# Patient Record
Sex: MALE | Race: WHITE
[De-identification: names, ages, dates, MRNs, and addresses within clinical notes are randomized per-mention and may not be internally consistent; named-entity substitution may affect disease eponyms.]

---

## 2018-09-12 ENCOUNTER — HOSPITAL ENCOUNTER (INPATIENT)
Dept: HOSPITAL 75 - ER | Age: 63
LOS: 5 days | Discharge: HOME | DRG: 392 | End: 2018-09-17
Attending: SURGERY | Admitting: SURGERY
Payer: COMMERCIAL

## 2018-09-12 VITALS — WEIGHT: 191.63 LBS | HEIGHT: 69 IN | BODY MASS INDEX: 28.38 KG/M2

## 2018-09-12 VITALS — SYSTOLIC BLOOD PRESSURE: 108 MMHG | DIASTOLIC BLOOD PRESSURE: 65 MMHG

## 2018-09-12 VITALS — DIASTOLIC BLOOD PRESSURE: 74 MMHG | SYSTOLIC BLOOD PRESSURE: 124 MMHG

## 2018-09-12 DIAGNOSIS — K57.20: Primary | ICD-10-CM

## 2018-09-12 DIAGNOSIS — K21.9: ICD-10-CM

## 2018-09-12 DIAGNOSIS — Z79.899: ICD-10-CM

## 2018-09-12 DIAGNOSIS — F32.9: ICD-10-CM

## 2018-09-12 LAB
ALBUMIN SERPL-MCNC: 4.5 GM/DL (ref 3.2–4.5)
ALP SERPL-CCNC: 85 U/L (ref 40–136)
ALT SERPL-CCNC: 24 U/L (ref 0–55)
APTT PPP: YELLOW S
BACTERIA #/AREA URNS HPF: (no result) /HPF
BASOPHILS # BLD AUTO: 0 10^3/UL (ref 0–0.1)
BASOPHILS NFR BLD AUTO: 0 % (ref 0–10)
BILIRUB SERPL-MCNC: 1.3 MG/DL (ref 0.1–1)
BILIRUB UR QL STRIP: NEGATIVE
BUN/CREAT SERPL: 10
CALCIUM SERPL-MCNC: 9.5 MG/DL (ref 8.5–10.1)
CHLORIDE SERPL-SCNC: 105 MMOL/L (ref 98–107)
CO2 SERPL-SCNC: 22 MMOL/L (ref 21–32)
CREAT SERPL-MCNC: 0.93 MG/DL (ref 0.6–1.3)
EOSINOPHIL # BLD AUTO: 0.1 10^3/UL (ref 0–0.3)
EOSINOPHIL NFR BLD AUTO: 1 % (ref 0–10)
ERYTHROCYTE [DISTWIDTH] IN BLOOD BY AUTOMATED COUNT: 13.1 % (ref 10–14.5)
FIBRINOGEN PPP-MCNC: CLEAR MG/DL
GFR SERPLBLD BASED ON 1.73 SQ M-ARVRAT: > 60 ML/MIN
GLUCOSE SERPL-MCNC: 107 MG/DL (ref 70–105)
GLUCOSE UR STRIP-MCNC: NEGATIVE MG/DL
HCT VFR BLD CALC: 48 % (ref 40–54)
HGB BLD-MCNC: 16.8 G/DL (ref 13.3–17.7)
KETONES UR QL STRIP: (no result)
LEUKOCYTE ESTERASE UR QL STRIP: (no result)
LYMPHOCYTES # BLD AUTO: 1.1 X 10^3 (ref 1–4)
LYMPHOCYTES NFR BLD AUTO: 8 % (ref 12–44)
MAGNESIUM SERPL-MCNC: 2.2 MG/DL (ref 1.8–2.4)
MANUAL DIFFERENTIAL PERFORMED BLD QL: NO
MCH RBC QN AUTO: 32 PG (ref 25–34)
MCHC RBC AUTO-ENTMCNC: 35 G/DL (ref 32–36)
MCV RBC AUTO: 92 FL (ref 80–99)
MONOCYTES # BLD AUTO: 1 X 10^3 (ref 0–1)
MONOCYTES NFR BLD AUTO: 8 % (ref 0–12)
NEUTROPHILS # BLD AUTO: 10.5 X 10^3 (ref 1.8–7.8)
NEUTROPHILS NFR BLD AUTO: 83 % (ref 42–75)
NITRITE UR QL STRIP: NEGATIVE
PH UR STRIP: 5 [PH] (ref 5–9)
PLATELET # BLD: 220 10^3/UL (ref 130–400)
PMV BLD AUTO: 9.4 FL (ref 7.4–10.4)
POTASSIUM SERPL-SCNC: 3.9 MMOL/L (ref 3.6–5)
PROT SERPL-MCNC: 7.8 GM/DL (ref 6.4–8.2)
PROT UR QL STRIP: (no result)
RBC # BLD AUTO: 5.21 10^6/UL (ref 4.35–5.85)
RBC #/AREA URNS HPF: (no result) /HPF
SODIUM SERPL-SCNC: 138 MMOL/L (ref 135–145)
SP GR UR STRIP: 1 (ref 1.02–1.02)
UROBILINOGEN UR-MCNC: NORMAL MG/DL
WBC # BLD AUTO: 12.7 10^3/UL (ref 4.3–11)
WBC #/AREA URNS HPF: (no result) /HPF

## 2018-09-12 PROCEDURE — 96374 THER/PROPH/DIAG INJ IV PUSH: CPT

## 2018-09-12 PROCEDURE — 85025 COMPLETE CBC W/AUTO DIFF WBC: CPT

## 2018-09-12 PROCEDURE — 85027 COMPLETE CBC AUTOMATED: CPT

## 2018-09-12 PROCEDURE — 87088 URINE BACTERIA CULTURE: CPT

## 2018-09-12 PROCEDURE — 80053 COMPREHEN METABOLIC PANEL: CPT

## 2018-09-12 PROCEDURE — 80048 BASIC METABOLIC PNL TOTAL CA: CPT

## 2018-09-12 PROCEDURE — 83735 ASSAY OF MAGNESIUM: CPT

## 2018-09-12 PROCEDURE — 81000 URINALYSIS NONAUTO W/SCOPE: CPT

## 2018-09-12 PROCEDURE — 36415 COLL VENOUS BLD VENIPUNCTURE: CPT

## 2018-09-12 PROCEDURE — 83605 ASSAY OF LACTIC ACID: CPT

## 2018-09-12 PROCEDURE — 74177 CT ABD & PELVIS W/CONTRAST: CPT

## 2018-09-12 RX ADMIN — MORPHINE SULFATE PRN MG: 4 INJECTION, SOLUTION INTRAMUSCULAR; INTRAVENOUS at 20:30

## 2018-09-12 RX ADMIN — METRONIDAZOLE SCH MLS/HR: 5 INJECTION, SOLUTION INTRAVENOUS at 22:14

## 2018-09-12 RX ADMIN — SODIUM CHLORIDE, SODIUM LACTATE, POTASSIUM CHLORIDE, AND CALCIUM CHLORIDE SCH MLS/HR: 600; 310; 30; 20 INJECTION, SOLUTION INTRAVENOUS at 20:25

## 2018-09-12 RX ADMIN — MORPHINE SULFATE PRN MG: 4 INJECTION, SOLUTION INTRAMUSCULAR; INTRAVENOUS at 22:46

## 2018-09-12 NOTE — XMS REPORT
Continuity of Care Document

 Created on: 2018



ROGERIO RODRIGUEZ

External Reference #: [id number]

: 1955

Sex: Male



Demographics







 Address  [address] [place] [address]

 

 Home Phone  [phone] x

 

 Preferred Language  Unknown

 

 Marital Status  Unknown

 

 [employer] Affiliation  Unknown

 

 Race  Unknown

 

 Ethnic Group  Unknown





Author







 Author  [facility]

 

 Address  Unknown

 

 Phone  Unavailable



              



Allergies

      



There is no data.                  



Medications

      



There is no data.                  



Problems

      





 Date Dx Coded            Attending            Type            Code            
Diagnosis            Diagnosed By        

 

 10/06/2011            DARIAN FARRAR PHD                         728.71    
        Plantar Fascial Fibromatosis                     

 

 10/06/2011            RASHI DIAMOND DO                         728.71      
      Plantar Fascial Fibromatosis                     

 

 10/06/2011            DARIAN FARRAR PHD                         728.71    
        Plantar Fascial Fibromatosis                     

 

 10/06/2011            ESSIE MCMANUS DO                         728.71         
   Plantar Fascial Fibromatosis                     

 

 10/06/2011                                      728.71            Plantar 
Fascial Fibromatosis                     

 

 10/06/2011                                      728.71            Plantar 
Fascial Fibromatosis                     

 

 10/06/2011                                      728.71            Plantar 
Fascial Fibromatosis                     

 

 10/06/2011                                      728.71            Plantar 
Fascial Fibromatosis                     

 

 10/06/2011                                      728.71            Plantar 
Fascial Fibromatosis                     

 

 10/06/2011            KYLAH MELGAR PA-C                         728.71    
        Plantar Fascial Fibromatosis                     

 

 10/06/2011            DARIAN FARRAR PHD                         728.71    
        Plantar Fascial Fibromatosis                     

 

 10/06/2011            ESSIE MCMANUS DO                         728.71         
   Plantar Fascial Fibromatosis                     

 

 10/06/2011            DARIAN FARRAR PHD                         728.71    
        Plantar Fascial Fibromatosis                     

 

 10/06/2011            EDWARD REYNAGA                         728.71 
           Plantar Fascial Fibromatosis                     

 

 10/06/2011            DARIAN FARRAR PHD                         728.71    
        Plantar Fascial Fibromatosis                     

 

 10/06/2011            DARIAN FARRAR PHD                         728.71    
        Plantar Fascial Fibromatosis                     

 

 10/06/2011            DARIAN FARRAR PHD                         728.71    
        Plantar Fascial Fibromatosis                     

 

 10/06/2011            EDWARD REYNAGA                         728.71 
           Plantar Fascial Fibromatosis                     

 

 10/06/2011            DARIAN FARRAR PHD                         728.71    
        Plantar Fascial Fibromatosis                     

 

 10/06/2011            ESSIE MCMANUS DO                         728.71         
   Plantar Fascial Fibromatosis                     

 

 10/06/2011            VERONICA APRN, JEFFREY                         728.71      
      Plantar Fascial Fibromatosis                     

 

 10/06/2011            DARIAN FARRAR PHD                         728.71    
        Plantar Fascial Fibromatosis                     

 

 10/06/2011            VERONICA APRN, JEFFREY                         728.71      
      Plantar Fascial Fibromatosis                     

 

 10/06/2011            DARIAN FARRAR PHD                         728.71    
        Plantar Fascial Fibromatosis                     

 

 10/21/2011            DARIAN FARRAR PHD                         311       
     DEPRESSIVE DISORDER NOT ELSEWHERE CLASSIFIED                     

 

 10/21/2011            DARIAN FARRAR PHD                         729.5     
       PAIN IN LIMB                     

 

 10/21/2011            RASHI IDAMOND DO F                         311         
   DEPRESSIVE DISORDER NOT ELSEWHERE CLASSIFIED                     

 

 10/21/2011            RASHI DIAMOND DO F                         729.5       
     PAIN IN LIMB                     

 

 10/21/2011            DARIAN FARRAR PHD                         311       
     DEPRESSIVE DISORDER NOT ELSEWHERE CLASSIFIED                     

 

 10/21/2011            DARIAN FARRAR PHD                         729.5     
       PAIN IN LIMB                     

 

 10/21/2011            ESSIE MCMANUS DO                         311            
DEPRESSIVE DISORDER NOT ELSEWHERE CLASSIFIED                     

 

 10/21/2011            ESSIE MCMANUS DO                         729.5          
  Pain In Limb                     

 

 10/21/2011                                      311            DEPRESSIVE 
DISORDER NOT ELSEWHERE CLASSIFIED                     

 

 10/21/2011                                      729.5            Pain In Limb 
                    

 

 10/21/2011                                      311            DEPRESSIVE 
DISORDER NOT ELSEWHERE CLASSIFIED                     

 

 10/21/2011                                      729.5            Pain In Limb 
                    

 

 10/21/2011                                      311            DEPRESSIVE 
DISORDER NOT ELSEWHERE CLASSIFIED                     

 

 10/21/2011                                      729.5            Pain In Limb 
                    

 

 10/21/2011                                      311            DEPRESSIVE 
DISORDER NOT ELSEWHERE CLASSIFIED                     

 

 10/21/2011                                      729.5            Pain In Limb 
                    

 

 10/21/2011                                      311            DEPRESSIVE 
DISORDER NOT ELSEWHERE CLASSIFIED                     

 

 10/21/2011                                      729.5            Pain In Limb 
                    

 

 10/21/2011            KYLAH MELGAR PA-C                         311       
     DEPRESSIVE DISORDER NOT ELSEWHERE CLASSIFIED                     

 

 10/21/2011            KYLAH MELGAR PA-C                         729.5     
       Pain In Limb                     

 

 10/21/2011            DARIAN FARRAR PHD                         311       
     DEPRESSIVE DISORDER NOT ELSEWHERE CLASSIFIED                     

 

 10/21/2011            DARIAN FARRAR PHD                         729.5     
       Pain In Limb                     

 

 10/21/2011            ESSIE MCMANUS DO                         311            
DEPRESSIVE DISORDER NOT ELSEWHERE CLASSIFIED                     

 

 10/21/2011            ESSIE MCMANUS DO                         729.5          
  Pain In Limb                     

 

 10/21/2011            DARIAN FARRAR PHD                         311       
     DEPRESSIVE DISORDER NOT ELSEWHERE CLASSIFIED                     

 

 10/21/2011            DARIAN FARRAR PHD                         729.5     
       Pain In Limb                     

 

 10/21/2011            VENKATA APREDWARD MAE                         311    
        DEPRESSIVE DISORDER NOT ELSEWHERE CLASSIFIED                     

 

 10/21/2011            VENKATA APREDWARD MAE                         729.5  
          Pain In Limb                     

 

 10/21/2011            DARIAN FARRAR PHD                         311       
     DEPRESSIVE DISORDER NOT ELSEWHERE CLASSIFIED                     

 

 10/21/2011            DARIAN FARRAR PHD                         729.5     
       Pain In Limb                     

 

 10/21/2011            DARIAN FARRAR PHD                         311       
     DEPRESSIVE DISORDER NOT ELSEWHERE CLASSIFIED                     

 

 10/21/2011            DARIAN FARRAR PHD                         729.5     
       Pain In Limb                     

 

 10/21/2011            DARIAN FARRAR PHD                         311       
     DEPRESSIVE DISORDER NOT ELSEWHERE CLASSIFIED                     

 

 10/21/2011            DARIAN FARRAR PHD                         729.5     
       Pain In Limb                     

 

 10/21/2011            EDWARD REYNAGA                         311    
        DEPRESSIVE DISORDER NOT ELSEWHERE CLASSIFIED                     

 

 10/21/2011            HASTINGS APREDWARD MAE                         729.5  
          Pain In Limb                     

 

 10/21/2011            DARIAN FARRAR PHD                         311       
     DEPRESSIVE DISORDER NOT ELSEWHERE CLASSIFIED                     

 

 10/21/2011            DARIAN FARRAR PHD                         729.5     
       Pain In Limb                     

 

 10/21/2011            MCMANUS DO, ESSIE K                         311            
DEPRESSIVE DISORDER NOT ELSEWHERE CLASSIFIED                     

 

 10/21/2011            MCMANUS DO, ESSIE K                         729.5          
  Pain In Limb                     

 

 10/21/2011            VERONICA APRN, JEFFREY                         311         
   DEPRESSIVE DISORDER NOT ELSEWHERE CLASSIFIED                     

 

 10/21/2011            VERONICA APRN, JEFFREY                         729.5       
     Pain In Limb                     

 

 10/21/2011            DARIAN FARRAR PHD                         311       
     DEPRESSIVE DISORDER NOT ELSEWHERE CLASSIFIED                     

 

 10/21/2011            DARIAN FARRAR PHD                         729.5     
       Pain In Limb                     

 

 10/21/2011            VERONICA APRN, JEFFREY                         311         
   DEPRESSIVE DISORDER NOT ELSEWHERE CLASSIFIED                     

 

 10/21/2011            VERONICA APRN, JEFFREY                         729.5       
     Pain In Limb                     

 

 10/21/2011            DARIAN FARRAR PHD                         311       
     DEPRESSIVE DISORDER NOT ELSEWHERE CLASSIFIED                     

 

 10/21/2011            DARIAN FARRAR PHD                         729.5     
       Pain In Limb                     

 

 10/22/2011            DARIAN FARRAR PHD                         296.30    
        MO DEPRESSIVE RECURRENT UNSPECIFIED                     

 

 10/22/2011            DARIAN FARRAR PHD                         304.80    
        SA POLYSUB DEP                     

 

 10/22/2011            DARIAN FARRAR PHD                         307.47    
        SI DYSSOMNIA NOS                     

 

 10/22/2011            WERDER DO, RASHI F                         296.30      
      MO DEPRESSIVE RECURRENT UNSPECIFIED                     

 

 10/22/2011            WERDER DO, RASHI F                         304.80      
      SA POLYSUB DEP                     

 

 10/22/2011            WERDER DO, RASHI F                         307.47      
      SI DYSSOMNIA NOS                     

 

 10/22/2011            DARIAN FARRAR PHD                         296.30    
        MO DEPRESSIVE RECURRENT UNSPECIFIED                     

 

 10/22/2011            DARIAN FARRAR PHD                         304.80    
        SA POLYSUB DEP                     

 

 10/22/2011            DARIAN FARRAR PHD                         307.47    
        SI DYSSOMNIA NOS                     

 

 10/22/2011            MCMANUS DO, ESSIE K                         296.30         
   MO DEPRESSIVE RECURRENT UNSPECIFIED                     

 

 10/22/2011            MCMANUS DO, ESSIE K                         304.80         
   SA POLYSUB DEP                     

 

 10/22/2011            MCMANUS DO, ESSIE K                         307.47         
   SI DYSSOMNIA NOS                     

 

 10/22/2011                                      296.30            MO 
DEPRESSIVE RECURRENT UNSPECIFIED                     

 

 10/22/2011                                      304.80            SA POLYSUB 
DEP                     

 

 10/22/2011                                      307.47            SI DYSSOMNIA 
NOS                     

 

 10/22/2011                                      296.30            MO 
DEPRESSIVE RECURRENT UNSPECIFIED                     

 

 10/22/2011                                      304.80            SA POLYSUB 
DEP                     

 

 10/22/2011                                      307.47            SI DYSSOMNIA 
NOS                     

 

 10/22/2011                                      296.30            MO 
DEPRESSIVE RECURRENT UNSPECIFIED                     

 

 10/22/2011                                      304.80            SA POLYSUB 
DEP                     

 

 10/22/2011                                      307.47            SI DYSSOMNIA 
NOS                     

 

 10/22/2011                                      296.30            MO 
DEPRESSIVE RECURRENT UNSPECIFIED                     

 

 10/22/2011                                      304.80            SA POLYSUB 
DEP                     

 

 10/22/2011                                      307.47            SI DYSSOMNIA 
NOS                     

 

 10/22/2011                                      296.30            MO 
DEPRESSIVE RECURRENT UNSPECIFIED                     

 

 10/22/2011                                      304.80            SA POLYSUB 
DEP                     

 

 10/22/2011                                      307.47            SI DYSSOMNIA 
NOS                     

 

 10/22/2011            KYLAH MELGAR PA-C                         296.30    
        MO DEPRESSIVE RECURRENT UNSPECIFIED                     

 

 10/22/2011            KYLAH MELGAR PA-C                         304.80    
        SA POLYSUB DEP                     

 

 10/22/2011            KYLAH MELGAR PA-C                         307.47    
        SI DYSSOMNIA NOS                     

 

 10/22/2011            DARIAN FARRAR PHD                         296.30    
        MO DEPRESSIVE RECURRENT UNSPECIFIED                     

 

 10/22/2011            DARIAN FARRAR PHD                         304.80    
        SA POLYSUB DEP                     

 

 10/22/2011            DARIAN FARRAR PHD                         307.47    
        SI DYSSOMNIA NOS                     

 

 10/22/2011            MCMANUS DO, ESSIE K                         296.30         
   MO DEPRESSIVE RECURRENT UNSPECIFIED                     

 

 10/22/2011            MCMANUS DO, ESSIE K                         304.80         
   SA POLYSUB DEP                     

 

 10/22/2011            MCMANUS DO, ESSIE K                         307.47         
   SI DYSSOMNIA NOS                     

 

 10/22/2011            DARIAN FARRAR PHD                         296.30    
        MO DEPRESSIVE RECURRENT UNSPECIFIED                     

 

 10/22/2011            DARIAN FARRAR PHD                         304.80    
        SA POLYSUB DEP                     

 

 10/22/2011            DARIAN FARRAR PHD                         307.47    
        SI DYSSOMNIA NOS                     

 

 10/22/2011            HASTINGS APRN, EDWARD VAUGHN                         296.30 
           MO DEPRESSIVE RECURRENT UNSPECIFIED                     

 

 10/22/2011            HASTINGS APRN, EDWARD RIVERAH                         304.80 
           SA POLYSUB DEP                     

 

 10/22/2011            HASTINGS APRN, EDWARD VAUGHN                         307.47 
           SI DYSSOMNIA NOS                     

 

 10/22/2011            DARIAN FARRAR PHD                         296.30    
        MO DEPRESSIVE RECURRENT UNSPECIFIED                     

 

 10/22/2011            DARIAN FARRAR PHD                         304.80    
        SA POLYSUB DEP                     

 

 10/22/2011            DARIAN FARRAR PHD                         307.47    
        SI DYSSOMNIA NOS                     

 

 10/22/2011            DARIAN FARRAR PHD                         296.30    
        MO DEPRESSIVE RECURRENT UNSPECIFIED                     

 

 10/22/2011            DARIAN FARRAR PHD                         304.80    
        SA POLYSUB DEP                     

 

 10/22/2011            DARIAN FARRAR PHD                         307.47    
        SI DYSSOMNIA NOS                     

 

 10/22/2011            DARIAN FARRAR PHD                         296.30    
        MO DEPRESSIVE RECURRENT UNSPECIFIED                     

 

 10/22/2011            DARIAN FARRAR PHD                         304.80    
        SA POLYSUB DEP                     

 

 10/22/2011            DARIAN FARRAR PHD                         307.47    
        SI DYSSOMNIA NOS                     

 

 10/22/2011            HASTINGS APRN, EDWARD VAUGHN                         296.30 
           MO DEPRESSIVE RECURRENT UNSPECIFIED                     

 

 10/22/2011            HASTINGS APRN, EDWARD RIVERAH                         304.80 
           SA POLYSUB DEP                     

 

 10/22/2011            HASTINGS APRN, EDWARD VAUGHN                         307.47 
           SI DYSSOMNIA NOS                     

 

 10/22/2011            DARIAN FARRAR PHD                         296.30    
        MO DEPRESSIVE RECURRENT UNSPECIFIED                     

 

 10/22/2011            DARIAN FARRAR PHD                         304.80    
        SA POLYSUB DEP                     

 

 10/22/2011            DARIAN FARRAR PHD                         307.47    
        SI DYSSOMNIA NOS                     

 

 10/22/2011            ESSIE MCMANUS DO K                         296.30         
   MO DEPRESSIVE RECURRENT UNSPECIFIED                     

 

 10/22/2011            ESSIE MCMANUS DO K                         304.80         
   SA POLYSUB DEP                     

 

 10/22/2011            MCMANUS DOESSIE K                         307.47         
   SI DYSSOMNIA NOS                     

 

 10/22/2011            VERONICA APRN, JEFFREY                         296.30      
      MO DEPRESSIVE RECURRENT UNSPECIFIED                     

 

 10/22/2011            VERONICA APRN, JEFFREY                         304.80      
      SA POLYSUB DEP                     

 

 10/22/2011            VERONICA APRN, JEFFREY                         307.47      
      SI DYSSOMNIA NOS                     

 

 10/22/2011            DARIAN FARRAR PHD                         296.30    
        MO DEPRESSIVE RECURRENT UNSPECIFIED                     

 

 10/22/2011            DARIAN FARRAR PHD                         304.80    
        SA POLYSUB DEP                     

 

 10/22/2011            DARIAN FARRAR PHD                         307.47    
        SI DYSSOMNIA NOS                     

 

 10/22/2011            VERONICA APRN, JEFFREY                         296.30      
      MO DEPRESSIVE RECURRENT UNSPECIFIED                     

 

 10/22/2011            VERONICA APRN, JEFFREY                         304.80      
      SA POLYSUB DEP                     

 

 10/22/2011            VERONICA APRN, JEFFREY                         307.47      
      SI DYSSOMNIA NOS                     

 

 10/22/2011            DARIAN FARRAR PHD                         296.30    
        MO DEPRESSIVE RECURRENT UNSPECIFIED                     

 

 10/22/2011            DARIAN FARRAR PHD                         304.80    
        SA POLYSUB DEP                     

 

 10/22/2011            DARIAN FARRAR PHD                         307.47    
        SI DYSSOMNIA NOS                     

 

 2011            DARIAN FARRAR PHD                         296.32    
        MO DEPRESSIVE RECURRENT MODERATE                     

 

 2011            RASHI DIAMOND DO                         296.32      
      MO DEPRESSIVE RECURRENT MODERATE                     

 

 2011            DARIAN FARRAR PHD                         296.32    
        MO DEPRESSIVE RECURRENT MODERATE                     

 

 2011            ESSIE MCMANUS DO                         296.32         
   MO DEPRESSIVE RECURRENT MODERATE                     

 

 2011                                      296.32            MO 
DEPRESSIVE RECURRENT MODERATE                     

 

 2011                                      296.32            MO 
DEPRESSIVE RECURRENT MODERATE                     

 

 2011                                      296.32            MO 
DEPRESSIVE RECURRENT MODERATE                     

 

 2011                                      296.32            MO 
DEPRESSIVE RECURRENT MODERATE                     

 

 2011                                      296.32            MO 
DEPRESSIVE RECURRENT MODERATE                     

 

 2011            KYLAH MELGAR PA-C                         296.32    
        MO DEPRESSIVE RECURRENT MODERATE                     

 

 2011            DARIAN FARRAR PHD                         296.32    
        MO DEPRESSIVE RECURRENT MODERATE                     

 

 2011            ESSIE MCMANUS DO                         296.32         
   MO DEPRESSIVE RECURRENT MODERATE                     

 

 2011            DARIAN FARRAR PHD                         296.32    
        MO DEPRESSIVE RECURRENT MODERATE                     

 

 2011            HASTINGS APRTU, EDWARD VAUGHN                         296.32 
           MO DEPRESSIVE RECURRENT MODERATE                     

 

 2011            LENI YAN, DARIAN JARA                         296.32    
        MO DEPRESSIVE RECURRENT MODERATE                     

 

 2011            DARIAN FARRAR PHD                         296.32    
        MO DEPRESSIVE RECURRENT MODERATE                     

 

 2011            LENI YAN, DARIAN JARA                         296.32    
        MO DEPRESSIVE RECURRENT MODERATE                     

 

 2011            HASTINGS APRTU, EDWARD VAUGHN                         296.32 
           MO DEPRESSIVE RECURRENT MODERATE                     

 

 2011            LENI YAN, DARIAN JARA                         296.32    
        MO DEPRESSIVE RECURRENT MODERATE                     

 

 2011            ESSIE MCMANUS DO                         296.32         
   MO DEPRESSIVE RECURRENT MODERATE                     

 

 2011            VERONICA APRN, JEFFREY                         296.32      
      MO DEPRESSIVE RECURRENT MODERATE                     

 

 2011            DARIAN FARRAR PHD                         296.32    
        MO DEPRESSIVE RECURRENT MODERATE                     

 

 2011            VERONICA APRN, JEFFREY                         296.32      
      MO DEPRESSIVE RECURRENT MODERATE                     

 

 2011            DARIAN FARRAR PHD                         296.32    
        MO DEPRESSIVE RECURRENT MODERATE                     

 

 2012            DARIAN FARRAR PHD                         111.0     
       PITYRIASIS VERSICOLOR                     

 

 2012            DARIAN FARRAR PHD                         272.4     
       OTHER AND UNSPECIFIED HYPERLIPIDEMIA                     

 

 2012            RASHI DIAMOND DO F                         111.0       
     PITYRIASIS VERSICOLOR                     

 

 2012            RASHI DIAMOND DO F                         272.4       
     OTHER AND UNSPECIFIED HYPERLIPIDEMIA                     

 

 2012            DARIAN FARRAR PHD                         111.0     
       PITYRIASIS VERSICOLOR                     

 

 2012            DARIAN FARRAR PHD                         272.4     
       OTHER AND UNSPECIFIED HYPERLIPIDEMIA                     

 

 2012            ESSIE MCMANUS DO                         111.0          
  PITYRIASIS VERSICOLOR                     

 

 2012            ESSIE MCMANUS DO                         272.4          
  OTHER AND UNSPECIFIED HYPERLIPIDEMIA                     

 

 2012                                      111.0            [facility] 
VERSICOLOR                     

 

 2012                                      272.4            OTHER AND 
UNSPECIFIED HYPERLIPIDEMIA                     

 

 2012                                      111.0            [facility] 
VERSICOLOR                     

 

 2012                                      272.4            OTHER AND 
UNSPECIFIED HYPERLIPIDEMIA                     

 

 2012                                      111.0            [facility] 
VERSICOLOR                     

 

 2012                                      272.4            OTHER AND 
UNSPECIFIED HYPERLIPIDEMIA                     

 

 2012                                      111.0            [facility] 
VERSICOLOR                     

 

 2012                                      272.4            OTHER AND 
UNSPECIFIED HYPERLIPIDEMIA                     

 

 2012                                      111.0            [facility] 
VERSICOLOR                     

 

 2012                                      272.4            OTHER AND 
UNSPECIFIED HYPERLIPIDEMIA                     

 

 2012            KYLAH MELGAR PA-C                         111.0     
       [facility] VERSICOLOR                     

 

 2012            KYLAH MELGAR PA-C                         272.4     
       OTHER AND UNSPECIFIED HYPERLIPIDEMIA                     

 

 2012            DARIAN FARRAR PHD A                         111.0     
       [facility] VERSICOLOR                     

 

 2012            DARIAN FARRAR PHD A                         272.4     
       OTHER AND UNSPECIFIED HYPERLIPIDEMIA                     

 

 2012            MCMANUS VERONIKA CABRALA K                         111.0          
  [facility] VERSICOLOR                     

 

 2012            ESSIE MCMANUS DO K                         272.4          
  OTHER AND UNSPECIFIED HYPERLIPIDEMIA                     

 

 2012            DARIAN FARRAR PHD A                         111.0     
       [facility] VERSICOLOR                     

 

 2012            DARIAN FARRAR PHD A                         272.4     
       OTHER AND UNSPECIFIED HYPERLIPIDEMIA                     

 

 2012            VENKATA WADDELL EDWARD RODERICK                         111.0  
          [facility] VERSICOLOR                     

 

 2012            VENKATA WADDELL EDWARD RODERICK                         272.4  
          OTHER AND UNSPECIFIED HYPERLIPIDEMIA                     

 

 2012            DARIAN FARRAR PHD A                         111.0     
       [facility] VERSICOLOR                     

 

 2012            DARIAN FARRAR PHD                         272.4     
       OTHER AND UNSPECIFIED HYPERLIPIDEMIA                     

 

 2012            DARIAN FARRAR PHD A                         111.0     
       [facility] VERSICOLOR                     

 

 2012            DARIAN FARRAR PHD A                         272.4     
       OTHER AND UNSPECIFIED HYPERLIPIDEMIA                     

 

 2012            DARIAN FARRAR PHD A                         111.0     
       [facility] VERSICOLOR                     

 

 2012            DARIAN FARRAR PHD A                         272.4     
       OTHER AND UNSPECIFIED HYPERLIPIDEMIA                     

 

 2012            VENKATA WADDELL EDWARD RODERICK                         111.0  
          [facility] VERSICOLOR                     

 

 2012            VENKATA WADDELL EDWARD RODERICK                         272.4  
          OTHER AND UNSPECIFIED HYPERLIPIDEMIA                     

 

 2012            DARIAN FARRAR PHD A                         111.0     
       [facility] VERSICOLOR                     

 

 2012            DARIAN FARRAR PHD A                         272.4     
       OTHER AND UNSPECIFIED HYPERLIPIDEMIA                     

 

 2012            MCMANUS VERONIKA CABRALA K                         111.0          
  [facility] VERSICOLOR                     

 

 2012            ESSIE MCMANUS DO                         272.4          
  OTHER AND UNSPECIFIED HYPERLIPIDEMIA                     

 

 2012            VERONICA APRN, JEFFREY                         111.0       
     [facility] VERSICOLOR                     

 

 2012            VERONICA APRN, JEFFREY                         272.4       
     OTHER AND UNSPECIFIED HYPERLIPIDEMIA                     

 

 2012            DARIAN FARRAR PHD                         111.0     
       [facility] VERSICOLOR                     

 

 2012            DARIAN FARRAR PHD                         272.4     
       OTHER AND UNSPECIFIED HYPERLIPIDEMIA                     

 

 2012            VERONICA APRN, JEFFREY                         111.0       
     [facility] VERSICOLOR                     

 

 2012            VERONICA APRN, JEFFREY                         272.4       
     OTHER AND UNSPECIFIED HYPERLIPIDEMIA                     

 

 2012            DARIAN FARRAR PHD                         111.0     
       [facility] VERSICOLOR                     

 

 2012            DARIAN FARRAR PHD                         272.4     
       OTHER AND UNSPECIFIED HYPERLIPIDEMIA                     

 

 2012            DARIAN FARRAR PHD                         686.9     
       UNSPECIFIED LOCAL INFECTION OF SKIN AND SUBCUTANEOUS TISSUE             
        

 

 2012            RASHI DIAMOND DO                         686.9       
     UNSPECIFIED LOCAL INFECTION OF SKIN AND SUBCUTANEOUS TISSUE               
      

 

 2012            DARIAN FARRAR PHD                         686.9     
       UNSPECIFIED LOCAL INFECTION OF SKIN AND SUBCUTANEOUS TISSUE             
        

 

 2012            ESSIE MCMANUS DO                         686.9          
  UNSPECIFIED LOCAL INFECTION OF SKIN AND SUBCUTANEOUS TISSUE                  
   

 

 2012                                      686.9            UNSPECIFIED 
LOCAL INFECTION OF SKIN AND SUBCUTANEOUS TISSUE                     

 

 2012                                      686.9            UNSPECIFIED 
LOCAL INFECTION OF SKIN AND SUBCUTANEOUS TISSUE                     

 

 2012                                      686.9            UNSPECIFIED 
LOCAL INFECTION OF SKIN AND SUBCUTANEOUS TISSUE                     

 

 2012                                      686.9            UNSPECIFIED 
LOCAL INFECTION OF SKIN AND SUBCUTANEOUS TISSUE                     

 

 2012                                      686.9            UNSPECIFIED 
LOCAL INFECTION OF SKIN AND SUBCUTANEOUS TISSUE                     

 

 2012            KYLAH MELGAR PA-C                         686.9     
       UNSPECIFIED LOCAL INFECTION OF SKIN AND SUBCUTANEOUS TISSUE             
        

 

 2012            DARIAN FARRAR PHD                         686.9     
       UNSPECIFIED LOCAL INFECTION OF SKIN AND SUBCUTANEOUS TISSUE             
        

 

 2012            ESSIE MCMANUS DO                         686.9          
  UNSPECIFIED LOCAL INFECTION OF SKIN AND SUBCUTANEOUS TISSUE                  
   

 

 2012            DARIAN FARRAR PHD                         686.9     
       UNSPECIFIED LOCAL INFECTION OF SKIN AND SUBCUTANEOUS TISSUE             
        

 

 2012            EDWARD REYNAGA                         686.9  
          UNSPECIFIED LOCAL INFECTION OF SKIN AND SUBCUTANEOUS TISSUE          
           

 

 2012            DARIAN FARRAR PHD                         686.9     
       UNSPECIFIED LOCAL INFECTION OF SKIN AND SUBCUTANEOUS TISSUE             
        

 

 2012            DARIAN FARRAR PHD                         686.9     
       UNSPECIFIED LOCAL INFECTION OF SKIN AND SUBCUTANEOUS TISSUE             
        

 

 2012            DARIAN FARRAR PHD                         686.9     
       UNSPECIFIED LOCAL INFECTION OF SKIN AND SUBCUTANEOUS TISSUE             
        

 

 2012            EDWARD REYNAGA                         686.9  
          UNSPECIFIED LOCAL INFECTION OF SKIN AND SUBCUTANEOUS TISSUE          
           

 

 2012            DARIAN FARRAR PHD                         686.9     
       UNSPECIFIED LOCAL INFECTION OF SKIN AND SUBCUTANEOUS TISSUE             
        

 

 2012            MCMANUS ESSIE CABRAL                         686.9          
  UNSPECIFIED LOCAL INFECTION OF SKIN AND SUBCUTANEOUS TISSUE                  
   

 

 2012            JEFFREY MORA                         686.9       
     UNSPECIFIED LOCAL INFECTION OF SKIN AND SUBCUTANEOUS TISSUE               
      

 

 2012            DARIAN FARRAR PHD                         686.9     
       UNSPECIFIED LOCAL INFECTION OF SKIN AND SUBCUTANEOUS TISSUE             
        

 

 2012            JEFFREY MORA                         686.9       
     UNSPECIFIED LOCAL INFECTION OF SKIN AND SUBCUTANEOUS TISSUE               
      

 

 2012            DARIAN FARRAR PHD                         686.9     
       UNSPECIFIED LOCAL INFECTION OF SKIN AND SUBCUTANEOUS TISSUE             
        

 

 2012            DARIAN FARRAR PHD                         782.3     
       EDEMA                     

 

 2012            DARIAN FARRAR PHD                         782.7     
       petichiae                     

 

 2012            [facility]ERDER DO RASHI F                         782.3       
     [facility]EMA                     

 

 [facility]                         782.7       
     petichiae                     

 

 2012            DARIAN FARRAR PHD                         782.3     
       EDEMA                     

 

 2012            DARIAN FARRAR PHD                         782.7     
       petichiae                     

 

 2012            MCMANUS DO, ESSIE K                         782.3          
  Edema                     

 

 2012            MCMANUS DO, ESSIE K                         782.7          
  Petichiae                     

 

 2012                                      782.3            Edema        
             

 

 2012                                      782.7            Petichiae    
                 

 

 2012                                      782.3            Edema        
             

 

 2012                                      782.7            Petichiae    
                 

 

 2012                                      782.3            Edema        
             

 

 2012                                      782.7            Petichiae    
                 

 

 2012                                      782.3            Edema        
             

 

 2012                                      782.7            Petichiae    
                 

 

 2012                                      782.3            Edema        
             

 

 2012                                      782.7            Petichiae    
                 

 

 2012            KYLAH MELGAR PA-C                         782.3     
       Edema                     

 

 2012            KYLAH MELGAR PA-C                         782.7     
       Petichiae                     

 

 2012            [facility]OUT PHD, DARIAN JARA                         782.3     
       Edema                     

 

 2012            [facility] PHD, DARIAN JARA                         782.7     
       Petichiae                     

 

 2012            MCMANUS DO, ESSIE K                         782.3          
  Edema                     

 

 2012            MCMANUS DO, ESSIE K                         782.7          
  Petichiae                     

 

 2012            [facility]OUT PHD, DARIAN A                         782.3     
       Edema                     

 

 2012            [facility] PHD, DARIAN JARA                         782.7     
       Petichiae                     

 

 2012            HASTINGS APRN, EDWARD RIVERAH                         782.3  
          Edema                     

 

 2012            HASTINGS APRN, EDWARD RODERICK                         782.7  
          Petichiae                     

 

 2012            BOEKHOUT PHD, DARIAN A                         782.3     
       Edema                     

 

 2012            [facility] PHD, DARIAN A                         782.7     
       Petichiae                     

 

 2012            [facility] PHD, DARIAN A                         782.3     
       Edema                     

 

 2012            BOEOUT PHD, DARIAN A                         782.7     
       Petichiae                     

 

 2012            BOEKHOUT PHD, DARIAN A                         782.3     
       Edema                     

 

 2012            BOEOUT PHD, DARIAN A                         782.7     
       Petichiae                     

 

 2012            HASTINGS APRN, EDWARD RODERICK                         782.3  
          Edema                     

 

 2012            HASTINGS APRN, EDWARD RODERICK                         782.7  
          Petichiae                     

 

 2012            BOEOUT PHD, DARIAN A                         782.3     
       Edema                     

 

 2012            BOEOUT PHD, DARIAN A                         782.7     
       Petichiae                     

 

 2012            MCMANUS DO, ESSIE K                         782.3          
  Edema                     

 

 2012            MCMANUS DO, ESSIE K                         782.7          
  Petichiae                     

 

 2012            VERONICA APRN, JEFFREY                         782.3       
     Edema                     

 

 2012            VERONICA APRN, JEFFREY                         782.7       
     Petichiae                     

 

 2012            BOEKHOUT PHD, DARIAN A                         782.3     
       Edema                     

 

 2012            DARIAN FARRAR PHD                         782.7     
       Petichiae                     

 

 2012            JEFFREY MORA                         782.3       
     Edema                     

 

 2012            JEFFREY MORA                         782.7       
     Petichiae                     

 

 2012            DARIAN FARRAR PHD                         782.3     
       Edema                     

 

 2012            DARIAN FARRAR PHD                         782.7     
       Petichiae                     

 

 2012            DARIAN FARRAR PHD                         709.1     
       VASCULAR DISORDERS OF SKIN                     

 

 2012            RASHI DIAMOND DO                         709.1       
     VASCULAR DISORDERS OF SKIN                     

 

 2012            DARIAN FARRAR PHD                         709.1     
       VASCULAR DISORDERS OF SKIN                     

 

 2012            ESSIE MCMANUS DO                         709.1          
  Vascular Disorders Of Skin                     

 

 2012                                      709.1            Vascular 
Disorders Of Skin                     

 

 2012                                      709.1            Vascular 
Disorders Of Skin                     

 

 2012                                      709.1            Vascular 
Disorders Of Skin                     

 

 2012                                      709.1            Vascular 
Disorders Of Skin                     

 

 2012                                      709.1            Vascular 
Disorders Of Skin                     

 

 2012            TALIA DENNIS, KYLAH ANDRADE                         709.1     
       Vascular Disorders Of Skin                     

 

 2012            DARIAN FARRAR PHD                         709.1     
       Vascular Disorders Of Skin                     

 

 2012            ESSIE MCMANUS DO                         709.1          
  Vascular Disorders Of Skin                     

 

 2012            DARIAN FARRAR PHD                         709.1     
       Vascular Disorders Of Skin                     

 

 2012            EDWARD REYNAGA                         709.1  
          Vascular Disorders Of Skin                     

 

 2012            DARIAN FARRAR PHD                         709.1     
       Vascular Disorders Of Skin                     

 

 2012            DARIAN FARRAR PHD                         709.1     
       Vascular Disorders Of Skin                     

 

 2012            DARIAN FARRAR PHD                         709.1     
       Vascular Disorders Of Skin                     

 

 2012            EDWARD REYNAGA                         709.1  
          Vascular Disorders Of Skin                     

 

 2012            DARIAN FARRAR PHD                         709.1     
       Vascular Disorders Of Skin                     

 

 2012            ESSIE MCMANUS DO                         709.1          
  Vascular Disorders Of Skin                     

 

 2012            JEFFREY MORA                         709.1       
     Vascular Disorders Of Skin                     

 

 2012            DARIAN FARRAR PHD                         709.1     
       Vascular Disorders Of Skin                     

 

 2012            JEFFREY MORA                         709.1       
     Vascular Disorders Of Skin                     

 

 2012            DARIAN FARRAR PHD                         709.1     
       Vascular Disorders Of Skin                     

 

 2012            DARIAN FARRAR PHD                         296.31    
        MO DEPRESSIVE RECURRENT MILD                     

 

 2012            RASHI DIAMOND DO                         296.31      
      MO DEPRESSIVE RECURRENT MILD                     

 

 2012            DARIAN FARRAR PHD                         296.31    
        MO DEPRESSIVE RECURRENT MILD                     

 

 2012            ESSIE MCMANUS DO                         296.31         
   MO DEPRESSIVE RECURRENT MILD                     

 

 2012                                      296.31            MO 
DEPRESSIVE RECURRENT MILD                     

 

 2012                                      296.31            MO 
DEPRESSIVE RECURRENT MILD                     

 

 2012                                      296.31            MO 
DEPRESSIVE RECURRENT MILD                     

 

 2012                                      296.31            MO 
DEPRESSIVE RECURRENT MILD                     

 

 2012                                      296.31            MO 
DEPRESSIVE RECURRENT MILD                     

 

 2012            KYLAH MELGAR PA-C                         296.31    
        MO DEPRESSIVE RECURRENT MILD                     

 

 2012            DARIAN FARRAR PHD                         296.31    
        MO DEPRESSIVE RECURRENT MILD                     

 

 2012            ESSIE MCMANUS DO                         296.31         
   MO DEPRESSIVE RECURRENT MILD                     

 

 2012            DARIAN FARRAR PHD                         296.31    
        MO DEPRESSIVE RECURRENT MILD                     

 

 2012            EDWARD REYNAGA                         296.31 
           MO DEPRESSIVE RECURRENT MILD                     

 

 2012            DARIAN FARRAR PHD                         296.31    
        MO DEPRESSIVE RECURRENT MILD                     

 

 2012            DARIAN FARRAR PHD                         296.31    
        MO DEPRESSIVE RECURRENT MILD                     

 

 2012            DARIAN FARRAR PHD                         296.31    
        MO DEPRESSIVE RECURRENT MILD                     

 

 2012            EDWARD REYNAGA                         296.31 
           MO DEPRESSIVE RECURRENT MILD                     

 

 2012            DARIAN FARRAR PHD                         296.31    
        MO DEPRESSIVE RECURRENT MILD                     

 

 2012            ESSIE MCMANUS DO                         296.31         
   MO DEPRESSIVE RECURRENT MILD                     

 

 2012            JEFFREY MORA                         296.31      
      MO DEPRESSIVE RECURRENT MILD                     

 

 2012            DARIAN FARRAR PHD                         296.31    
        MO DEPRESSIVE RECURRENT MILD                     

 

 2012            JEFFREY MORA                         296.31      
      MO DEPRESSIVE RECURRENT MILD                     

 

 2012            DARIAN FARRAR PHD                         296.31    
        MO DEPRESSIVE RECURRENT MILD                     

 

 2013            JENNIFER FARNSWORTH, ENRIQUE POLANCO Ot            276.51  
          DEHYDRATION                     

 

 2013            ENRIQUE RODRIGUEZ MD            Ot            787.03  
          VOMITING ALONE                     

 

 2013            ENRIQUE RODRIGUEZ MD            Ot            789.00  
          ABDOMINAL PAIN, UNSPECIFIED SITE                     

 

 2013                                      727.03            TRIGGER 
FINGER (ACQUIRED)                     

 

 2013                                      727.03            TRIGGER 
FINGER (ACQUIRED)                     

 

 2013                                      727.03            TRIGGER 
FINGER (ACQUIRED)                     

 

 2013            KYLAH MELGAR PA-C                         727.03    
        TRIGGER FINGER (ACQUIRED)                     

 

 2013            LENI YAN, DARIAN A                         727.03    
        TRIGGER FINGER (ACQUIRED)                     

 

 2013            ESSIE MCMANUS DO                         727.03         
   TRIGGER FINGER (ACQUIRED)                     

 

 2013            LENI YAN, DARIAN A                         727.03    
        TRIGGER FINGER (ACQUIRED)                     

 

 2013            EDWARD REYNAGA                         727.03 
           TRIGGER FINGER (ACQUIRED)                     

 

 2013            LENI YAN, DARIAN A                         727.03    
        TRIGGER FINGER (ACQUIRED)                     

 

 2013            DARIAN FARRAR PHD A                         727.03    
        TRIGGER FINGER (ACQUIRED)                     

 

 2013            [facility]                         727.03    
        TRIGGER FINGER (ACQUIRED)                     

 

 2013            EDWARD REYNAGA                         727.03 
           TRIGGER FINGER (ACQUIRED)                     

 

 2013            [facility]                         727.03    
        TRIGGER FINGER (ACQUIRED)                     

 

 2013            ESSIE MCMANUS DO                         727.03         
   TRIGGER FINGER (ACQUIRED)                     

 

 2013            VERONICA WADDELL JEFFREY                         727.03      
      TRIGGER FINGER (ACQUIRED)                     

 

 2013            LENI YAN DARIAN A                         727.03    
        TRIGGER FINGER (ACQUIRED)                     

 

 2013            VERONICA WADDELL JEFFREY                         727.03      
      TRIGGER FINGER (ACQUIRED)                     

 

 2013            LENI YAN, DARIAN A                         727.03    
        TRIGGER FINGER (ACQUIRED)                     

 

 2013                                      790.4            ABNORMAL LFT (
ELEVATED)                     

 

 2013                                      790.4            ABNORMAL LFT (
ELEVATED)                     

 

 2013            KYLAH MELGAR PA-C                         790.4     
       ABNORMAL LFT (ELEVATED)                     

 

 2013            DARIAN FARRAR PHD                         790.4     
       ABNORMAL LFT (ELEVATED)                     

 

 2013            ESSIE MCMANUS DO                         790.4          
  ABNORMAL LFT (ELEVATED)                     

 

 2013            [facility]                         790.4     
       ABNORMAL LFT (ELEVATED)                     

 

 2013            VENKATA WADDELL EDWARD RIVERAH                         790.4  
          ABNORMAL LFT (ELEVATED)                     

 

 2013            [facility]                         790.4     
       ABNORMAL LFT (ELEVATED)                     

 

 2013            [facility]                         790.4     
       ABNORMAL LFT (ELEVATED)                     

 

 2013            [facility]                         790.4     
       ABNORMAL LFT (ELEVATED)                     

 

 2013            VENKATA WADDELL EDWARD RODERICK                         790.4  
          ABNORMAL LFT (ELEVATED)                     

 

 2013OUT PHD, DARIAN A                         790.4     
       ABNORMAL LFT (ELEVATED)                     

 

 2013            ESSIE MCMANUS DO                         790.4          
  ABNORMAL LFT (ELEVATED)                     

 

 2013            JEFFREY MORA                         790.4       
     ABNORMAL LFT (ELEVATED)                     

 

 [facility]                         790.[facility]     
       ABNORMAL LFT (ELEVATED)                     

 

 2013            JEFFREY MORA                         790.4       
     ABNORMAL LFT (ELEVATED)                     

 

 2013            [facility] PHD, DARIAN A                         790.4     
       ABNORMAL LFT (ELEVATED)                     

 

 2013                                      070.70            HEPATITIS C, 
UNSPEC                     

 

 2013            KYLAH MELGAR PA-C                         070.70    
        HEPATITIS C, UNSPEC                     

 

 2013            LENI PHD, DARIAN A                         070.70    
        HEPATITIS C, UNSPEC                     

 

 2013            ESSIE MCMANUS DO                         070.70         
   HEPATITIS C, UNSPEC                     

 

 2013OUT PHD, DARIAN A                         070.70    
        HEPATITIS C, UNSPEC                     

 

 2013            VENKATA WADDELL EDWARD RODERICK                         070.70 
           HEPATITIS C, UNSPEC                     

 

 2013            BRENDAOUT PHD, DARIAN A                         070.70    
        HEPATITIS C, UNSPEC                     

 

 2013            BOEOUT PHD, DARIAN A                         070.70    
        HEPATITIS C, UNSPEC                     

 

 2013            BOEOUT PHD, DARIAN A                         070.70    
        HEPATITIS C, UNSPEC                     

 

 2013            EDWARD REYNAGA                         070.70 
           HEPATITIS C, UNSPEC                     

 

 2013OUT PHD, DARIAN A                         070.70    
        HEPATITIS C, UNSPEC                     

 

 2013            ESSIE MCMANUS DO                         070.70         
   HEPATITIS C, UNSPEC                     

 

 2013            VERONICA APRTU, JEFFRYE                         070.70      
      HEPATITIS C, UNSPEC                     

 

 2013            LENI YAN, DARIAN JARA                         070.70    
        HEPATITIS C, UNSPEC                     

 

 2013            VERONICA APRTU JEFFREY                         070.70      
      HEPATITIS C, UNSPEC                     

 

 2013            LENI YAN, DARIAN JARA                         070.70    
        HEPATITIS C, UNSPEC                     

 

 10/10/2013            ESSIE MCMANUS DO                         307.42         
   PERSISTENT DISORDER OF INITIATING OR MAINTAINING SLEEP                     

 

 10/10/2013            DARIAN FARRAR PHD                         307.42    
        PERSISTENT DISORDER OF INITIATING OR MAINTAINING SLEEP                 
    

 

 10/10/2013            EDWARD REYNAGA                         307.42 
           PERSISTENT DISORDER OF INITIATING OR MAINTAINING SLEEP              
       

 

 10/10/2013            DARIAN FARRAR PHD                         307.42    
        PERSISTENT DISORDER OF INITIATING OR MAINTAINING SLEEP                 
    

 

 10/10/2013            DARIAN FARRAR PHD                         307.42    
        PERSISTENT DISORDER OF INITIATING OR MAINTAINING SLEEP                 
    

 

 10/10/2013            DARIAN FARRAR PHD                         307.42    
        PERSISTENT DISORDER OF INITIATING OR MAINTAINING SLEEP                 
    

 

 10/10/2013            EDWARD REYNAGA                         307.42 
           PERSISTENT DISORDER OF INITIATING OR MAINTAINING SLEEP              
       

 

 10/10/2013            DARIAN FARRAR PHD                         307.42    
        PERSISTENT DISORDER OF INITIATING OR MAINTAINING SLEEP                 
    

 

 10/10/2013            ESSIE MCMANUS DO                         307.42         
   PERSISTENT DISORDER OF INITIATING OR MAINTAINING SLEEP                     

 

 10/10/2013            VERONICA WADDELL JEFFREY                         307.42      
      PERSISTENT DISORDER OF INITIATING OR MAINTAINING SLEEP                   
  

 

 10/10/2013            DARIAN FARRAR PHD                         307.42    
        PERSISTENT DISORDER OF INITIATING OR MAINTAINING SLEEP                 
    

 

 10/10/2013            VERONICA APRTU JEFFREY                         307.42      
      PERSISTENT DISORDER OF INITIATING OR MAINTAINING SLEEP                   
  

 

 10/10/2013            DARIAN FARRAR PHD                         307.42    
        PERSISTENT DISORDER OF INITIATING OR MAINTAINING SLEEP                 
    

 

 10/17/2013            ESSIE MCMANUS DO                         454.1          
  VARICOSE VEINS OF LOWER EXTREMITIES WITH INFLAMMATION                     

 

 10/17/2013            DARIAN FARRAR PHD                         454.1     
       VARICOSE VEINS OF LOWER EXTREMITIES WITH INFLAMMATION                   
  

 

 10/17/2013            EDWARD REYNAGA                         454.1  
          VARICOSE VEINS OF LOWER EXTREMITIES WITH INFLAMMATION                
     

 

 10/17/2013            DARIAN FARRAR PHD                         454.1     
       VARICOSE VEINS OF LOWER EXTREMITIES WITH INFLAMMATION                   
  

 

 10/17/2013            DARIAN FARRAR PHD                         454.1     
       VARICOSE VEINS OF LOWER EXTREMITIES WITH INFLAMMATION                   
  

 

 10/17/2013            LENI YAN, DARIAN JARA                         454.1     
       VARICOSE VEINS OF LOWER EXTREMITIES WITH INFLAMMATION                   
  

 

 10/17/2013            VENKATA WADDELL EDWARD RODERICK                         454.1  
          VARICOSE VEINS OF LOWER EXTREMITIES WITH INFLAMMATION                
     

 

 10/17/2013            LENI YAN, DARINA JARA                         454.1     
       VARICOSE VEINS OF LOWER EXTREMITIES WITH INFLAMMATION                   
  

 

 10/17/2013            ESSIE MCMANUS DO                         454.1          
  VARICOSE VEINS OF LOWER EXTREMITIES WITH INFLAMMATION                     

 

 10/17/2013            JEFFREY MORA                         454.1       
     VARICOSE VEINS OF LOWER EXTREMITIES WITH INFLAMMATION                     

 

 10/17/2013            DARIAN FARRAR PHD                         454.1     
       VARICOSE VEINS OF LOWER EXTREMITIES WITH INFLAMMATION                   
  

 

 10/17/2013            JEFFREY MORA                         454.1       
     VARICOSE VEINS OF LOWER EXTREMITIES WITH INFLAMMATION                     

 

 10/17/2013            DARIAN FARRAR PHD                         454.1     
       VARICOSE VEINS OF LOWER EXTREMITIES WITH INFLAMMATION                   
  

 

 2014            EDWARD REYNAGA                         300.00 
           AN ANXIETY UNSPEC                     

 

 2014            LENI YAN, DARIAN A                         300.00    
        AN ANXIETY UNSPEC                     

 

 2014            LENI YAN, DARIAN A                         300.00    
        AN ANXIETY UNSPEC                     

 

 2014            LENI YAN, DARIAN A                         300.00    
        AN ANXIETY UNSPEC                     

 

 2014            EDWARD REYNAGA                         300.00 
           AN ANXIETY UNSPEC                     

 

 2014            BOEANA YAN, DARIAN A                         300.00    
        AN ANXIETY UNSPEC                     

 

 2014            ESSIE MCMANUS DO                         300.00         
   AN ANXIETY UNSPEC                     

 

 2014            JEFFREY MORA                         300.00      
      AN ANXIETY UNSPEC                     

 

 2014            BOEANA YAN, DARIAN A                         300.00    
        AN ANXIETY UNSPEC                     

 

 2014            JEFFREY MORA                         300.00      
      AN ANXIETY UNSPEC                     

 

 2014            LENI YAN, DARIAN A                         300.00    
        AN ANXIETY UNSPEC                     

 

 2014            DARIAN FARRAR PHD                         V58.69    
        MEDICATION HIGH RISK                     

 

 2014            EDWARD REYNAGA                         V58.69 
           MEDICATION HIGH RISK                     

 

 2014            DARIAN FARRAR PHD                         V58.69    
        MEDICATION HIGH RISK                     

 

 2014            ESSIE MCMANUS DO                         V58.69         
   MEDICATION HIGH RISK                     

 

 2014            JEFFREY MORA                         V58.69      
      MEDICATION HIGH RISK                     

 

 2014            LENI PHD, DARIAN JARA                         V58.69    
        MEDICATION HIGH RISK                     

 

 2014            JEFFREY MORA                         V58.69      
      MEDICATION HIGH RISK                     

 

 2014            LENI PHD, DARIAN JARA                         V58.69    
        MEDICATION HIGH RISK                     

 

 10/30/2014            JEFFREY MORA                         300.15      
      DS DISSOCIATIVE DIS NOS                     

 

 10/30/2014            LENI YAN, DARIAN JARA                         300.15    
        DS DISSOCIATIVE DIS NOS                     

 

 10/30/2014            JEFFREY MORA                         300.15      
      DS DISSOCIATIVE DIS NOS                     

 

 10/30/2014            LENI YAN, DARIAN JARA                         300.15    
        DS DISSOCIATIVE DIS NOS                     



                                                                               
                                                                               
                                                                               
                                                                               
                                                                               
                                                                               
                                                                               
                                                                               
                                                                               
                                                                               
                                                                               
                         



Procedures

      





 Code            Description            Performed By            Performed On   
     

 

             35363                                  PSYCH PHARM MGMT           
                        2012        

 

             55484                                  INDIV PSYTX 45/50 MIN      
                             2012        

 

             94926                                  ROUTINE VENIPUNCTURE       
                            2012        

 

             41662                                  CMP                        
           2012        

 

             89343                                  LIPID PANEL                
                   2012        

 

             0272291                                  GFR CALC (RESULT ONLY)   
                                2012        

 

             59086                                  PSYTX PT&/FAMILY 45 MINUTES
                                   2013        

 

             12506                                  PSYTX PT&/FAMILY 45 MINUTES
                                   2013        

 

             49608                                  PSYCH IND W/MED CK 20      
                             2013        

 

             47666                                  ROUTINE VENIPUNCTURE       
                            2013        

 

             90253                                  CMP                        
           2013        

 

             06880                                  LIPID PANEL                
                   2013        

 

             4944732                                  GFR CALC (RESULT ONLY)   
                                2013        

 

             89430                                  ROUTINE VENIPUNCTURE       
                            2013        

 

             49283                                  FERRITIN                   
                2013        

 

             89536                                  IRON SERUM                 
                  2013        

 

             61252                                  IRON BNDNG CAP             
                      2013        

 

             [id number]                                  HCV INDEX (RESULT ONLY)  
                                 2013        

 

             91293                                  HEPATITIS PROFILE          
                         2013        

 

             IRGROUP                                  IRON GROUP (Iron,TIBC, 
Ferritin)                                   2013        

 

             55314                                  ROUTINE VENIPUNCTURE       
                            2013        

 

             33334                                  CBC                        
           2013        

 

             83933                                  PT/INR                     
              2013        

 

             21251                                  HEP C PCR QUANT (SERIAL)   
                                2013        

 

             94975                                  PSYTX PT&/FAMILY 45 MINUTES
                                   10/07/2013        

 

             61901                                  PSYTX PT&/FAMILY 45 MINUTES
                                   2013        

 

             94684                                  PSYTX PT&/FAMILY 45 MINUTES
                                   2014        

 

             08305                                  PSYTX PT&/FAMILY 45 MINUTES
                                   2014        

 

             23652                                  PSYTX PT&/FAMILY 45 MINUTES
                                   2014        

 

             71467                                  ROUTINE VENIPUNCTURE       
                            2014        

 

             58153                                  CBC                        
           2014        

 

             1801570                                  GFR CALC (RESULT ONLY)   
                                2014        

 

             19266                                  CMP                        
           2014        

 

             68640                                  LIPID PANEL                
                   2014        

 

             79363                                  IRON SERUM                 
                  2014        

 

             30416                                  IRON BNDNG CAP             
                      2014        

 

             81599                                  FERRITIN                   
                2014        

 

             [id number]                                  HCV INDEX (RESULT ONLY)  
                                 2014        

 

             19042                                  HEPATITIS PROFILE          
                         2014        

 

             08454                                  PSYTX PT&/FAMILY 45 MINUTES
                                   2014        

 

             02852                                  ROUTINE VENIPUNCTURE       
                            2014        

 

             IRGROUP                                  IRON GROUP (Iron,TIBC, 
Ferritin)                                   2014        

 

             0280029                                  GFR CALC (RESULT ONLY)   
                                09/10/2014        

 

             37416                                  CMP                        
           09/10/2014        

 

             01397                                  PSYTX PT&/FAMILY 45 MINUTES
                                   2014        

 

             32799                                  PSYTX PT&/FAMILY 45 MINUTES
                                   2015        



                                                                               
                             



Results

      



There is no data.              



Encounters

      





 ACCT No.            Visit Date/Time            Discharge            Status    
        Pt. Type            Provider            Facility            Loc./Unit  
          Complaint        

 

 [id number]            2015 13:26:00            2015 23:59:59          
  CLS            Outpatient            DARIAN FARRAR PHD                   
                            

 

 [id number]            2014 15:54:00            2014 23:59:59          
  CLS            Outpatient            JEFFREY MORA                     
                          

 

 [id number]            2014 09:49:00            2014 23:59:59          
  CLS            Outpatient            DARIAN FARRAR PHD                   
                            

 

 [id number]            10/30/2014 14:04:00            10/30/2014 23:59:59          
  CLS            Outpatient            JEFFREY MORA                     
                          

 

 [id number]            2014 14:46:00            2014 23:59:59          
  CLS            Outpatient            ESSIE MCMANUS DO                        
                       

 

 [id number]            2014 12:54:00            2014 23:59:59          
  CLS            Outpatient            DARIAN FARRAR PHD                   
                            

 

 [id number]            2014 12:27:00            2014 23:59:59          
  CLS            Outpatient            EDWARD REYNAGA                
                               

 

 [id number]            2014 14:05:00            2014 23:59:59          
  CLS            Outpatient            DARIAN FARRAR PHD                   
                            

 

 [id number]            2014 13:52:00            2014 23:59:59          
  CLS            Outpatient            DARIAN FARRAR PHD                   
                            

 

 [id number]            2014 13:55:00            2014 23:59:59          
  CLS            Outpatient            DARIAN FARRAR PHD                   
                            

 

 [id number]            2014 11:22:00            2014 23:59:59          
  CLS            Outpatient            EDWARD REYNAGA                
                               

 

 [id number]            2013 12:47:00            2013 23:59:59          
  CLS            Outpatient            DARIAN FARRAR PHD                   
                            

 

 [id number]            10/17/2013 09:42:00            10/17/2013 23:59:59          
  CLS            Outpatient            MARIETTA ESSIE CABRAL JASVIR                        
                       

 

 [id number]            10/04/2013 12:42:00            10/04/2013 23:59:59          
  CLS            Outpatient            DARIAN FARRAR PHD                   
                            

 

 [id number]            2013 14:41:00            2013 23:59:59          
  CLS            Outpatient            KYLAH MELGAR PA-C                   
                            

 

 [id number]            2013 15:44:00            2013 23:59:59          
  CLS            Outpatient                                                    
        

 

 [id number]            2013 10:41:00            2013 23:59:59          
  CLS            Outpatient            MARIETTA ESSIE CABRAL                        
                       

 

 [id number]            2013 10:42:00            2013 23:59:59          
  CLS            Outpatient            DARIAN FARRAR PHD                   
                            

 

 [id number]            2012 09:43:00            2012 23:59:59          
  CLS            Outpatient            RASHI DIAMOND DO THALIA                     
                          

 

 [id number]            2012 17:01:00            2012 23:59:59            
CLS            Outpatient            DARIAN FARRAR PHD                     
                          

 

 [id number]            2013 11:07:00                                      
Document Registration                                                          
  

 

 [id number]            2013 08:08:00                                      
Document Registration                                                          
  

 

 [id number]            2013 16:10:00                                      
Document Registration                                                          
  

 

 [id number]            04/15/2013 15:07:00                                      
Document Registration                                                          
  

 

 [id number]            2013 08:49:00            2013 11:15:00    
        DIS            Emergency            JENNIFER FARNSWORTH, ENRIQUE POLANCO            
[facility]            ER            VOMITING        

 

 [id number]            2018 18:27:00                         ACT       
     Inpatient            EL CARLOS DO            [facility]            4TH            DIVERTICULITIS

## 2018-09-12 NOTE — XMS REPORT
"  SUBJECTIVE:                                                    Aggie Butts is a 23 04/27/17 104/70   03/27/17 126/78   02/20/03/10/2017 (Approximate)  SpO2 98%  Breastfeeding? [facility]

 Created on: 2015



Ignacio Valle

External Reference #: [id number]

: 1955

Sex: Male



Demographics







 Address  [address] [place]  [address]

 

 Home Phone  [phone]

 

 Preferred Language  Unknown

 

 Marital Status  Unknown

 

 Muslim Affiliation  Unknown

 

 Race  White

 

 Ethnic Group  Not  or 





Author







 Author  SUKHI GARCIA

 

 Organization  [employer]

 

 Address  Unknown

 

 Phone  Unavailable







Care Team Providers







 Care Team Member Name  Role  Phone

 

 SUKHI GARCIA  CP  Unavailable



                                                                



Allergies

          No Known Allergies                                                   
                                     



Problems

          





 Problem Type  Condition  ICD-9 Code  Onset Dates  Condition Status

 

 Problem  Varicose veins of lower extremities with inflammation  454.1     
Active

 

 Problem  Unspecified viral hepatitis C without hepatic coma  070.70     Active

 

 Problem  Anxiety state, unspecified  300.00     Active

 

 Problem  Pityriasis versicolor  111.0     Active

 

 Problem  Persistent disorder of initiating or maintaining sleep  307.42     
Active

 

 Problem  Other and unspecified hyperlipidemia  272.4     Active

 

 Problem  Vascular disorder of skin  709.1     Active

 

 Problem  Unspecified local infection of skin and subcutaneous tissue  686.9   
  Active

 

 Problem  Trigger finger (acquired)  727.03     Active

 

 Problem  Nonspecific elevation of levels of transaminase or lactic acid 
dehydrogenase (LDH)  790.4     Active

 

 Problem  Spontaneous ecchymoses  782.7     Active

 

 Problem  Edema  782.3     Active

 

 Assessment  Dental examination  V72.2     Active

 

 Problem  Encounter for long-term (current) use of other medications  V58.69   
  Active

 

 Problem  Dissociative disorder or reaction, unspecified  300.15     Active

 

 Problem  Major depressive disorder, recurrent episode, mild  296.31     Active



                                                                               
                                                                               
                                                                                



Medications

          No Known Medications                                                 
                                       



Procedures

          





 Procedure  Coding System  Code  Date

 

 INTRAORL-PERIAPICAL 1 FILM 01057  CPT-4    2015

 

 EXTRAC ERUPTED TOOTH/EXPOSED ROOT  CPT-4    2015

 

 LTD ORAL EVALUATION - PROBLEM FOCUS  CPT-4    2015



                                                                               
                   



Results

          No Known Results                                                     
               



Summary Purpose

          [employer] Submission Kaden Carrion PA-C  [facility]

## 2018-09-12 NOTE — DIAGNOSTIC IMAGING REPORT
PROCEDURE: CT abdomen and pelvis with contrast.



TECHNIQUE: Multiple contiguous axial images were obtained through

the abdomen and pelvis after administration of intravenous

contrast. 



INDICATION: Left lower quadrant pain.



COMPARISON: None available. 



FINDINGS:



Lower chest: There are a few scattered calcified granulomas

within the right lung base. Otherwise, the lungs are clear.



Peritoneum: No free intraperitoneal air or fluid.  



Liver and biliary system: There is a benign cavernous type

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

measuring 3.1 x 2.4 cm. The gallbladder is normal. No biliary

duct dilation. 



Spleen and Pancreas: Spleen is normal. The pancreas enhances

normally without mass lesion or peripancreatic inflammatory

changes. 



Adrenals: Normal.



 tract: The kidneys enhance normally without suspicious mass or

obstruction. Urinary bladder is distended without wall

thickening. Prostate is normal.



GI tract: Stomach is decompressed. No bowel obstruction. Sigmoid

and descending colon diverticulosis with associated wall

thickening and surrounding inflammation is indicative of acute

diverticulitis. There is a solitary focus of loculated

extraluminal air adjacent to the area of diverticulitis

compatible with microperforation. No abscess formation. Normal

appendix.



Vasculature and Lymph nodes: Normal caliber aorta. No abdominal

or pelvic lymphadenopathy.



Musculoskeletal: No concerning osseous lesion. 



IMPRESSION: Acute diverticulitis of the sigmoid colon. There is a

solitary punctate focus of contained microperforation. No abscess

formation or bowel obstruction.



Dictated by: 



  Dictated on workstation # [id number]

## 2018-09-12 NOTE — XMS REPORT
Continuity of Care Document

 Created on: 2013



IGNACIO RODRIGUEZ

External Reference #: [id number]

: 1955

Sex: Male



Demographics







 Address  [address] [place]  [address]

 

 Home Phone  [phone]

 

 Preferred Language  Unknown

 

 Marital Status  Unknown

 

 Baptism Affiliation  Unknown

 

 Race  Unknown

 

 Ethnic Group  Unknown





Author







 Author  [facility]

 

 Organization  [facility]

 

 Address  Unknown

 

 Phone  Unavailable







Support







 Name  Relationship  Address  Phone

 

 TENA FAJARDO  Next Of Kin  [address] [place]  [address] [phone]







Care Team Providers







 Care Team Member Name  Role  Phone

 

 [facility]    [phone]



                                            



Insurance Providers

                      





 Payer Name                    Policy Number                    Subscriber Name
                    Relationship                

 

 Self Pay                                         Ignacio Rodriguez             
       01 Self / Same As Patient                



                                                                    



Advance Directives

                      





 Directive                    Response                    Recorded Date        
        

 

 Advance Directives                    N                    13 9:01am    
            

 

 Health Care Power of                     N                    12 
10:27pm                

 

 Organ Donor                    N                    12 10:27pm          
      



                                                                               
         



Problems

          No Known Problems or Medical conditions.                             
                                       



Family History

                      





 History                    Response                    Recorded Date/Time     
           

 

 Hx Family Cancer                    Y dad -skin cancer                     10:53pm                

 

 Hx Family Breast Cancer                    N                    12 10:
53pm                

 

 Hx Family Lung Cancer                    N                    12 10:53pm
                

 

 Hx Family Colorectal Cancer                    N                    12 10
:53pm                

 

 Hx Family Cardiac Disorders                    N dad had 4 bypasses           
         12 10:53pm                

 

 Hx Family Hypertension                    Y mom                    12 10:
53pm                

 

 Hx Family Myocardial Infarction                    Y dad passed from MI       
             12 10:53pm                



                                                                    



Social History

                      





 History                    Response                    Recorded Date/Time     
           

 

 Alcohol Use                    Regular Use                    13 9:01am 
               

 

 Recreational Drug Use                    Y marijuana                     9:01am                

 

 Recent Foreign Travel                    N                    13 9:01am 
               

 

 Recent Infectious Disease Exposure                    N                     9:01am                



                                                                               
         



Allergies, Adverse Reactions, Alerts

                      





 Allergen                    Type                    Severity                   
 Reaction                    Last Updated                

 

 No Known Drug Allergies                                                       
                            12                



                                                                               
                   



Medications

                      





 Medication                    Dose                    Units                    
Route                    Sig                    Qty                    Days    
            

 

 Promethazine HCl (Phenergan 25 Mg)                    1                    Tab
                    PO                    QID PRN                    10        
                             

 

 Vilazodone Hydrochloride (Viibryd)                    1                    
Each                    PO                    DAILY                            
                              

 

 Atorvastatin Calcium (Lipitor Tablet)                    10                    
Mg                    PO                    DAILY                              
                            

 

 Quetiapine Fumarate (Seroquel 100 Mg)                    1                    
Tab                    PO                    HS                                
                          

 

 Trimethoprim/Sulfamethoxazole (Bactrim Ds)                    1               
     Ea                    PO                    BID                    12     
                                

 

 Cefdinir                    1                    Each                    PO   
                 BID                    12                                     

 

 Trazodone HCl (Desyrel 100 Mg)                    100                    Mg   
                 PO                    HS                                      
                    

 

 Sertraline HCl (Zoloft)                    200                    Mg          
          PO                    DAILY                                          
                

 

 Cetirizine HCl (Cetirizine Hcl)                    10                    Mg   
                 PO                    DAILY                                   
                       



                                                                               
                                                                               



Immunizations

                      





 Name                    Given                    Type                

 

 Date of Influenza Vaccine                    11                    H    
            



                                                                              



Pregnancy

                      





 Response                    Recorded Date/Time                

 

 Status not known                    Unknown                



                                                                              



Results

          No Known Relevant Diagnostic Tests, Laboratory Data and/or Discharge 
Summary.                                                                    



Procedures

                      





 Procedure                    Code                    Date                

 

 NONEXCIS DEBRID OF WOUND, INFECT, OR BURN                    86.28            
        05/10/12                



                                                                              



Encounters

                      





 Encounter                    Location                    Date/Time            
    

 

 Departed Emergency Room                    [facility]                     8:49am                

 

 Discharged Inpatient                    [facility]                     9:47pm

## 2018-09-12 NOTE — HISTORY & PHYSICAL-SURGICAL
History of Present Illness


History of Present Illness


Reason for visit/HPI


HPI per ED:The patient presents to the ER by private conveyance with chief 

complaint that since yesterday evening around 9:00 [place] was sitting around and 

started having some aggressively worsening sharp pain to his left lower 

quadrant. No problems with bowel movements or dysuria. Bowel movements does not 

help either. No nausea vomiting. No fevers or chills. No history of abdominal 

surgeries. No history of diverticula or colonoscopies. He takes olanzapine for 

mood, mirtazapine for sleep and atorvastatin for cholesterol. It is not worse 

with movement but it is worse with pushing on his belly. His last oral intake 

was last night prior to the pain. He is not taking anything for the pain.








When I spoke to pt he states pain is 6 out of 10, worse with movement.  He 

denies any radiation of the pain.  He says he has never had pain like this 

before; he had a colonoscopy at 48 but nothing since then.  He thinks he was 

told he had polyps, but never told he needed a repeat of the colonoscopy.


Date of Admission


09/12/18


Time Seen by Provider:  17:56


I consulted on this patient on


9/12/18


 18:30


Attending Physician





Admitting Physician


[facility] [place]


Consult








Allergies and Home Medications


Allergies


Coded Allergies:  


     No Known Drug Allergies (Unverified , 5/6/12)





Home Medications


Atorvastatin Calcium 10 Mg Tablet, 10 MG PO DAILY, (Reported)


Promethazine Hcl 25 Mg Tablet, 1 TAB PO QID PRN


   Prescribed by: ENRIQUE RODRIGUEZ on 6/24/13 1106


Quetiapine Fumarate 100 Mg Tablet, 1 TAB PO HS, (Reported)


Vilazodone Hydrochloride 1 Each Tab.ds.pk, 1 EACH PO DAILY, (Reported)





Patient Home Medication List


Home Medication List Reviewed:  Yes





Past Medical-Social-Family Hx


Patient Social History


Alcohol Use:  Occasionally Uses


Recreational Drug Use:  No


Smoking Status:  Never a Smoker


Recent Foreign Travel:  No


Contact w/Someone Who Travel:  No


Recent Infectious Disease Expo:  No





Immunizations Up To Date


Date of Influenza Vaccine:  Aug 31, 2011





Seasonal Allergies


Seasonal Allergies:  No





Surgeries


Surgeries:  Orthopedic (thumb and shoulder)





Respiratory


History of Respiratory Disorde:  No





Cardiovascular


History of Cardiac Disorders:  No





Neurological


History of Neurological Disord:  No





Reproductive System


Hx Reproductive Disorders:  No





Gastrointestinal


Gastrointestinal Disorders:  Gastroesophageal Reflux





Musculoskeletal


History of Musculoskeletal Dis:  No





Endocrine


History of Endocrine Disorders:  No





HEENT


History of HEENT Disorders:  No





Cancer


History of Cancer:  No





Psychosocial


History of Psychiatric Problem:  No


Behavioral Health Disorders:  Depression





Integumentary


History of Skin or Integumenta:  Yes


Skin/Integumentary Disorders:  Eczema





Blood Transfusions


History of Blood Disorders:  No





Family Medical History


Significant Family History:  CAD Over 55 Years Old (Father), Stroke (Mother)





Review of Systems


Constitutional:  chills; No diaphoresis, No weakness


EENTM:  No double vision, No eye pain, No mouth swelling, No epistaxis


Respiratory:  No dyspnea on exertion, No hemoptysis, No short of breath


Cardiovascular:  No chest pain, No edema, No palpitations


Gastrointestinal:  LLQ, abdominal pain, diarrhea; No melena, No vomiting


Genitourinary:  No dysuria, No frequency


Musculoskeletal:  joint pain, joint swelling, muscle stiffness


Skin:  dryness; No hx of skin cancer


Psychiatric/Neurological:  Denies Anxiety, Denies Depressed, Denies Seizure, 

Denies Tremors


pt denies any abnormal bleeding or bruising, denies heat or cold intolerance





Physical Exam


Vital Signs





Vital Signs - First Documented








 9/12/18





 16:06


 


Temp 98.0


 


Pulse 86


 


Resp 20


 


B/P (MAP) 140/78 (98)


 


Pulse Ox 97


 


O2 Delivery Room Air





Capillary Refill : Less Than 3 Seconds


Height, Weight, BMI


Height: 5'9.00"


Weight: 193lbs. oz. 87.968264ky;  BMI


Method:Stated


General Appearance:  WD/WN, Mild Distress


Eyes:  Bilateral Eye PERRL, Bilateral Eye EOMI


HEENT:  Pharynx Normal, Moist Mucous Membranes


Neck:  Full Range of Motion, Normal Inspection, Non Tender, Supple


Respiratory:  Chest Non Tender, Lungs Clear, Normal Breath Sounds, No Accessory 

Muscle Use, No Respiratory Distress


Cardiovascular:  Regular Rate, Rhythm, No Edema, Normal Peripheral Pulses


Gastrointestinal:  Normal Bowel Sounds, No Organomegaly, Soft, Guarding (

voluntary with deep palpation); No Rebound; Tenderness (LLQ)


Extremity:  Normal Capillary Refill, Non Tender, No Calf Tenderness, No Pedal 

Edema


Neurologic/Psychiatric:  Alert, Oriented x3, No Motor/Sensory Deficits, Normal 

Mood/Affect, CNs II-XII Norm as Tested


Skin:  Normal Color, Warm/Dry


Lymphatic:  No Adenopathy (neck, axilla or groin)





Data Review


Labs


Laboratory Tests


9/12/18 16:20: 


White Blood Count 12.7H, Red Blood Count 5.21, Hemoglobin 16.8, Hematocrit 48, 

Mean Corpuscular Volume 92, Mean Corpuscular Hemoglobin 32, Mean Corpuscular 

Hemoglobin Concent 35, Red Cell Distribution Width 13.1, Platelet Count 220, 

Mean Platelet Volume 9.4, Neutrophils (%) (Auto) 83H, Lymphocytes (%) (Auto) 8L

, Monocytes (%) (Auto) 8, Eosinophils (%) (Auto) 1, Basophils (%) (Auto) 0, 

Neutrophils # (Auto) 10.5H, Lymphocytes # (Auto) 1.1, Monocytes # (Auto) 1.0, 

Eosinophils # (Auto) 0.1, Basophils # (Auto) 0.0, Sodium Level 138, Potassium 

Level 3.9, Chloride Level 105, Carbon Dioxide Level 22, Anion Gap 11, Blood 

Urea Nitrogen 9, Creatinine 0.93, Estimat Glomerular Filtration Rate > 60, BUN/

Creatinine Ratio 10, Glucose Level 107H, Calcium Level 9.5, Corrected Calcium 

9.1, Magnesium Level 2.2, Total Bilirubin 1.3H, Aspartate Amino Transf (AST/SGOT

) 17, Alanine Aminotransferase (ALT/SGPT) 24, Alkaline Phosphatase 85, Total 

Protein 7.8, Albumin 4.5


9/12/18 16:55: Lactic Acid Level 1.27


9/12/18 17:35: 


Urine Color YELLOW, Urine Clarity CLEAR, Urine pH 5, Urine Specific Gravity 

1.005L, Urine Protein 1+H, Urine Glucose (UA) NEGATIVE, Urine Ketones 1+H, 

Urine Nitrite NEGATIVE, Urine Bilirubin NEGATIVE, Urine Urobilinogen NORMAL, 

Urine Leukocyte Esterase 2+H, Urine RBC (Auto) 2+H, Urine RBC 5-10H, Urine WBC 5

-10H, Urine Crystals NONE, Urine Bacteria FEWH, Urine Casts NONE, Urine Mucus 

NEGATIVE, Urine Culture Indicated YES








Assessment/Plan


Assessment/Plan


Admission Diagonsis


Acute Diverticulitis


Admission Status:  Observation


Assessment/Plan


Acute Diverticulitis





Plan is to admit for observation, NPO, IV fluids, IV ABX, pain control with IV 

meds, anti-emetics.  When pt's WBC comes down and pain improves we can start on 

liquid diet and advance slowly.  I spoke to 


pt regarding decreasing red meat (which increases pressure as it moves through 

colon, thereby worsening diverticulitis) and he will need a colonoscopy as an 

outpt for screening (he is 5-10 yrs overdue). He 


understood our plan and all questions answered to his and his wife's 

satisfaction.











EL CARLOS DO Sep 12, 2018 18:35

## 2018-09-12 NOTE — XMS REPORT
Continuity of Care Document

 Created on: 2018



ROGERIO RODRIGUEZ

External Reference #: [id number]

: 1955

Sex: Male



Demographics







 Address  [address] [place] [address]

 

 Home Phone  [phone] x

 

 Preferred Language  Unknown

 

 Marital Status  Unknown

 

 [employer] Affiliation  Unknown

 

 Race  Unknown

 

 Ethnic Group  Unknown





Author







 Author  [facility]

 

 Address  Unknown

 

 Phone  Unavailable



              



Allergies

      



There is no data.                  



Medications

      



There is no data.                  



Problems

      





 Date Dx Coded            Attending            Type            Code            
Diagnosis            Diagnosed By        

 

 10/06/2011            DARIAN FARRAR PHD                         728.71    
        Plantar Fascial Fibromatosis                     

 

 10/06/2011            RASHI DIAMOND DO                         728.71      
      Plantar Fascial Fibromatosis                     

 

 10/06/2011            DARIAN FARRAR PHD                         728.71    
        Plantar Fascial Fibromatosis                     

 

 10/06/2011            ESSIE MCMANUS DO                         728.71         
   Plantar Fascial Fibromatosis                     

 

 10/06/2011                                      728.71            Plantar 
Fascial Fibromatosis                     

 

 10/06/2011                                      728.71            Plantar 
Fascial Fibromatosis                     

 

 10/06/2011                                      728.71            Plantar 
Fascial Fibromatosis                     

 

 10/06/2011                                      728.71            Plantar 
Fascial Fibromatosis                     

 

 10/06/2011                                      728.71            Plantar 
Fascial Fibromatosis                     

 

 10/06/2011            KYLAH MELGAR PA-C                         728.71    
        Plantar Fascial Fibromatosis                     

 

 10/06/2011            DARIAN FARRAR PHD                         728.71    
        Plantar Fascial Fibromatosis                     

 

 10/06/2011            ESSIE MCMANUS DO                         728.71         
   Plantar Fascial Fibromatosis                     

 

 10/06/2011            DARIAN FARRAR PHD                         728.71    
        Plantar Fascial Fibromatosis                     

 

 10/06/2011            EDWARD REYNAGA                         728.71 
           Plantar Fascial Fibromatosis                     

 

 10/06/2011            DARIAN FARRAR PHD                         728.71    
        Plantar Fascial Fibromatosis                     

 

 10/06/2011            DARIAN FARRAR PHD                         728.71    
        Plantar Fascial Fibromatosis                     

 

 10/06/2011            DARIAN FARRAR PHD                         728.71    
        Plantar Fascial Fibromatosis                     

 

 10/06/2011            EDWARD REYNAGA                         728.71 
           Plantar Fascial Fibromatosis                     

 

 10/06/2011            DARIAN FARRAR PHD                         728.71    
        Plantar Fascial Fibromatosis                     

 

 10/06/2011            ESSIE MCMANUS DO                         728.71         
   Plantar Fascial Fibromatosis                     

 

 10/06/2011            VERONICA APRN, JEFFREY                         728.71      
      Plantar Fascial Fibromatosis                     

 

 10/06/2011            DARIAN FARRAR PHD                         728.71    
        Plantar Fascial Fibromatosis                     

 

 10/06/2011            VERONICA APRN, JEFFREY                         728.71      
      Plantar Fascial Fibromatosis                     

 

 10/06/2011            DARIAN FARRAR PHD                         728.71    
        Plantar Fascial Fibromatosis                     

 

 10/21/2011            DARIAN FARRAR PHD                         311       
     DEPRESSIVE DISORDER NOT ELSEWHERE CLASSIFIED                     

 

 10/21/2011            DARIAN FARRAR PHD                         729.5     
       PAIN IN LIMB                     

 

 10/21/2011            RASHI DIAMOND DO F                         311         
   DEPRESSIVE DISORDER NOT ELSEWHERE CLASSIFIED                     

 

 10/21/2011            RASHI DIAMOND DO F                         729.5       
     PAIN IN LIMB                     

 

 10/21/2011            DARIAN FARRAR PHD                         311       
     DEPRESSIVE DISORDER NOT ELSEWHERE CLASSIFIED                     

 

 10/21/2011            DARIAN FARRAR PHD                         729.5     
       PAIN IN LIMB                     

 

 10/21/2011            ESSIE MCMANUS DO                         311            
DEPRESSIVE DISORDER NOT ELSEWHERE CLASSIFIED                     

 

 10/21/2011            ESSIE MCMANUS DO                         729.5          
  Pain In Limb                     

 

 10/21/2011                                      311            DEPRESSIVE 
DISORDER NOT ELSEWHERE CLASSIFIED                     

 

 10/21/2011                                      729.5            Pain In Limb 
                    

 

 10/21/2011                                      311            DEPRESSIVE 
DISORDER NOT ELSEWHERE CLASSIFIED                     

 

 10/21/2011                                      729.5            Pain In Limb 
                    

 

 10/21/2011                                      311            DEPRESSIVE 
DISORDER NOT ELSEWHERE CLASSIFIED                     

 

 10/21/2011                                      729.5            Pain In Limb 
                    

 

 10/21/2011                                      311            DEPRESSIVE 
DISORDER NOT ELSEWHERE CLASSIFIED                     

 

 10/21/2011                                      729.5            Pain In Limb 
                    

 

 10/21/2011                                      311            DEPRESSIVE 
DISORDER NOT ELSEWHERE CLASSIFIED                     

 

 10/21/2011                                      729.5            Pain In Limb 
                    

 

 10/21/2011            KYLAH MELGAR PA-C                         311       
     DEPRESSIVE DISORDER NOT ELSEWHERE CLASSIFIED                     

 

 10/21/2011            KYLAH MELGAR PA-C                         729.5     
       Pain In Limb                     

 

 10/21/2011            DARIAN FARRAR PHD                         311       
     DEPRESSIVE DISORDER NOT ELSEWHERE CLASSIFIED                     

 

 10/21/2011            DARIAN FARRAR PHD                         729.5     
       Pain In Limb                     

 

 10/21/2011            ESSIE MCMANUS DO                         311            
DEPRESSIVE DISORDER NOT ELSEWHERE CLASSIFIED                     

 

 10/21/2011            ESSIE MCMANUS DO                         729.5          
  Pain In Limb                     

 

 10/21/2011            DARIAN FARRAR PHD                         311       
     DEPRESSIVE DISORDER NOT ELSEWHERE CLASSIFIED                     

 

 10/21/2011            DARIAN FARRAR PHD                         729.5     
       Pain In Limb                     

 

 10/21/2011            VENKATA APREDWARD MAE                         311    
        DEPRESSIVE DISORDER NOT ELSEWHERE CLASSIFIED                     

 

 10/21/2011            VENKATA APREDWARD MAE                         729.5  
          Pain In Limb                     

 

 10/21/2011            DARIAN FARRAR PHD                         311       
     DEPRESSIVE DISORDER NOT ELSEWHERE CLASSIFIED                     

 

 10/21/2011            DARIAN FARRAR PHD                         729.5     
       Pain In Limb                     

 

 10/21/2011            DARIAN FARRAR PHD                         311       
     DEPRESSIVE DISORDER NOT ELSEWHERE CLASSIFIED                     

 

 10/21/2011            DARIAN FARRAR PHD                         729.5     
       Pain In Limb                     

 

 10/21/2011            DARIAN FARRAR PHD                         311       
     DEPRESSIVE DISORDER NOT ELSEWHERE CLASSIFIED                     

 

 10/21/2011            DARIAN FARRAR PHD                         729.5     
       Pain In Limb                     

 

 10/21/2011            EDWARD REYNAGA                         311    
        DEPRESSIVE DISORDER NOT ELSEWHERE CLASSIFIED                     

 

 10/21/2011            HASTINGS APREDWARD MAE                         729.5  
          Pain In Limb                     

 

 10/21/2011            DARIAN FARRAR PHD                         311       
     DEPRESSIVE DISORDER NOT ELSEWHERE CLASSIFIED                     

 

 10/21/2011            DARIAN FARRAR PHD                         729.5     
       Pain In Limb                     

 

 10/21/2011            MCMANUS DO, ESSIE K                         311            
DEPRESSIVE DISORDER NOT ELSEWHERE CLASSIFIED                     

 

 10/21/2011            MCMANUS DO, ESSIE K                         729.5          
  Pain In Limb                     

 

 10/21/2011            VERONICA APRN, JEFFREY                         311         
   DEPRESSIVE DISORDER NOT ELSEWHERE CLASSIFIED                     

 

 10/21/2011            VERONICA APRN, JEFFREY                         729.5       
     Pain In Limb                     

 

 10/21/2011            DARIAN FARRAR PHD                         311       
     DEPRESSIVE DISORDER NOT ELSEWHERE CLASSIFIED                     

 

 10/21/2011            DARIAN FARRAR PHD                         729.5     
       Pain In Limb                     

 

 10/21/2011            VERONICA APRN, JEFFREY                         311         
   DEPRESSIVE DISORDER NOT ELSEWHERE CLASSIFIED                     

 

 10/21/2011            VERONICA APRN, JEFFREY                         729.5       
     Pain In Limb                     

 

 10/21/2011            DARIAN FARRAR PHD                         311       
     DEPRESSIVE DISORDER NOT ELSEWHERE CLASSIFIED                     

 

 10/21/2011            DARIAN FARRAR PHD                         729.5     
       Pain In Limb                     

 

 10/22/2011            DARIAN FARRAR PHD                         296.30    
        MO DEPRESSIVE RECURRENT UNSPECIFIED                     

 

 10/22/2011            DARIAN FARRAR PHD                         304.80    
        SA POLYSUB DEP                     

 

 10/22/2011            DARIAN FARRAR PHD                         307.47    
        SI DYSSOMNIA NOS                     

 

 10/22/2011            WERDER DO, RASHI F                         296.30      
      MO DEPRESSIVE RECURRENT UNSPECIFIED                     

 

 10/22/2011            WERDER DO, RASHI F                         304.80      
      SA POLYSUB DEP                     

 

 10/22/2011            WERDER DO, RASHI F                         307.47      
      SI DYSSOMNIA NOS                     

 

 10/22/2011            DARIAN FARRAR PHD                         296.30    
        MO DEPRESSIVE RECURRENT UNSPECIFIED                     

 

 10/22/2011            DARIAN FARRAR PHD                         304.80    
        SA POLYSUB DEP                     

 

 10/22/2011            DARIAN FARRAR PHD                         307.47    
        SI DYSSOMNIA NOS                     

 

 10/22/2011            MCMANUS DO, ESSIE K                         296.30         
   MO DEPRESSIVE RECURRENT UNSPECIFIED                     

 

 10/22/2011            MCMANUS DO, ESSIE K                         304.80         
   SA POLYSUB DEP                     

 

 10/22/2011            MCMANUS DO, ESSIE K                         307.47         
   SI DYSSOMNIA NOS                     

 

 10/22/2011                                      296.30            MO 
DEPRESSIVE RECURRENT UNSPECIFIED                     

 

 10/22/2011                                      304.80            SA POLYSUB 
DEP                     

 

 10/22/2011                                      307.47            SI DYSSOMNIA 
NOS                     

 

 10/22/2011                                      296.30            MO 
DEPRESSIVE RECURRENT UNSPECIFIED                     

 

 10/22/2011                                      304.80            SA POLYSUB 
DEP                     

 

 10/22/2011                                      307.47            SI DYSSOMNIA 
NOS                     

 

 10/22/2011                                      296.30            MO 
DEPRESSIVE RECURRENT UNSPECIFIED                     

 

 10/22/2011                                      304.80            SA POLYSUB 
DEP                     

 

 10/22/2011                                      307.47            SI DYSSOMNIA 
NOS                     

 

 10/22/2011                                      296.30            MO 
DEPRESSIVE RECURRENT UNSPECIFIED                     

 

 10/22/2011                                      304.80            SA POLYSUB 
DEP                     

 

 10/22/2011                                      307.47            SI DYSSOMNIA 
NOS                     

 

 10/22/2011                                      296.30            MO 
DEPRESSIVE RECURRENT UNSPECIFIED                     

 

 10/22/2011                                      304.80            SA POLYSUB 
DEP                     

 

 10/22/2011                                      307.47            SI DYSSOMNIA 
NOS                     

 

 10/22/2011            KYLAH MELGAR PA-C                         296.30    
        MO DEPRESSIVE RECURRENT UNSPECIFIED                     

 

 10/22/2011            KYLAH MELGAR PA-C                         304.80    
        SA POLYSUB DEP                     

 

 10/22/2011            KYLAH MELGAR PA-C                         307.47    
        SI DYSSOMNIA NOS                     

 

 10/22/2011            DARIAN FARRAR PHD                         296.30    
        MO DEPRESSIVE RECURRENT UNSPECIFIED                     

 

 10/22/2011            DARIAN FARRAR PHD                         304.80    
        SA POLYSUB DEP                     

 

 10/22/2011            DARIAN FARRAR PHD                         307.47    
        SI DYSSOMNIA NOS                     

 

 10/22/2011            MCMANUS DO, ESSIE K                         296.30         
   MO DEPRESSIVE RECURRENT UNSPECIFIED                     

 

 10/22/2011            MCMANUS DO, ESSIE K                         304.80         
   SA POLYSUB DEP                     

 

 10/22/2011            MCMANUS DO, ESSIE K                         307.47         
   SI DYSSOMNIA NOS                     

 

 10/22/2011            DARIAN FARRAR PHD                         296.30    
        MO DEPRESSIVE RECURRENT UNSPECIFIED                     

 

 10/22/2011            DARIAN FARRAR PHD                         304.80    
        SA POLYSUB DEP                     

 

 10/22/2011            DARIAN FARRAR PHD                         307.47    
        SI DYSSOMNIA NOS                     

 

 10/22/2011            HASTINGS APRN, EDWARD VAUGHN                         296.30 
           MO DEPRESSIVE RECURRENT UNSPECIFIED                     

 

 10/22/2011            HASTINGS APRN, EDWARD RIVERAH                         304.80 
           SA POLYSUB DEP                     

 

 10/22/2011            HASTINGS APRN, EDWARD VAUGHN                         307.47 
           SI DYSSOMNIA NOS                     

 

 10/22/2011            DARIAN FARRAR PHD                         296.30    
        MO DEPRESSIVE RECURRENT UNSPECIFIED                     

 

 10/22/2011            DARIAN FARRAR PHD                         304.80    
        SA POLYSUB DEP                     

 

 10/22/2011            DARIAN FARRAR PHD                         307.47    
        SI DYSSOMNIA NOS                     

 

 10/22/2011            DARIAN FARRAR PHD                         296.30    
        MO DEPRESSIVE RECURRENT UNSPECIFIED                     

 

 10/22/2011            DARIAN FARRAR PHD                         304.80    
        SA POLYSUB DEP                     

 

 10/22/2011            DARIAN FARRAR PHD                         307.47    
        SI DYSSOMNIA NOS                     

 

 10/22/2011            DARIAN FARRAR PHD                         296.30    
        MO DEPRESSIVE RECURRENT UNSPECIFIED                     

 

 10/22/2011            DARIAN FARRAR PHD                         304.80    
        SA POLYSUB DEP                     

 

 10/22/2011            DARIAN FARRAR PHD                         307.47    
        SI DYSSOMNIA NOS                     

 

 10/22/2011            HASTINGS APRN, EDWARD VAUGHN                         296.30 
           MO DEPRESSIVE RECURRENT UNSPECIFIED                     

 

 10/22/2011            HASTINGS APRN, EDWARD RIVERAH                         304.80 
           SA POLYSUB DEP                     

 

 10/22/2011            HASTINGS APRN, EDWARD VAUGHN                         307.47 
           SI DYSSOMNIA NOS                     

 

 10/22/2011            DARIAN FARRAR PHD                         296.30    
        MO DEPRESSIVE RECURRENT UNSPECIFIED                     

 

 10/22/2011            DARIAN FARRAR PHD                         304.80    
        SA POLYSUB DEP                     

 

 10/22/2011            DARIAN FARRAR PHD                         307.47    
        SI DYSSOMNIA NOS                     

 

 10/22/2011            ESSIE MCMANUS DO K                         296.30         
   MO DEPRESSIVE RECURRENT UNSPECIFIED                     

 

 10/22/2011            ESSIE MCMANUS DO K                         304.80         
   SA POLYSUB DEP                     

 

 10/22/2011            MCMANUS DOESSIE K                         307.47         
   SI DYSSOMNIA NOS                     

 

 10/22/2011            VERONICA APRN, JEFFREY                         296.30      
      MO DEPRESSIVE RECURRENT UNSPECIFIED                     

 

 10/22/2011            VERONICA APRN, JEFFREY                         304.80      
      SA POLYSUB DEP                     

 

 10/22/2011            VERONICA APRN, JEFFREY                         307.47      
      SI DYSSOMNIA NOS                     

 

 10/22/2011            DARIAN FARRAR PHD                         296.30    
        MO DEPRESSIVE RECURRENT UNSPECIFIED                     

 

 10/22/2011            DARIAN FARRAR PHD                         304.80    
        SA POLYSUB DEP                     

 

 10/22/2011            DARIAN FARRAR PHD                         307.47    
        SI DYSSOMNIA NOS                     

 

 10/22/2011            VERONICA APRN, JEFFREY                         296.30      
      MO DEPRESSIVE RECURRENT UNSPECIFIED                     

 

 10/22/2011            VERONICA APRN, JEFFREY                         304.80      
      SA POLYSUB DEP                     

 

 10/22/2011            VERONICA APRN, JEFFREY                         307.47      
      SI DYSSOMNIA NOS                     

 

 10/22/2011            DARIAN FARRAR PHD                         296.30    
        MO DEPRESSIVE RECURRENT UNSPECIFIED                     

 

 10/22/2011            DARIAN FARRAR PHD                         304.80    
        SA POLYSUB DEP                     

 

 10/22/2011            DARIAN FARRAR PHD                         307.47    
        SI DYSSOMNIA NOS                     

 

 2011            DARIAN FARRAR PHD                         296.32    
        MO DEPRESSIVE RECURRENT MODERATE                     

 

 2011            RASHI DIAMOND DO                         296.32      
      MO DEPRESSIVE RECURRENT MODERATE                     

 

 2011            DARIAN FARRAR PHD                         296.32    
        MO DEPRESSIVE RECURRENT MODERATE                     

 

 2011            ESSIE MCMANUS DO                         296.32         
   MO DEPRESSIVE RECURRENT MODERATE                     

 

 2011                                      296.32            MO 
DEPRESSIVE RECURRENT MODERATE                     

 

 2011                                      296.32            MO 
DEPRESSIVE RECURRENT MODERATE                     

 

 2011                                      296.32            MO 
DEPRESSIVE RECURRENT MODERATE                     

 

 2011                                      296.32            MO 
DEPRESSIVE RECURRENT MODERATE                     

 

 2011                                      296.32            MO 
DEPRESSIVE RECURRENT MODERATE                     

 

 2011            KYLAH MELGAR PA-C                         296.32    
        MO DEPRESSIVE RECURRENT MODERATE                     

 

 2011            DARIAN FARRAR PHD                         296.32    
        MO DEPRESSIVE RECURRENT MODERATE                     

 

 2011            ESSIE MCMANUS DO                         296.32         
   MO DEPRESSIVE RECURRENT MODERATE                     

 

 2011            DARIAN FARRAR PHD                         296.32    
        MO DEPRESSIVE RECURRENT MODERATE                     

 

 2011            HASTINGS APRTU, EDWARD VAUGHN                         296.32 
           MO DEPRESSIVE RECURRENT MODERATE                     

 

 2011            LENI YAN, DARIAN JARA                         296.32    
        MO DEPRESSIVE RECURRENT MODERATE                     

 

 2011            DARIAN FARRAR PHD                         296.32    
        MO DEPRESSIVE RECURRENT MODERATE                     

 

 2011            LENI YAN, DARIAN JARA                         296.32    
        MO DEPRESSIVE RECURRENT MODERATE                     

 

 2011            HASTINGS APRTU, EDWARD VAUGHN                         296.32 
           MO DEPRESSIVE RECURRENT MODERATE                     

 

 2011            LENI YAN, DARIAN JARA                         296.32    
        MO DEPRESSIVE RECURRENT MODERATE                     

 

 2011            ESSIE MCMANUS DO                         296.32         
   MO DEPRESSIVE RECURRENT MODERATE                     

 

 2011            VERONICA APRN, JEFFREY                         296.32      
      MO DEPRESSIVE RECURRENT MODERATE                     

 

 2011            DARIAN FARRAR PHD                         296.32    
        MO DEPRESSIVE RECURRENT MODERATE                     

 

 2011            VERONICA APRN, JEFFREY                         296.32      
      MO DEPRESSIVE RECURRENT MODERATE                     

 

 2011            DARIAN FARRAR PHD                         296.32    
        MO DEPRESSIVE RECURRENT MODERATE                     

 

 2012            DARIAN FARRAR PHD                         111.0     
       PITYRIASIS VERSICOLOR                     

 

 2012            DARIAN FARRAR PHD                         272.4     
       OTHER AND UNSPECIFIED HYPERLIPIDEMIA                     

 

 2012            RASHI DIAMOND DO F                         111.0       
     PITYRIASIS VERSICOLOR                     

 

 2012            RASHI DIAMOND DO F                         272.4       
     OTHER AND UNSPECIFIED HYPERLIPIDEMIA                     

 

 2012            DARIAN FARRAR PHD                         111.0     
       PITYRIASIS VERSICOLOR                     

 

 2012            DARIAN FARRAR PHD                         272.4     
       OTHER AND UNSPECIFIED HYPERLIPIDEMIA                     

 

 2012            ESSIE MCMANUS DO                         111.0          
  PITYRIASIS VERSICOLOR                     

 

 2012            ESSIE MCMANUS DO                         272.4          
  OTHER AND UNSPECIFIED HYPERLIPIDEMIA                     

 

 2012                                      111.0            [facility] 
VERSICOLOR                     

 

 2012                                      272.4            OTHER AND 
UNSPECIFIED HYPERLIPIDEMIA                     

 

 2012                                      111.0            [facility] 
VERSICOLOR                     

 

 2012                                      272.4            OTHER AND 
UNSPECIFIED HYPERLIPIDEMIA                     

 

 2012                                      111.0            [facility] 
VERSICOLOR                     

 

 2012                                      272.4            OTHER AND 
UNSPECIFIED HYPERLIPIDEMIA                     

 

 2012                                      111.0            [facility] 
VERSICOLOR                     

 

 2012                                      272.4            OTHER AND 
UNSPECIFIED HYPERLIPIDEMIA                     

 

 2012                                      111.0            [facility] 
VERSICOLOR                     

 

 2012                                      272.4            OTHER AND 
UNSPECIFIED HYPERLIPIDEMIA                     

 

 2012            KYLAH MELGAR PA-C                         111.0     
       [facility] VERSICOLOR                     

 

 2012            KYLAH MELGAR PA-C                         272.4     
       OTHER AND UNSPECIFIED HYPERLIPIDEMIA                     

 

 2012            DARIAN FARRAR PHD A                         111.0     
       [facility] VERSICOLOR                     

 

 2012            DARIAN FARRAR PHD A                         272.4     
       OTHER AND UNSPECIFIED HYPERLIPIDEMIA                     

 

 2012            MCMANUS VERONIKA CABRALA K                         111.0          
  [facility] VERSICOLOR                     

 

 2012            ESSIE MCMANUS DO K                         272.4          
  OTHER AND UNSPECIFIED HYPERLIPIDEMIA                     

 

 2012            DARIAN FARRAR PHD A                         111.0     
       [facility] VERSICOLOR                     

 

 2012            DARIAN FARRAR PHD A                         272.4     
       OTHER AND UNSPECIFIED HYPERLIPIDEMIA                     

 

 2012            VENKATA WADDELL EDWARD RODERICK                         111.0  
          [facility] VERSICOLOR                     

 

 2012            VENKATA WADDELL EDWARD RODERICK                         272.4  
          OTHER AND UNSPECIFIED HYPERLIPIDEMIA                     

 

 2012            DARIAN FARRAR PHD A                         111.0     
       [facility] VERSICOLOR                     

 

 2012            DARIAN FARRAR PHD                         272.4     
       OTHER AND UNSPECIFIED HYPERLIPIDEMIA                     

 

 2012            DARIAN FARRAR PHD A                         111.0     
       [facility] VERSICOLOR                     

 

 2012            DARIAN FARRAR PHD A                         272.4     
       OTHER AND UNSPECIFIED HYPERLIPIDEMIA                     

 

 2012            DARIAN FARRAR PHD A                         111.0     
       [facility] VERSICOLOR                     

 

 2012            DARIAN FARRAR PHD A                         272.4     
       OTHER AND UNSPECIFIED HYPERLIPIDEMIA                     

 

 2012            VENKATA WADDELL EDWARD RODERICK                         111.0  
          [facility] VERSICOLOR                     

 

 2012            VENKATA WADDELL EDWARD RODERICK                         272.4  
          OTHER AND UNSPECIFIED HYPERLIPIDEMIA                     

 

 2012            DARIAN FARRAR PHD A                         111.0     
       [facility] VERSICOLOR                     

 

 2012            DARIAN FARRAR PHD A                         272.4     
       OTHER AND UNSPECIFIED HYPERLIPIDEMIA                     

 

 2012            MCMANUS VERONIKA CABRALA K                         111.0          
  [facility] VERSICOLOR                     

 

 2012            ESSIE MCMANUS DO                         272.4          
  OTHER AND UNSPECIFIED HYPERLIPIDEMIA                     

 

 2012            VERONICA APRN, JEFFREY                         111.0       
     [facility] VERSICOLOR                     

 

 2012            VERONICA APRN, JEFFREY                         272.4       
     OTHER AND UNSPECIFIED HYPERLIPIDEMIA                     

 

 2012            DARIAN FARRAR PHD                         111.0     
       [facility] VERSICOLOR                     

 

 2012            DARIAN FARRAR PHD                         272.4     
       OTHER AND UNSPECIFIED HYPERLIPIDEMIA                     

 

 2012            VERONICA APRN, JEFFREY                         111.0       
     [facility] VERSICOLOR                     

 

 2012            VERONICA APRN, JEFFREY                         272.4       
     OTHER AND UNSPECIFIED HYPERLIPIDEMIA                     

 

 2012            DARIAN FARRAR PHD                         111.0     
       [facility] VERSICOLOR                     

 

 2012            DARIAN FARRAR PHD                         272.4     
       OTHER AND UNSPECIFIED HYPERLIPIDEMIA                     

 

 2012            DARIAN FARRAR PHD                         686.9     
       UNSPECIFIED LOCAL INFECTION OF SKIN AND SUBCUTANEOUS TISSUE             
        

 

 2012            RASHI DIAMOND DO                         686.9       
     UNSPECIFIED LOCAL INFECTION OF SKIN AND SUBCUTANEOUS TISSUE               
      

 

 2012            DARIAN FARRAR PHD                         686.9     
       UNSPECIFIED LOCAL INFECTION OF SKIN AND SUBCUTANEOUS TISSUE             
        

 

 2012            ESSIE MCMANUS DO                         686.9          
  UNSPECIFIED LOCAL INFECTION OF SKIN AND SUBCUTANEOUS TISSUE                  
   

 

 2012                                      686.9            UNSPECIFIED 
LOCAL INFECTION OF SKIN AND SUBCUTANEOUS TISSUE                     

 

 2012                                      686.9            UNSPECIFIED 
LOCAL INFECTION OF SKIN AND SUBCUTANEOUS TISSUE                     

 

 2012                                      686.9            UNSPECIFIED 
LOCAL INFECTION OF SKIN AND SUBCUTANEOUS TISSUE                     

 

 2012                                      686.9            UNSPECIFIED 
LOCAL INFECTION OF SKIN AND SUBCUTANEOUS TISSUE                     

 

 2012                                      686.9            UNSPECIFIED 
LOCAL INFECTION OF SKIN AND SUBCUTANEOUS TISSUE                     

 

 2012            KYLAH MELGAR PA-C                         686.9     
       UNSPECIFIED LOCAL INFECTION OF SKIN AND SUBCUTANEOUS TISSUE             
        

 

 2012            DARIAN FARRAR PHD                         686.9     
       UNSPECIFIED LOCAL INFECTION OF SKIN AND SUBCUTANEOUS TISSUE             
        

 

 2012            ESSIE MCMANUS DO                         686.9          
  UNSPECIFIED LOCAL INFECTION OF SKIN AND SUBCUTANEOUS TISSUE                  
   

 

 2012            DARIAN FARRAR PHD                         686.9     
       UNSPECIFIED LOCAL INFECTION OF SKIN AND SUBCUTANEOUS TISSUE             
        

 

 2012            EDWARD REYNAGA                         686.9  
          UNSPECIFIED LOCAL INFECTION OF SKIN AND SUBCUTANEOUS TISSUE          
           

 

 2012            DARIAN FARRAR PHD                         686.9     
       UNSPECIFIED LOCAL INFECTION OF SKIN AND SUBCUTANEOUS TISSUE             
        

 

 2012            DARIAN FARRAR PHD                         686.9     
       UNSPECIFIED LOCAL INFECTION OF SKIN AND SUBCUTANEOUS TISSUE             
        

 

 2012            DARIAN FARRAR PHD                         686.9     
       UNSPECIFIED LOCAL INFECTION OF SKIN AND SUBCUTANEOUS TISSUE             
        

 

 2012            EDWARD REYNAGA                         686.9  
          UNSPECIFIED LOCAL INFECTION OF SKIN AND SUBCUTANEOUS TISSUE          
           

 

 2012            DARIAN FARRAR PHD                         686.9     
       UNSPECIFIED LOCAL INFECTION OF SKIN AND SUBCUTANEOUS TISSUE             
        

 

 2012            MCMANUS ESSIE CABRAL                         686.9          
  UNSPECIFIED LOCAL INFECTION OF SKIN AND SUBCUTANEOUS TISSUE                  
   

 

 2012            JEFFREY MORA                         686.9       
     UNSPECIFIED LOCAL INFECTION OF SKIN AND SUBCUTANEOUS TISSUE               
      

 

 2012            DARIAN FARRAR PHD                         686.9     
       UNSPECIFIED LOCAL INFECTION OF SKIN AND SUBCUTANEOUS TISSUE             
        

 

 2012            JEFFREY MORA                         686.9       
     UNSPECIFIED LOCAL INFECTION OF SKIN AND SUBCUTANEOUS TISSUE               
      

 

 2012            DARIAN FARRAR PHD                         686.9     
       UNSPECIFIED LOCAL INFECTION OF SKIN AND SUBCUTANEOUS TISSUE             
        

 

 2012            DARIAN FARRAR PHD                         782.3     
       EDEMA                     

 

 2012            DARIAN FARRAR PHD                         782.7     
       petichiae                     

 

 2012            WERDER DO RASHI F                         782.3       
     EDEMA                     

 

 2012            [facility]                         782.7       
     petichiae                     

 

 2012            DARIAN FARRAR PHD                         782.3     
       EDEMA                     

 

 2012            DARIAN FARRAR PHD                         782.7     
       petichiae                     

 

 2012            MCMANUS DO, ESSIE K                         782.3          
  Edema                     

 

 2012            MCMANUS DO, ESSIE K                         782.7          
  Petichiae                     

 

 2012                                      782.3            Edema        
             

 

 2012                                      782.7            Petichiae    
                 

 

 2012                                      782.3            Edema        
             

 

 2012                                      782.7            Petichiae    
                 

 

 2012                                      782.3            Edema        
             

 

 2012                                      782.7            Petichiae    
                 

 

 2012                                      782.3            Edema        
             

 

 2012                                      782.7            Petichiae    
                 

 

 2012                                      782.3            Edema        
             

 

 2012                                      782.7            Petichiae    
                 

 

 2012            KYLAH MELGAR PA-C                         782.3     
       Edema                     

 

 2012            KYLAH MELGAR PA-C                         782.7     
       Petichiae                     

 

 2012            BOEKHOUT PHD, DARIAN JARA                         782.3     
       Edema                     

 

 2012            BOEKHOUT PHD, DARIAN JARA                         782.7     
       Petichiae                     

 

 2012            MCMANUS DO, ESSIE K                         782.3          
  Edema                     

 

 2012            MCMANUS DO, ESSIE K                         782.7          
  Petichiae                     

 

 2012            BOEKHOUT PHD, DARIAN A                         782.3     
       Edema                     

 

 2012            BOEKHOUT PHD, DARIAN JARA                         782.7     
       Petichiae                     

 

 2012            HASTINGS APRN, EDWARD RIVERAH                         782.3  
          Edema                     

 

 2012            HASTINGS APRN, EDWARD RODERICK                         782.7  
          Petichiae                     

 

 2012            BOEKHOUT PHD, DARIAN A                         782.3     
       Edema                     

 

 2012            BOEKHOUT PHD, DARIAN A                         782.7     
       Petichiae                     

 

 2012            BOEKHOUT PHD, DARIAN A                         782.3     
       Edema                     

 

 2012            BOEOUT PHD, DARIAN A                         782.7     
       Petichiae                     

 

 2012            BOEKHOUT PHD, DARIAN A                         782.3     
       Edema                     

 

 2012            BOEOUT PHD, DARIAN A                         782.7     
       Petichiae                     

 

 2012            HASTINGS APRN, EDWARD RODERICK                         782.3  
          Edema                     

 

 2012            HASTINGS APRN, EDWARD RODERICK                         782.7  
          Petichiae                     

 

 2012            BOEOUT PHD, DARIAN A                         782.3     
       Edema                     

 

 2012            BOEOUT PHD, DARIAN A                         782.7     
       Petichiae                     

 

 2012            MCMANUS DO, ESSIE K                         782.3          
  Edema                     

 

 2012            MCMANUS DO, ESSIE K                         782.7          
  Petichiae                     

 

 2012            VERONICA APRN, JEFFREY                         782.3       
     Edema                     

 

 2012            VERONICA APRN, JEFFREY                         782.7       
     Petichiae                     

 

 2012            BOEKHOUT PHD, DARIAN A                         782.3     
       Edema                     

 

 2012            DARIAN FARRAR PHD                         782.7     
       Petichiae                     

 

 2012            JEFFREY MORA                         782.3       
     Edema                     

 

 2012            JEFFREY MORA                         782.7       
     Petichiae                     

 

 2012            DARIAN FARRAR PHD                         782.3     
       Edema                     

 

 2012            DARIAN FARRAR PHD                         782.7     
       Petichiae                     

 

 2012            DARIAN FARRAR PHD                         709.1     
       VASCULAR DISORDERS OF SKIN                     

 

 2012            RASHI DIAMOND DO                         709.1       
     VASCULAR DISORDERS OF SKIN                     

 

 2012            DARIAN FARRAR PHD                         709.1     
       VASCULAR DISORDERS OF SKIN                     

 

 2012            ESSIE MCMANUS DO                         709.1          
  Vascular Disorders Of Skin                     

 

 2012                                      709.1            Vascular 
Disorders Of Skin                     

 

 2012                                      709.1            Vascular 
Disorders Of Skin                     

 

 2012                                      709.1            Vascular 
Disorders Of Skin                     

 

 2012                                      709.1            Vascular 
Disorders Of Skin                     

 

 2012                                      709.1            Vascular 
Disorders Of Skin                     

 

 2012            TALIA DENNIS, KYLAH ANDRADE                         709.1     
       Vascular Disorders Of Skin                     

 

 2012            DARIAN FARRAR PHD                         709.1     
       Vascular Disorders Of Skin                     

 

 2012            ESSIE MCMANUS DO                         709.1          
  Vascular Disorders Of Skin                     

 

 2012            DARIAN FARRAR PHD                         709.1     
       Vascular Disorders Of Skin                     

 

 2012            EDWARD REYNAGA                         709.1  
          Vascular Disorders Of Skin                     

 

 2012            DARIAN FARRAR PHD                         709.1     
       Vascular Disorders Of Skin                     

 

 2012            DARIAN FARRAR PHD                         709.1     
       Vascular Disorders Of Skin                     

 

 2012            DARIAN FARRAR PHD                         709.1     
       Vascular Disorders Of Skin                     

 

 2012            EDWARD REYNAGA                         709.1  
          Vascular Disorders Of Skin                     

 

 2012            DARIAN FARRAR PHD                         709.1     
       Vascular Disorders Of Skin                     

 

 2012            ESSIE MCMANUS DO                         709.1          
  Vascular Disorders Of Skin                     

 

 2012            JEFFREY MORA                         709.1       
     Vascular Disorders Of Skin                     

 

 2012            DARIAN FARRAR PHD                         709.1     
       Vascular Disorders Of Skin                     

 

 2012            JEFFREY MORA                         709.1       
     Vascular Disorders Of Skin                     

 

 2012            DARIAN FARRAR PHD                         709.1     
       Vascular Disorders Of Skin                     

 

 2012            DARIAN FARRAR PHD                         296.31    
        MO DEPRESSIVE RECURRENT MILD                     

 

 2012            RASHI DIAMOND DO                         296.31      
      MO DEPRESSIVE RECURRENT MILD                     

 

 2012            DARIAN FARRAR PHD                         296.31    
        MO DEPRESSIVE RECURRENT MILD                     

 

 2012            ESSIE MCMANUS DO                         296.31         
   MO DEPRESSIVE RECURRENT MILD                     

 

 2012                                      296.31            MO 
DEPRESSIVE RECURRENT MILD                     

 

 2012                                      296.31            MO 
DEPRESSIVE RECURRENT MILD                     

 

 2012                                      296.31            MO 
DEPRESSIVE RECURRENT MILD                     

 

 2012                                      296.31            MO 
DEPRESSIVE RECURRENT MILD                     

 

 2012                                      296.31            MO 
DEPRESSIVE RECURRENT MILD                     

 

 2012            KYLAH MELGAR PA-C                         296.31    
        MO DEPRESSIVE RECURRENT MILD                     

 

 2012            DARIAN FARRAR PHD                         296.31    
        MO DEPRESSIVE RECURRENT MILD                     

 

 2012            ESSIE MCMANUS DO                         296.31         
   MO DEPRESSIVE RECURRENT MILD                     

 

 2012            DARIAN FARRAR PHD                         296.31    
        MO DEPRESSIVE RECURRENT MILD                     

 

 2012            EDWARD REYNAGA                         296.31 
           MO DEPRESSIVE RECURRENT MILD                     

 

 2012            DARIAN FARRAR PHD                         296.31    
        MO DEPRESSIVE RECURRENT MILD                     

 

 2012            DARIAN FARRAR PHD                         296.31    
        MO DEPRESSIVE RECURRENT MILD                     

 

 2012            DARIAN FARRAR PHD                         296.31    
        MO DEPRESSIVE RECURRENT MILD                     

 

 2012            EDWARD REYNAGA                         296.31 
           MO DEPRESSIVE RECURRENT MILD                     

 

 2012            DARIAN FARRAR PHD                         296.31    
        MO DEPRESSIVE RECURRENT MILD                     

 

 2012            ESSIE MCMANUS DO                         296.31         
   MO DEPRESSIVE RECURRENT MILD                     

 

 2012            JEFFREY MORA                         296.31      
      MO DEPRESSIVE RECURRENT MILD                     

 

 2012            DARIAN FARRAR PHD                         296.31    
        MO DEPRESSIVE RECURRENT MILD                     

 

 2012            JEFFREY MORA                         296.31      
      MO DEPRESSIVE RECURRENT MILD                     

 

 2012            DARIAN FARRAR PHD                         296.31    
        MO DEPRESSIVE RECURRENT MILD                     

 

 2013            JENNIFER FARNSWORTH, ENRIQUE POLANCO Ot            276.51  
          DEHYDRATION                     

 

 2013            ENRIQUE RODRIGUEZ MD            Ot            787.03  
          VOMITING ALONE                     

 

 2013            ENRIQUE RODRIGUEZ MD            Ot            789.00  
          ABDOMINAL PAIN, UNSPECIFIED SITE                     

 

 2013                                      727.03            TRIGGER 
FINGER (ACQUIRED)                     

 

 2013                                      727.03            TRIGGER 
FINGER (ACQUIRED)                     

 

 2013                                      727.03            TRIGGER 
FINGER (ACQUIRED)                     

 

 2013            KYLAH MELGAR PA-C                         727.03    
        TRIGGER FINGER (ACQUIRED)                     

 

 2013            LENI YAN, DARIAN A                         727.03    
        TRIGGER FINGER (ACQUIRED)                     

 

 2013            ESSIE MCMANUS DO                         727.03         
   TRIGGER FINGER (ACQUIRED)                     

 

 2013            LENI YAN, DARIAN A                         727.03    
        TRIGGER FINGER (ACQUIRED)                     

 

 2013            EDWARD REYNAGA                         727.03 
           TRIGGER FINGER (ACQUIRED)                     

 

 2013            LENI YAN, DARIAN A                         727.03    
        TRIGGER FINGER (ACQUIRED)                     

 

 2013            DARIAN FARRAR PHD A                         727.03    
        TRIGGER FINGER (ACQUIRED)                     

 

 2013            [facility]                         727.03    
        TRIGGER FINGER (ACQUIRED)                     

 

 2013            EDWARD REYNAGA                         727.03 
           TRIGGER FINGER (ACQUIRED)                     

 

 [facility]            [facility]                         727.03    
        TRIGGER FINGER (ACQUIRED)                     

 

 2013            ESSIE MCMANUS DO                         727.03         
   TRIGGER FINGER (ACQUIRED)                     

 

 2013            VERONICA WADDELL JEFFREY                         727.03      
      TRIGGER FINGER (ACQUIRED)                     

 

 2013            LENI YAN DARIAN A                         727.03    
        TRIGGER FINGER (ACQUIRED)                     

 

 2013            VERONICA WADDELL JEFFREY                         727.03      
      TRIGGER FINGER (ACQUIRED)                     

 

 2013            LENI YAN, DARIAN A                         727.03    
        TRIGGER FINGER (ACQUIRED)                     

 

 2013                                      790.4            ABNORMAL LFT (
ELEVATED)                     

 

 2013                                      790.4            ABNORMAL LFT (
ELEVATED)                     

 

 2013            KYLAH MELGAR PA-C                         790.4     
       ABNORMAL LFT (ELEVATED)                     

 

 2013            DARIAN FARRAR PHD                         790.4     
       ABNORMAL LFT (ELEVATED)                     

 

 2013            ESSIE MCMANUS DO                         790.4          
  ABNORMAL LFT (ELEVATED)                     

 

 2013            [facility]                         790.4     
       ABNORMAL LFT (ELEVATED)                     

 

 2013            VENKATA WADDELL EDWARD RIVERAH                         790.4  
          ABNORMAL LFT (ELEVATED)                     

 

 2013            [facility]                         790.4     
       ABNORMAL LFT (ELEVATED)                     

 

 2013            [facility] A                         790.4     
       ABNORMAL LFT (ELEVATED)                     

 

 2013            [facility] D[facility]IAN A                         790.4     
       ABNORMAL LFT (ELEVATED)                     

 

 2013            VENKATA WADDELL EDWARD RODERICK                         790.4  
          ABNORMAL LFT (ELEVATED)                     

 

 2013OUT PHD, DARIAN A                         790.4     
       ABNORMAL LFT (ELEVATED)                     

 

 2013            ESSIE MCMANUS DO                         790.4          
  ABNORMAL LFT (ELEVATED)                     

 

 2013            JEFFREY MORA                         790.4       
     ABNORMAL LFT (ELEVATED)                     

 

 2013            [facility]                         790.4     
       ABNORMAL LFT (ELEVATED)                     

 

 2013            JEFFREY MORA                         790.4       
     ABNORMAL LFT (ELEVATED)                     

 

 2013            [facility] PHD, DARIAN A                         790.4     
       ABNORMAL LFT (ELEVATED)                     

 

 2013                                      070.70            HEPATITIS C, 
UNSPEC                     

 

 2013            KYLAH MELGAR PA-C                         070.70    
        HEPATITIS C, UNSPEC                     

 

 2013            LENI PHD, DARIAN A                         070.70    
        HEPATITIS C, UNSPEC                     

 

 2013            ESSIE MCMANUS DO                         070.70         
   HEPATITIS C, UNSPEC                     

 

 2013OUT PHD, DARIAN A                         070.70    
        HEPATITIS C, UNSPEC                     

 

 2013            VENKATA WADDELL EDWARD RODERICK                         070.70 
           HEPATITIS C, UNSPEC                     

 

 2013            BRENDAOUT PHD, DARIAN A                         070.70    
        HEPATITIS C, UNSPEC                     

 

 2013            BOEOUT PHD, DARAIN A                         070.70    
        HEPATITIS C, UNSPEC                     

 

 2013            BOEOUT PHD, DARIAN A                         070.70    
        HEPATITIS C, UNSPEC                     

 

 2013            EDWARD REYNAGA                         070.70 
           HEPATITIS C, UNSPEC                     

 

 2013OUT PHD, DARIAN A                         070.70    
        HEPATITIS C, UNSPEC                     

 

 2013            ESSIE MCMANUS DO                         070.70         
   HEPATITIS C, UNSPEC                     

 

 2013            VERONICA APRTU, JEFFREY                         070.70      
      HEPATITIS C, UNSPEC                     

 

 2013            LENI YAN, DARIAN JARA                         070.70    
        HEPATITIS C, UNSPEC                     

 

 2013            VERONICA APRTU JEFFREY                         070.70      
      HEPATITIS C, UNSPEC                     

 

 2013            LENI YAN, DARIAN JARA                         070.70    
        HEPATITIS C, UNSPEC                     

 

 10/10/2013            ESSIE MCMANUS DO                         307.42         
   PERSISTENT DISORDER OF INITIATING OR MAINTAINING SLEEP                     

 

 10/10/2013            DARIAN FARRAR PHD                         307.42    
        PERSISTENT DISORDER OF INITIATING OR MAINTAINING SLEEP                 
    

 

 10/10/2013            EDWARD REYNAGA                         307.42 
           PERSISTENT DISORDER OF INITIATING OR MAINTAINING SLEEP              
       

 

 10/10/2013            DARIAN FARRAR PHD                         307.42    
        PERSISTENT DISORDER OF INITIATING OR MAINTAINING SLEEP                 
    

 

 10/10/2013            DARIAN FARRAR PHD                         307.42    
        PERSISTENT DISORDER OF INITIATING OR MAINTAINING SLEEP                 
    

 

 10/10/2013            DARIAN FARRAR PHD                         307.42    
        PERSISTENT DISORDER OF INITIATING OR MAINTAINING SLEEP                 
    

 

 10/10/2013            EDWARD REYNAGA                         307.42 
           PERSISTENT DISORDER OF INITIATING OR MAINTAINING SLEEP              
       

 

 10/10/2013            DARIAN FARRAR PHD                         307.42    
        PERSISTENT DISORDER OF INITIATING OR MAINTAINING SLEEP                 
    

 

 10/10/2013            ESSIE MCMANUS DO                         307.42         
   PERSISTENT DISORDER OF INITIATING OR MAINTAINING SLEEP                     

 

 10/10/2013            VERONICA WADDELL JEFFREY                         307.42      
      PERSISTENT DISORDER OF INITIATING OR MAINTAINING SLEEP                   
  

 

 10/10/2013            DARIAN FARRAR PHD                         307.42    
        PERSISTENT DISORDER OF INITIATING OR MAINTAINING SLEEP                 
    

 

 10/10/2013            VERONICA APRTU JEFFREY                         307.42      
      PERSISTENT DISORDER OF INITIATING OR MAINTAINING SLEEP                   
  

 

 10/10/2013            DARIAN FARRAR PHD                         307.42    
        PERSISTENT DISORDER OF INITIATING OR MAINTAINING SLEEP                 
    

 

 10/17/2013            ESSIE MCMANUS DO                         454.1          
  VARICOSE VEINS OF LOWER EXTREMITIES WITH INFLAMMATION                     

 

 10/17/2013            DARIAN FARRAR PHD                         454.1     
       VARICOSE VEINS OF LOWER EXTREMITIES WITH INFLAMMATION                   
  

 

 10/17/2013            EDWARD REYNAGA                         454.1  
          VARICOSE VEINS OF LOWER EXTREMITIES WITH INFLAMMATION                
     

 

 10/17/2013            DARIAN FARRAR PHD                         454.1     
       VARICOSE VEINS OF LOWER EXTREMITIES WITH INFLAMMATION                   
  

 

 10/17/2013            DARIAN FARRAR PHD                         454.1     
       VARICOSE VEINS OF LOWER EXTREMITIES WITH INFLAMMATION                   
  

 

 10/17/2013            LENI YAN, DARIAN JARA                         454.1     
       VARICOSE VEINS OF LOWER EXTREMITIES WITH INFLAMMATION                   
  

 

 10/17/2013            VENKATA WADDELL EDWARD RODERICK                         454.1  
          VARICOSE VEINS OF LOWER EXTREMITIES WITH INFLAMMATION                
     

 

 10/17/2013            LENI YAN, DARIAN JARA                         454.1     
       VARICOSE VEINS OF LOWER EXTREMITIES WITH INFLAMMATION                   
  

 

 10/17/2013            ESSIE MCMANUS DO                         454.1          
  VARICOSE VEINS OF LOWER EXTREMITIES WITH INFLAMMATION                     

 

 10/17/2013            JEFFREY MORA                         454.1       
     VARICOSE VEINS OF LOWER EXTREMITIES WITH INFLAMMATION                     

 

 10/17/2013            DARIAN FARRAR PHD                         454.1     
       VARICOSE VEINS OF LOWER EXTREMITIES WITH INFLAMMATION                   
  

 

 10/17/2013            JEFFREY MORA                         454.1       
     VARICOSE VEINS OF LOWER EXTREMITIES WITH INFLAMMATION                     

 

 10/17/2013            DARIAN FARRAR PHD                         454.1     
       VARICOSE VEINS OF LOWER EXTREMITIES WITH INFLAMMATION                   
  

 

 2014            EDWARD REYNAGA                         300.00 
           AN ANXIETY UNSPEC                     

 

 2014            LENI YAN, DARIAN A                         300.00    
        AN ANXIETY UNSPEC                     

 

 2014            LENI YAN, DARIAN A                         300.00    
        AN ANXIETY UNSPEC                     

 

 2014            LENI YAN, DARIAN A                         300.00    
        AN ANXIETY UNSPEC                     

 

 2014            EDWARD REYNAGA                         300.00 
           AN ANXIETY UNSPEC                     

 

 2014            BOEANA YAN, DARIAN A                         300.00    
        AN ANXIETY UNSPEC                     

 

 2014            ESSIE MCMANUS DO                         300.00         
   AN ANXIETY UNSPEC                     

 

 2014            JEFFREY MORA                         300.00      
      AN ANXIETY UNSPEC                     

 

 2014            BOEANA YAN, DARIAN A                         300.00    
        AN ANXIETY UNSPEC                     

 

 2014            JEFFREY MORA                         300.00      
      AN ANXIETY UNSPEC                     

 

 2014            LENI YAN, DARIAN A                         300.00    
        AN ANXIETY UNSPEC                     

 

 2014            DARIAN FARRAR PHD                         V58.69    
        MEDICATION HIGH RISK                     

 

 2014            EDWARD REYNAGA                         V58.69 
           MEDICATION HIGH RISK                     

 

 2014            DARIAN FARRAR PHD                         V58.69    
        MEDICATION HIGH RISK                     

 

 2014            ESSIE MCMANUS DO                         V58.69         
   MEDICATION HIGH RISK                     

 

 2014            JEFFREY MORA                         V58.69      
      MEDICATION HIGH RISK                     

 

 2014            LENI PHD, DARIAN JARA                         V58.69    
        MEDICATION HIGH RISK                     

 

 2014            JEFFREY MORA                         V58.69      
      MEDICATION HIGH RISK                     

 

 2014            LENI PHD, DARIAN JARA                         V58.69    
        MEDICATION HIGH RISK                     

 

 10/30/2014            JEFFREY MORA                         300.15      
      DS DISSOCIATIVE DIS NOS                     

 

 10/30/2014            LENI YAN, DARIAN JARA                         300.15    
        DS DISSOCIATIVE DIS NOS                     

 

 10/30/2014            JEFFREY MORA                         300.15      
      DS DISSOCIATIVE DIS NOS                     

 

 10/30/2014            LENI YAN, DARIAN JARA                         300.15    
        DS DISSOCIATIVE DIS NOS                     



                                                                               
                                                                               
                                                                               
                                                                               
                                                                               
                                                                               
                                                                               
                                                                               
                                                                               
                                                                               
                                                                               
                         



Procedures

      





 Code            Description            Performed By            Performed On   
     

 

             76014                                  PSYCH PHARM MGMT           
                        2012        

 

             44712                                  INDIV PSYTX 45/50 MIN      
                             2012        

 

             47944                                  ROUTINE VENIPUNCTURE       
                            2012        

 

             44878                                  CMP                        
           2012        

 

             26232                                  LIPID PANEL                
                   2012        

 

             [id number]                                  GFR CALC (RESULT ONLY)   
                                2012        

 

             70712                                  PSYTX PT&/FAMILY 45 MINUTES
                                   2013        

 

             78637                                  PSYTX PT&/FAMILY 45 MINUTES
                                   2013        

 

             05692                                  PSYCH IND W/MED CK 20      
                             2013        

 

             79659                                  ROUTINE VENIPUNCTURE       
                            2013        

 

             18887                                  CMP                        
           2013        

 

             78351                                  LIPID PANEL                
                   2013        

 

             [id number]                                  GFR CALC (RESULT ONLY)   
                                2013        

 

             55631                                  ROUTINE VENIPUNCTURE       
                            2013        

 

             66108                                  FERRITIN                   
                2013        

 

             74748                                  IRON SERUM                 
                  2013        

 

             40880                                  IRON BNDNG CAP             
                      2013        

 

             [id number]                                  HCV INDEX (RESULT ONLY)  
                                 2013        

 

             73693                                  HEPATITIS PROFILE          
                         2013        

 

             IRGROUP                                  IRON GROUP (Iron,TIBC, 
Ferritin)                                   2013        

 

             01025                                  ROUTINE VENIPUNCTURE       
                            2013        

 

             61887                                  CBC                        
           2013        

 

             55764                                  PT/INR                     
              2013        

 

             19080                                  HEP C PCR QUANT (SERIAL)   
                                2013        

 

             47307                                  PSYTX PT&/FAMILY 45 MINUTES
                                   10/07/2013        

 

             10301                                  PSYTX PT&/FAMILY 45 MINUTES
                                   2013        

 

             04037                                  PSYTX PT&/FAMILY 45 MINUTES
                                   2014        

 

             10689                                  PSYTX PT&/FAMILY 45 MINUTES
                                   2014        

 

             73167                                  PSYTX PT&/FAMILY 45 MINUTES
                                   2014        

 

             96550                                  ROUTINE VENIPUNCTURE       
                            2014        

 

             49953                                  CBC                        
           2014        

 

             [id number]                                  GFR CALC (RESULT ONLY)   
                                2014        

 

             26388                                  CMP                        
           2014        

 

             01881                                  LIPID PANEL                
                   2014        

 

             39674                                  IRON SERUM                 
                  2014        

 

             41074                                  IRON BNDNG CAP             
                      2014        

 

             17419                                  FERRITIN                   
                2014        

 

             [id number]                                  HCV INDEX (RESULT ONLY)  
                                 2014        

 

             66693                                  HEPATITIS PROFILE          
                         2014        

 

             27894                                  PSYTX PT&/FAMILY 45 MINUTES
                                   2014        

 

             09054                                  ROUTINE VENIPUNCTURE       
                            2014        

 

             IRGROUP                                  IRON GROUP (Iron,TIBC, 
Ferritin)                                   2014        

 

             [id number]                                  GFR CALC (RESULT ONLY)   
                                09/10/2014        

 

             21187                                  CMP                        
           09/10/2014        

 

             23061                                  PSYTX PT&/FAMILY 45 MINUTES
                                   2014        

 

             38123                                  PSYTX PT&/FAMILY 45 MINUTES
                                   2015        



                                                                               
                             



Results

      



There is no data.              



Encounters

      





 ACCT No.            Visit Date/Time            Discharge            Status    
        Pt. Type            Provider            Facility            Loc./Unit  
          Complaint        

 

 [id number]            2015 13:26:00            2015 23:59:59          
  CLS            Outpatient            DARIAN FARRAR PHD                   
                            

 

 [id number]            2014 15:54:00            2014 23:59:59          
  CLS            Outpatient            JEFFREY MORA                     
                          

 

 [id number]            2014 09:49:00            2014 23:59:59          
  CLS            Outpatient            DARIAN FARRAR PHD                   
                            

 

 [id number]            10/30/2014 14:04:00            10/30/2014 23:59:59          
  CLS            Outpatient            JEFFREY MORA                     
                          

 

 [id number]            2014 14:46:00            2014 23:59:59          
  CLS            Outpatient            ESSIE MCMANUS DO                        
                       

 

 [id number]            2014 12:54:00            2014 23:59:59          
  CLS            Outpatient            DARIAN FARRAR PHD                   
                            

 

 [id number]            2014 12:27:00            2014 23:59:59          
  CLS            Outpatient            EDWARD REYNAGA                
                               

 

 [id number]            2014 14:05:00            2014 23:59:59          
  CLS            Outpatient            DARIAN FARRAR PHD                   
                            

 

 [id number]            2014 13:52:00            2014 23:59:59          
  CLS            Outpatient            DARIAN FARRAR PHD                   
                            

 

 [id number]            2014 13:55:00            2014 23:59:59          
  CLS            Outpatient            DARIAN FARRAR PHD                   
                            

 

 [id number]            2014 11:22:00            2014 23:59:59          
  CLS            Outpatient            EDWARD REYNAGA                
                               

 

 [id number]            2013 12:47:00            2013 23:59:59          
  CLS            Outpatient            DARIAN FARRAR PHD                   
                            

 

 [id number]            10/17/2013 09:42:00            10/17/2013 23:59:59          
  CLS            Outpatient            MARIETTA ESSIE CABRAL JASVIR                        
                       

 

 [id number]            10/04/2013 12:42:00            10/04/2013 23:59:59          
  CLS            Outpatient            DARIAN FARRAR PHD                   
                            

 

 [id number]            2013 14:41:00            2013 23:59:59          
  CLS            Outpatient            KYLAH MELGAR PA-C                   
                            

 

 [id number]            2013 15:44:00            2013 23:59:59          
  CLS            Outpatient                                                    
        

 

 [id number]            2013 10:41:00            2013 23:59:59          
  CLS            Outpatient            MAIRETTA ESSIE CABRAL                        
                       

 

 [id number]            2013 10:42:00            2013 23:59:59          
  CLS            Outpatient            DARIAN FARRAR PHD                   
                            

 

 [id number]            2012 09:43:00            2012 23:59:59          
  CLS            Outpatient            RASHI DIAMOND DO THALIA                     
                          

 

 [id number]            2012 17:01:00            2012 23:59:59            
CLS            Outpatient            DARIAN FARRAR PHD                     
                          

 

 [id number]            2013 11:07:00                                      
Document Registration                                                          
  

 

 [id number]            2013 08:08:00                                      
Document Registration                                                          
  

 

 [id number]            2013 16:10:00                                      
Document Registration                                                          
  

 

 [id number]            04/15/2013 15:07:00                                      
Document Registration                                                          
  

 

 [id number]            2013 08:49:00            2013 11:15:00    
        DIS            Emergency            JENNIFER FARNSWORTH, ENRIQUE POLANCO            
[facility]            ER            VOMITING        

 

 [id number]            2018 15:41:00                         [place]       
     Emergency            JACKSON FARNSWORTH, MARGRET HICKMAN            [facility]            ER            [facility]

## 2018-09-12 NOTE — ED ABDOMINAL PAIN
General


Chief Complaint:  Abdominal/GI Problems


Stated Complaint:  LLQ SHARP PAIN


Nursing Triage Note:  


PT STATES HE IS EXPERIENCING LLQ PAIN THAT BEGAN LAST NIGHT. PAIN IS REPORTED 

AS 


SHARP IN NATURE


Sepsis Screen:  No Definite Risk


Source of Information:  Patient, Family (daughter)


Exam Limitations:  No Limitations





History of Present Illness


Date Seen by Provider:  Sep 12, 2018


Time Seen by Provider:  16:06


Initial Comments


The patient presents to the ER by private conveyance with chief complaint that 

since yesterday evening around 9:00 [place] was sitting around and started having 

some aggressively worsening sharp pain to his left lower quadrant. No problems 

with bowel movements or dysuria. Bowel movements does not help either. No 

nausea vomiting. No fevers or chills. No history of abdominal surgeries. No 

history of diverticula or colonoscopies. He takes olanzapine for mood, 

mirtazapine for sleep and atorvastatin for cholesterol. It is not worse with 

movement but it is worse with pushing on his belly. His last oral intake was 

last night prior to the pain. He is not taking anything for the pain.





Allergies and Home Medications


Allergies


Coded Allergies:  


     No Known Drug Allergies (Unverified , 12)





Home Medications


Atorvastatin Calcium 10 Mg Tablet, 10 MG PO DAILY, (Reported)


Promethazine Hcl 25 Mg Tablet, 1 TAB PO QID PRN


   Prescribed by: ENRIQUE RODRIGUEZ on 13 1106


Quetiapine Fumarate 100 Mg Tablet, 1 TAB PO HS, (Reported)


Vilazodone Hydrochloride 1 Each Tab.ds.pk, 1 EACH PO DAILY, (Reported)





Patient Home Medication List


Home Medication List Reviewed:  Yes





Review of Systems


Review of Systems


Constitutional:  No chills, No diaphoresis, No fever


EENTM:  No Blurred Vision, No Double Vision


Respiratory:  Denies Cough, Denies Shortness of Air


Cardiovascular:  Denies Chest Pain, Denies Irregular Heart Rate, Denies 

Lightheadedness


Gastrointestinal:  Denies Constipated, Denies Diarrhea, Denies Nausea; Poor 

Appetite


Genitourinary:  Denies Burning, Denies Discharge, Denies Drainage


Musculoskeletal:  No back pain, No joint pain





Past Medical-Social-Family Hx


Patient Social History


Alcohol Use:  Occasionally Uses


Alcohol Beverage of Choice:  Beer


Recreational Drug Use:  No


Smoking Status:  Never a Smoker


Recent Foreign Travel:  No


Contact w/Someone Who Travel:  No


Recent Infectious Disease Expo:  No





Immunizations Up To Date


Date of Influenza Vaccine:  Aug 31, 2011





Past Medical History


Reproductive Disorders:  No


Gastroesophageal Reflux


Depression





Family Medical History


No Pertinent Family Hx





Physical Exam


Vital Signs





Vital Signs - First Documented








 18





 16:06


 


Temp 98.0


 


Pulse 86


 


Resp 20


 


B/P (MAP) 140/78 (98)


 


Pulse Ox 97


 


O2 Delivery Room Air





Capillary Refill : Less Than 3 Seconds


Height/Weight/BMI


Height: 5'9.00"


Weight: 193lbs. oz. 87.347218xt;  BMI


Method:Stated


General Appearance:  WD/WN, mild distress


HEENT:  PERRL/EOMI, pharynx normal


Neck:  non-tender, normal inspection


Respiratory:  chest non-tender, lungs clear, normal breath sounds, no 

respiratory distress, no accessory muscle use


Cardiovascular:  normal peripheral pulses, regular rate, rhythm


Peripheral Pulses:  2+ Radial Pulses (R), 2+ Radial Pulses (L)


Gastrointestinal:  normal bowel sounds, no organomegaly, guarding (left lower 

quadrant); No rebound; tenderness (left lower quadrant), other (negative psoas 

sign or other mesenteric signs.)


Extremities:  normal range of motion, non-tender


Neurologic/Psychiatric:  alert, normal mood/affect, oriented x 3


Skin:  normal color, warm/dry





Focused Exam


Lactate Level


18 16:55: Lactic Acid Level 1.27





Lactic Acid Level





Laboratory Tests








Test


 18


16:55


 


Lactic Acid Level


 1.27 MMOL/L


(0.50-2.00)











Progress/Results/Core Measures


Results/Orders


Lab Results





Laboratory Tests








Test


 18


16:20 18


16:55 18


17:35 Range/Units


 


 


White Blood Count


 12.7 H


 


 


 4.3-11.0


10^3/uL


 


Red Blood Count


 5.21 


 


 


 4.35-5.85


10^6/uL


 


Hemoglobin 16.8    13.3-17.7  G/DL


 


Hematocrit 48    40-54  %


 


Mean Corpuscular Volume 92    80-99  FL


 


Mean Corpuscular Hemoglobin 32    25-34  PG


 


Mean Corpuscular Hemoglobin


Concent 35 


 


 


 32-36  G/DL





 


Red Cell Distribution Width 13.1    10.0-14.5  %


 


Platelet Count


 220 


 


 


 130-400


10^3/uL


 


Mean Platelet Volume 9.4    7.4-10.4  FL


 


Neutrophils (%) (Auto) 83 H   42-75  %


 


Lymphocytes (%) (Auto) 8 L   12-44  %


 


Monocytes (%) (Auto) 8    0-12  %


 


Eosinophils (%) (Auto) 1    0-10  %


 


Basophils (%) (Auto) 0    0-10  %


 


Neutrophils # (Auto) 10.5 H   1.8-7.8  X 10^3


 


Lymphocytes # (Auto) 1.1    1.0-4.0  X 10^3


 


Monocytes # (Auto) 1.0    0.0-1.0  X 10^3


 


Eosinophils # (Auto)


 0.1 


 


 


 0.0-0.3


10^3/uL


 


Basophils # (Auto)


 0.0 


 


 


 0.0-0.1


10^3/uL


 


Sodium Level 138    135-145  MMOL/L


 


Potassium Level 3.9    3.6-5.0  MMOL/L


 


Chloride Level 105      MMOL/L


 


Carbon Dioxide Level 22    21-32  MMOL/L


 


Anion Gap 11    5-14  MMOL/L


 


Blood Urea Nitrogen 9    7-18  MG/DL


 


Creatinine


 0.93 


 


 


 0.60-1.30


MG/DL


 


Estimat Glomerular Filtration


Rate > 60 


 


 


  





 


BUN/Creatinine Ratio 10     


 


Glucose Level 107 H     MG/DL


 


Calcium Level 9.5    8.5-10.1  MG/DL


 


Corrected Calcium 9.1    8.5-10.1  MG/DL


 


Magnesium Level 2.2    1.8-2.4  MG/DL


 


Total Bilirubin 1.3 H   0.1-1.0  MG/DL


 


Aspartate Amino Transf


(AST/SGOT) 17 


 


 


 5-34  U/L





 


Alanine Aminotransferase


(ALT/SGPT) 24 


 


 


 0-55  U/L





 


Alkaline Phosphatase 85      U/L


 


Total Protein 7.8    6.4-8.2  GM/DL


 


Albumin 4.5    3.2-4.5  GM/DL


 


Lactic Acid Level


 


 1.27 


 


 0.50-2.00


MMOL/L


 


Urine Color   YELLOW   


 


Urine Clarity   CLEAR   


 


Urine pH   5  5-9  


 


Urine Specific Gravity   1.005 L 1.016-1.022  


 


Urine Protein   1+ H NEGATIVE  


 


Urine Glucose (UA)   NEGATIVE  NEGATIVE  


 


Urine Ketones   1+ H NEGATIVE  


 


Urine Nitrite   NEGATIVE  NEGATIVE  


 


Urine Bilirubin   NEGATIVE  NEGATIVE  


 


Urine Urobilinogen   NORMAL  NORMAL  MG/DL


 


Urine Leukocyte Esterase   2+ H NEGATIVE  


 


Urine RBC (Auto)   2+ H NEGATIVE  


 


Urine RBC   5-10 H  /HPF


 


Urine WBC   5-10 H  /HPF


 


Urine Crystals   NONE   /LPF


 


Urine Bacteria   FEW H  /HPF


 


Urine Casts   NONE   /LPF


 


Urine Mucus   NEGATIVE   /LPF


 


Urine Culture Indicated   YES   








My Orders





Orders - MARGRET PAZ


Ct Abdomen/Pelvis W (18 16:11)


Saline Lock/Iv-Start (18 16:11)


Cbc With Automated Diff (18 16:11)


Comprehensive Metabolic Panel (18 16:11)


Lactic Acid Analyzer (18 16:11)


Magnesium (18 16:11)


Ua Culture If Indicated (18 16:11)


Saline Lock/Iv-Start (18 16:11)


Ns Iv 1000 Ml (Sodium Chloride 0.9%) (18 16:11)


Ketorolac Injection (Toradol Injection) (18 16:15)


Iohexol Injection (Omnipaque 350 Mg/Ml 1 (18 17:00)


Ns (Ivpb) (Sodium Chloride 0.9%) (18 17:00)


Metronidazole 500mg/100ml Ivpb (Flagyl 5 (18 18:00)


Ciprofloxacin Iv 400mg/200ml (Cipro Iv S (18 18:00)


Urine Culture (18 17:35)





Medications Given in ED





Current Medications








 Medications  Dose


 Ordered  Sig/Rosey


 Route  Start Time


 Stop Time Status Last Admin


Dose Admin


 


 Ciprofloxacin/


 Dextrose  200 ml @ 


 200 mls/hr  ONCE  ONCE


 IV  18 18:00


 18 18:59 DC 18 18:25


200 MLS/HR


 


 Iohexol  100 ml  ONCE  ONCE


 IV  18 17:00


 18 17:02 DC 18 17:11


100 ML


 


 Ketorolac


 Tromethamine  15 mg  ONCE  ONCE


 IVP  18 16:15


 18 16:16 DC 18 16:31


15 MG


 


 Sodium Chloride  250 ml  ONCE  ONCE


 IV  18 17:00


 18 17:02 DC 18 17:11


80 ML








Vital Signs/I&O











 18





 16:06


 


Temp 98.0


 


Pulse 86


 


Resp 20


 


B/P (MAP) 140/78 (98)


 


Pulse Ox 97


 


O2 Delivery Room Air














Blood Pressure Mean:  98











Progress


Progress Note :  


   Time:  16:18


Progress Note


Toradol for pain, 1 L normal saline IV, nothing by mouth and will get a CT of 

the abdomen pelvis with labs and urinalysis. Magnesium in case he has a low 

potassium.





Diagnostic Imaging





   Diagonstic Imaging:  CT (with contrast)


   Plain Films/CT/US/NM/MRI:  abdomen, pelvis


Comments


 VIA [facility].


 [place], [place]





NAME:   ROGERIO RODRIGUEZ


[facility] REC#:   [id number]


ACCOUNT#:   [id number]


PT STATUS:   REG ER


:   [id number]


PHYSICIAN:   MARGRET PAZ MD


ADMIT DATE:   18/ER


 ***Draft***


Date of Exam:18





CT ABDOMEN/PELVIS W








PROCEDURE: CT abdomen and pelvis with contrast.





TECHNIQUE: Multiple contiguous axial images were obtained through


the abdomen and pelvis after administration of intravenous


contrast. 





INDICATION: Left lower quadrant pain.





COMPARISON: None available. 





FINDINGS:





Lower chest: There are a few scattered calcified granulomas


within the right lung base. Otherwise, the lungs are clear.





Peritoneum: No free intraperitoneal air or fluid.  





Liver and biliary system: There is a benign cavernous type


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


measuring 3.1 x 2.4 cm. The gallbladder is normal. No biliary


duct dilation. 





Spleen and Pancreas: Spleen is normal. The pancreas enhances


normally without mass lesion or peripancreatic inflammatory


changes. 





Adrenals: Normal.





 tract: The kidneys enhance normally without suspicious mass or


obstruction. Urinary bladder is distended without wall


thickening. Prostate is normal.





GI tract: Stomach is decompressed. No bowel obstruction. Sigmoid


and descending colon diverticulosis with associated wall


thickening and surrounding inflammation is indicative of acute


diverticulitis. There is a solitary focus of loculated


extraluminal air adjacent to the area of diverticulitis


compatible with microperforation. No abscess formation. Normal


appendix.





Vasculature and Lymph nodes: Normal caliber aorta. No abdominal


or pelvic lymphadenopathy.





Musculoskeletal: No concerning osseous lesion. 





IMPRESSION: Acute diverticulitis of the sigmoid colon. There is a


solitary punctate focus of contained microperforation. No abscess


formation or bowel obstruction.





  Dictated on workstation # [id number]








Dict:   [id number] 174


Trans:   18


[facility] [id number]





Interpreted by:     LOS BALDWIN MD


Electronically signed by:


   Reviewed:  Reviewed by Me





Departure


Communication (Admissions)


Time/Spoke to Admitting Phy:  17:55


Discussed case lab imaging findings with Dr. Ruiz and he agrees with 

antibiotics and observation stay. He says that since the patient doesn't have 

any medical history he be willing to admit the patient himself and he will 

swing by the ER shortly to speak with the patient.





Impression





 Primary Impression:  


 Diverticulitis of intestine


 Qualified Codes:  K57.20 - Diverticulitis of large intestine with perforation 

and abscess without bleeding


Disposition:   ADMITTED AS INPATIENT


Condition:  Stable





Admissions


Decision to Admit Reason:  Admit from ER (General)


Decision to Admit/Date:  Sep 12, 2018


Time/Decision to Admit Time:  17:56





Departure-Patient Inst.


Referrals:  


[facility] (PCP/Family)


Primary Care Physician





Copy


Copies To 1:   ESSIE MCMANUS TITUS J Sep 12, 2018 16:17

## 2018-09-13 VITALS — DIASTOLIC BLOOD PRESSURE: 73 MMHG | SYSTOLIC BLOOD PRESSURE: 145 MMHG

## 2018-09-13 VITALS — DIASTOLIC BLOOD PRESSURE: 64 MMHG | SYSTOLIC BLOOD PRESSURE: 128 MMHG

## 2018-09-13 VITALS — SYSTOLIC BLOOD PRESSURE: 119 MMHG | DIASTOLIC BLOOD PRESSURE: 72 MMHG

## 2018-09-13 VITALS — DIASTOLIC BLOOD PRESSURE: 80 MMHG | SYSTOLIC BLOOD PRESSURE: 124 MMHG

## 2018-09-13 LAB
ALBUMIN SERPL-MCNC: 3.5 GM/DL (ref 3.2–4.5)
ALP SERPL-CCNC: 63 U/L (ref 40–136)
ALT SERPL-CCNC: 17 U/L (ref 0–55)
BASOPHILS # BLD AUTO: 0 10^3/UL (ref 0–0.1)
BASOPHILS NFR BLD AUTO: 0 % (ref 0–10)
BILIRUB SERPL-MCNC: 1.4 MG/DL (ref 0.1–1)
BUN/CREAT SERPL: 12
CALCIUM SERPL-MCNC: 8.5 MG/DL (ref 8.5–10.1)
CHLORIDE SERPL-SCNC: 105 MMOL/L (ref 98–107)
CO2 SERPL-SCNC: 18 MMOL/L (ref 21–32)
CREAT SERPL-MCNC: 0.83 MG/DL (ref 0.6–1.3)
EOSINOPHIL # BLD AUTO: 0 10^3/UL (ref 0–0.3)
EOSINOPHIL NFR BLD AUTO: 0 % (ref 0–10)
ERYTHROCYTE [DISTWIDTH] IN BLOOD BY AUTOMATED COUNT: 13 % (ref 10–14.5)
GFR SERPLBLD BASED ON 1.73 SQ M-ARVRAT: > 60 ML/MIN
GLUCOSE SERPL-MCNC: 118 MG/DL (ref 70–105)
HCT VFR BLD CALC: 42 % (ref 40–54)
HGB BLD-MCNC: 14.3 G/DL (ref 13.3–17.7)
LYMPHOCYTES # BLD AUTO: 1 X 10^3 (ref 1–4)
LYMPHOCYTES NFR BLD AUTO: 10 % (ref 12–44)
MANUAL DIFFERENTIAL PERFORMED BLD QL: NO
MCH RBC QN AUTO: 32 PG (ref 25–34)
MCHC RBC AUTO-ENTMCNC: 34 G/DL (ref 32–36)
MCV RBC AUTO: 94 FL (ref 80–99)
MONOCYTES # BLD AUTO: 1.1 X 10^3 (ref 0–1)
MONOCYTES NFR BLD AUTO: 11 % (ref 0–12)
NEUTROPHILS # BLD AUTO: 8 X 10^3 (ref 1.8–7.8)
NEUTROPHILS NFR BLD AUTO: 79 % (ref 42–75)
PLATELET # BLD: 175 10^3/UL (ref 130–400)
PMV BLD AUTO: 9.6 FL (ref 7.4–10.4)
POTASSIUM SERPL-SCNC: 3.6 MMOL/L (ref 3.6–5)
PROT SERPL-MCNC: 5.8 GM/DL (ref 6.4–8.2)
RBC # BLD AUTO: 4.46 10^6/UL (ref 4.35–5.85)
SODIUM SERPL-SCNC: 136 MMOL/L (ref 135–145)
WBC # BLD AUTO: 10.1 10^3/UL (ref 4.3–11)

## 2018-09-13 RX ADMIN — CIPROFLOXACIN SCH MLS/HR: 2 INJECTION, SOLUTION INTRAVENOUS at 18:15

## 2018-09-13 RX ADMIN — MORPHINE SULFATE PRN MG: 4 INJECTION, SOLUTION INTRAMUSCULAR; INTRAVENOUS at 04:22

## 2018-09-13 RX ADMIN — SODIUM CHLORIDE, SODIUM LACTATE, POTASSIUM CHLORIDE, AND CALCIUM CHLORIDE SCH MLS/HR: 600; 310; 30; 20 INJECTION, SOLUTION INTRAVENOUS at 20:44

## 2018-09-13 RX ADMIN — METRONIDAZOLE SCH MLS/HR: 5 INJECTION, SOLUTION INTRAVENOUS at 05:19

## 2018-09-13 RX ADMIN — METRONIDAZOLE SCH MLS/HR: 5 INJECTION, SOLUTION INTRAVENOUS at 13:07

## 2018-09-13 RX ADMIN — MORPHINE SULFATE PRN MG: 4 INJECTION, SOLUTION INTRAMUSCULAR; INTRAVENOUS at 07:36

## 2018-09-13 RX ADMIN — MORPHINE SULFATE PRN MG: 4 INJECTION, SOLUTION INTRAMUSCULAR; INTRAVENOUS at 23:13

## 2018-09-13 RX ADMIN — SODIUM CHLORIDE, SODIUM LACTATE, POTASSIUM CHLORIDE, AND CALCIUM CHLORIDE SCH MLS/HR: 600; 310; 30; 20 INJECTION, SOLUTION INTRAVENOUS at 13:58

## 2018-09-13 RX ADMIN — SODIUM CHLORIDE, SODIUM LACTATE, POTASSIUM CHLORIDE, AND CALCIUM CHLORIDE SCH MLS/HR: 600; 310; 30; 20 INJECTION, SOLUTION INTRAVENOUS at 01:34

## 2018-09-13 RX ADMIN — MORPHINE SULFATE PRN MG: 4 INJECTION, SOLUTION INTRAMUSCULAR; INTRAVENOUS at 01:34

## 2018-09-13 RX ADMIN — MORPHINE SULFATE PRN MG: 4 INJECTION, SOLUTION INTRAMUSCULAR; INTRAVENOUS at 16:03

## 2018-09-13 RX ADMIN — MORPHINE SULFATE PRN MG: 4 INJECTION, SOLUTION INTRAMUSCULAR; INTRAVENOUS at 20:41

## 2018-09-13 RX ADMIN — MORPHINE SULFATE PRN MG: 4 INJECTION, SOLUTION INTRAMUSCULAR; INTRAVENOUS at 18:16

## 2018-09-13 RX ADMIN — MORPHINE SULFATE PRN MG: 4 INJECTION, SOLUTION INTRAMUSCULAR; INTRAVENOUS at 10:31

## 2018-09-13 RX ADMIN — SODIUM CHLORIDE, SODIUM LACTATE, POTASSIUM CHLORIDE, AND CALCIUM CHLORIDE SCH MLS/HR: 600; 310; 30; 20 INJECTION, SOLUTION INTRAVENOUS at 04:21

## 2018-09-13 RX ADMIN — CIPROFLOXACIN SCH MLS/HR: 2 INJECTION, SOLUTION INTRAVENOUS at 07:36

## 2018-09-13 RX ADMIN — METRONIDAZOLE SCH MLS/HR: 5 INJECTION, SOLUTION INTRAVENOUS at 22:56

## 2018-09-13 RX ADMIN — SODIUM CHLORIDE, SODIUM LACTATE, POTASSIUM CHLORIDE, AND CALCIUM CHLORIDE SCH MLS/HR: 600; 310; 30; 20 INJECTION, SOLUTION INTRAVENOUS at 13:56

## 2018-09-13 NOTE — PROGRESS NOTE
Subjective


Time Seen by Provider:  10:01


Subjective/Events-last exam


Pt seen and examined, states he still has moderate LLQ pain.  He thinks it 

might be "a little better" than yesterday.  Pt denies N/V


and has not had any flatus or BM.


Review of Systems


General:  No Chills, No Night Sweats


Pulmonary:  No Dyspnea, No Cough


Cardiovascular:  No: Chest Pain, Palpitations


Gastrointestinal:  Abdominal Pain





Focused Exam


Lactate Level


18 16:55: Lactic Acid Level 1.27





Objective


Exam





Vital Signs








  Date Time  Temp Pulse Resp B/P (MAP) Pulse Ox O2 Delivery O2 Flow Rate FiO2


 


18 09:18 99.8       


 


18 08:00      Room Air  


 


18 08:00 100.3 95 18 119/72 (88) 95 Room Air  


 


18 04:09 98.3 87 17 119/72 (88) 95 Room Air  


 


18 23:49 99.8 99 18 108/65 (79) 95 Room Air  


 


18 20:30 99.7       


 


18 19:15 100.8 98 16 124/74 (91) 97 Room Air  


 


18 19:15      Room Air  


 


18 19:09 98.0 89 20 125/79 (98) 97 Room Air  


 


18 16:06 98.0 86 20 140/78 (98) 97 Room Air  














I & O 


 


 18





 07:00


 


Intake Total 2400 ml


 


Balance 2400 ml





Capillary Refill : Less Than 3 Seconds


General Appearance:  WD/WN, Mild Distress


HEENT:  Pharynx Normal, Moist Mucous Membranes


Neck:  Full Range of Motion, Normal Inspection, Non Tender, Supple


Respiratory:  Chest Non Tender, Lungs Clear, Normal Breath Sounds, No Accessory 

Muscle Use, No Respiratory Distress


Cardiovascular:  Regular Rate, Rhythm, No Edema, Normal Peripheral Pulses


Peripheral Pulses:  2+ Radial Pulses (R), 2+ Radial Pulses (L)


Gastrointestinal:  normal bowel sounds, no organomegaly, guarding (left lower 

quadrant); No rebound; tenderness (left lower quadrant), other (negative psoas 

sign or other mesenteric signs.)


Extremity:  Normal Capillary Refill, Non Tender, No Calf Tenderness, No Pedal 

Edema


Neurologic/Psychiatric:  Alert, Oriented x3, No Motor/Sensory Deficits, Normal 

Mood/Affect, CNs II-XII Norm as Tested


Skin:  Normal Color, Warm/Dry


Lymphatic:  No Adenopathy (neck, axilla or groin)





Results


Lab


Laboratory Tests


18 16:20: 


White Blood Count 12.7H, Red Blood Count 5.21, Hemoglobin 16.8, Hematocrit 48, 

Mean Corpuscular Volume 92, Mean Corpuscular Hemoglobin 32, Mean Corpuscular 

Hemoglobin Concent 35, Red Cell Distribution Width 13.1, Platelet Count 220, 

Mean Platelet Volume 9.4, Neutrophils (%) (Auto) 83H, Lymphocytes (%) (Auto) 8L

, Monocytes (%) (Auto) 8, Eosinophils (%) (Auto) 1, Basophils (%) (Auto) 0, 

Neutrophils # (Auto) 10.5H, Lymphocytes # (Auto) 1.1, Monocytes # (Auto) 1.0, 

Eosinophils # (Auto) 0.1, Basophils # (Auto) 0.0, Sodium Level 138, Potassium 

Level 3.9, Chloride Level 105, Carbon Dioxide Level 22, Anion Gap 11, Blood 

Urea Nitrogen 9, Creatinine 0.93, Estimat Glomerular Filtration Rate > 60, BUN/

Creatinine Ratio 10, Glucose Level 107H, Calcium Level 9.5, Corrected Calcium 

9.1, Magnesium Level 2.2, Total Bilirubin 1.3H, Aspartate Amino Transf (AST/SGOT

) 17, Alanine Aminotransferase (ALT/SGPT) 24, Alkaline Phosphatase 85, Total 

Protein 7.8, Albumin 4.5


18 16:55: Lactic Acid Level 1.27


18 17:35: 


Urine Color YELLOW, Urine Clarity CLEAR, Urine pH 5, Urine Specific Gravity 

1.005L, Urine Protein 1+H, Urine Glucose (UA) NEGATIVE, Urine Ketones 1+H, 

Urine Nitrite NEGATIVE, Urine Bilirubin NEGATIVE, Urine Urobilinogen NORMAL, 

Urine Leukocyte Esterase 2+H, Urine RBC (Auto) 2+H, Urine RBC 5-10H, Urine WBC 5

-10H, Urine Crystals NONE, Urine Bacteria FEWH, Urine Casts NONE, Urine Mucus 

NEGATIVE, Urine Culture Indicated YES


18 05:15: 


White Blood Count 10.1, Red Blood Count 4.46, Hemoglobin 14.3, Hematocrit 42, 

Mean Corpuscular Volume 94, Mean Corpuscular Hemoglobin 32, Mean Corpuscular 

Hemoglobin Concent 34, Red Cell Distribution Width 13.0, Platelet Count 175, 

Mean Platelet Volume 9.6, Neutrophils (%) (Auto) 79H, Lymphocytes (%) (Auto) 10L

, Monocytes (%) (Auto) 11, Eosinophils (%) (Auto) 0, Basophils (%) (Auto) 0, 

Neutrophils # (Auto) 8.0H, Lymphocytes # (Auto) 1.0, Monocytes # (Auto) 1.1H, 

Eosinophils # (Auto) 0.0, Basophils # (Auto) 0.0, Sodium Level 136, Potassium 

Level 3.6, Chloride Level 105, Carbon Dioxide Level 18L, Anion Gap 13, Blood 

Urea Nitrogen 10, Creatinine 0.83, Estimat Glomerular Filtration Rate > 60, BUN/

Creatinine Ratio 12, Glucose Level 118H, Calcium Level 8.5, Corrected Calcium 

8.9, Total Bilirubin 1.4H, Aspartate Amino Transf (AST/SGOT) 12, Alanine 

Aminotransferase (ALT/SGPT) 17, Alkaline Phosphatase 63, Total Protein 5.8L, 

Albumin 3.5





Assessment/Plan


Acute Diverticulitis





Pt still has moderate LLQ pain; CT shows contained small perforation and his 

pain is all in LLQ, no surgery needed at this time.  Will continue  NPO, IV 

fluids, IV ABX, pain control with IV meds, anti-emetics. 


WBC is normal but pain hasn't improved; will keep on ice chips only for now. 








I spoke to pt regarding decreasing red meat (which increases pressure as it 

moves through colon, thereby worsening diverticulitis) and he will need a 

colonoscopy as an outpt for screening (he is 5-10 yrs overdue).





Clinical Quality Measures


DVT/VTE Risk/Contraindication:


Risk Factor Score Per Nursin


RFS Level Per Nursing on Admit:  2=Moderate











EL CARLOS DO Sep 13, 2018 10:37

## 2018-09-14 VITALS — SYSTOLIC BLOOD PRESSURE: 111 MMHG | DIASTOLIC BLOOD PRESSURE: 67 MMHG

## 2018-09-14 VITALS — DIASTOLIC BLOOD PRESSURE: 74 MMHG | SYSTOLIC BLOOD PRESSURE: 121 MMHG

## 2018-09-14 VITALS — DIASTOLIC BLOOD PRESSURE: 71 MMHG | SYSTOLIC BLOOD PRESSURE: 108 MMHG

## 2018-09-14 VITALS — SYSTOLIC BLOOD PRESSURE: 134 MMHG | DIASTOLIC BLOOD PRESSURE: 68 MMHG

## 2018-09-14 VITALS — DIASTOLIC BLOOD PRESSURE: 70 MMHG | SYSTOLIC BLOOD PRESSURE: 109 MMHG

## 2018-09-14 VITALS — SYSTOLIC BLOOD PRESSURE: 111 MMHG | DIASTOLIC BLOOD PRESSURE: 70 MMHG

## 2018-09-14 RX ADMIN — CIPROFLOXACIN SCH MLS/HR: 2 INJECTION, SOLUTION INTRAVENOUS at 06:55

## 2018-09-14 RX ADMIN — MORPHINE SULFATE PRN MG: 4 INJECTION, SOLUTION INTRAMUSCULAR; INTRAVENOUS at 20:39

## 2018-09-14 RX ADMIN — SODIUM CHLORIDE, SODIUM LACTATE, POTASSIUM CHLORIDE, AND CALCIUM CHLORIDE SCH MLS/HR: 600; 310; 30; 20 INJECTION, SOLUTION INTRAVENOUS at 13:56

## 2018-09-14 RX ADMIN — METRONIDAZOLE SCH MLS/HR: 5 INJECTION, SOLUTION INTRAVENOUS at 05:57

## 2018-09-14 RX ADMIN — METRONIDAZOLE SCH MLS/HR: 5 INJECTION, SOLUTION INTRAVENOUS at 21:25

## 2018-09-14 RX ADMIN — MORPHINE SULFATE PRN MG: 4 INJECTION, SOLUTION INTRAMUSCULAR; INTRAVENOUS at 18:25

## 2018-09-14 RX ADMIN — CIPROFLOXACIN SCH MLS/HR: 2 INJECTION, SOLUTION INTRAVENOUS at 18:25

## 2018-09-14 RX ADMIN — MORPHINE SULFATE PRN MG: 4 INJECTION, SOLUTION INTRAMUSCULAR; INTRAVENOUS at 05:55

## 2018-09-14 RX ADMIN — SODIUM CHLORIDE, SODIUM LACTATE, POTASSIUM CHLORIDE, AND CALCIUM CHLORIDE SCH MLS/HR: 600; 310; 30; 20 INJECTION, SOLUTION INTRAVENOUS at 23:43

## 2018-09-14 RX ADMIN — MORPHINE SULFATE PRN MG: 4 INJECTION, SOLUTION INTRAMUSCULAR; INTRAVENOUS at 08:07

## 2018-09-14 RX ADMIN — METRONIDAZOLE SCH MLS/HR: 5 INJECTION, SOLUTION INTRAVENOUS at 13:58

## 2018-09-14 RX ADMIN — MORPHINE SULFATE PRN MG: 4 INJECTION, SOLUTION INTRAMUSCULAR; INTRAVENOUS at 23:47

## 2018-09-14 RX ADMIN — SODIUM CHLORIDE, SODIUM LACTATE, POTASSIUM CHLORIDE, AND CALCIUM CHLORIDE SCH MLS/HR: 600; 310; 30; 20 INJECTION, SOLUTION INTRAVENOUS at 11:18

## 2018-09-14 RX ADMIN — MORPHINE SULFATE PRN MG: 4 INJECTION, SOLUTION INTRAMUSCULAR; INTRAVENOUS at 11:17

## 2018-09-14 RX ADMIN — SODIUM CHLORIDE, SODIUM LACTATE, POTASSIUM CHLORIDE, AND CALCIUM CHLORIDE SCH MLS/HR: 600; 310; 30; 20 INJECTION, SOLUTION INTRAVENOUS at 04:32

## 2018-09-14 RX ADMIN — MORPHINE SULFATE PRN MG: 4 INJECTION, SOLUTION INTRAMUSCULAR; INTRAVENOUS at 13:55

## 2018-09-14 NOTE — PROGRESS NOTE
Subjective


Date Seen by Provider:  Sep 14, 2018


Time Seen by Provider:  07:55


Subjective/Events-last exam


Pt seen and evaluated by me (student), he reports he is still having LLQ pain 

rated 5/10. Denies N/V. No bowel movements. Denies fevers or chills.





Dr. Ruiz


I evaluated pt at 10:01, pt states he is feeling better but still with the LLQ.

  He would like to eat and also wants to get out of here.


Review of Systems


General:  No Chills, No Fatigue, No Malaise


HEENT:  No Head Aches, No Dysphasia


Pulmonary:  No Dyspnea, No Cough


Cardiovascular:  No: Chest Pain, Palpitations


Gastrointestinal:  Abdominal Pain; No: Nausea, Vomiting


Genitourinary:  No Dysuria, No Frequency


Musculoskeletal:  No: neck pain, back pain


Neurological:  No: Weakness, Numbness, Incoordination, Change in speech, 

Confusion





Focused Exam


Lactate Level


18 16:55: Lactic Acid Level 1.27





Objective


Exam





Vital Signs








  Date Time  Temp Pulse Resp B/P (MAP) Pulse Ox O2 Delivery O2 Flow Rate FiO2


 


18 04:06 98.2 85 20 134/68 (90) 94 Room Air  


 


18 00:39 97.5 84 20 111/67 (82) 95 Room Air  


 


18 20:00      Room Air  


 


18 19:55 99.4 86 22 124/80 (95) 94 Room Air  


 


18 16:38 99.6       


 


18 15:55 101.4 85 13 128/64 (85) 94   


 


18 14:28 100.1       


 


18 12:00 100.3 82 20 145/73 (97) 94 Room Air  


 


18 09:18 99.8       














I & O 


 


 18





 07:00


 


Intake Total 2600 ml


 


Output Total 2300 ml


 


Balance 300 ml





Capillary Refill : Less Than 3 Seconds


General Appearance:  No Apparent Distress, WD/WN


HEENT:  PERRL/EOMI, Pharynx Normal, Moist Mucous Membranes


Neck:  Full Range of Motion, Normal Inspection, Non Tender, Supple


Respiratory:  Chest Non Tender, Lungs Clear, Normal Breath Sounds, No Accessory 

Muscle Use, No Respiratory Distress


Cardiovascular:  Regular Rate, Rhythm, No Edema, Normal Peripheral Pulses


Peripheral Pulses:  2+ Radial Pulses (R), 2+ Radial Pulses (L)


Gastrointestinal:  normal bowel sounds, no organomegaly, guarding (left lower 

quadrant); No rebound; tenderness (left lower quadrant, localized pain just to 

this side, however he is very tender with even light palpation.), other (

negative psoas sign or other mesenteric signs.)


Extremity:  Normal Capillary Refill, Non Tender, No Calf Tenderness, No Pedal 

Edema


Neurologic/Psychiatric:  Alert, Oriented x3, No Motor/Sensory Deficits, Normal 

Mood/Affect, CNs II-XII Norm as Tested


Skin:  Normal Color, Warm/Dry


Lymphatic:  No Adenopathy (neck, axilla or groin)





Results


Lab





Microbiology


18 Urine Culture - Final, Complete


          NO GROWTH





Assessment/Plan


Dr Ruiz


Acute Diverticulitis





Pt still has moderate LLQ pain; will continue  NPO, IV fluids, IV ABX, pain 

control with IV meds, anti-emetics and will keep on ice chips only for now.  I 

discussed the reasons why he could not eat and why


he was going to have to spend more time in the hospital.  We discussed the 

possible course of repeated diverticulitis attacks and possible need for 

surgery in future.  That is what we are trying to avoid at this


time.  He understood but was obviously dissapointed about not eating and not 

going home.





I did a full physical exam and agree with findings; plus updated some to my 

exam.








Pt will need a colonoscopy as an outpt for screening (he is 5-10 yrs overdue). 








18 student A/P:


Pt still reporting LLQ abdominal pain rated 5/10. Will continue NPO (except ice 

chips), IV fluids, and symptom control and reassess tomorrow.





Clinical Quality Measures


DVT/VTE Risk/Contraindication:


Risk Factor Score Per Nursin


RFS Level Per Nursing on Admit:  2=Moderate











MAITE DYE MEDICAL STUDENT Sep 14, 2018 08:28


EL RUIZ DO Sep 14, 2018 10:30

## 2018-09-15 VITALS — SYSTOLIC BLOOD PRESSURE: 127 MMHG | DIASTOLIC BLOOD PRESSURE: 77 MMHG

## 2018-09-15 VITALS — SYSTOLIC BLOOD PRESSURE: 117 MMHG | DIASTOLIC BLOOD PRESSURE: 68 MMHG

## 2018-09-15 VITALS — SYSTOLIC BLOOD PRESSURE: 114 MMHG | DIASTOLIC BLOOD PRESSURE: 76 MMHG

## 2018-09-15 VITALS — SYSTOLIC BLOOD PRESSURE: 135 MMHG | DIASTOLIC BLOOD PRESSURE: 74 MMHG

## 2018-09-15 VITALS — DIASTOLIC BLOOD PRESSURE: 79 MMHG | SYSTOLIC BLOOD PRESSURE: 117 MMHG

## 2018-09-15 VITALS — DIASTOLIC BLOOD PRESSURE: 77 MMHG | SYSTOLIC BLOOD PRESSURE: 132 MMHG

## 2018-09-15 LAB
ALBUMIN SERPL-MCNC: 3.5 GM/DL (ref 3.2–4.5)
ALP SERPL-CCNC: 56 U/L (ref 40–136)
ALT SERPL-CCNC: 12 U/L (ref 0–55)
BASOPHILS # BLD AUTO: 0 10^3/UL (ref 0–0.1)
BASOPHILS NFR BLD AUTO: 0 % (ref 0–10)
BILIRUB SERPL-MCNC: 0.9 MG/DL (ref 0.1–1)
BUN/CREAT SERPL: 9
CALCIUM SERPL-MCNC: 9 MG/DL (ref 8.5–10.1)
CHLORIDE SERPL-SCNC: 104 MMOL/L (ref 98–107)
CO2 SERPL-SCNC: 18 MMOL/L (ref 21–32)
CREAT SERPL-MCNC: 0.8 MG/DL (ref 0.6–1.3)
EOSINOPHIL # BLD AUTO: 0.2 10^3/UL (ref 0–0.3)
EOSINOPHIL NFR BLD AUTO: 3 % (ref 0–10)
ERYTHROCYTE [DISTWIDTH] IN BLOOD BY AUTOMATED COUNT: 12.8 % (ref 10–14.5)
GFR SERPLBLD BASED ON 1.73 SQ M-ARVRAT: > 60 ML/MIN
GLUCOSE SERPL-MCNC: 91 MG/DL (ref 70–105)
HCT VFR BLD CALC: 40 % (ref 40–54)
HGB BLD-MCNC: 13.9 G/DL (ref 13.3–17.7)
LYMPHOCYTES # BLD AUTO: 1.4 X 10^3 (ref 1–4)
LYMPHOCYTES NFR BLD AUTO: 21 % (ref 12–44)
MANUAL DIFFERENTIAL PERFORMED BLD QL: NO
MCH RBC QN AUTO: 32 PG (ref 25–34)
MCHC RBC AUTO-ENTMCNC: 35 G/DL (ref 32–36)
MCV RBC AUTO: 94 FL (ref 80–99)
MONOCYTES # BLD AUTO: 0.7 X 10^3 (ref 0–1)
MONOCYTES NFR BLD AUTO: 11 % (ref 0–12)
NEUTROPHILS # BLD AUTO: 4.2 X 10^3 (ref 1.8–7.8)
NEUTROPHILS NFR BLD AUTO: 66 % (ref 42–75)
PLATELET # BLD: 170 10^3/UL (ref 130–400)
PMV BLD AUTO: 9.8 FL (ref 7.4–10.4)
POTASSIUM SERPL-SCNC: 3.8 MMOL/L (ref 3.6–5)
PROT SERPL-MCNC: 6.4 GM/DL (ref 6.4–8.2)
RBC # BLD AUTO: 4.28 10^6/UL (ref 4.35–5.85)
SODIUM SERPL-SCNC: 136 MMOL/L (ref 135–145)
WBC # BLD AUTO: 6.5 10^3/UL (ref 4.3–11)

## 2018-09-15 RX ADMIN — METRONIDAZOLE SCH MLS/HR: 5 INJECTION, SOLUTION INTRAVENOUS at 15:23

## 2018-09-15 RX ADMIN — CIPROFLOXACIN SCH MLS/HR: 2 INJECTION, SOLUTION INTRAVENOUS at 06:02

## 2018-09-15 RX ADMIN — SODIUM CHLORIDE, SODIUM LACTATE, POTASSIUM CHLORIDE, AND CALCIUM CHLORIDE SCH MLS/HR: 600; 310; 30; 20 INJECTION, SOLUTION INTRAVENOUS at 16:53

## 2018-09-15 RX ADMIN — MORPHINE SULFATE PRN MG: 4 INJECTION, SOLUTION INTRAMUSCULAR; INTRAVENOUS at 09:05

## 2018-09-15 RX ADMIN — MORPHINE SULFATE PRN MG: 4 INJECTION, SOLUTION INTRAMUSCULAR; INTRAVENOUS at 06:03

## 2018-09-15 RX ADMIN — CIPROFLOXACIN SCH MLS/HR: 2 INJECTION, SOLUTION INTRAVENOUS at 18:29

## 2018-09-15 RX ADMIN — MORPHINE SULFATE PRN MG: 4 INJECTION, SOLUTION INTRAMUSCULAR; INTRAVENOUS at 22:16

## 2018-09-15 RX ADMIN — METRONIDAZOLE SCH MLS/HR: 5 INJECTION, SOLUTION INTRAVENOUS at 22:15

## 2018-09-15 RX ADMIN — METRONIDAZOLE SCH MLS/HR: 5 INJECTION, SOLUTION INTRAVENOUS at 05:01

## 2018-09-15 RX ADMIN — SODIUM CHLORIDE, SODIUM LACTATE, POTASSIUM CHLORIDE, AND CALCIUM CHLORIDE SCH MLS/HR: 600; 310; 30; 20 INJECTION, SOLUTION INTRAVENOUS at 09:02

## 2018-09-15 RX ADMIN — SODIUM CHLORIDE, SODIUM LACTATE, POTASSIUM CHLORIDE, AND CALCIUM CHLORIDE SCH MLS/HR: 600; 310; 30; 20 INJECTION, SOLUTION INTRAVENOUS at 12:43

## 2018-09-15 NOTE — PROGRESS NOTE
Subjective


Date Seen by Provider:  Sep 15, 2018


Time Seen by Provider:  14:17


Subjective/Events-last exam


patient feeling better.  Continues to have less pain in the LLQ, but not 

completely gone yet.  wbc normal range today. wanting food.


Denies n/v feve sweats chills shortness of breath or chest pain.





Objective


Exam





Vital Signs








  Date Time  Temp Pulse Resp B/P (MAP) Pulse Ox O2 Delivery O2 Flow Rate FiO2


 


9/15/18 20:55 98.3 62 18 127/77 (94) 97 Room Air  


 


9/15/18 20:00      Room Air  


 


9/15/18 16:44 98.2 67 18 132/77 (95) 94 Room Air  


 


9/15/18 11:52 98.7 63 18 114/76 (89) 96 Room Air  


 


9/15/18 08:38 99.4 68 18 117/79 (92) 96 Room Air  


 


9/15/18 07:39      Room Air  


 


9/15/18 03:24 98.0 65 18 117/68 (84) 95 Room Air  


 


9/15/18 00:14 97.7       


 


9/15/18 00:02 100.0 73 20 135/74 (94) 98 Room Air  














I & O 


 


 9/15/18





 07:00


 


Intake Total 2550 ml


 


Output Total 3450 ml


 


Balance -900 ml





Capillary Refill : Less Than 3 SecondsLess Than 3 Seconds


General Appearance:  No Apparent Distress, WD/WN


HEENT:  PERRL/EOMI, Pharynx Normal, Moist Mucous Membranes


Neck:  Full Range of Motion, Normal Inspection, Non Tender, Supple


Respiratory:  Chest Non Tender, Lungs Clear, Normal Breath Sounds, No Accessory 

Muscle Use, No Respiratory Distress


Cardiovascular:  Regular Rate, Rhythm, No Edema, Normal Peripheral Pulses


Peripheral Pulses:  2+ Radial Pulses (R), 2+ Radial Pulses (L)


Gastrointestinal:  normal bowel sounds, no organomegaly; No rebound; tenderness 

(left lower quadrant,  pain left lower quadrant), other (negative psoas sign or 

other mesenteric signs.)


Extremity:  Normal Capillary Refill, Non Tender, No Calf Tenderness, No Pedal 

Edema


Neurologic/Psychiatric:  Alert, Oriented x3, No Motor/Sensory Deficits, Normal 

Mood/Affect, CNs II-XII Norm as Tested


Skin:  Normal Color, Warm/Dry


Lymphatic:  No Adenopathy (neck, axilla or groin)





Results


Lab


Laboratory Tests


9/15/18 05:27: 


White Blood Count 6.5, Red Blood Count 4.28L, Hemoglobin 13.9, Hematocrit 40, 

Mean Corpuscular Volume 94, Mean Corpuscular Hemoglobin 32, Mean Corpuscular 

Hemoglobin Concent 35, Red Cell Distribution Width 12.8, Platelet Count 170, 

Mean Platelet Volume 9.8, Neutrophils (%) (Auto) 66, Lymphocytes (%) (Auto) 21, 

Monocytes (%) (Auto) 11, Eosinophils (%) (Auto) 3, Basophils (%) (Auto) 0, 

Neutrophils # (Auto) 4.2, Lymphocytes # (Auto) 1.4, Monocytes # (Auto) 0.7, 

Eosinophils # (Auto) 0.2, Basophils # (Auto) 0.0, Sodium Level 136, Potassium 

Level 3.8, Chloride Level 104, Carbon Dioxide Level 18L, Anion Gap 14, Blood 

Urea Nitrogen 7, Creatinine 0.80, Estimat Glomerular Filtration Rate > 60, BUN/

Creatinine Ratio 9, Glucose Level 91, Calcium Level 9.0, Corrected Calcium 9.4, 

Total Bilirubin 0.9, Aspartate Amino Transf (AST/SGOT) 12, Alanine 

Aminotransferase (ALT/SGPT) 12, Alkaline Phosphatase 56, Total Protein 6.4, 

Albumin 3.5





Microbiology


18 Urine Culture - Final, Complete


          NO GROWTH





Assessment/Plan





acute diverticultiits


llq abdominal pain.








patient states pain conntinues to improve


wbc in normal range


will start on clear liquids but informed patient to keep it limited


continue abx





Clinical Quality Measures


DVT/VTE Risk/Contraindication:


Risk Factor Score Per Nursin


RFS Level Per Nursing on Admit:  2=Moderate











RONNI GURROLA DO Sep 15, 2018 22:18

## 2018-09-16 VITALS — SYSTOLIC BLOOD PRESSURE: 112 MMHG | DIASTOLIC BLOOD PRESSURE: 74 MMHG

## 2018-09-16 VITALS — SYSTOLIC BLOOD PRESSURE: 112 MMHG | DIASTOLIC BLOOD PRESSURE: 75 MMHG

## 2018-09-16 VITALS — DIASTOLIC BLOOD PRESSURE: 70 MMHG | SYSTOLIC BLOOD PRESSURE: 119 MMHG

## 2018-09-16 VITALS — DIASTOLIC BLOOD PRESSURE: 67 MMHG | SYSTOLIC BLOOD PRESSURE: 121 MMHG

## 2018-09-16 VITALS — DIASTOLIC BLOOD PRESSURE: 63 MMHG | SYSTOLIC BLOOD PRESSURE: 136 MMHG

## 2018-09-16 VITALS — SYSTOLIC BLOOD PRESSURE: 134 MMHG | DIASTOLIC BLOOD PRESSURE: 74 MMHG

## 2018-09-16 RX ADMIN — CIPROFLOXACIN SCH MLS/HR: 2 INJECTION, SOLUTION INTRAVENOUS at 07:01

## 2018-09-16 RX ADMIN — CIPROFLOXACIN SCH MLS/HR: 2 INJECTION, SOLUTION INTRAVENOUS at 18:45

## 2018-09-16 RX ADMIN — SODIUM CHLORIDE, SODIUM LACTATE, POTASSIUM CHLORIDE, AND CALCIUM CHLORIDE SCH MLS/HR: 600; 310; 30; 20 INJECTION, SOLUTION INTRAVENOUS at 08:28

## 2018-09-16 RX ADMIN — METRONIDAZOLE SCH MLS/HR: 5 INJECTION, SOLUTION INTRAVENOUS at 05:51

## 2018-09-16 RX ADMIN — METRONIDAZOLE SCH MLS/HR: 5 INJECTION, SOLUTION INTRAVENOUS at 22:43

## 2018-09-16 RX ADMIN — SODIUM CHLORIDE, SODIUM LACTATE, POTASSIUM CHLORIDE, AND CALCIUM CHLORIDE SCH MLS/HR: 600; 310; 30; 20 INJECTION, SOLUTION INTRAVENOUS at 22:43

## 2018-09-16 RX ADMIN — MORPHINE SULFATE PRN MG: 4 INJECTION, SOLUTION INTRAMUSCULAR; INTRAVENOUS at 05:54

## 2018-09-16 RX ADMIN — SODIUM CHLORIDE, SODIUM LACTATE, POTASSIUM CHLORIDE, AND CALCIUM CHLORIDE SCH MLS/HR: 600; 310; 30; 20 INJECTION, SOLUTION INTRAVENOUS at 13:07

## 2018-09-16 RX ADMIN — SODIUM CHLORIDE, SODIUM LACTATE, POTASSIUM CHLORIDE, AND CALCIUM CHLORIDE SCH MLS/HR: 600; 310; 30; 20 INJECTION, SOLUTION INTRAVENOUS at 03:53

## 2018-09-16 RX ADMIN — METRONIDAZOLE SCH MLS/HR: 5 INJECTION, SOLUTION INTRAVENOUS at 13:07

## 2018-09-16 NOTE — PROGRESS NOTE
Subjective


Date Seen by Provider:  Sep 16, 2018


Time Seen by Provider:  04:33


Subjective/Events-last exam


feeling about the same as yesterday.  tolerating clears.  Had a bm.


Denies n/v fever sweats chills shortness of breath or chest pain.





Objective


Exam





Vital Signs








  Date Time  Temp Pulse Resp B/P (MAP) Pulse Ox O2 Delivery O2 Flow Rate FiO2


 


18 04:00 99.4 59 20 121/67 (85) 95 Room Air  


 


18 00:00 99.5 60 20 112/75 (87) 95 Room Air  


 


9/15/18 20:55 98.3 62 18 127/77 (94) 97 Room Air  


 


9/15/18 20:00      Room Air  


 


9/15/18 16:44 98.2 67 18 132/77 (95) 94 Room Air  


 


9/15/18 11:52 98.7 63 18 114/76 (89) 96 Room Air  


 


9/15/18 08:38 99.4 68 18 117/79 (92) 96 Room Air  


 


9/15/18 07:39      Room Air  














I & O 


 


 18





 07:00


 


Intake Total 2044 ml


 


Output Total 2300 ml


 


Balance -256 ml





Capillary Refill : Less Than 3 SecondsLess Than 3 Seconds


General Appearance:  No Apparent Distress, WD/WN


HEENT:  PERRL/EOMI, Pharynx Normal, Moist Mucous Membranes


Neck:  Full Range of Motion, Normal Inspection, Non Tender, Supple


Respiratory:  Chest Non Tender, Lungs Clear, Normal Breath Sounds, No Accessory 

Muscle Use, No Respiratory Distress


Cardiovascular:  Regular Rate, Rhythm, No Edema, Normal Peripheral Pulses


Peripheral Pulses:  2+ Radial Pulses (R), 2+ Radial Pulses (L)


Gastrointestinal:  normal bowel sounds, no organomegaly; No rebound; tenderness 

(left lower quadrant,  pain left lower quadrant about same as yesterday.), 

other (negative psoas sign or other mesenteric signs.)


Extremity:  Normal Capillary Refill, Non Tender, No Calf Tenderness, No Pedal 

Edema


Neurologic/Psychiatric:  Alert, Oriented x3, No Motor/Sensory Deficits, Normal 

Mood/Affect, CNs II-XII Norm as Tested


Skin:  Normal Color, Warm/Dry


Lymphatic:  No Adenopathy (neck, axilla or groin)





Results


Lab


Laboratory Tests


9/15/18 05:27: 


White Blood Count 6.5, Red Blood Count 4.28L, Hemoglobin 13.9, Hematocrit 40, 

Mean Corpuscular Volume 94, Mean Corpuscular Hemoglobin 32, Mean Corpuscular 

Hemoglobin Concent 35, Red Cell Distribution Width 12.8, Platelet Count 170, 

Mean Platelet Volume 9.8, Neutrophils (%) (Auto) 66, Lymphocytes (%) (Auto) 21, 

Monocytes (%) (Auto) 11, Eosinophils (%) (Auto) 3, Basophils (%) (Auto) 0, 

Neutrophils # (Auto) 4.2, Lymphocytes # (Auto) 1.4, Monocytes # (Auto) 0.7, 

Eosinophils # (Auto) 0.2, Basophils # (Auto) 0.0, Sodium Level 136, Potassium 

Level 3.8, Chloride Level 104, Carbon Dioxide Level 18L, Anion Gap 14, Blood 

Urea Nitrogen 7, Creatinine 0.80, Estimat Glomerular Filtration Rate > 60, BUN/

Creatinine Ratio 9, Glucose Level 91, Calcium Level 9.0, Corrected Calcium 9.4, 

Total Bilirubin 0.9, Aspartate Amino Transf (AST/SGOT) 12, Alanine 

Aminotransferase (ALT/SGPT) 12, Alkaline Phosphatase 56, Total Protein 6.4, 

Albumin 3.5





Microbiology


18 Urine Culture - Final, Complete


          NO GROWTH





Assessment/Plan





acute diverticultiits


llq abdominal pain.








pain about same as yesterday- pain control


repeat labs in am


stay on clear today


continue abx





Clinical Quality Measures


DVT/VTE Risk/Contraindication:


Risk Factor Score Per Nursin


RFS Level Per Nursing on Admit:  2=Moderate











RONNI GURROLA DO Sep 16, 2018 04:35

## 2018-09-17 VITALS — SYSTOLIC BLOOD PRESSURE: 124 MMHG | DIASTOLIC BLOOD PRESSURE: 74 MMHG

## 2018-09-17 VITALS — DIASTOLIC BLOOD PRESSURE: 66 MMHG | SYSTOLIC BLOOD PRESSURE: 114 MMHG

## 2018-09-17 VITALS — SYSTOLIC BLOOD PRESSURE: 128 MMHG | DIASTOLIC BLOOD PRESSURE: 64 MMHG

## 2018-09-17 VITALS — SYSTOLIC BLOOD PRESSURE: 131 MMHG | DIASTOLIC BLOOD PRESSURE: 77 MMHG

## 2018-09-17 LAB
BUN/CREAT SERPL: 7
CALCIUM SERPL-MCNC: 8.7 MG/DL (ref 8.5–10.1)
CHLORIDE SERPL-SCNC: 109 MMOL/L (ref 98–107)
CO2 SERPL-SCNC: 21 MMOL/L (ref 21–32)
CREAT SERPL-MCNC: 0.71 MG/DL (ref 0.6–1.3)
ERYTHROCYTE [DISTWIDTH] IN BLOOD BY AUTOMATED COUNT: 12.5 % (ref 10–14.5)
GFR SERPLBLD BASED ON 1.73 SQ M-ARVRAT: > 60 ML/MIN
GLUCOSE SERPL-MCNC: 110 MG/DL (ref 70–105)
HCT VFR BLD CALC: 39 % (ref 40–54)
HGB BLD-MCNC: 13.1 G/DL (ref 13.3–17.7)
MCH RBC QN AUTO: 32 PG (ref 25–34)
MCHC RBC AUTO-ENTMCNC: 34 G/DL (ref 32–36)
MCV RBC AUTO: 93 FL (ref 80–99)
PLATELET # BLD: 211 10^3/UL (ref 130–400)
PMV BLD AUTO: 9.8 FL (ref 7.4–10.4)
POTASSIUM SERPL-SCNC: 3.2 MMOL/L (ref 3.6–5)
RBC # BLD AUTO: 4.14 10^6/UL (ref 4.35–5.85)
SODIUM SERPL-SCNC: 137 MMOL/L (ref 135–145)
WBC # BLD AUTO: 4.8 10^3/UL (ref 4.3–11)

## 2018-09-17 RX ADMIN — CIPROFLOXACIN SCH MLS/HR: 2 INJECTION, SOLUTION INTRAVENOUS at 06:44

## 2018-09-17 RX ADMIN — METRONIDAZOLE SCH MLS/HR: 5 INJECTION, SOLUTION INTRAVENOUS at 15:04

## 2018-09-17 RX ADMIN — METRONIDAZOLE SCH MLS/HR: 5 INJECTION, SOLUTION INTRAVENOUS at 05:32

## 2018-09-17 RX ADMIN — SODIUM CHLORIDE, SODIUM LACTATE, POTASSIUM CHLORIDE, AND CALCIUM CHLORIDE SCH MLS/HR: 600; 310; 30; 20 INJECTION, SOLUTION INTRAVENOUS at 05:32

## 2018-09-17 RX ADMIN — SODIUM CHLORIDE, SODIUM LACTATE, POTASSIUM CHLORIDE, AND CALCIUM CHLORIDE SCH MLS/HR: 600; 310; 30; 20 INJECTION, SOLUTION INTRAVENOUS at 11:48

## 2018-09-17 NOTE — DISCHARGE INST-SURGICAL
Discharge Inst-Surgical


Depart Medication/Instructions


New, Converted or Re-Newed RX:  Transmitted to Pharmacy


Patient Instructions


Follow up Appt:


Make appointment for 1 week.





Instructions:


No strenuous activity. 


May shower in 24 hours, no tub bath or soaking.


Use incentive spirometer at home as directed.


No Smoking








Symptoms to Report:


Appetite Changes, Extremity Discoloration, Numbness/Tingling, Swelling Increased

, Bleeding Excessive, Eyesight Changes, Pain Increased, Urine Color Change, 

Constipation(Persistent), Fever over 101 degree F, Pain/Pressure in chest, 

Urinating Difficulty, Cough Up/Vomit Blood, Heart Beat Irreg/Pounding, Pain/

Pressure in jaw,  Cramps in feet or legs, Lightheadedness, Pain/Pressure in 

shoulder, Diarrhea(Persistent), Memory Changes Suddenly, Questions/Concerns, 

Weight gain consecutive days, Dizziness/Fainting, Nausea/Vomiting, Shortness of 

Breath, Weight gain over 2 pounds








If questions or concerns contact your physician 


Or seek help at emergency department.





Activity


Activity as Tolerated:  Yes





Diet


Discharge Diet:  Low Residue





Skin/Wound Care


Bathing Instructions:  EL Medina DO Sep 17, 2018 11:16

## 2018-09-17 NOTE — PROGRESS NOTE
Subjective


Time Seen by Provider:  11:01


Subjective/Events-last exam


Pt seen and examined, states he feels "great".  He is tolerating clears, +BM 

and states no pain "unless I move".   Pt is 


eager to go home.


Review of Systems


General:  No Chills


Pulmonary:  No Dyspnea, No Cough


Cardiovascular:  No: Chest Pain


Gastrointestinal:  No: Nausea, Vomiting, Diarrhea, Constipation





Objective


Exam





Vital Signs








  Date Time  Temp Pulse Resp B/P (MAP) Pulse Ox O2 Delivery O2 Flow Rate FiO2


 


18 08:28 98.0 70 18 124/74 (91) 96 Room Air  


 


18 04:00 98.9 63 20 131/77 (95) 95 Room Air  


 


18 00:00 98.2 59 20 114/66 (82) 95 Room Air  


 


18 20:00      Room Air  


 


18 20:00 98.4 61 20 119/70 (86) 96 Room Air  


 


18 16:00 100.5 60 24 134/74 (94) 97 Room Air  


 


18 12:28 98.6 68 20 136/63 (87) 96 Room Air  














I & O 


 


 18





 07:00


 


Intake Total 4180 ml


 


Output Total 2075 ml


 


Balance 2105 ml





Capillary Refill : Less Than 3 SecondsLess Than 3 Seconds


General Appearance:  No Apparent Distress, WD/WN


HEENT:  PERRL/EOMI, Pharynx Normal, Moist Mucous Membranes


Respiratory:  Chest Non Tender, Lungs Clear, Normal Breath Sounds, No Accessory 

Muscle Use, No Respiratory Distress


Cardiovascular:  Regular Rate, Rhythm, No Edema, Normal Peripheral Pulses


Peripheral Pulses:  2+ Radial Pulses (R), 2+ Radial Pulses (L)


Gastrointestinal:  normal bowel sounds, no organomegaly; No rebound; tenderness 

(left lower quadrant very minimal even with deep palpation), other


Extremity:  Normal Capillary Refill, Non Tender, No Calf Tenderness, No Pedal 

Edema


Neurologic/Psychiatric:  Alert, Oriented x3, No Motor/Sensory Deficits, Normal 

Mood/Affect, CNs II-XII Norm as Tested


Skin:  Normal Color, Warm/Dry


Lymphatic:  No Adenopathy (neck, axilla or groin)





Results


Lab


Laboratory Tests


18 05:39: 


White Blood Count 4.8, Red Blood Count 4.14L, Hemoglobin 13.1L, Hematocrit 39L, 

Mean Corpuscular Volume 93, Mean Corpuscular Hemoglobin 32, Mean Corpuscular 

Hemoglobin Concent 34, Red Cell Distribution Width 12.5, Platelet Count 211, 

Mean Platelet Volume 9.8, Sodium Level 137, Potassium Level 3.2L, Chloride 

Level 109H, Carbon Dioxide Level 21, Anion Gap 7, Blood Urea Nitrogen 5L, 

Creatinine 0.71, Estimat Glomerular Filtration Rate > 60, BUN/Creatinine Ratio 7

, Glucose Level 110H, Calcium Level 8.7





Microbiology


18 Urine Culture - Final, Complete


          NO GROWTH





Assessment/Plan


Acute diverticultiits


LLQ abdominal pain.








Pain is much improved will switch to oral ABX and start soft diet; if he 

tolerates diet he can go home today.





Clinical Quality Measures


DVT/VTE Risk/Contraindication:


Risk Factor Score Per Nursin


RFS Level Per Nursing on Admit:  2=Moderate











EL CARLOS DO Sep 17, 2018 11:10

## 2018-09-19 NOTE — XMS REPORT
Continuity of Care Document

 Created on: 2018



ROGERIO RODRIGUEZ

External Reference #: [id number]

: 1955

Sex: Male



Demographics







 Address  [address] [place] [address]

 

 Home Phone  [phone] x

 

 Preferred Language  Unknown

 

 Marital Status  Unknown

 

 Christianity Affiliation  Unknown

 

 Race  Unknown

 

 Ethnic Group  Unknown





Author







 Author  [facility]

 

 Address  Unknown

 

 Phone  Unavailable



              



Allergies

      





 Active            Description            Code            Type            
Severity            Reaction            Onset            Reported/Identified   
         Relationship to Patient            Clinical Status        

 

 Yes            No Known Drug Allergies            [id number]            Drug 
Allergy            Unknown            N/A                         2012   
                               



                  



Medications

      



There is no data.                  



Problems

      





 Date Dx Coded            Attending            Type            Code            
Diagnosis            Diagnosed By        

 

 10/06/2011            DARIAN FARRAR PHD                         728.71    
        Plantar Fascial Fibromatosis                     

 

 10/06/2011            RASHI DIAMOND DO                         728.71      
      Plantar Fascial Fibromatosis                     

 

 10/06/2011            DARIAN FARRAR PHD                         728.71    
        Plantar Fascial Fibromatosis                     

 

 10/06/2011            ESSIE MCMANUS DO                         728.71         
   Plantar Fascial Fibromatosis                     

 

 10/06/2011                                      728.71            Plantar 
Fascial Fibromatosis                     

 

 10/06/2011                                      728.71            Plantar 
Fascial Fibromatosis                     

 

 10/06/2011                                      728.71            Plantar 
Fascial Fibromatosis                     

 

 10/06/2011                                      728.71            Plantar 
Fascial Fibromatosis                     

 

 10/06/2011                                      728.71            Plantar 
Fascial Fibromatosis                     

 

 10/06/2011            KYLAH MELGAR PA-C                         728.71    
        Plantar Fascial Fibromatosis                     

 

 10/06/2011            DARIAN FARRAR PHD                         728.71    
        Plantar Fascial Fibromatosis                     

 

 10/06/2011            ESSIE MCMANUS DO                         728.71         
   Plantar Fascial Fibromatosis                     

 

 10/06/2011            DARIAN FARRAR PHD                         728.71    
        Plantar Fascial Fibromatosis                     

 

 10/06/2011            EDWARD REYNAGA                         728.71 
           Plantar Fascial Fibromatosis                     

 

 10/06/2011            DARIAN FARRAR PHD                         728.71    
        Plantar Fascial Fibromatosis                     

 

 10/06/2011            DARIAN FARRAR PHD                         728.71    
        Plantar Fascial Fibromatosis                     

 

 10/06/2011            DARIAN FARRAR PHD                         728.71    
        Plantar Fascial Fibromatosis                     

 

 10/06/2011            EDWARD REYNAGA                         728.71 
           Plantar Fascial Fibromatosis                     

 

 10/06/2011            DARIAN FARRAR PHD                         728.71    
        Plantar Fascial Fibromatosis                     

 

 10/06/2011            ESSIE MCMANUS DO                         728.71         
   Plantar Fascial Fibromatosis                     

 

 10/06/2011            VERONICA APRN, JEFFREY                         728.71      
      Plantar Fascial Fibromatosis                     

 

 10/06/2011            DARIAN FARRAR PHD                         728.71    
        Plantar Fascial Fibromatosis                     

 

 10/06/2011            VERONICA APRN, JEFFREY                         728.71      
      Plantar Fascial Fibromatosis                     

 

 10/06/2011            DARIAN FARRAR PHD                         728.71    
        Plantar Fascial Fibromatosis                     

 

 10/21/2011            DARIAN FARRAR PHD                         311       
     DEPRESSIVE DISORDER NOT ELSEWHERE CLASSIFIED                     

 

 10/21/2011            DARIAN FARRAR PHD                         729.5     
       PAIN IN LIMB                     

 

 10/21/2011            RASHI DIAMOND DO F                         311         
   DEPRESSIVE DISORDER NOT ELSEWHERE CLASSIFIED                     

 

 10/21/2011            RASHI DIAMOND DO F                         729.5       
     PAIN IN LIMB                     

 

 10/21/2011            DARIAN FARRAR PHD                         311       
     DEPRESSIVE DISORDER NOT ELSEWHERE CLASSIFIED                     

 

 10/21/2011            DARIAN FARRAR PHD                         729.5     
       PAIN IN LIMB                     

 

 10/21/2011            ESSIE MCMANUS DO                         311            
DEPRESSIVE DISORDER NOT ELSEWHERE CLASSIFIED                     

 

 10/21/2011            ESSIE MCMANUS DO K                         729.5          
  Pain In Limb                     

 

 10/21/2011                                      311            DEPRESSIVE 
DISORDER NOT ELSEWHERE CLASSIFIED                     

 

 10/21/2011                                      729.5            Pain In Limb 
                    

 

 10/21/2011                                      311            DEPRESSIVE 
DISORDER NOT ELSEWHERE CLASSIFIED                     

 

 10/21/2011                                      729.5            Pain In Limb 
                    

 

 10/21/2011                                      311            DEPRESSIVE 
DISORDER NOT ELSEWHERE CLASSIFIED                     

 

 10/21/2011                                      729.5            Pain In Limb 
                    

 

 10/21/2011                                      311            DEPRESSIVE 
DISORDER NOT ELSEWHERE CLASSIFIED                     

 

 10/21/2011                                      729.5            Pain In Limb 
                    

 

 10/21/2011                                      311            DEPRESSIVE 
DISORDER NOT ELSEWHERE CLASSIFIED                     

 

 10/21/2011                                      729.5            Pain In Limb 
                    

 

 10/21/2011            KYLAH MELGAR PA-C                         311       
     DEPRESSIVE DISORDER NOT ELSEWHERE CLASSIFIED                     

 

 10/21/2011            KYLAH MELGAR PA-C                         729.5     
       Pain In Limb                     

 

 10/21/2011            DARIAN FARRAR PHD                         311       
     DEPRESSIVE DISORDER NOT ELSEWHERE CLASSIFIED                     

 

 10/21/2011            DARIAN FARRAR PHD                         729.5     
       Pain In Limb                     

 

 10/21/2011            ESSIE MCMANUS DO K                         311            
DEPRESSIVE DISORDER NOT ELSEWHERE CLASSIFIED                     

 

 10/21/2011            MCMANUS DO, ESSIE K                         729.5          
  Pain In Limb                     

 

 10/21/2011            BOEMARYOUT PHD, DARIAN A                         311       
     DEPRESSIVE DISORDER NOT ELSEWHERE CLASSIFIED                     

 

 10/21/2011            DARIAN FARRAR PHD A                         729.5     
       Pain In Limb                     

 

 10/21/2011            EDWARD REYNAGA                         311    
        DEPRESSIVE DISORDER NOT ELSEWHERE CLASSIFIED                     

 

 10/21/2011            VENKATA APREDWARD MAE                         729.5  
          Pain In Limb                     

 

 10/21/2011            BOEOUT DARIAN YAN A                         311       
     DEPRESSIVE DISORDER NOT ELSEWHERE CLASSIFIED                     

 

 10/21/2011            BOEOUT PHDDARIAN A                         729.5     
       Pain In Limb                     

 

 10/21/2011            BOEOUT PHDDARIAN A                         311       
     DEPRESSIVE DISORDER NOT ELSEWHERE CLASSIFIED                     

 

 10/21/2011            [facility]                         729.5     
       Pain In Limb                     

 

 10/21/2011            BOEOUT DARIAN YAN A                         311       
     DEPRESSIVE DISORDER NOT ELSEWHERE CLASSIFIED                     

 

 10/21/2011            [facility]                         729.5     
       Pain In Limb                     

 

 10/21/2011            EDWARD REYNAGA                         311    
        DEPRESSIVE DISORDER NOT ELSEWHERE CLASSIFIED                     

 

 10/21/2011            HASTINGS EDWARD WADDELL                         729.5  
          Pain In Limb                     

 

 10/21/2011            BOEDARIAN ALVAREZ PHD A                         311       
     DEPRESSIVE DISORDER NOT ELSEWHERE CLASSIFIED                     

 

 10/21/2011            DARIAN FARRAR PHD A                         729.5     
       Pain In Limb                     

 

 10/21/2011            MCMANUS DO, ESSIE K                         311            
DEPRESSIVE DISORDER NOT ELSEWHERE CLASSIFIED                     

 

 10/21/2011            MCMANUS DO, ESSIE K                         729.5          
  Pain In Limb                     

 

 10/21/2011            VERONICA APRN, JEFFREY                         311         
   DEPRESSIVE DISORDER NOT ELSEWHERE CLASSIFIED                     

 

 10/21/2011            VERONICA APRN, JEFFREY                         729.5       
     Pain In Limb                     

 

 10/21/2011            BOEDARIAN PEREZ PHD A                         311       
     DEPRESSIVE DISORDER NOT ELSEWHERE CLASSIFIED                     

 

 10/21/2011            BOEDARIAN PEREZ PHD A                         729.5     
       Pain In Limb                     

 

 10/21/2011            VERONICA APRN, JEFFREY                         311         
   DEPRESSIVE DISORDER NOT ELSEWHERE CLASSIFIED                     

 

 10/21/2011            VERONICA APRN, JEFFREY                         729.5       
     Pain In Limb                     

 

 10/21/2011            BOEDARIAN PEREZ PHD A                         311       
     DEPRESSIVE DISORDER NOT ELSEWHERE CLASSIFIED                     

 

 10/21/2011            DARIAN AFRRAR PHD A                         729.5     
       Pain In Limb                     

 

 10/22/2011            DARIAN FARRAR PHD A                         296.30    
        MO DEPRESSIVE RECURRENT UNSPECIFIED                     

 

 10/22/2011            DARIAN FARRAR PHD                         304.80    
        SA POLYSUB DEP                     

 

 10/22/2011            DARIAN FARRAR PHD                         307.47    
        SI DYSSOMNIA NOS                     

 

 10/22/2011            RASHI DIAMOND DO F                         296.30      
      MO DEPRESSIVE RECURRENT UNSPECIFIED                     

 

 10/22/2011            LOBO CABRAL, RASHI F                         304.80      
      SA POLYSUB DEP                     

 

 10/22/2011            RASHI DIAMOND DO F                         307.47      
      SI DYSSOMNIA NOS                     

 

 10/22/2011            DARIAN FARRAR PHD                         296.30    
        MO DEPRESSIVE RECURRENT UNSPECIFIED                     

 

 10/22/2011            DARIAN FARRAR PHD                         304.80    
        SA POLYSUB DEP                     

 

 10/22/2011            DARIAN FARRAR PHD                         307.47    
        SI DYSSOMNIA NOS                     

 

 10/22/2011            MCMANUS DO, ESSIE K                         296.30         
   MO DEPRESSIVE RECURRENT UNSPECIFIED                     

 

 10/22/2011            MCMANUS DO, ESSIE K                         304.80         
   SA POLYSUB DEP                     

 

 10/22/2011            MCMANUS DO, ESSIE K                         307.47         
   SI DYSSOMNIA NOS                     

 

 10/22/2011                                      296.30            MO 
DEPRESSIVE RECURRENT UNSPECIFIED                     

 

 10/22/2011                                      304.80            SA POLYSUB 
DEP                     

 

 10/22/2011                                      307.47            SI DYSSOMNIA 
NOS                     

 

 10/22/2011                                      296.30            MO 
DEPRESSIVE RECURRENT UNSPECIFIED                     

 

 10/22/2011                                      304.80            SA POLYSUB 
DEP                     

 

 10/22/2011                                      307.47            SI DYSSOMNIA 
NOS                     

 

 10/22/2011                                      296.30            MO 
DEPRESSIVE RECURRENT UNSPECIFIED                     

 

 10/22/2011                                      304.80            SA POLYSUB 
DEP                     

 

 10/22/2011                                      307.47            SI DYSSOMNIA 
NOS                     

 

 10/22/2011                                      296.30            MO 
DEPRESSIVE RECURRENT UNSPECIFIED                     

 

 10/22/2011                                      304.80            SA POLYSUB 
DEP                     

 

 10/22/2011                                      307.47            SI DYSSOMNIA 
NOS                     

 

 10/22/2011                                      296.30            MO 
DEPRESSIVE RECURRENT UNSPECIFIED                     

 

 10/22/2011                                      304.80            SA POLYSUB 
DEP                     

 

 10/22/2011                                      307.47            SI DYSSOMNIA 
NOS                     

 

 10/22/2011            KYLAH MELGAR PA-C                         296.30    
        MO DEPRESSIVE RECURRENT UNSPECIFIED                     

 

 10/22/2011            KYLAH MELGAR PA-C                         304.80    
        SA POLYSUB DEP                     

 

 10/22/2011            KYLAH MELGAR PA-C                         307.47    
        SI DYSSOMNIA NOS                     

 

 10/22/2011            DARIAN FARRAR PHD                         296.30    
        MO DEPRESSIVE RECURRENT UNSPECIFIED                     

 

 10/22/2011            LENI YAN, DARIAN JARA                         304.80    
        SA POLYSUB DEP                     

 

 10/22/2011            LENI YAN, DARIAN JARA                         307.47    
        SI DYSSOMNIA NOS                     

 

 10/22/2011            MCMANUS DO, ESSIE K                         296.30         
   MO DEPRESSIVE RECURRENT UNSPECIFIED                     

 

 10/22/2011            MCMANUS DO, ESSIE K                         304.80         
   SA POLYSUB DEP                     

 

 10/22/2011            MCMANUS DO, ESSIE K                         307.47         
   SI DYSSOMNIA NOS                     

 

 10/22/2011            LENI YAN, DARIAN JARA                         296.30    
        MO DEPRESSIVE RECURRENT UNSPECIFIED                     

 

 10/22/2011            LENI YAN, DARIAN JARA                         304.80    
        SA POLYSUB DEP                     

 

 10/22/2011            LENI YAN, DARIAN JARA                         307.47    
        SI DYSSOMNIA NOS                     

 

 10/22/2011            HASTINGS BAIRON, EDWARD VAUGHN                         296.30 
           MO DEPRESSIVE RECURRENT UNSPECIFIED                     

 

 10/22/2011            HASTINGS BAIRON, EDWARD VAUGHN                         304.80 
           SA POLYSUB DEP                     

 

 10/22/2011            EDWARD REYNAGA                         307.47 
           SI DYSSOMNIA NOS                     

 

 10/22/2011            DARIAN FARRAR PHD                         296.30    
        MO DEPRESSIVE RECURRENT UNSPECIFIED                     

 

 10/22/2011            LENI YAN, DARIAN JARA                         304.80    
        SA POLYSUB DEP                     

 

 10/22/2011            DARIAN FARRAR PHD                         307.47    
        SI DYSSOMNIA NOS                     

 

 10/22/2011            DARIAN FARRAR PHD                         296.30    
        MO DEPRESSIVE RECURRENT UNSPECIFIED                     

 

 10/22/2011            LENI YAN, DARIAN JARA                         304.80    
        SA POLYSUB DEP                     

 

 10/22/2011            DARIAN FARRAR PHD                         307.47    
        SI DYSSOMNIA NOS                     

 

 10/22/2011            DARIAN FARRAR PHD                         296.30    
        MO DEPRESSIVE RECURRENT UNSPECIFIED                     

 

 10/22/2011            DARIAN FARRAR PHD                         304.80    
        SA POLYSUB DEP                     

 

 10/22/2011            DARIAN FARRAR PHD                         307.47    
        SI DYSSOMNIA NOS                     

 

 10/22/2011            HASTINGSEVERARDO WADDELL, EDWARD VAUGHN                         296.30 
           MO DEPRESSIVE RECURRENT UNSPECIFIED                     

 

 10/22/2011            HASTINGS APRTU, EDWARD VAUGHN                         304.80 
           SA POLYSUB DEP                     

 

 10/22/2011            VENKATA WADDELL, EDWARD VAUGHN                         307.47 
           SI DYSSOMNIA NOS                     

 

 10/22/2011            DARIAN FARRAR PHD                         296.30    
        MO DEPRESSIVE RECURRENT UNSPECIFIED                     

 

 10/22/2011            DARIAN FARRAR PHD                         304.80    
        SA POLYSUB DEP                     

 

 10/22/2011            DARIAN FARRAR PHD                         307.47    
        SI DYSSOMNIA NOS                     

 

 10/22/2011            MCMANUS DO, ESSIE K                         296.30         
   MO DEPRESSIVE RECURRENT UNSPECIFIED                     

 

 10/22/2011            MCMANUS DO, ESSIE K                         304.80         
   SA POLYSUB DEP                     

 

 10/22/2011            MCMANUS DO, ESSIE K                         307.47         
   SI DYSSOMNIA NOS                     

 

 10/22/2011            VERONICA APRN, JEFFREY                         296.30      
      MO DEPRESSIVE RECURRENT UNSPECIFIED                     

 

 10/22/2011            VERONICA APRN, JEFFREY                         304.80      
      SA POLYSUB DEP                     

 

 10/22/2011            VERONICA APRN, JEFFREY                         307.47      
      SI DYSSOMNIA NOS                     

 

 10/22/2011            DARIAN FARRAR PHD                         296.30    
        MO DEPRESSIVE RECURRENT UNSPECIFIED                     

 

 10/22/2011            DARIAN FARRAR PHD                         304.80    
        SA POLYSUB DEP                     

 

 10/22/2011            DARIAN FARRAR PHD                         307.47    
        SI DYSSOMNIA NOS                     

 

 10/22/2011            VERONICA APRN, JEFFREY                         296.30      
      MO DEPRESSIVE RECURRENT UNSPECIFIED                     

 

 10/22/2011            VERONICA APRN, JEFFREY                         304.80      
      SA POLYSUB DEP                     

 

 10/22/2011            VERONICA APRN, JEFFREY                         307.47      
      SI DYSSOMNIA NOS                     

 

 10/22/2011            DARIAN FARRAR PHD                         296.30    
        MO DEPRESSIVE RECURRENT UNSPECIFIED                     

 

 10/22/2011            DARIAN FARRAR PHD                         304.80    
        SA POLYSUB DEP                     

 

 10/22/2011            DARIAN FARRAR PHD                         307.47    
        SI DYSSOMNIA NOS                     

 

 2011            DARIAN FARRAR PHD                         296.32    
        MO DEPRESSIVE RECURRENT MODERATE                     

 

 2011            RASHI DIAMOND DO                         296.32      
      MO DEPRESSIVE RECURRENT MODERATE                     

 

 2011            DARIAN FARRAR PHD                         296.32    
        MO DEPRESSIVE RECURRENT MODERATE                     

 

 2011            ESSIE MCMANUS DO K                         296.32         
   MO DEPRESSIVE RECURRENT MODERATE                     

 

 2011                                      296.32            MO 
DEPRESSIVE RECURRENT MODERATE                     

 

 2011                                      296.32            MO 
DEPRESSIVE RECURRENT MODERATE                     

 

 2011                                      296.32            MO 
DEPRESSIVE RECURRENT MODERATE                     

 

 2011                                      296.32            MO 
DEPRESSIVE RECURRENT MODERATE                     

 

 2011                                      296.32            MO 
DEPRESSIVE RECURRENT MODERATE                     

 

 2011            MELGAR PA-C, KYLAH M                         296.32    
        MO DEPRESSIVE RECURRENT MODERATE                     

 

 2011            DARIAN FARRAR PHD                         296.32    
        MO DEPRESSIVE RECURRENT MODERATE                     

 

 2011            ESSIE MCMANUS DO                         296.32         
   MO DEPRESSIVE RECURRENT MODERATE                     

 

 2011            DARIAN FARRAR PHD                         296.32    
        MO DEPRESSIVE RECURRENT MODERATE                     

 

 2011            VENKATA WADDELLEDWARD                         296.32 
           MO DEPRESSIVE RECURRENT MODERATE                     

 

 2011            DARIAN FARRAR PHD                         296.32    
        MO DEPRESSIVE RECURRENT MODERATE                     

 

 2011            DARIAN FARRAR PHD                         296.32    
        MO DEPRESSIVE RECURRENT MODERATE                     

 

 2011            DARIAN FARRAR PHD                         296.32    
        MO DEPRESSIVE RECURRENT MODERATE                     

 

 2011            VENKATA WADDELLEDWARD                         296.32 
           MO DEPRESSIVE RECURRENT MODERATE                     

 

 2011            DARIAN FARRAR PHD                         296.32    
        MO DEPRESSIVE RECURRENT MODERATE                     

 

 2011            ESSIE MCMANUS DO                         296.32         
   MO DEPRESSIVE RECURRENT MODERATE                     

 

 2011            VERONICA APRN, JEFFREY                         296.32      
      MO DEPRESSIVE RECURRENT MODERATE                     

 

 2011            DARIAN FARRAR PHD                         296.32    
        MO DEPRESSIVE RECURRENT MODERATE                     

 

 2011            VERONICA APRN, JEFFREY                         296.32      
      MO DEPRESSIVE RECURRENT MODERATE                     

 

 2011            DARIAN FARRAR PHD                         296.32    
        MO DEPRESSIVE RECURRENT MODERATE                     

 

 2012            DARIAN FARRAR PHD                         111.0     
       PITYRIASIS VERSICOLOR                     

 

 2012            DARIAN FARRAR PHD                         272.4     
       OTHER AND UNSPECIFIED HYPERLIPIDEMIA                     

 

 2012            RASHI DIAMOND DO                         111.0       
     PITYRIASIS VERSICOLOR                     

 

 2012            RASHI DIAMOND DO                         272.4       
     OTHER AND UNSPECIFIED HYPERLIPIDEMIA                     

 

 2012            DARIAN FARRAR PHD                         111.0     
       PITYRIASIS VERSICOLOR                     

 

 2012            DARIAN FARRAR PHD                         272.4     
       OTHER AND UNSPECIFIED HYPERLIPIDEMIA                     

 

 2012            ESSIE MCMANUS DO                         111.0          
  PITYRIASIS VERSICOLOR                     

 

 2012            ESSIE MCMANUS DO                         272.4          
  OTHER AND UNSPECIFIED HYPERLIPIDEMIA                     

 

 2012                                      111.0            PITYRIASIS 
VERSICOLOR                     

 

 2012                                      272.4            OTHER AND 
UNSPECIFIED HYPERLIPIDEMIA                     

 

 2012                                      111.0            PITYRIASIS 
VERSICOLOR                     

 

 2012                                      272.4            OTHER AND 
UNSPECIFIED HYPERLIPIDEMIA                     

 

 2012                                      111.0            PITYRIASIS 
VERSICOLOR                     

 

 2012                                      272.4            OTHER AND 
UNSPECIFIED HYPERLIPIDEMIA                     

 

 2012                                      111.0            PITYRIASIS 
VERSICOLOR                     

 

 2012                                      272.4            OTHER AND 
UNSPECIFIED HYPERLIPIDEMIA                     

 

 2012                                      111.0            PITYRIASIS 
VERSICOLOR                     

 

 2012                                      272.4            OTHER AND 
UNSPECIFIED HYPERLIPIDEMIA                     

 

 2012            KYLAH MELGAR PA-C                         111.0     
       PITYRIASIS VERSICOLOR                     

 

 2012            KYLAH MELGAR PA-C                         272.4     
       OTHER AND UNSPECIFIED HYPERLIPIDEMIA                     

 

 2012            DARIAN FARRAR PHD A                         111.0     
       PITYRIASIS VERSICOLOR                     

 

 2012            DARIAN FARRAR PHD                         272.4     
       OTHER AND UNSPECIFIED HYPERLIPIDEMIA                     

 

 2012            MCMANUS DO ESSIE K                         111.0          
  PITYRIASIS VERSICOLOR                     

 

 2012            MCMANUS DO ESSIE K                         272.4          
  OTHER AND UNSPECIFIED HYPERLIPIDEMIA                     

 

 2012            DARIAN FARRAR PHD                         111.0     
       PITYRIASIS VERSICOLOR                     

 

 2012            DARIAN FARRAR PHD                         272.4     
       OTHER AND UNSPECIFIED HYPERLIPIDEMIA                     

 

 2012            EDWARD REYNAGA                         111.0  
          PITYRIASIS VERSICOLOR                     

 

 2012            EDWARD REYNAGA                         272.4  
          OTHER AND UNSPECIFIED HYPERLIPIDEMIA                     

 

 2012            DARIAN FARRAR PHD A                         111.0     
       PITYRIASIS VERSICOLOR                     

 

 2012            DARIAN FARRAR PHD                         272.4     
       OTHER AND UNSPECIFIED HYPERLIPIDEMIA                     

 

 2012            DARIAN FARRAR PHD A                         111.0     
       PITYRIASIS VERSICOLOR                     

 

 2012            DARIAN FARRAR PHD A                         272.4     
       OTHER AND UNSPECIFIED HYPERLIPIDEMIA                     

 

 2012            DARIAN FARRAR PHD A                         111.0     
       PITYRIASIS VERSICOLOR                     

 

 2012            DARIAN FARRAR PHD                         272.4     
       OTHER AND UNSPECIFIED HYPERLIPIDEMIA                     

 

 2012            VENKATA WADDELL EDWARD RODERICK                         111.0  
          PITYRIASIS VERSICOLOR                     

 

 2012            EDWARD REYNAGA                         272.4  
          OTHER AND UNSPECIFIED HYPERLIPIDEMIA                     

 

 2012            DARIAN FARRAR PHD A                         111.0     
       PITYRIASIS VERSICOLOR                     

 

 2012            DARIAN FARRAR PHD                         272.4     
       OTHER AND UNSPECIFIED HYPERLIPIDEMIA                     

 

 2012            ESSIE MCMANUS DO                         111.0          
  PITYRIASIS VERSICOLOR                     

 

 2012            ESSIE MCMANUS DO                         272.4          
  OTHER AND UNSPECIFIED HYPERLIPIDEMIA                     

 

 2012            VERONICA APRN, JEFFREY                         111.0       
     PITYRIASIS VERSICOLOR                     

 

 2012            VERONICA APRN, JEFFREY                         272.4       
     OTHER AND UNSPECIFIED HYPERLIPIDEMIA                     

 

 2012            DARIAN FARRAR PHD                         111.0     
       PITYRIASIS VERSICOLOR                     

 

 2012            DARIAN FARRAR PHD                         272.4     
       OTHER AND UNSPECIFIED HYPERLIPIDEMIA                     

 

 2012            VERONICA APRN, JEFFREY                         111.0       
     PITYRIASIS VERSICOLOR                     

 

 2012            VERONICA APRN, JEFFREY                         272.4       
     OTHER AND UNSPECIFIED HYPERLIPIDEMIA                     

 

 2012            DARIAN FARRAR PHD                         111.0     
       PITYRIASIS VERSICOLOR                     

 

 2012            DARIAN FARRAR PHD                         272.4     
       OTHER AND UNSPECIFIED HYPERLIPIDEMIA                     

 

 2012            DARIAN FARRAR PHD                         686.9     
       UNSPECIFIED LOCAL INFECTION OF SKIN AND SUBCUTANEOUS TISSUE             
        

 

 2012            RASHI DIAMOND DO                         686.9       
     UNSPECIFIED LOCAL INFECTION OF SKIN AND SUBCUTANEOUS TISSUE               
      

 

 2012            DARIAN FARRAR PHD                         686.9     
       UNSPECIFIED LOCAL INFECTION OF SKIN AND SUBCUTANEOUS TISSUE             
        

 

 2012            ESSIE MCMANUS DO                         686.9          
  UNSPECIFIED LOCAL INFECTION OF SKIN AND SUBCUTANEOUS TISSUE                  
   

 

 2012                                      686.9            UNSPECIFIED 
LOCAL INFECTION OF SKIN AND SUBCUTANEOUS TISSUE                     

 

 2012                                      686.9            UNSPECIFIED 
LOCAL INFECTION OF SKIN AND SUBCUTANEOUS TISSUE                     

 

 2012                                      686.9            UNSPECIFIED 
LOCAL INFECTION OF SKIN AND SUBCUTANEOUS TISSUE                     

 

 2012                                      686.9            UNSPECIFIED 
LOCAL INFECTION OF SKIN AND SUBCUTANEOUS TISSUE                     

 

 2012                                      686.9            UNSPECIFIED 
LOCAL INFECTION OF SKIN AND SUBCUTANEOUS TISSUE                     

 

 2012            KYLAH MELGAR PA-C                         686.9     
       UNSPECIFIED LOCAL INFECTION OF SKIN AND SUBCUTANEOUS TISSUE             
        

 

 2012            DARIAN FARRAR PHD                         686.9     
       UNSPECIFIED LOCAL INFECTION OF SKIN AND SUBCUTANEOUS TISSUE             
        

 

 2012            ESSIE MCMANUS DO                         686.9          
  UNSPECIFIED LOCAL INFECTION OF SKIN AND SUBCUTANEOUS TISSUE                  
   

 

 2012            DARIAN FARRAR PHD                         686.9     
       UNSPECIFIED LOCAL INFECTION OF SKIN AND SUBCUTANEOUS TISSUE             
        

 

 2012            VENKATA WADDELLEDWARD                         686.9  
          UNSPECIFIED LOCAL INFECTION OF SKIN AND SUBCUTANEOUS TISSUE          
           

 

 2012            BOEANA YAN, DARIAN JARA                         686.9     
       UNSPECIFIED LOCAL INFECTION OF SKIN AND SUBCUTANEOUS TISSUE             
        

 

 2012            BOEANA YAN, DARIAN JARA                         686.9     
       UNSPECIFIED LOCAL INFECTION OF SKIN AND SUBCUTANEOUS TISSUE             
        

 

 2012            BOEANA YAN, DARIAN JARA                         686.9     
       UNSPECIFIED LOCAL INFECTION OF SKIN AND SUBCUTANEOUS TISSUE             
        

 

 2012            VENKATA WADDELL EDWARD VAUGHN                         686.9  
          UNSPECIFIED LOCAL INFECTION OF SKIN AND SUBCUTANEOUS TISSUE          
           

 

 2012            DARIAN FARRAR PHD                         686.9     
       UNSPECIFIED LOCAL INFECTION OF SKIN AND SUBCUTANEOUS TISSUE             
        

 

 2012            ESSIE MCMANUS DO                         686.9          
  UNSPECIFIED LOCAL INFECTION OF SKIN AND SUBCUTANEOUS TISSUE                  
   

 

 2012            VERONICA WADDELL, JEFFREY                         686.9       
     UNSPECIFIED LOCAL INFECTION OF SKIN AND SUBCUTANEOUS TISSUE               
      

 

 2012            DARIAN FARRAR PHD                         686.9     
       UNSPECIFIED LOCAL INFECTION OF SKIN AND SUBCUTANEOUS TISSUE             
        

 

 2012            VERONICA WADDELL, JEFFREY                         686.9       
     UNSPECIFIED LOCAL INFECTION OF SKIN AND SUBCUTANEOUS TISSUE               
      

 

 2012            DARIAN FARRAR PHD                         686.9     
       UNSPECIFIED LOCAL INFECTION OF SKIN AND SUBCUTANEOUS TISSUE             
        

 

 2012            DARIAN FARRAR PHD                         782.3     
       EDEMA                     

 

 2012            DARIAN FARRAR PHD                         782.7     
       petichiae                     

 

 2012            RASHI DIAMOND DO F                         782.3       
     EDEMA                     

 

 2012            RASHI DIAMOND DO F                         782.7       
     petichiae                     

 

 2012            DARIAN FARRAR PHD                         782.3     
       EDEMA                     

 

 2012            DARIAN FARRAR PHD                         782.7     
       petichiae                     

 

 2012            MCMANUS ESSIE CABRAL K                         782.3          
  Edema                     

 

 2012            MCMANUS DOESSIE K                         782.7          
  Petichiae                     

 

 2012                                      782.3            Edema        
             

 

 2012                                      782.7            Petichiae    
                 

 

 2012                                      782.3            Edema        
             

 

 2012                                      782.7            Petichiae    
                 

 

 2012                                      782.3            Edema        
             

 

 2012                                      782.7            Petichiae    
                 

 

 2012                                      782.3            Edema        
             

 

 2012                                      782.7            Petichiae    
                 

 

 2012                                      782.3            Edema        
             

 

 2012                                      782.7            Petichiae    
                 

 

 2012            TALIA DENNIS, KYLAH ANDRADE                         782.3     
       Edema                     

 

 2012            TALIA DENNIS, KYLAH ANDRADE                         782.7     
       Petichiae                     

 

 2012            BOEANA PHD, DARIAN A                         782.3     
       Edema                     

 

 2012            BOEOUT PHD, DARIAN A                         782.7     
       Petichiae                     

 

 2012            MCMANUS DO, ESSIE K                         782.3          
  Edema                     

 

 2012            MCMANUS DO, ESSIE K                         782.7          
  Petichiae                     

 

 2012            BOEMARYOUT PHD, DARIAN A                         782.3     
       Edema                     

 

 2012            BOEMARYOUT PHD, DARIAN A                         782.7     
       Petichiae                     

 

 2012            HASTINGS APRN, EDWARD RODERICK                         782.3  
          Edema                     

 

 2012            HASTINGS APRN, [facility]                         782.7  
          Petichiae                     

 

 2012            BOEOUT PHD, DARIAN A                         782.3     
       Edema                     

 

 2012            [facility]                         782.7     
       Petichiae                     

 

 2012            BOEOUT PHD, DARIAN A                         782.3     
       Edema                     

 

 2012            BOEOUT PHD, DARIAN A                         782.7     
       Petichiae                     

 

 2012            BOEOUT PHD, DARIAN A                         782.3     
       Edema                     

 

 2012            [facility]                         782.7     
       Petichiae                     

 

 2012            HASTINGS APRN, [facility]                         782.3  
          Edema                     

 

 2012            HASTINGS APRN, [facility]                         782.7  
          Petichiae                     

 

 2012            BOEOUT PHD, DARIAN A                         782.3     
       Edema                     

 

 2012            BOEMARYOUT PHD, DARIAN A                         782.7     
       Petichiae                     

 

 2012            MCMANUS DO, ESSIE K                         782.3          
  Edema                     

 

 2012            MCMANUS DO, ESSIE K                         782.7          
  Petichiae                     

 

 2012            VERONICA APRN, JEFFREY                         782.3       
     Edema                     

 

 2012            VERONICA APRN, JEFFREY                         782.7       
     Petichiae                     

 

 2012            DARIAN FARRAR PHD                         782.3     
       Edema                     

 

 2012            DARIAN FARRAR PHD                         782.7     
       Petichiae                     

 

 2012            JEFFREY MORA                         782.3       
     Edema                     

 

 2012            JEFFREY MORA                         782.7       
     Petichiae                     

 

 2012            DARIAN FARRAR PHD                         782.3     
       Edema                     

 

 2012            DARIAN FARRAR PHD                         782.7     
       Petichiae                     

 

 2012            DARIAN FARRAR PHD                         709.1     
       VASCULAR DISORDERS OF SKIN                     

 

 2012            RASHI DIAMOND DO                         709.1       
     VASCULAR DISORDERS OF SKIN                     

 

 2012            DARIAN FARRAR PHD                         709.1     
       VASCULAR DISORDERS OF SKIN                     

 

 2012            ESSIE MCMANUS DO                         709.1          
  Vascular Disorders Of Skin                     

 

 2012                                      709.1            Vascular 
Disorders Of Skin                     

 

 2012                                      709.1            Vascular 
Disorders Of Skin                     

 

 2012                                      709.1            Vascular 
Disorders Of Skin                     

 

 2012                                      709.1            Vascular 
Disorders Of Skin                     

 

 2012                                      709.1            Vascular 
Disorders Of Skin                     

 

 2012            TALIA DENNIS, KYLAH ANDRADE                         709.1     
       Vascular Disorders Of Skin                     

 

 2012            DARIAN FARRAR PHD                         709.1     
       Vascular Disorders Of Skin                     

 

 2012            ESSIE MCMANUS DO                         709.1          
  Vascular Disorders Of Skin                     

 

 2012            DARIAN FARRAR PHD                         709.1     
       Vascular Disorders Of Skin                     

 

 2012            EDWARD REYNAGA                         709.1  
          Vascular Disorders Of Skin                     

 

 2012            DARIAN FARRAR PHD                         709.1     
       Vascular Disorders Of Skin                     

 

 2012            DARIAN FARRAR PHD                         709.1     
       Vascular Disorders Of Skin                     

 

 2012            DARIAN FARRAR PHD                         709.1     
       Vascular Disorders Of Skin                     

 

 2012            EDWARD REYNAGA                         709.1  
          Vascular Disorders Of Skin                     

 

 2012            DARIAN FARRAR PHD                         709.1     
       Vascular Disorders Of Skin                     

 

 2012            ESSIE MCMANUS DO                         709.1          
  Vascular Disorders Of Skin                     

 

 2012            JEFFREY MORA                         709.1       
     Vascular Disorders Of Skin                     

 

 2012            DARIAN FARRAR PHD                         709.1     
       Vascular Disorders Of Skin                     

 

 2012            JEFFREY MORA                         709.1       
     Vascular Disorders Of Skin                     

 

 2012            DARIAN FARRAR PHD                         709.1     
       Vascular Disorders Of Skin                     

 

 2012            DARIAN FARRAR PHD                         296.31    
        MO DEPRESSIVE RECURRENT MILD                     

 

 2012            RASHI DIAMOND DO                         296.31      
      MO DEPRESSIVE RECURRENT MILD                     

 

 2012            DARIAN FARRAR PHD                         296.31    
        MO DEPRESSIVE RECURRENT MILD                     

 

 2012            ESSIE MCMANUS DO                         296.31         
   MO DEPRESSIVE RECURRENT MILD                     

 

 2012                                      296.31            MO 
DEPRESSIVE RECURRENT MILD                     

 

 2012                                      296.31            MO 
DEPRESSIVE RECURRENT MILD                     

 

 2012                                      296.31            MO 
DEPRESSIVE RECURRENT MILD                     

 

 2012                                      296.31            MO 
DEPRESSIVE RECURRENT MILD                     

 

 2012                                      296.31            MO 
DEPRESSIVE RECURRENT MILD                     

 

 2012            KYLAH MELGAR PA-C                         296.31    
        MO DEPRESSIVE RECURRENT MILD                     

 

 2012            DARIAN FARRAR PHD                         296.31    
        MO DEPRESSIVE RECURRENT MILD                     

 

 2012            ESSIE MCMANUS DO                         296.31         
   MO DEPRESSIVE RECURRENT MILD                     

 

 2012            DARIAN FARRAR PHD                         296.31    
        MO DEPRESSIVE RECURRENT MILD                     

 

 2012            EDWARD REYNAGA                         296.31 
           MO DEPRESSIVE RECURRENT MILD                     

 

 2012            DARIAN FARRAR PHD                         296.31    
        MO DEPRESSIVE RECURRENT MILD                     

 

 2012            DARIAN FARRAR PHD                         296.31    
        MO DEPRESSIVE RECURRENT MILD                     

 

 2012            DARIAN FARRAR PHD                         296.31    
        MO DEPRESSIVE RECURRENT MILD                     

 

 2012            EDWARD REYNAGA                         296.31 
           MO DEPRESSIVE RECURRENT MILD                     

 

 2012            DARIAN FARRAR PHD                         296.31    
        MO DEPRESSIVE RECURRENT MILD                     

 

 2012            ESSIE MCMANUS DO                         296.31         
   MO DEPRESSIVE RECURRENT MILD                     

 

 2012            JEFFREY MORA                         296.31      
      MO DEPRESSIVE RECURRENT MILD                     

 

 2012            DARIAN FARRAR PHD                         296.31    
        MO DEPRESSIVE RECURRENT MILD                     

 

 2012            JEFFREY MORA                         296.31      
      MO DEPRESSIVE RECURRENT MILD                     

 

 2012            BOEKHOUT PHD, DARIAN A                         296.31    
        MO DEPRESSIVE RECURRENT MILD                     

 

 2013            JENNIFER FARNSWORTH, ENRIQUE POLANCO            Ot            276.51  
          DEHYDRATION                     

 

 2013            JENNIFER FARNSWORTH, ENRIQUE POLANCO            Ot            787.03  
          VOMITING ALONE                     

 

 2013            JENNIFER FARNSWORTH, ENRIQUE POLANCO            Ot            789.00  
          ABDOMINAL PAIN, UNSPECIFIED SITE                     

 

 2013                                      727.03            TRIGGER 
FINGER (ACQUIRED)                     

 

 2013                                      727.03            TRIGGER 
FINGER (ACQUIRED)                     

 

 2013                                      727.03            TRIGGER 
FINGER (ACQUIRED)                     

 

 2013            KYLAH MELGAR PA-C                         727.03    
        TRIGGER FINGER (ACQUIRED)                     

 

 2013            LENI YAN, DARIAN A                         727.03    
        TRIGGER FINGER (ACQUIRED)                     

 

 2013            ESSIE MCMANUS DO                         727.03         
   TRIGGER FINGER (ACQUIRED)                     

 

 2013            LENI YAN, DARIAN A                         727.03    
        TRIGGER FINGER (ACQUIRED)                     

 

 2013            EDWARD REYNAGA                         727.03 
           TRIGGER FINGER (ACQUIRED)                     

 

 2013            LENI YAN, DARIAN A                         727.03    
        TRIGGER FINGER (ACQUIRED)                     

 

 2013            LENI YAN, DARIAN A                         727.03    
        TRIGGER FINGER (ACQUIRED)                     

 

 2013            LENI YAN, DARIAN A                         727.03    
        TRIGGER FINGER (ACQUIRED)                     

 

 2013            EDWARD REYNAGA                         727.03 
           TRIGGER FINGER (ACQUIRED)                     

 

 2013            LENI YAN, DARIAN A                         727.03    
        TRIGGER FINGER (ACQUIRED)                     

 

 2013            ESSIE MCMANUS DO                         727.03         
   TRIGGER FINGER (ACQUIRED)                     

 

 2013            JEFFREY MORA                         727.03      
      TRIGGER FINGER (ACQUIRED)                     

 

 2013            DARIAN FARRAR PHD A                         727.03    
        TRIGGER FINGER (ACQUIRED)                     

 

 2013            JEFFREY MORA                         727.03      
      TRIGGER FINGER (ACQUIRED)                     

 

 2013            DARIAN FARRAR PHD A                         727.03    
        TRIGGER FINGER (ACQUIRED)                     

 

 2013                                      790.4            ABNORMAL LFT (
ELEVATED)                     

 

 2013                                      790.4            ABNORMAL LFT (
ELEVATED)                     

 

 2013            KYLAH MELGAR PA-C                         790.4     
       ABNORMAL LFT (ELEVATED)                     

 

 2013            MARINA FARRAR PHDCK A                         790.4     
       ABNORMAL LFT (ELEVATED)                     

 

 2013            ESSIE MCMANUS DO                         790.4          
  ABNORMAL LFT (ELEVATED)                     

 

 2013[facility]D, DARIAN A                         790.4     
       ABNORMAL LFT (ELEVATED)                     

 

 2013            EDWARD REYNAGA                         790.4  
          ABNORMAL LFT (ELEVATED)                     

 

 2013OUT PHD, DARIAN A                         790.4     
       ABNORMAL LFT (ELEVATED)                     

 

 2013[facility] PHD, DARIAN A                         790.4     
       ABNORMAL LFT (ELEVATED)                     

 

 2013OUT PHD, DARIAN A                         790.4     
       ABNORMAL LFT (ELEVATED)                     

 

 2013            EDWARD REYNAGA                         790.4  
          ABNORMAL LFT (ELEVATED)                     

 

 2013            [facility] PHD, DARIAN A                         790.4     
       ABNORMAL LFT (ELEVATED)                     

 

 2013            ESSIE MCMANUS DO                         790.4          
  ABNORMAL LFT (ELEVATED)                     

 

 2013            JEFFREY MORA                         790.4       
     ABNORMAL LFT (ELEVATED)                     

 

 2013[facility] PHD, DARIAN A                         790.4     
       ABNORMAL LFT (ELEVATED)                     

 

 2013            JEFFREY MORA                         790.4       
     ABNORMAL LFT (ELEVATED)                     

 

 2013            [facility] PHD, DARIAN A                         790.4     
       ABNORMAL LFT (ELEVATED)                     

 

 2013                                      070.70            HEPATITIS C, 
UNSPEC                     

 

 2013            KYLAH MELGAR PA-C                         070.70    
        HEPATITIS C, UNSPEC                     

 

 2013[facility] PHD, DARIAN A                         070.70    
        HEPATITIS C, UNSPEC                     

 

 2013            ESSIE MCMANUS DO                         070.70         
   HEPATITIS C, UNSPEC                     

 

 2013[facility] PHD, DARIAN A                         070.70    
        HEPATITIS C, UNSPEC                     

 

 2013            EDWARD REYNAGA                         070.70 
           HEPATITIS C, UNSPEC                     

 

 2013[facility] PHD, DARIAN A                         070.70    
        HEPATITIS C, UNSPEC                     

 

 2013OUT PHD, DARIAN A                         070.70    
        HEPATITIS C, UNSPEC                     

 

 2013OUT PHD, DARIAN A                         070.70    
        HEPATITIS C, UNSPEC                     

 

 2013            EDWARD REYNAGA                         070.70 
           HEPATITIS C, UNSPEC                     

 

 2013            ELNI YAN, DARIAN JARA                         070.70    
        HEPATITIS C, UNSPEC                     

 

 2013            ESSIE MCMANUS DO                         070.70         
   HEPATITIS C, UNSPEC                     

 

 2013            VERONICA APRTU, JEFFREY                         070.70      
      HEPATITIS C, UNSPEC                     

 

 2013            LENI YAN, DARIAN JARA                         070.70    
        HEPATITIS C, UNSPEC                     

 

 2013            VERONICA APRTU, JEFFREY                         070.70      
      HEPATITIS C, UNSPEC                     

 

 2013            LENI YAN, DARIAN JARA                         070.70    
        HEPATITIS C, UNSPEC                     

 

 10/10/2013            ESSIE MCMANUS DO                         307.42         
   PERSISTENT DISORDER OF INITIATING OR MAINTAINING SLEEP                     

 

 10/10/2013            DARIAN FARRAR PHD                         307.42    
        PERSISTENT DISORDER OF INITIATING OR MAINTAINING SLEEP                 
    

 

 10/10/2013            EDWARD REYNAGA                         307.42 
           PERSISTENT DISORDER OF INITIATING OR MAINTAINING SLEEP              
       

 

 10/10/2013            DARIAN FARRAR PHD                         307.42    
        PERSISTENT DISORDER OF INITIATING OR MAINTAINING SLEEP                 
    

 

 10/10/2013            DARIAN FARRAR PHD                         307.42    
        PERSISTENT DISORDER OF INITIATING OR MAINTAINING SLEEP                 
    

 

 10/10/2013            DARIAN FARRAR PHD                         307.42    
        PERSISTENT DISORDER OF INITIATING OR MAINTAINING SLEEP                 
    

 

 10/10/2013            EDWARD REYNAGA                         307.42 
           PERSISTENT DISORDER OF INITIATING OR MAINTAINING SLEEP              
       

 

 10/10/2013            DARIAN FARRAR PHD                         307.42    
        PERSISTENT DISORDER OF INITIATING OR MAINTAINING SLEEP                 
    

 

 10/10/2013            ESSIE MCMANUS DO                         307.42         
   PERSISTENT DISORDER OF INITIATING OR MAINTAINING SLEEP                     

 

 10/10/2013            JEFFREY MORA                         307.42      
      PERSISTENT DISORDER OF INITIATING OR MAINTAINING SLEEP                   
  

 

 10/10/2013            DARIAN FARRAR PHD                         307.42    
        PERSISTENT DISORDER OF INITIATING OR MAINTAINING SLEEP                 
    

 

 10/10/2013            JEFFREY MORA                         307.42      
      PERSISTENT DISORDER OF INITIATING OR MAINTAINING SLEEP                   
  

 

 10/10/2013            DARIAN FARRAR PHD                         307.42    
        PERSISTENT DISORDER OF INITIATING OR MAINTAINING SLEEP                 
    

 

 10/17/2013            ESSIE MCMANUS DO                         454.1          
  VARICOSE VEINS OF LOWER EXTREMITIES WITH INFLAMMATION                     

 

 10/17/2013            DARIAN FARRAR PHD                         454.1     
       VARICOSE VEINS OF LOWER EXTREMITIES WITH INFLAMMATION                   
  

 

 10/17/2013            EDWARD REYNAGA                         454.1  
          VARICOSE VEINS OF LOWER EXTREMITIES WITH INFLAMMATION                
     

 

 10/17/2013            DARIAN FARRAR PHD                         454.1     
       VARICOSE VEINS OF LOWER EXTREMITIES WITH INFLAMMATION                   
  

 

 10/17/2013            DARIAN FARRAR PHD                         454.1     
       VARICOSE VEINS OF LOWER EXTREMITIES WITH INFLAMMATION                   
  

 

 10/17/2013            LENI YAN, DARIAN JARA                         454.1     
       VARICOSE VEINS OF LOWER EXTREMITIES WITH INFLAMMATION                   
  

 

 10/17/2013            VENKATA WADDELL EDWARD VAUGHN                         454.1  
          VARICOSE VEINS OF LOWER EXTREMITIES WITH INFLAMMATION                
     

 

 10/17/2013            DARIAN FARRAR PHD                         454.1     
       VARICOSE VEINS OF LOWER EXTREMITIES WITH INFLAMMATION                   
  

 

 10/17/2013            ESSIE MCMANUS DO                         454.1          
  VARICOSE VEINS OF LOWER EXTREMITIES WITH INFLAMMATION                     

 

 10/17/2013            JEFFREY MORA                         454.1       
     VARICOSE VEINS OF LOWER EXTREMITIES WITH INFLAMMATION                     

 

 10/17/2013            DARIAN FARRAR PHD                         454.1     
       VARICOSE VEINS OF LOWER EXTREMITIES WITH INFLAMMATION                   
  

 

 10/17/2013            JEFFREY MORA                         454.1       
     VARICOSE VEINS OF LOWER EXTREMITIES WITH INFLAMMATION                     

 

 10/17/2013            DARIAN FARRAR PHD                         454.1     
       VARICOSE VEINS OF LOWER EXTREMITIES WITH INFLAMMATION                   
  

 

 2014            VENKATA WADDELL EDWARD VAUGHN                         300.00 
           AN ANXIETY UNSPEC                     

 

 2014            LENI YAN, DARIAN JARA                         300.00    
        AN ANXIETY UNSPEC                     

 

 2014            DARIAN FARRAR PHD                         300.00    
        AN ANXIETY UNSPEC                     

 

 2014            LENI YAN, DARIAN JARA                         300.00    
        AN ANXIETY UNSPEC                     

 

 2014            VENKATA WADDELL EDWARD VAUGHN                         300.00 
           AN ANXIETY UNSPEC                     

 

 2014            DARIAN FARRAR PHD                         300.00    
        AN ANXIETY UNSPEC                     

 

 2014            ESSIE MCMANUS DO                         300.00         
   AN ANXIETY UNSPEC                     

 

 2014            JEFFREY MORA                         300.00      
      AN ANXIETY UNSPEC                     

 

 2014            LENI YAN, DARIAN A                         300.00    
        AN ANXIETY UNSPEC                     

 

 2014            JEFFREY MORA                         300.00      
      AN ANXIETY UNSPEC                     

 

 2014            LENI YAN, DARIAN A                         300.00    
        AN ANXIETY UNSPEC                     

 

 2014            DARIAN FARRAR PHD                         V58.69    
        MEDICATION HIGH RISK                     

 

 2014            VENKATA WADDELL EDWARD VAUGHN                         V58.69 
           MEDICATION HIGH RISK                     

 

 2014            DARIAN FARRAR PHD                         V58.69    
        MEDICATION HIGH RISK                     

 

 2014            ESSIE MCMANUS DO                         V58.69         
   MEDICATION HIGH RISK                     

 

 2014            JEFFREY MORA                         V58.69      
      MEDICATION HIGH RISK                     

 

 2014            LENI YAN, DARIAN JARA                         V58.69    
        MEDICATION HIGH RISK                     

 

 2014            JEFFREY MORA                         V58.69      
      MEDICATION HIGH RISK                     

 

 2014            LENI YAN, DARIAN JARA                         V58.69    
        MEDICATION HIGH RISK                     

 

 10/30/2014            JEFFREY MORA                         300.15      
      DS DISSOCIATIVE DIS NOS                     

 

 10/30/2014            LENI YAN, DARIAN JARA                         300.15    
        DS DISSOCIATIVE DIS NOS                     

 

 10/30/2014            JEFFREY MORA                         300.15      
      DS DISSOCIATIVE DIS NOS                     

 

 10/30/2014            LENI YAN, DARIAN JARA                         300.15    
        DS DISSOCIATIVE DIS NOS                     



                                                                               
                                                                               
                                                                               
                                                                               
                                                                               
                                                                               
                                                                               
                                                                               
                                                                               
                                                                               
                                                                               
                         



Procedures

      





 Code            Description            Performed By            Performed On   
     

 

             10385                                  PSYCH PHARM MGMT           
                        2012        

 

             27357                                  INDIV PSYTX 45/50 MIN      
                             2012        

 

             25116                                  ROUTINE VENIPUNCTURE       
                            2012        

 

             30387                                  CMP                        
           2012        

 

             92813                                  LIPID PANEL                
                   2012        

 

             [id number]                                  GFR CALC (RESULT ONLY)   
                                2012        

 

             58947                                  PSYTX PT&/FAMILY 45 MINUTES
                                   2013        

 

             52794                                  PSYTX PT&/FAMILY 45 MINUTES
                                   2013        

 

             54100                                  PSYCH IND W/MED CK 20      
                             2013        

 

             67037                                  ROUTINE VENIPUNCTURE       
                            2013        

 

             90081                                  CMP                        
           2013        

 

             22767                                  LIPID PANEL                
                   2013        

 

             [id number]                                  GFR CALC (RESULT ONLY)   
                                2013        

 

             88608                                  ROUTINE VENIPUNCTURE       
                            2013        

 

             [id number]                                  FERRITIN                   
                2013        

 

             04313                                  IRON SERUM                 
                  2013        

 

             03010                                  IRON BNDNG CAP             
                      2013        

 

             [id number]                                  HCV INDEX (RESULT ONLY)  
                                 2013        

 

             15582                                  HEPATITIS PROFILE          
                         2013        

 

             IRGROUP                                  IRON GROUP (Iron,TIBC, 
Ferritin)                                   2013        

 

             85866                                  ROUTINE VENIPUNCTURE       
                            2013        

 

             59356                                  CBC                        
           2013        

 

             39185                                  PT/INR                     
              2013        

 

             37927                                  HEP C PCR QUANT (SERIAL)   
                                2013        

 

             43060                                  PSYTX PT&/FAMILY 45 MINUTES
                                   10/07/2013        

 

             01949                                  PSYTX PT&/FAMILY 45 MINUTES
                                   2013        

 

             18484                                  PSYTX PT&/FAMILY 45 MINUTES
                                   2014        

 

             36218                                  PSYTX PT&/FAMILY 45 MINUTES
                                   2014        

 

             95086                                  PSYTX PT&/FAMILY 45 MINUTES
                                   2014        

 

             56845                                  ROUTINE VENIPUNCTURE       
                            2014        

 

             98306                                  CBC                        
           2014        

 

             [id number]                                  GFR CALC (RESULT ONLY)   
                                2014        

 

             36205                                  CMP                        
           2014        

 

             69612                                  LIPID PANEL                
                   2014        

 

             57477                                  IRON SERUM                 
                  2014        

 

             [id number]                                  IRON BNDNG CAP             
                      2014        

 

             [id number]                                  FERRITIN                   
                2014        

 

             [id number]                                  HCV INDEX (RESULT ONLY)  
                                 2014        

 

             55100                                  HEPATITIS PROFILE          
                         2014        

 

             92728                                  PSYTX PT&/FAMILY 45 MINUTES
                                   2014        

 

             05337                                  ROUTINE VENIPUNCTURE       
                            2014        

 

             IRGROUP                                  IRON GROUP (Iron,TIBC, 
Ferritin)                                   2014        

 

             [id number]                                  GFR CALC (RESULT ONLY)   
                                09/10/2014        

 

             13490                                  CMP                        
           09/10/2014        

 

             90672                                  PSYTX PT&/FAMILY 45 MINUTES
                                   2014        

 

             42600                                  PSYTX PT&/FAMILY 45 MINUTES
                                   2015        



                                                                               
                             



Results

      





 Test            Result            Range        









 Complete blood count (CBC) with automated white blood cell (WBC) differential 
- 18 16:20         









 Blood leukocytes automated count (number/volume)            12.7 10*3/uL      
      4.3-11.0        

 

 Blood erythrocytes automated count (number/volume)            5.21 10*6/uL    
        4.35-5.85        

 

 Venous blood hemoglobin measurement (mass/volume)            16.8 g/dL        
    13.3-17.7        

 

 Blood hematocrit (volume fraction)            48 %            40-54        

 

 Automated erythrocyte mean corpuscular volume            92 [foz_us]          
  80-99        

 

 Automated erythrocyte mean corpuscular hemoglobin (mass per erythrocyte)      
      32 pg            25-34        

 

 Automated erythrocyte mean corpuscular hemoglobin concentration measurement (
mass/volume)            35 g/dL            32-36        

 

 Automated erythrocyte distribution width ratio            13.1 %            
10.0-14.5        

 

 Automated blood platelet count (count/volume)            220 10*3/uL          
  130-400        

 

 Automated blood platelet mean volume measurement            9.4 [foz_us]      
      7.4-10.4        

 

 Automated blood neutrophils/100 leukocytes            83 %            42-75   
     

 

 Automated blood lymphocytes/100 leukocytes            8 %            12-44    
    

 

 Blood monocytes/100 leukocytes            8 %            0-12        

 

 Automated blood eosinophils/100 leukocytes            1 %            0-10     
   

 

 Automated blood basophils/100 leukocytes            0 %            0-10        

 

 Blood neutrophils automated count (number/volume)            10.5 10*3        
    1.8-7.8        

 

 Blood lymphocytes automated count (number/volume)            1.1 10*3         
   1.0-4.0        

 

 Blood monocytes automated count (number/volume)            1.0 10*3            
0.0-1.0        

 

 Automated eosinophil count            0.1 10*3/uL            0.0-0.3        

 

 Automated blood basophil count (count/volume)            0.0 10*3/uL          
  0.0-0.1        









 Comprehensive metabolic panel - 18 16:20         









 Serum or plasma sodium measurement (moles/volume)            138 mmol/L       
     135-145        

 

 Serum or plasma potassium measurement (moles/volume)            3.9 mmol/L    
        3.6-5.0        

 

 Serum or plasma chloride measurement (moles/volume)            105 mmol/L     
               

 

 Carbon dioxide            22 mmol/L            21-32        

 

 Serum or plasma anion gap determination (moles/volume)            11 mmol/L   
         5-14        

 

 Serum or plasma urea nitrogen measurement (mass/volume)            9 mg/dL    
        7-18        

 

 Serum or plasma creatinine measurement (mass/volume)            0.93 mg/dL    
        0.60-1.30        

 

 Serum or plasma urea nitrogen/creatinine mass ratio            10             
NRG        

 

 Serum or plasma creatinine measurement with calculation of estimated 
glomerular filtration rate            >             NRG        

 

 Serum or plasma glucose measurement (mass/volume)            107 mg/dL        
            

 

 Serum or plasma calcium measurement (mass/volume)            9.5 mg/dL        
    8.5-10.1        

 

 Serum or plasma total bilirubin measurement (mass/volume)            1.3 mg/dL
            0.1-1.0        

 

 Serum or plasma alkaline phosphatase measurement (enzymatic activity/volume)  
          85 U/L                    

 

 Serum or plasma aspartate aminotransferase measurement (enzymatic activity/
volume)            17 U/L            5-34        

 

 Serum or plasma alanine aminotransferase measurement (enzymatic activity/volume
)            24 U/L            0-55        

 

 Serum or plasma protein measurement (mass/volume)            7.8 g/dL         
   6.4-8.2        

 

 Serum or plasma albumin measurement (mass/volume)            4.5 g/dL         
   3.2-4.5        

 

 CALCIUM CORRECTED            9.1 mg/dL            8.5-10.1        









 Magnesium - 18 16:20         









 Magnesium            2.2 mg/dL            1.8-2.4        









 Blood lactic acid measurement (moles/volume) - 18 16:55         









 Blood lactic acid measurement (moles/volume)            1.27 mmol/L            
0.50-2.00        









 Complete urinalysis with reflex to culture - 18 17:35         









 Urine color determination            YELLOW             NRG        

 

 Urine clarity determination            CLEAR             NRG        

 

 Urine pH measurement by test strip            5             5-9        

 

 Specific gravity of urine by test strip            1.005             1.016-
1.022        

 

 Urine protein assay by test strip, semi-quantitative            1+             
NEGATIVE        

 

 Urine glucose detection by automated test strip            NEGATIVE           
  NEGATIVE        

 

 Erythrocytes detection in urine sediment by light microscopy            2+    
         NEGATIVE        

 

 Urine ketones detection by automated test strip            1+             
NEGATIVE        

 

 Urine nitrite detection by test strip            NEGATIVE             NEGATIVE
        

 

 Urine total bilirubin detection by test strip            NEGATIVE             
NEGATIVE        

 

 Urine urobilinogen measurement by automated test strip (mass/volume)          
  NORMAL             NORMAL        

 

 Urine leukocyte esterase detection by dipstick            2+             
NEGATIVE        

 

 Automated urine sediment erythrocyte count by microscopy (number/high power 
field)             [HPF]            NRG        

 

 Automated urine sediment leukocyte count by microscopy (number/high power field
)             [HPF]            NRG        

 

 Bacteria detection in urine sediment by light microscopy            FEW       
      NRG        

 

 Crystals detection in urine sediment by light microscopy            NONE      
       NRG        

 

 Casts detection in urine sediment by light microscopy            NONE         
    NRG        

 

 Mucus detection in urine sediment by light microscopy            NEGATIVE     
        NRG        

 

 Complete urinalysis with reflex to culture            YES             NRG     
   









 Bacterial urine culture - 18 17:35         









 Bacterial urine culture            NG             NRG        









 Complete blood count (CBC) with automated white blood cell (WBC) differential 
- 18 05:15         









 Blood leukocytes automated count (number/volume)            10.1 10*3/uL      
      4.3-11.0        

 

 Blood erythrocytes automated count (number/volume)            4.46 10*6/uL    
        4.35-5.85        

 

 Venous blood hemoglobin measurement (mass/volume)            14.3 g/dL        
    13.3-17.7        

 

 Blood hematocrit (volume fraction)            42 %            40-54        

 

 Automated erythrocyte mean corpuscular volume            94 [foz_us]          
  80-99        

 

 Automated erythrocyte mean corpuscular hemoglobin (mass per erythrocyte)      
      32 pg            25-34        

 

 Automated erythrocyte mean corpuscular hemoglobin concentration measurement (
mass/volume)            34 g/dL            32-36        

 

 Automated erythrocyte distribution width ratio            13.0 %            
10.0-14.5        

 

 Automated blood platelet count (count/volume)            175 10*3/uL          
  130-400        

 

 Automated blood platelet mean volume measurement            9.6 [foz_us]      
      7.4-10.4        

 

 Automated blood neutrophils/100 leukocytes            79 %            42-75   
     

 

 Automated blood lymphocytes/100 leukocytes            10 %            12-44   
     

 

 Blood monocytes/100 leukocytes            11 %            0-12        

 

 Automated blood eosinophils/100 leukocytes            0 %            0-10     
   

 

 Automated blood basophils/100 leukocytes            0 %            0-10        

 

 Blood neutrophils automated count (number/volume)            8.0 10*3         
   1.8-7.8        

 

 Blood lymphocytes automated count (number/volume)            1.0 10*3         
   1.0-4.0        

 

 Blood monocytes automated count (number/volume)            1.1 10*3            
0.0-1.0        

 

 Automated eosinophil count            0.0 10*3/uL            0.0-0.3        

 

 Automated blood basophil count (count/volume)            0.0 10*3/uL          
  0.0-0.1        









 Comprehensive metabolic panel - 18 05:15         









 Serum or plasma sodium measurement (moles/volume)            136 mmol/L       
     135-145        

 

 Serum or plasma potassium measurement (moles/volume)            3.6 mmol/L    
        3.6-5.0        

 

 Serum or plasma chloride measurement (moles/volume)            105 mmol/L     
               

 

 Carbon dioxide            18 mmol/L            21-32        

 

 Serum or plasma anion gap determination (moles/volume)            13 mmol/L   
         5-14        

 

 Serum or plasma urea nitrogen measurement (mass/volume)            10 mg/dL   
         7-18        

 

 Serum or plasma creatinine measurement (mass/volume)            0.83 mg/dL    
        0.60-1.30        

 

 Serum or plasma urea nitrogen/creatinine mass ratio            12             
NRG        

 

 Serum or plasma creatinine measurement with calculation of estimated 
glomerular filtration rate            >             NRG        

 

 Serum or plasma glucose measurement (mass/volume)            118 mg/dL        
            

 

 Serum or plasma calcium measurement (mass/volume)            8.5 mg/dL        
    8.5-10.1        

 

 Serum or plasma total bilirubin measurement (mass/volume)            1.4 mg/dL
            0.1-1.0        

 

 Serum or plasma alkaline phosphatase measurement (enzymatic activity/volume)  
          63 U/L                    

 

 Serum or plasma aspartate aminotransferase measurement (enzymatic activity/
volume)            12 U/L            5-34        

 

 Serum or plasma alanine aminotransferase measurement (enzymatic activity/volume
)            17 U/L            0-55        

 

 Serum or plasma protein measurement (mass/volume)            5.8 g/dL         
   6.4-8.2        

 

 Serum or plasma albumin measurement (mass/volume)            3.5 g/dL         
   3.2-4.5        

 

 CALCIUM CORRECTED            8.9 mg/dL            8.5-10.1        









 Complete blood count (CBC) with automated white blood cell (WBC) differential 
- 09/15/18 05:27         









 Blood leukocytes automated count (number/volume)            6.5 10*3/uL       
     4.3-11.0        

 

 Blood erythrocytes automated count (number/volume)            4.28 10*6/uL    
        4.35-5.85        

 

 Venous blood hemoglobin measurement (mass/volume)            13.9 g/dL        
    13.3-17.7        

 

 Blood hematocrit (volume fraction)            40 %            40-54        

 

 Automated erythrocyte mean corpuscular volume            94 [foz_us]          
  80-99        

 

 Automated erythrocyte mean corpuscular hemoglobin (mass per erythrocyte)      
      32 pg            25-34        

 

 Automated erythrocyte mean corpuscular hemoglobin concentration measurement (
mass/volume)            35 g/dL            32-36        

 

 Automated erythrocyte distribution width ratio            12.8 %            
10.0-14.5        

 

 Automated blood platelet count (count/volume)            170 10*3/uL          
  130-400        

 

 Automated blood platelet mean volume measurement            9.8 [foz_us]      
      7.4-10.4        

 

 Automated blood neutrophils/100 leukocytes            66 %            42-75   
     

 

 Automated blood lymphocytes/100 leukocytes            21 %            12-44   
     

 

 Blood monocytes/100 leukocytes            11 %            0-12        

 

 Automated blood eosinophils/100 leukocytes            3 %            0-10     
   

 

 Automated blood basophils/100 leukocytes            0 %            0-10        

 

 Blood neutrophils automated count (number/volume)            4.2 10*3         
   1.8-7.8        

 

 Blood lymphocytes automated count (number/volume)            1.4 10*3         
   1.0-4.0        

 

 Blood monocytes automated count (number/volume)            0.7 10*3            
0.0-1.0        

 

 Automated eosinophil count            0.2 10*3/uL            0.0-0.3        

 

 Automated blood basophil count (count/volume)            0.0 10*3/uL          
  0.0-0.1        









 Comprehensive metabolic panel - 09/15/18 05:27         









 Serum or plasma sodium measurement (moles/volume)            136 mmol/L       
     135-145        

 

 Serum or plasma potassium measurement (moles/volume)            3.8 mmol/L    
        3.6-5.0        

 

 Serum or plasma chloride measurement (moles/volume)            104 mmol/L     
               

 

 Carbon dioxide            18 mmol/L            21-32        

 

 Serum or plasma anion gap determination (moles/volume)            14 mmol/L   
         5-14        

 

 Serum or plasma urea nitrogen measurement (mass/volume)            7 mg/dL    
        7-18        

 

 Serum or plasma creatinine measurement (mass/volume)            0.80 mg/dL    
        0.60-1.30        

 

 Serum or plasma urea nitrogen/creatinine mass ratio            9             
NRG        

 

 Serum or plasma creatinine measurement with calculation of estimated 
glomerular filtration rate            >             NRG        

 

 Serum or plasma glucose measurement (mass/volume)            91 mg/dL         
           

 

 Serum or plasma calcium measurement (mass/volume)            9.0 mg/dL        
    8.5-10.1        

 

 Serum or plasma total bilirubin measurement (mass/volume)            0.9 mg/dL
            0.1-1.0        

 

 Serum or plasma alkaline phosphatase measurement (enzymatic activity/volume)  
          56 U/L                    

 

 Serum or plasma aspartate aminotransferase measurement (enzymatic activity/
volume)            12 U/L            5-34        

 

 Serum or plasma alanine aminotransferase measurement (enzymatic activity/volume
)            12 U/L            0-55        

 

 Serum or plasma protein measurement (mass/volume)            6.4 g/dL         
   6.4-8.2        

 

 Serum or plasma albumin measurement (mass/volume)            3.5 g/dL         
   3.2-4.5        

 

 CALCIUM CORRECTED            9.4 mg/dL            8.5-10.1        









 Automated blood complete blood count (hemogram) panel - 18 05:39         









 Blood leukocytes automated count (number/volume)            4.8 10*3/uL       
     4.3-11.0        

 

 Blood erythrocytes automated count (number/volume)            4.14 10*6/uL    
        4.35-5.85        

 

 Venous blood hemoglobin measurement (mass/volume)            13.1 g/dL        
    13.3-17.7        

 

 Blood hematocrit (volume fraction)            39 %            40-54        

 

 Automated erythrocyte mean corpuscular volume            93 [foz_us]          
  80-99        

 

 Automated erythrocyte mean corpuscular hemoglobin (mass per erythrocyte)      
      32 pg            25-34        

 

 Automated erythrocyte mean corpuscular hemoglobin concentration measurement (
mass/volume)            34 g/dL            32-36        

 

 Automated erythrocyte distribution width ratio            12.5 %            
10.0-14.5        

 

 Automated blood platelet count (count/volume)            211 10*3/uL          
  130-400        

 

 Automated blood platelet mean volume measurement            9.8 [foz_us]      
      7.4-10.4        









 Whole blood basic metabolic panel - 18 05:39         









 Serum or plasma sodium measurement (moles/volume)            137 mmol/L       
     135-145        

 

 Serum or plasma potassium measurement (moles/volume)            3.2 mmol/L    
        3.6-5.0        

 

 Serum or plasma chloride measurement (moles/volume)            109 mmol/L     
               

 

 Carbon dioxide            21 mmol/L            21-32        

 

 Serum or plasma anion gap determination (moles/volume)            7 mmol/L    
        5-14        

 

 Serum or plasma urea nitrogen measurement (mass/volume)            5 mg/dL    
        7-18        

 

 Serum or plasma creatinine measurement (mass/volume)            0.71 mg/dL    
        0.60-1.30        

 

 Serum or plasma urea nitrogen/creatinine mass ratio            7             
NRG        

 

 Serum or plasma creatinine measurement with calculation of estimated 
glomerular filtration rate            >             NRG        

 

 Serum or plasma glucose measurement (mass/volume)            110 mg/dL        
            

 

 Serum or plasma calcium measurement (mass/volume)            8.7 mg/dL        
    8.5-10.1        



                                      



Encounters

      





 ACCT No.            Visit Date/Time            Discharge            Status    
        Pt. Type            Provider            Facility            Loc./Unit  
          Complaint        

 

 [id number]            2015 13:26:00            2015 23:59:59          
  [facility]            Outpatient            DARIAN FARRAR PHD                   
                            

 

 [id number]            2014 15:54:00            2014 23:59:59          
  [facility]            Outpatient            JEFFREY MORA                     
                          

 

 [id number]            2014 09:49:00            2014 23:59:59          
  [facility]            Outpatient            DARIAN FARRAR PHD                   
                            

 

 [id number]            10/30/2014 14:04:00            10/30/2014 23:59:59          
  [facility]            Outpatient            JEFFREY MORA                     
                          

 

 [id number]            2014 14:46:00            2014 23:59:59          
  [facility]            Outpatient            ESSIE MCMANUS DO                        
                       

 

 [id number]            2014 12:54:00            2014 23:59:59          
  [facility]            Outpatient            DARIAN FARRAR PHD                   
                            

 

 [id number]            2014 12:27:00            2014 23:59:59          
  [facility]            Outpatient            EDWARD REYNAGA                
                               

 

 [id number]            2014 14:05:00            2014 23:59:59          
  [facility]            Outpatient            DARIAN FARRAR PHD                   
                            

 

 [id number]            2014 13:52:00            2014 23:59:59          
  [facility]            Outpatient            DARIAN FARRAR PHD                   
                            

 

 [id number]            2014 13:55:00            2014 23:59:59          
  [facility]            Outpatient            DARIAN FARRAR PHD                   
                            

 

 [id number]            2014 11:22:00            2014 23:59:59          
  [facility]            Outpatient            EDWARD REYNAGA                
                               

 

 [id number]            2013 12:47:00            2013 23:59:59          
  [facility]            Outpatient            DARIAN FARRAR PHD                   
                            

 

 [id number]            10/17/2013 09:42:00            10/17/2013 23:59:59          
  [facility]            Outpatient            ESSIE MCMANUS DO                        
                       

 

 [id number]            10/04/2013 12:42:00            10/04/2013 23:59:59          
  [facility]            Outpatient            DARIAN FARRAR PHD                   
                            

 

 [id number]            2013 14:41:00            2013 23:59:59          
  [facility]            Outpatient            KYLAH MELGAR PA-C                   
                            

 

 [id number]            2013 15:44:00            2013 23:59:59          
  [facility]            Outpatient                                                    
        

 

 [id number]            2013 10:41:00            2013 23:59:59          
  [facility]            Outpatient            ESSIE MCMANUS DO                        
                       

 

 [id number]            2013 10:42:00            2013 23:59:59          
  [facility]            Outpatient            DARIAN FARRAR PHD                   
                            

 

 [id number]            2012 09:43:00            2012 23:59:59          
  [facility]            Outpatient            RASHI DIAMOND DO                     
                          

 

 [id number]            2012 17:01:00            2012 23:59:59            
[facility]            Outpatient            DARIAN FARRAR PHD                     
                          

 

 [id number]            2013 11:07:00                                      
Document Registration                                                          
  

 

 [id number]            2013 08:08:00                                      
Document Registration                                                          
  

 

 [id number]            2013 16:10:00                                      
Document Registration                                                          
  

 

 [id number]            04/15/2013 15:07:00                                      
Document Registration                                                          
  

 

 [id number]            2018 19:15:00            2018 15:55:00    
        [facility]            Inpatient            EL CARLOS DO            [facility] 
[facility]            [address]TH            DIVERTICULITIS        

 

 [id number]            2013 08:49:00            2013 11:15:00    
        DIS            Emergency            JENNIFER FARNSWORTH, ENRIQUE POLANCO            
[facility]            ER            VOMITING

## 2018-10-01 NOTE — DISCHARGE SUMMARY
Diagnosis/Chief Complaint


Date of Admission


Sep 12, 2018 at 19:15


Date of Discharge


Sep 17, 2018 at 15:55


Admission Diagnosis


Admission Diagnosis


Acute Diverticulitis





Discharge Diagnosis


Acute Diverticulitis with mini-perforation





Reason Hospital Visit


HPI per ED:The patient presents to the ER by private conveyance with chief 

complaint that since yesterday evening around 9:00 [place] was sitting around and 

started having some aggressively worsening sharp pain to his left lower 

quadrant. No problems with bowel movements or dysuria. Bowel movements does not 

help either. No nausea vomiting. No fevers or chills. No history of abdominal 

surgeries. No history of diverticula or colonoscopies. He takes olanzapine for 

mood, mirtazapine for sleep and atorvastatin for cholesterol. It is not worse 

with movement but it is worse with pushing on his belly. His last oral intake 

was last night prior to the pain. He is not taking anything for the pain.








When I spoke to pt he states pain is 6 out of 10, worse with movement.  He 

denies any radiation of the pain.  He says he has never had pain like this 

before; he had a colonoscopy at 48 but nothing since then.  He thinks he was 

told he had polyps, but never told he needed a repeat of the colonoscopy.





Discharge Summary


Procedures:  


none


Discharge Physical Examination


Allergies:  


Coded Allergies:  


     No Known Drug Allergies (Unverified , 12)


General Appearance:  Alert, Oriented X3


HEENT:  PERRLA, EOMI


Cardiovascular:  Regular Rate, No Murmurs


Abdominal:  Normal Bowel Sounds, Soft





Hospital Course


Patient is a 63-year-old male who presented on  with left lower 

quadrant pain, elevated white count and CT scan was read as acute 

diverticulitis with small perforation.  Patient was admitted to the hospital 

with IV fluids IV antibiotics pain control and he was nothing by mouth.  Plan 

was to make sure that he did not develop into an acute abdomen he only had 

localized tenderness.  Waiting to make sure he did not have diffuse tenderness 

which would indicate need for surgery.  Patient slowly over the next few days 

improved; pain got better, white count came down.  Once this occurred we were 

able to finally start feeding him liquids.  Once he was tolerating liquids and 

passing gas, he then had a bowel movement and subsequently was started on a 

soft diet.  He was sent home in stable condition, to follow-up in the office to 

schedule a colonoscopy because has not had one needs one for screening and to 

look at the diverticula.





Discharge


Instructions to patient/family


Please see electronic discharge instructions given to patient.


Discharge Medications


Reviewed and agree with Discharge Medication list on patient's Discharge 

Instruction sheet





Clinical Quality Measures


DVT/VTE Risk/Contraindication:


Risk Factor Score Per Nursin


RFS Level Per Nursing on Admit:  2=Moderate











EL CARLOS DO Oct 1, 2018 16:22

## 2018-10-18 ENCOUNTER — HOSPITAL ENCOUNTER (OUTPATIENT)
Dept: HOSPITAL 75 - PREOP | Age: 63
Discharge: HOME | End: 2018-10-18
Attending: SURGERY
Payer: OTHER GOVERNMENT

## 2018-10-18 VITALS — WEIGHT: 191 LBS | BODY MASS INDEX: 28.29 KG/M2 | HEIGHT: 69 IN

## 2018-10-18 DIAGNOSIS — Z01.818: Primary | ICD-10-CM

## 2018-10-22 ENCOUNTER — HOSPITAL ENCOUNTER (OUTPATIENT)
Dept: HOSPITAL 75 - ENDO | Age: 63
Discharge: HOME | End: 2018-10-22
Attending: SURGERY
Payer: OTHER GOVERNMENT

## 2018-10-22 VITALS — SYSTOLIC BLOOD PRESSURE: 127 MMHG | DIASTOLIC BLOOD PRESSURE: 82 MMHG

## 2018-10-22 VITALS — BODY MASS INDEX: 28.29 KG/M2 | WEIGHT: 191 LBS | HEIGHT: 69 IN

## 2018-10-22 VITALS — DIASTOLIC BLOOD PRESSURE: 68 MMHG | SYSTOLIC BLOOD PRESSURE: 109 MMHG

## 2018-10-22 VITALS — SYSTOLIC BLOOD PRESSURE: 125 MMHG | DIASTOLIC BLOOD PRESSURE: 79 MMHG

## 2018-10-22 DIAGNOSIS — Z12.11: Primary | ICD-10-CM

## 2018-10-22 DIAGNOSIS — Z86.19: ICD-10-CM

## 2018-10-22 DIAGNOSIS — F32.9: ICD-10-CM

## 2018-10-22 DIAGNOSIS — K57.30: ICD-10-CM

## 2018-10-22 DIAGNOSIS — D12.3: ICD-10-CM

## 2018-10-22 DIAGNOSIS — D12.4: ICD-10-CM

## 2018-10-22 DIAGNOSIS — Z87.19: ICD-10-CM

## 2018-10-22 DIAGNOSIS — E78.5: ICD-10-CM

## 2018-10-22 DIAGNOSIS — D12.2: ICD-10-CM

## 2018-10-22 DIAGNOSIS — K64.8: ICD-10-CM

## 2018-10-22 PROCEDURE — 88305 TISSUE EXAM BY PATHOLOGIST: CPT

## 2018-10-22 NOTE — ENDOSCOPY DISCHARGE INSTRUCT
Endo Procedure/Findings


Findings


1.:  Polyp


2.:  Diverticulosis


3.:  Internal Hemorrhoids





Discharge Instructions


-


Activity: You might feel a little sleepy until tomorrow.  This is due to the 

medicine you received to relax you.





Until tomorrow, you should:  


   NOT drive a car, operate machinery or power tools.


   NOT drink any alcoholic beverages.


   NOT make any important decisions or sign importortant papers.





Do not return to work until tomorrow, unless otherwise instructed. Resume 

previous activities tomorrow.





Diet: Start by taking liquids.  If you tolerate liquids, advance to solid food.





Make appointment for one week.


Instructions:


1.:  Colonoscopy in 1 year





Notify Physician


-


If you experience excessive bleeding, unusual abdominal pain, fever, or chest 

pain, contact your doctor immediately.





Follow-Up:


-


I have received and understand the above instructions and will call my doctor 

if I have any further questions.








________________________             ____________


Patient Signature                Date





________________________


Nurse Signature








________________________


Other (Relationship)











EL CARLOS DO Oct 22, 2018 14:08

## 2018-10-22 NOTE — PROGRESS NOTE-POST OPERATIVE
Post-Operative Progess Note


Surgeon (s)/Assistant (s)


Surgeon


EL CARLOS DO


Assistant:  none





Pre-Operative Diagnosis


Screening colonoscopy





Post-Operative Diagnosis





Polyps


Diverticula


Int Hemorrhoids





Procedure & Operative Findings


Date of Procedure


10/22/18


Procedure Performed/Findings


Colon with snare


Colon with tattoo


Anesthesia Type


IV sedation by CRNA





Estimated Blood Loss


Estimated blood loss (mL):  scant





Specimens/Packing


Specimens Removed


polyps - Desc, Transverse, ascending











EL CARLOS DO Oct 22, 2018 14:07

## 2018-10-22 NOTE — PROGRESS NOTE-PRE OPERATIVE
Pre-Operative Progress Note


H&P Reviewed


The H&P was reviewed, patient examined and no changes noted.


Time Seen by Provider:  12:21


Date H&P Reviewed:  Oct 22, 2018


Time H&P Reviewed:  12:22


Pre-Operative Diagnosis:  Screening colonoscopy











EL CARLOS DO Oct 22, 2018 12:23

## 2018-10-22 NOTE — XMS REPORT
Continuity of Care Document

 Created on: 10/22/2018



ROGERIO RODRIGUEZ

External Reference #: [id number]

: 1955

Sex: Male



Demographics







 Address  [address] [place] [address]

 

 Home Phone  [phone] x

 

 Preferred Language  Unknown

 

 Marital Status  Unknown

 

 Latter-day Affiliation  Unknown

 

 Race  Unknown

 

 Ethnic Group  Unknown





Author







 Author  [facility]

 

 Address  Unknown

 

 Phone  Unavailable



              



Allergies

      





 Active            Description            Code            Type            
Severity            Reaction            Onset            Reported/Identified   
         Relationship to Patient            Clinical Status        

 

 Yes            No Known Drug Allergies            [id number]            Drug 
Allergy            Unknown            N/A                         2012   
                               



                  



Medications

      



There is no data.                  



Problems

      





 Date Dx Coded            Attending            Type            Code            
Diagnosis            Diagnosed By        

 

 10/06/2011            DARIAN FARRAR PHD                         728.71    
        Plantar Fascial Fibromatosis                     

 

 10/06/2011            RASHI DIAMOND DO                         728.71      
      Plantar Fascial Fibromatosis                     

 

 10/06/2011            DARIAN FARRAR PHD                         728.71    
        Plantar Fascial Fibromatosis                     

 

 10/06/2011            ESSIE MCMANUS DO                         728.71         
   Plantar Fascial Fibromatosis                     

 

 10/06/2011                                      728.71            Plantar 
Fascial Fibromatosis                     

 

 10/06/2011                                      728.71            Plantar 
Fascial Fibromatosis                     

 

 10/06/2011                                      728.71            Plantar 
Fascial Fibromatosis                     

 

 10/06/2011                                      728.71            Plantar 
Fascial Fibromatosis                     

 

 10/06/2011                                      728.71            Plantar 
Fascial Fibromatosis                     

 

 10/06/2011            KYLAH MELGAR PA-C                         728.71    
        Plantar Fascial Fibromatosis                     

 

 10/06/2011            DARIAN FARRAR PHD                         728.71    
        Plantar Fascial Fibromatosis                     

 

 10/06/2011            ESSIE MCMANUS DO                         728.71         
   Plantar Fascial Fibromatosis                     

 

 10/06/2011            DARIAN FARRAR PHD                         728.71    
        Plantar Fascial Fibromatosis                     

 

 10/06/2011            EDWARD REYNAGA                         728.71 
           Plantar Fascial Fibromatosis                     

 

 10/06/2011            DARIAN FARRAR PHD                         728.71    
        Plantar Fascial Fibromatosis                     

 

 10/06/2011            DARIAN FARRAR PHD                         728.71    
        Plantar Fascial Fibromatosis                     

 

 10/06/2011            DARIAN FARRAR PHD                         728.71    
        Plantar Fascial Fibromatosis                     

 

 10/06/2011            EDWARD REYNAGA                         728.71 
           Plantar Fascial Fibromatosis                     

 

 10/06/2011            DARIAN FARRAR PHD                         728.71    
        Plantar Fascial Fibromatosis                     

 

 10/06/2011            ESSIE MCMANUS DO                         728.71         
   Plantar Fascial Fibromatosis                     

 

 10/06/2011            VERONICA APRN, JEFFREY                         728.71      
      Plantar Fascial Fibromatosis                     

 

 10/06/2011            DARIAN FARRAR PHD                         728.71    
        Plantar Fascial Fibromatosis                     

 

 10/06/2011            VERONICA APRN, JEFFREY                         728.71      
      Plantar Fascial Fibromatosis                     

 

 10/06/2011            DARIAN FARRAR PHD                         728.71    
        Plantar Fascial Fibromatosis                     

 

 10/21/2011            DARIAN FARRAR PHD                         311       
     DEPRESSIVE DISORDER NOT ELSEWHERE CLASSIFIED                     

 

 10/21/2011            DARIAN FARRAR PHD                         729.5     
       PAIN IN LIMB                     

 

 10/21/2011            RASHI DIAMOND DO F                         311         
   DEPRESSIVE DISORDER NOT ELSEWHERE CLASSIFIED                     

 

 10/21/2011            RASHI DIAMOND DO F                         729.5       
     PAIN IN LIMB                     

 

 10/21/2011            DARIAN FARRAR PHD                         311       
     DEPRESSIVE DISORDER NOT ELSEWHERE CLASSIFIED                     

 

 10/21/2011            DARIAN FARRAR PHD                         729.5     
       PAIN IN LIMB                     

 

 10/21/2011            ESSIE MCMANUS DO                         311            
DEPRESSIVE DISORDER NOT ELSEWHERE CLASSIFIED                     

 

 10/21/2011            ESSIE MCMANUS DO K                         729.5          
  Pain In Limb                     

 

 10/21/2011                                      311            DEPRESSIVE 
DISORDER NOT ELSEWHERE CLASSIFIED                     

 

 10/21/2011                                      729.5            Pain In Limb 
                    

 

 10/21/2011                                      311            DEPRESSIVE 
DISORDER NOT ELSEWHERE CLASSIFIED                     

 

 10/21/2011                                      729.5            Pain In Limb 
                    

 

 10/21/2011                                      311            DEPRESSIVE 
DISORDER NOT ELSEWHERE CLASSIFIED                     

 

 10/21/2011                                      729.5            Pain In Limb 
                    

 

 10/21/2011                                      311            DEPRESSIVE 
DISORDER NOT ELSEWHERE CLASSIFIED                     

 

 10/21/2011                                      729.5            Pain In Limb 
                    

 

 10/21/2011                                      311            DEPRESSIVE 
DISORDER NOT ELSEWHERE CLASSIFIED                     

 

 10/21/2011                                      729.5            Pain In Limb 
                    

 

 10/21/2011            KYLAH MELGAR PA-C                         311       
     DEPRESSIVE DISORDER NOT ELSEWHERE CLASSIFIED                     

 

 10/21/2011            KYLAH MELGAR PA-C                         729.5     
       Pain In Limb                     

 

 10/21/2011            DARIAN FARRAR PHD                         311       
     DEPRESSIVE DISORDER NOT ELSEWHERE CLASSIFIED                     

 

 10/21/2011            DARIAN FARRAR PHD                         729.5     
       Pain In Limb                     

 

 10/21/2011            ESSIE MCMANUS DO K                         311            
DEPRESSIVE DISORDER NOT ELSEWHERE CLASSIFIED                     

 

 10/21/2011            MCMANUS DO, ESSIE K                         729.5          
  Pain In Limb                     

 

 10/21/2011            BOEMARYOUT PHD, DARIAN A                         311       
     DEPRESSIVE DISORDER NOT ELSEWHERE CLASSIFIED                     

 

 10/21/2011            DARIAN FARRAR PHD A                         729.5     
       Pain In Limb                     

 

 10/21/2011            EDWARD REYNAGA                         311    
        DEPRESSIVE DISORDER NOT ELSEWHERE CLASSIFIED                     

 

 10/21/2011            VENKATA APREDWARD MAE                         729.5  
          Pain In Limb                     

 

 10/21/2011            BOEOUT DARIAN YAN A                         311       
     DEPRESSIVE DISORDER NOT ELSEWHERE CLASSIFIED                     

 

 10/21/2011            BOEOUT PHDDARIAN A                         729.5     
       Pain In Limb                     

 

 10/21/2011            BOEOUT PHDDARIAN A                         311       
     DEPRESSIVE DISORDER NOT ELSEWHERE CLASSIFIED                     

 

 [facility]21/2011            [facility]AR[facility]                         729.5     
       Pain In Limb                     

 

 10/21/2011            BOEOUT DARIAN YAN A                         311       
     DEPRESSIVE DISORDER NOT ELSEWHERE CLASSIFIED                     

 

 10/21/2011            [facility] DAR[facility] Y[facility] A                         729.5     
       Pain In Limb                     

 

 10/21/2011            EDWARD REYNAGA                         311    
        DEPRESSIVE DISORDER NOT ELSEWHERE CLASSIFIED                     

 

 10/21/2011            HASTINGS EDWARD WADDELL                         729.5  
          Pain In Limb                     

 

 10/21/2011            BOEDARIAN ALVAREZ PHD A                         311       
     DEPRESSIVE DISORDER NOT ELSEWHERE CLASSIFIED                     

 

 10/21/2011            DARIAN FARRAR PHD A                         729.5     
       Pain In Limb                     

 

 10/21/2011            MCMANUS DO, ESSIE K                         311            
DEPRESSIVE DISORDER NOT ELSEWHERE CLASSIFIED                     

 

 10/21/2011            MCMANUS DO, ESSIE K                         729.5          
  Pain In Limb                     

 

 10/21/2011            VERONICA APRN, JEFFREY                         311         
   DEPRESSIVE DISORDER NOT ELSEWHERE CLASSIFIED                     

 

 10/21/2011            VERONICA APRN, JEFFREY                         729.5       
     Pain In Limb                     

 

 10/21/2011            BOEDARIAN PEREZ PHD A                         311       
     DEPRESSIVE DISORDER NOT ELSEWHERE CLASSIFIED                     

 

 10/21/2011            BOEDARIAN PEREZ PHD A                         729.5     
       Pain In Limb                     

 

 10/21/2011            VERONICA APRN, JEFFREY                         311         
   DEPRESSIVE DISORDER NOT ELSEWHERE CLASSIFIED                     

 

 10/21/2011            VERONICA APRN, JEFFREY                         729.5       
     Pain In Limb                     

 

 10/21/2011            BOEDARIAN PEREZ PHD A                         311       
     DEPRESSIVE DISORDER NOT ELSEWHERE CLASSIFIED                     

 

 10/21/2011            DARIAN FARRAR PHD A                         729.5     
       Pain In Limb                     

 

 10/22/2011            DARIAN FARRAR PHD A                         296.30    
        MO DEPRESSIVE RECURRENT UNSPECIFIED                     

 

 10/22/2011            DARIAN FARRAR PHD                         304.80    
        SA POLYSUB DEP                     

 

 10/22/2011            DARIAN FARRAR PHD                         307.47    
        SI DYSSOMNIA NOS                     

 

 10/22/2011            RASHI DIAMOND DO F                         296.30      
      MO DEPRESSIVE RECURRENT UNSPECIFIED                     

 

 10/22/2011            LOBO CABRAL, RASHI F                         304.80      
      SA POLYSUB DEP                     

 

 10/22/2011            RASHI DIAMOND DO F                         307.47      
      SI DYSSOMNIA NOS                     

 

 10/22/2011            DARIAN FARRAR PHD                         296.30    
        MO DEPRESSIVE RECURRENT UNSPECIFIED                     

 

 10/22/2011            DARIAN FARRAR PHD                         304.80    
        SA POLYSUB DEP                     

 

 10/22/2011            DARIAN FARRAR PHD                         307.47    
        SI DYSSOMNIA NOS                     

 

 10/22/2011            MCMANUS DO, ESSIE K                         296.30         
   MO DEPRESSIVE RECURRENT UNSPECIFIED                     

 

 10/22/2011            MCMANUS DO, ESSIE K                         304.80         
   SA POLYSUB DEP                     

 

 10/22/2011            MCMANUS DO, ESSIE K                         307.47         
   SI DYSSOMNIA NOS                     

 

 10/22/2011                                      296.30            MO 
DEPRESSIVE RECURRENT UNSPECIFIED                     

 

 10/22/2011                                      304.80            SA POLYSUB 
DEP                     

 

 10/22/2011                                      307.47            SI DYSSOMNIA 
NOS                     

 

 10/22/2011                                      296.30            MO 
DEPRESSIVE RECURRENT UNSPECIFIED                     

 

 10/22/2011                                      304.80            SA POLYSUB 
DEP                     

 

 10/22/2011                                      307.47            SI DYSSOMNIA 
NOS                     

 

 10/22/2011                                      296.30            MO 
DEPRESSIVE RECURRENT UNSPECIFIED                     

 

 10/22/2011                                      304.80            SA POLYSUB 
DEP                     

 

 10/22/2011                                      307.47            SI DYSSOMNIA 
NOS                     

 

 10/22/2011                                      296.30            MO 
DEPRESSIVE RECURRENT UNSPECIFIED                     

 

 10/22/2011                                      304.80            SA POLYSUB 
DEP                     

 

 10/22/2011                                      307.47            SI DYSSOMNIA 
NOS                     

 

 10/22/2011                                      296.30            MO 
DEPRESSIVE RECURRENT UNSPECIFIED                     

 

 10/22/2011                                      304.80            SA POLYSUB 
DEP                     

 

 10/22/2011                                      307.47            SI DYSSOMNIA 
NOS                     

 

 10/22/2011            KYLAH MELGAR PA-C                         296.30    
        MO DEPRESSIVE RECURRENT UNSPECIFIED                     

 

 10/22/2011            KYLAH MELGAR PA-C                         304.80    
        SA POLYSUB DEP                     

 

 10/22/2011            KYLAH MELGAR PA-C                         307.47    
        SI DYSSOMNIA NOS                     

 

 10/22/2011            DARIAN FARRAR PHD                         296.30    
        MO DEPRESSIVE RECURRENT UNSPECIFIED                     

 

 10/22/2011            LENI YAN, DARIAN JARA                         304.80    
        SA POLYSUB DEP                     

 

 10/22/2011            LENI YAN, DARIAN JARA                         307.47    
        SI DYSSOMNIA NOS                     

 

 10/22/2011            MCMANUS DO, ESSIE K                         296.30         
   MO DEPRESSIVE RECURRENT UNSPECIFIED                     

 

 10/22/2011            MCMANUS DO, ESSIE K                         304.80         
   SA POLYSUB DEP                     

 

 10/22/2011            MCMANUS DO, ESSIE K                         307.47         
   SI DYSSOMNIA NOS                     

 

 10/22/2011            LENI YAN, DARIAN JARA                         296.30    
        MO DEPRESSIVE RECURRENT UNSPECIFIED                     

 

 10/22/2011            LENI YAN, DARIAN JARA                         304.80    
        SA POLYSUB DEP                     

 

 10/22/2011            LENI YAN, DARIAN JARA                         307.47    
        SI DYSSOMNIA NOS                     

 

 10/22/2011            HASTINGS BAIRON, EDWARD VAUGHN                         296.30 
           MO DEPRESSIVE RECURRENT UNSPECIFIED                     

 

 10/22/2011            HASTINGS BAIRON, EDWARD VAUGHN                         304.80 
           SA POLYSUB DEP                     

 

 10/22/2011            EDWARD REYNAGA                         307.47 
           SI DYSSOMNIA NOS                     

 

 10/22/2011            DARIAN FARRAR PHD                         296.30    
        MO DEPRESSIVE RECURRENT UNSPECIFIED                     

 

 10/22/2011            LENI YAN, DARIAN JARA                         304.80    
        SA POLYSUB DEP                     

 

 10/22/2011            DARIAN FARRAR PHD                         307.47    
        SI DYSSOMNIA NOS                     

 

 10/22/2011            DARIAN FARRAR PHD                         296.30    
        MO DEPRESSIVE RECURRENT UNSPECIFIED                     

 

 10/22/2011            LENI YAN, DARIAN JARA                         304.80    
        SA POLYSUB DEP                     

 

 10/22/2011            DARIAN FARRAR PHD                         307.47    
        SI DYSSOMNIA NOS                     

 

 10/22/2011            DARIAN FARRAR PHD                         296.30    
        MO DEPRESSIVE RECURRENT UNSPECIFIED                     

 

 10/22/2011            DARIAN FARRAR PHD                         304.80    
        SA POLYSUB DEP                     

 

 10/22/2011            DARIAN FARRAR PHD                         307.47    
        SI DYSSOMNIA NOS                     

 

 10/22/2011            HASTINGSEVERARDO WADDELL, EDWARD VAUGHN                         296.30 
           MO DEPRESSIVE RECURRENT UNSPECIFIED                     

 

 10/22/2011            HASTINGS APRTU, EDWARD VAUGHN                         304.80 
           SA POLYSUB DEP                     

 

 10/22/2011            VENKATA WADDELL, EDWARD VAUGHN                         307.47 
           SI DYSSOMNIA NOS                     

 

 10/22/2011            DARIAN FARRAR PHD                         296.30    
        MO DEPRESSIVE RECURRENT UNSPECIFIED                     

 

 10/22/2011            DARIAN FARRAR PHD                         304.80    
        SA POLYSUB DEP                     

 

 10/22/2011            DARIAN FARRAR PHD                         307.47    
        SI DYSSOMNIA NOS                     

 

 10/22/2011            MCMANUS DO, ESSIE K                         296.30         
   MO DEPRESSIVE RECURRENT UNSPECIFIED                     

 

 10/22/2011            MCMANUS DO, ESSIE K                         304.80         
   SA POLYSUB DEP                     

 

 10/22/2011            MCMANUS DO, ESSIE K                         307.47         
   SI DYSSOMNIA NOS                     

 

 10/22/2011            VERONICA APRN, JEFFREY                         296.30      
      MO DEPRESSIVE RECURRENT UNSPECIFIED                     

 

 10/22/2011            VERONICA APRN, JEFFREY                         304.80      
      SA POLYSUB DEP                     

 

 10/22/2011            VERONICA APRN, JEFFREY                         307.47      
      SI DYSSOMNIA NOS                     

 

 10/22/2011            DARIAN FARRAR PHD                         296.30    
        MO DEPRESSIVE RECURRENT UNSPECIFIED                     

 

 10/22/2011            DARIAN FARRAR PHD                         304.80    
        SA POLYSUB DEP                     

 

 10/22/2011            DARIAN FARRAR PHD                         307.47    
        SI DYSSOMNIA NOS                     

 

 10/22/2011            VERONICA APRN, JEFFREY                         296.30      
      MO DEPRESSIVE RECURRENT UNSPECIFIED                     

 

 10/22/2011            VERONICA APRN, JEFFREY                         304.80      
      SA POLYSUB DEP                     

 

 10/22/2011            VERONICA APRN, JEFFREY                         307.47      
      SI DYSSOMNIA NOS                     

 

 10/22/2011            DARIAN FARRAR PHD                         296.30    
        MO DEPRESSIVE RECURRENT UNSPECIFIED                     

 

 10/22/2011            DARIAN FARRAR PHD                         304.80    
        SA POLYSUB DEP                     

 

 10/22/2011            DARIAN FARRAR PHD                         307.47    
        SI DYSSOMNIA NOS                     

 

 2011            DARIAN FARRAR PHD                         296.32    
        MO DEPRESSIVE RECURRENT MODERATE                     

 

 2011            RASHI DIAMOND DO                         296.32      
      MO DEPRESSIVE RECURRENT MODERATE                     

 

 2011            DARIAN FARRAR PHD                         296.32    
        MO DEPRESSIVE RECURRENT MODERATE                     

 

 2011            ESSIE MCMANUS DO K                         296.32         
   MO DEPRESSIVE RECURRENT MODERATE                     

 

 2011                                      296.32            MO 
DEPRESSIVE RECURRENT MODERATE                     

 

 2011                                      296.32            MO 
DEPRESSIVE RECURRENT MODERATE                     

 

 2011                                      296.32            MO 
DEPRESSIVE RECURRENT MODERATE                     

 

 2011                                      296.32            MO 
DEPRESSIVE RECURRENT MODERATE                     

 

 2011                                      296.32            MO 
DEPRESSIVE RECURRENT MODERATE                     

 

 2011            MELGAR PA-C, KYLAH M                         296.32    
        MO DEPRESSIVE RECURRENT MODERATE                     

 

 2011            DARIAN FARRAR PHD                         296.32    
        MO DEPRESSIVE RECURRENT MODERATE                     

 

 2011            ESSIE MCMANUS DO                         296.32         
   MO DEPRESSIVE RECURRENT MODERATE                     

 

 2011            DARIAN FARRAR PHD                         296.32    
        MO DEPRESSIVE RECURRENT MODERATE                     

 

 2011            VENKATA WADDELLEDWARD                         296.32 
           MO DEPRESSIVE RECURRENT MODERATE                     

 

 2011            DARIAN FARRAR PHD                         296.32    
        MO DEPRESSIVE RECURRENT MODERATE                     

 

 2011            DARIAN FARRAR PHD                         296.32    
        MO DEPRESSIVE RECURRENT MODERATE                     

 

 2011            DARIAN FARRAR PHD                         296.32    
        MO DEPRESSIVE RECURRENT MODERATE                     

 

 2011            VENKATA WADDELLEDWARD                         296.32 
           MO DEPRESSIVE RECURRENT MODERATE                     

 

 2011            DARIAN FARRAR PHD                         296.32    
        MO DEPRESSIVE RECURRENT MODERATE                     

 

 2011            ESSIE MCMANUS DO                         296.32         
   MO DEPRESSIVE RECURRENT MODERATE                     

 

 2011            VERNOICA APRN, JEFFREY                         296.32      
      MO DEPRESSIVE RECURRENT MODERATE                     

 

 2011            DARIAN FARRAR PHD                         296.32    
        MO DEPRESSIVE RECURRENT MODERATE                     

 

 2011            VERONICA APRN, JEFFREY                         296.32      
      MO DEPRESSIVE RECURRENT MODERATE                     

 

 2011            DARIAN FARRAR PHD                         296.32    
        MO DEPRESSIVE RECURRENT MODERATE                     

 

 2012            DARIAN FARRAR PHD                         111.0     
       PITYRIASIS VERSICOLOR                     

 

 2012            DARIAN FARRAR PHD                         272.4     
       OTHER AND UNSPECIFIED HYPERLIPIDEMIA                     

 

 2012            RASHI DIAMOND DO                         111.0       
     PITYRIASIS VERSICOLOR                     

 

 2012            RASHI DIAMOND DO                         272.4       
     OTHER AND UNSPECIFIED HYPERLIPIDEMIA                     

 

 2012            DARIAN FARRAR PHD                         111.0     
       PITYRIASIS VERSICOLOR                     

 

 2012            DARIAN FARRAR PHD                         272.4     
       OTHER AND UNSPECIFIED HYPERLIPIDEMIA                     

 

 2012            ESSIE MCMANUS DO                         111.0          
  PITYRIASIS VERSICOLOR                     

 

 2012            ESSIE MCMANUS DO                         272.4          
  OTHER AND UNSPECIFIED HYPERLIPIDEMIA                     

 

 2012                                      111.0            PITYRIASIS 
VERSICOLOR                     

 

 2012                                      272.4            OTHER AND 
UNSPECIFIED HYPERLIPIDEMIA                     

 

 2012                                      111.0            PITYRIASIS 
VERSICOLOR                     

 

 2012                                      272.4            OTHER AND 
UNSPECIFIED HYPERLIPIDEMIA                     

 

 2012                                      111.0            PITYRIASIS 
VERSICOLOR                     

 

 2012                                      272.4            OTHER AND 
UNSPECIFIED HYPERLIPIDEMIA                     

 

 2012                                      111.0            PITYRIASIS 
VERSICOLOR                     

 

 2012                                      272.4            OTHER AND 
UNSPECIFIED HYPERLIPIDEMIA                     

 

 2012                                      111.0            PITYRIASIS 
VERSICOLOR                     

 

 2012                                      272.4            OTHER AND 
UNSPECIFIED HYPERLIPIDEMIA                     

 

 2012            KYLAH MELGAR PA-C                         111.0     
       PITYRIASIS VERSICOLOR                     

 

 2012            KYLAH MELGAR PA-C                         272.4     
       OTHER AND UNSPECIFIED HYPERLIPIDEMIA                     

 

 2012            DARIAN FARRAR PHD A                         111.0     
       PITYRIASIS VERSICOLOR                     

 

 2012            DARIAN FARRAR PHD                         272.4     
       OTHER AND UNSPECIFIED HYPERLIPIDEMIA                     

 

 2012            MCMANUS DO ESSIE K                         111.0          
  PITYRIASIS VERSICOLOR                     

 

 2012            MCMANUS DO ESSIE K                         272.4          
  OTHER AND UNSPECIFIED HYPERLIPIDEMIA                     

 

 2012            DARIAN FARRAR PHD                         111.0     
       PITYRIASIS VERSICOLOR                     

 

 2012            DARIAN FARRAR PHD                         272.4     
       OTHER AND UNSPECIFIED HYPERLIPIDEMIA                     

 

 2012            EDWARD REYNAGA                         111.0  
          PITYRIASIS VERSICOLOR                     

 

 2012            EDWARD REYNAGA                         272.4  
          OTHER AND UNSPECIFIED HYPERLIPIDEMIA                     

 

 2012            DARIAN FARRAR PHD A                         111.0     
       PITYRIASIS VERSICOLOR                     

 

 2012            DARIAN FARRAR PHD                         272.4     
       OTHER AND UNSPECIFIED HYPERLIPIDEMIA                     

 

 2012            DARIAN FARRAR PHD A                         111.0     
       PITYRIASIS VERSICOLOR                     

 

 2012            DARIAN FARRAR PHD A                         272.4     
       OTHER AND UNSPECIFIED HYPERLIPIDEMIA                     

 

 2012            DARIAN FARRAR PHD A                         111.0     
       PITYRIASIS VERSICOLOR                     

 

 2012            DARIAN FARRAR PHD                         272.4     
       OTHER AND UNSPECIFIED HYPERLIPIDEMIA                     

 

 2012            VENKATA WADDELL EDWARD RODERICK                         111.0  
          PITYRIASIS VERSICOLOR                     

 

 2012            EDWARD REYNAGA                         272.4  
          OTHER AND UNSPECIFIED HYPERLIPIDEMIA                     

 

 2012            DARIAN FARRAR PHD A                         111.0     
       PITYRIASIS VERSICOLOR                     

 

 2012            DARIAN FARRAR PHD                         272.4     
       OTHER AND UNSPECIFIED HYPERLIPIDEMIA                     

 

 2012            ESSIE MCMANUS DO                         111.0          
  PITYRIASIS VERSICOLOR                     

 

 2012            ESSIE MCMANUS DO                         272.4          
  OTHER AND UNSPECIFIED HYPERLIPIDEMIA                     

 

 2012            VERONICA APRN, JEFFREY                         111.0       
     PITYRIASIS VERSICOLOR                     

 

 2012            VERONICA APRN, JEFFREY                         272.4       
     OTHER AND UNSPECIFIED HYPERLIPIDEMIA                     

 

 2012            DARIAN FARRAR PHD                         111.0     
       PITYRIASIS VERSICOLOR                     

 

 2012            DARIAN FARRAR PHD                         272.4     
       OTHER AND UNSPECIFIED HYPERLIPIDEMIA                     

 

 2012            VERONICA APRN, JEFFREY                         111.0       
     PITYRIASIS VERSICOLOR                     

 

 2012            VERONICA APRN, JEFFREY                         272.4       
     OTHER AND UNSPECIFIED HYPERLIPIDEMIA                     

 

 2012            DARIAN FARRAR PHD                         111.0     
       PITYRIASIS VERSICOLOR                     

 

 2012            DARIAN FARRAR PHD                         272.4     
       OTHER AND UNSPECIFIED HYPERLIPIDEMIA                     

 

 2012            DARIAN FARRAR PHD                         686.9     
       UNSPECIFIED LOCAL INFECTION OF SKIN AND SUBCUTANEOUS TISSUE             
        

 

 2012            RASHI DIAMOND DO                         686.9       
     UNSPECIFIED LOCAL INFECTION OF SKIN AND SUBCUTANEOUS TISSUE               
      

 

 2012            DARIAN FARRAR PHD                         686.9     
       UNSPECIFIED LOCAL INFECTION OF SKIN AND SUBCUTANEOUS TISSUE             
        

 

 2012            ESSIE MCMANUS DO                         686.9          
  UNSPECIFIED LOCAL INFECTION OF SKIN AND SUBCUTANEOUS TISSUE                  
   

 

 2012                                      686.9            UNSPECIFIED 
LOCAL INFECTION OF SKIN AND SUBCUTANEOUS TISSUE                     

 

 2012                                      686.9            UNSPECIFIED 
LOCAL INFECTION OF SKIN AND SUBCUTANEOUS TISSUE                     

 

 2012                                      686.9            UNSPECIFIED 
LOCAL INFECTION OF SKIN AND SUBCUTANEOUS TISSUE                     

 

 2012                                      686.9            UNSPECIFIED 
LOCAL INFECTION OF SKIN AND SUBCUTANEOUS TISSUE                     

 

 2012                                      686.9            UNSPECIFIED 
LOCAL INFECTION OF SKIN AND SUBCUTANEOUS TISSUE                     

 

 2012            KYLAH MELGAR PA-C                         686.9     
       UNSPECIFIED LOCAL INFECTION OF SKIN AND SUBCUTANEOUS TISSUE             
        

 

 2012            DARIAN FARRAR PHD                         686.9     
       UNSPECIFIED LOCAL INFECTION OF SKIN AND SUBCUTANEOUS TISSUE             
        

 

 2012            ESSIE MCMANUS DO                         686.9          
  UNSPECIFIED LOCAL INFECTION OF SKIN AND SUBCUTANEOUS TISSUE                  
   

 

 2012            DARIAN FARRAR PHD                         686.9     
       UNSPECIFIED LOCAL INFECTION OF SKIN AND SUBCUTANEOUS TISSUE             
        

 

 2012            VENKATA WADDELLEDWARD                         686.9  
          UNSPECIFIED LOCAL INFECTION OF SKIN AND SUBCUTANEOUS TISSUE          
           

 

 2012            BOEANA YAN, DARIAN JARA                         686.9     
       UNSPECIFIED LOCAL INFECTION OF SKIN AND SUBCUTANEOUS TISSUE             
        

 

 2012            BOEANA YAN, DARIAN JARA                         686.9     
       UNSPECIFIED LOCAL INFECTION OF SKIN AND SUBCUTANEOUS TISSUE             
        

 

 2012            BOEANA YAN, DARIAN JARA                         686.9     
       UNSPECIFIED LOCAL INFECTION OF SKIN AND SUBCUTANEOUS TISSUE             
        

 

 2012            VENKATA WADDELL EDWARD VAUGHN                         686.9  
          UNSPECIFIED LOCAL INFECTION OF SKIN AND SUBCUTANEOUS TISSUE          
           

 

 2012            DARIAN FARRAR PHD                         686.9     
       UNSPECIFIED LOCAL INFECTION OF SKIN AND SUBCUTANEOUS TISSUE             
        

 

 2012            ESSIE MCMANUS DO                         686.9          
  UNSPECIFIED LOCAL INFECTION OF SKIN AND SUBCUTANEOUS TISSUE                  
   

 

 2012            VERONICA WADDELL, JEFFREY                         686.9       
     UNSPECIFIED LOCAL INFECTION OF SKIN AND SUBCUTANEOUS TISSUE               
      

 

 2012            DARIAN FARRAR PHD                         686.9     
       UNSPECIFIED LOCAL INFECTION OF SKIN AND SUBCUTANEOUS TISSUE             
        

 

 2012            VERONICA WADDELL, JEFFREY                         686.9       
     UNSPECIFIED LOCAL INFECTION OF SKIN AND SUBCUTANEOUS TISSUE               
      

 

 2012            DARIAN FARRAR PHD                         686.9     
       UNSPECIFIED LOCAL INFECTION OF SKIN AND SUBCUTANEOUS TISSUE             
        

 

 2012            DARIAN FARRAR PHD                         782.3     
       EDEMA                     

 

 2012            DARIAN FARRAR PHD                         782.7     
       petichiae                     

 

 2012            RASHI DIAMOND DO F                         782.3       
     EDEMA                     

 

 2012            RASHI DIAMOND DO F                         782.7       
     petichiae                     

 

 2012            DARIAN FARRAR PHD                         782.3     
       EDEMA                     

 

 2012            DARIAN FARRAR PHD                         782.7     
       petichiae                     

 

 2012            MCMANUS ESSIE CABRAL K                         782.3          
  Edema                     

 

 2012            MCMANUS DOESSIE K                         782.7          
  Petichiae                     

 

 2012                                      782.3            Edema        
             

 

 2012                                      782.7            Petichiae    
                 

 

 2012                                      782.3            Edema        
             

 

 2012                                      782.7            Petichiae    
                 

 

 2012                                      782.3            Edema        
             

 

 2012                                      782.7            Petichiae    
                 

 

 2012                                      782.3            Edema        
             

 

 2012                                      782.7            Petichiae    
                 

 

 2012                                      782.3            Edema        
             

 

 2012                                      782.7            Petichiae    
                 

 

 2012            TALIA DENNIS, KYLAH ANDRADE                         782.3     
       Edema                     

 

 2012            TALIA DENNIS, KYLAH ANDRADE                         782.7     
       Petichiae                     

 

 2012            BOEANA PHD, DARIAN A                         782.3     
       Edema                     

 

 2012            BOEOUT PHD, DARIAN A                         782.7     
       Petichiae                     

 

 2012            MCMANUS DO, ESSIE K                         782.3          
  Edema                     

 

 2012            MCMANUS DO, ESSIE K                         782.7          
  Petichiae                     

 

 2012            BOEMARYOUT PHD, DARIAN A                         782.3     
       Edema                     

 

 2012            BOEMARYOUT PHD, DARIAN A                         782.7     
       Petichiae                     

 

 2012            HASTINGS APRN, EDWARD RODERICK                         782.3  
          Edema                     

 

 2012            HASTINGS APRN, [facility]                         782.7  
          Petichiae                     

 

 2012            BOEOUT PHD, DARIAN A                         782.3     
       Edema                     

 

 2012            [facility]                         782.7     
       Petichiae                     

 

 2012            BOEOUT PHD, DARIAN A                         782.3     
       Edema                     

 

 2012            BOEOUT PHD, DARIAN A                         782.7     
       Petichiae                     

 

 2012            BOEOUT PHD, DARIAN A                         782.3     
       Edema                     

 

 2012            [facility] DARIAN A                         782.7     
       Petichiae                     

 

 2012            HASTINGS APRN, [facility]                         782.3  
          Edema                     

 

 2012            HASTINGS APRN, [facility]                         782.7  
          Petichiae                     

 

 2012            BOEOUT PHD, DARIAN A                         782.3     
       Edema                     

 

 2012            BOEMARYOUT PHD, DARIAN A                         782.7     
       Petichiae                     

 

 2012            MCMANUS DO, ESSIE K                         782.3          
  Edema                     

 

 2012            MCMANUS DO, ESSIE K                         782.7          
  Petichiae                     

 

 2012            VERONICA APRN, JEFFREY                         782.3       
     Edema                     

 

 2012            VERONICA APRN, JEFFREY                         782.7       
     Petichiae                     

 

 2012            DARIAN FARRAR PHD                         782.3     
       Edema                     

 

 2012            DARIAN FARRAR PHD                         782.7     
       Petichiae                     

 

 2012            JEFFREY MORA                         782.3       
     Edema                     

 

 2012            JEFFREY MORA                         782.7       
     Petichiae                     

 

 2012            DARIAN FARRAR PHD                         782.3     
       Edema                     

 

 2012            DARIAN FARRAR PHD                         782.7     
       Petichiae                     

 

 2012            DARIAN FARRAR PHD                         709.1     
       VASCULAR DISORDERS OF SKIN                     

 

 2012            RASHI DIAMOND DO                         709.1       
     VASCULAR DISORDERS OF SKIN                     

 

 2012            DARIAN FARRAR PHD                         709.1     
       VASCULAR DISORDERS OF SKIN                     

 

 2012            ESSIE MCMANUS DO                         709.1          
  Vascular Disorders Of Skin                     

 

 2012                                      709.1            Vascular 
Disorders Of Skin                     

 

 2012                                      709.1            Vascular 
Disorders Of Skin                     

 

 2012                                      709.1            Vascular 
Disorders Of Skin                     

 

 2012                                      709.1            Vascular 
Disorders Of Skin                     

 

 2012                                      709.1            Vascular 
Disorders Of Skin                     

 

 2012            TALIA DENNIS, KYLAH ANDRADE                         709.1     
       Vascular Disorders Of Skin                     

 

 2012            DARIAN FARRAR PHD                         709.1     
       Vascular Disorders Of Skin                     

 

 2012            ESSIE MCMANUS DO                         709.1          
  Vascular Disorders Of Skin                     

 

 2012            DARIAN FARRAR PHD                         709.1     
       Vascular Disorders Of Skin                     

 

 2012            EDWARD REYNAGA                         709.1  
          Vascular Disorders Of Skin                     

 

 2012            DARIAN FARRAR PHD                         709.1     
       Vascular Disorders Of Skin                     

 

 2012            DARIAN FARRAR PHD                         709.1     
       Vascular Disorders Of Skin                     

 

 2012            DARIAN FARRAR PHD                         709.1     
       Vascular Disorders Of Skin                     

 

 2012            EDWARD REYNAGA                         709.1  
          Vascular Disorders Of Skin                     

 

 2012            DARIAN FARRAR PHD                         709.1     
       Vascular Disorders Of Skin                     

 

 2012            ESSIE MCMANUS DO                         709.1          
  Vascular Disorders Of Skin                     

 

 2012            JEFFREY MORA                         709.1       
     Vascular Disorders Of Skin                     

 

 2012            DARIAN FARRAR PHD                         709.1     
       Vascular Disorders Of Skin                     

 

 2012            JEFFREY MORA                         709.1       
     Vascular Disorders Of Skin                     

 

 2012            DARIAN FARRAR PHD                         709.1     
       Vascular Disorders Of Skin                     

 

 2012            DARIAN FARRAR PHD                         296.31    
        MO DEPRESSIVE RECURRENT MILD                     

 

 2012            RASHI DIAMOND DO                         296.31      
      MO DEPRESSIVE RECURRENT MILD                     

 

 2012            DARIAN FARRAR PHD                         296.31    
        MO DEPRESSIVE RECURRENT MILD                     

 

 2012            ESSIE MCMANUS DO                         296.31         
   MO DEPRESSIVE RECURRENT MILD                     

 

 2012                                      296.31            MO 
DEPRESSIVE RECURRENT MILD                     

 

 2012                                      296.31            MO 
DEPRESSIVE RECURRENT MILD                     

 

 2012                                      296.31            MO 
DEPRESSIVE RECURRENT MILD                     

 

 2012                                      296.31            MO 
DEPRESSIVE RECURRENT MILD                     

 

 2012                                      296.31            MO 
DEPRESSIVE RECURRENT MILD                     

 

 2012            KYLAH MELGAR PA-C                         296.31    
        MO DEPRESSIVE RECURRENT MILD                     

 

 2012            DARIAN FARRAR PHD                         296.31    
        MO DEPRESSIVE RECURRENT MILD                     

 

 2012            ESSIE MCMANUS DO                         296.31         
   MO DEPRESSIVE RECURRENT MILD                     

 

 2012            DARIAN FARRAR PHD                         296.31    
        MO DEPRESSIVE RECURRENT MILD                     

 

 2012            EDWARD REYNAGA                         296.31 
           MO DEPRESSIVE RECURRENT MILD                     

 

 2012            DARIAN FARRAR PHD                         296.31    
        MO DEPRESSIVE RECURRENT MILD                     

 

 2012            DARIAN FARRAR PHD                         296.31    
        MO DEPRESSIVE RECURRENT MILD                     

 

 2012            DARIAN FARRAR PHD                         296.31    
        MO DEPRESSIVE RECURRENT MILD                     

 

 2012            EDWARD REYNAGA                         296.31 
           MO DEPRESSIVE RECURRENT MILD                     

 

 2012            DARIAN FARRAR PHD                         296.31    
        MO DEPRESSIVE RECURRENT MILD                     

 

 2012            ESSIE MCMANUS DO                         296.31         
   MO DEPRESSIVE RECURRENT MILD                     

 

 2012            JEFFREY MORA                         296.31      
      MO DEPRESSIVE RECURRENT MILD                     

 

 2012            DARIAN FARRAR PHD                         296.31    
        MO DEPRESSIVE RECURRENT MILD                     

 

 2012            JEFFREY MORA                         296.31      
      MO DEPRESSIVE RECURRENT MILD                     

 

 2012            BOEKHOUT PHD, DARIAN A                         296.31    
        MO DEPRESSIVE RECURRENT MILD                     

 

 2013            JENNIFER FARNSWORTH, ENRIQUE POLANCO            Ot            276.51  
          DEHYDRATION                     

 

 2013            JENNIFER FARNSWORTH, ENRIQUE POLANCO            Ot            787.03  
          VOMITING ALONE                     

 

 2013            JENNIFER FARNSWORTH, ENRIQUE POLANCO            Ot            789.00  
          ABDOMINAL PAIN, UNSPECIFIED SITE                     

 

 2013                                      727.03            TRIGGER 
FINGER (ACQUIRED)                     

 

 2013                                      727.03            TRIGGER 
FINGER (ACQUIRED)                     

 

 2013                                      727.03            TRIGGER 
FINGER (ACQUIRED)                     

 

 2013            KYLAH MELGAR PA-C                         727.03    
        TRIGGER FINGER (ACQUIRED)                     

 

 2013            LENI YAN, DARIAN A                         727.03    
        TRIGGER FINGER (ACQUIRED)                     

 

 2013            ESSIE MCMANUS DO                         727.03         
   TRIGGER FINGER (ACQUIRED)                     

 

 2013            LENI YAN, DARIAN A                         727.03    
        TRIGGER FINGER (ACQUIRED)                     

 

 2013            EDWARD REYNAGA                         727.03 
           TRIGGER FINGER (ACQUIRED)                     

 

 2013            LENI YAN, DARIAN A                         727.03    
        TRIGGER FINGER (ACQUIRED)                     

 

 2013            LENI YAN, DARIAN A                         727.03    
        TRIGGER FINGER (ACQUIRED)                     

 

 2013            LENI YAN, DARIAN A                         727.03    
        TRIGGER FINGER (ACQUIRED)                     

 

 2013            EDWARD REYNAGA                         727.03 
           TRIGGER FINGER (ACQUIRED)                     

 

 2013            LENI YAN, DARIAN A                         727.03    
        TRIGGER FINGER (ACQUIRED)                     

 

 2013            ESSIE MCMANUS DO                         727.03         
   TRIGGER FINGER (ACQUIRED)                     

 

 2013            JEFFREY MORA                         727.03      
      TRIGGER FINGER (ACQUIRED)                     

 

 2013            DARIAN FARRAR PHD A                         727.03    
        TRIGGER FINGER (ACQUIRED)                     

 

 2013            JEFFREY MORA                         727.03      
      TRIGGER FINGER (ACQUIRED)                     

 

 2013            DARIAN FARRAR PHD A                         727.03    
        TRIGGER FINGER (ACQUIRED)                     

 

 2013                                      790.4            ABNORMAL LFT (
ELEVATED)                     

 

 2013                                      790.4            ABNORMAL LFT (
ELEVATED)                     

 

 2013            KYLAH MELGAR PA-C                         790.4     
       ABNORMAL LFT (ELEVATED)                     

 

 2013            MARINA FARRAR PHDCK A                         790.4     
       ABNORMAL LFT (ELEVATED)                     

 

 2013            ESSIE MCMANUS DO                         790.4          
  ABNORMAL LFT (ELEVATED)                     

 

 2013[facility] PHD, DARIAN A                         790.4     
       ABNORMAL LFT (ELEVATED)                     

 

 2013            EDWARD REYNAGA                         790.4  
          ABNORMAL LFT (ELEVATED)                     

 

 2013OUT PHD, DARIAN A                         790.4     
       ABNORMAL LFT (ELEVATED)                     

 

 2013[facility] PHD, DARIAN A                         790.4     
       ABNORMAL LFT (ELEVATED)                     

 

 2013OUT PHD, DARIAN A                         790.4     
       ABNORMAL LFT (ELEVATED)                     

 

 2013            EDWARD REYNAGA                         790.4  
          ABNORMAL LFT (ELEVATED)                     

 

 2013            [facility] PHD, DARIAN A                         790.4     
       ABNORMAL LFT (ELEVATED)                     

 

 2013            ESSIE MCMANUS DO                         790.4          
  ABNORMAL LFT (ELEVATED)                     

 

 2013            JEFFREY MORA                         790.4       
     ABNORMAL LFT (ELEVATED)                     

 

 2013[facility] PHD, DARIAN A                         790.4     
       ABNORMAL LFT (ELEVATED)                     

 

 2013            JEFFREY MORA                         790.4       
     ABNORMAL LFT (ELEVATED)                     

 

 2013            [facility] PHD, DARIAN A                         790.4     
       ABNORMAL LFT (ELEVATED)                     

 

 2013                                      070.70            HEPATITIS C, 
UNSPEC                     

 

 2013            KYLAH MELGAR PA-C                         070.70    
        HEPATITIS C, UNSPEC                     

 

 2013[facility] PHD, DARIAN A                         070.70    
        HEPATITIS C, UNSPEC                     

 

 2013            ESSIE MCMANUS DO                         070.70         
   HEPATITIS C, UNSPEC                     

 

 2013[facility] PHD, DARIAN A                         070.70    
        HEPATITIS C, UNSPEC                     

 

 2013            EDWARD REYNAGA                         070.70 
           HEPATITIS C, UNSPEC                     

 

 2013[facility] PHD, DARIAN A                         070.70    
        HEPATITIS C, UNSPEC                     

 

 2013OUT PHD, DARIAN A                         070.70    
        HEPATITIS C, UNSPEC                     

 

 2013OUT PHD, DARIAN A                         070.70    
        HEPATITIS C, UNSPEC                     

 

 2013            EDWARD REYNAGA                         070.70 
           HEPATITIS C, UNSPEC                     

 

 2013            LENI YAN, DARIAN JARA                         070.70    
        HEPATITIS C, UNSPEC                     

 

 2013            ESSIE MCMANUS DO                         070.70         
   HEPATITIS C, UNSPEC                     

 

 2013            VERONICA APRTU, JEFFREY                         070.70      
      HEPATITIS C, UNSPEC                     

 

 2013            LENI YAN, DARIAN JARA                         070.70    
        HEPATITIS C, UNSPEC                     

 

 2013            VERONICA APRTU, JEFFREY                         070.70      
      HEPATITIS C, UNSPEC                     

 

 2013            LENI YAN, DARIAN JARA                         070.70    
        HEPATITIS C, UNSPEC                     

 

 10/10/2013            ESSIE MCMANUS DO                         307.42         
   PERSISTENT DISORDER OF INITIATING OR MAINTAINING SLEEP                     

 

 10/10/2013            DARIAN FARRAR PHD                         307.42    
        PERSISTENT DISORDER OF INITIATING OR MAINTAINING SLEEP                 
    

 

 10/10/2013            EDWARD REYNAGA                         307.42 
           PERSISTENT DISORDER OF INITIATING OR MAINTAINING SLEEP              
       

 

 10/10/2013            DARIAN FARRAR PHD                         307.42    
        PERSISTENT DISORDER OF INITIATING OR MAINTAINING SLEEP                 
    

 

 10/10/2013            DARIAN FARRAR PHD                         307.42    
        PERSISTENT DISORDER OF INITIATING OR MAINTAINING SLEEP                 
    

 

 10/10/2013            DARIAN FARRAR PHD                         307.42    
        PERSISTENT DISORDER OF INITIATING OR MAINTAINING SLEEP                 
    

 

 10/10/2013            EDWARD REYNAGA                         307.42 
           PERSISTENT DISORDER OF INITIATING OR MAINTAINING SLEEP              
       

 

 10/10/2013            DARIAN FARRAR PHD                         307.42    
        PERSISTENT DISORDER OF INITIATING OR MAINTAINING SLEEP                 
    

 

 10/10/2013            ESSIE MCMANUS DO                         307.42         
   PERSISTENT DISORDER OF INITIATING OR MAINTAINING SLEEP                     

 

 10/10/2013            JEFFREY MORA                         307.42      
      PERSISTENT DISORDER OF INITIATING OR MAINTAINING SLEEP                   
  

 

 10/10/2013            DARIAN FARRAR PHD                         307.42    
        PERSISTENT DISORDER OF INITIATING OR MAINTAINING SLEEP                 
    

 

 10/10/2013            JEFFREY MORA                         307.42      
      PERSISTENT DISORDER OF INITIATING OR MAINTAINING SLEEP                   
  

 

 10/10/2013            DARIAN FARRAR PHD                         307.42    
        PERSISTENT DISORDER OF INITIATING OR MAINTAINING SLEEP                 
    

 

 10/17/2013            ESSIE MCMANUS DO                         454.1          
  VARICOSE VEINS OF LOWER EXTREMITIES WITH INFLAMMATION                     

 

 10/17/2013            DARIAN FARRAR PHD                         454.1     
       VARICOSE VEINS OF LOWER EXTREMITIES WITH INFLAMMATION                   
  

 

 10/17/2013            EDWARD REYNAGA                         454.1  
          VARICOSE VEINS OF LOWER EXTREMITIES WITH INFLAMMATION                
     

 

 10/17/2013            DARIAN FARRAR PHD                         454.1     
       VARICOSE VEINS OF LOWER EXTREMITIES WITH INFLAMMATION                   
  

 

 10/17/2013            DARIAN FARRAR PHD                         454.1     
       VARICOSE VEINS OF LOWER EXTREMITIES WITH INFLAMMATION                   
  

 

 10/17/2013            LENI YAN, DARIAN JARA                         454.1     
       VARICOSE VEINS OF LOWER EXTREMITIES WITH INFLAMMATION                   
  

 

 10/17/2013            VENKATA WADDELL EDWARD VAUGHN                         454.1  
          VARICOSE VEINS OF LOWER EXTREMITIES WITH INFLAMMATION                
     

 

 10/17/2013            DARIAN FARRAR PHD                         454.1     
       VARICOSE VEINS OF LOWER EXTREMITIES WITH INFLAMMATION                   
  

 

 10/17/2013            ESSIE MCMANUS DO                         454.1          
  VARICOSE VEINS OF LOWER EXTREMITIES WITH INFLAMMATION                     

 

 10/17/2013            JEFFREY MORA                         454.1       
     VARICOSE VEINS OF LOWER EXTREMITIES WITH INFLAMMATION                     

 

 10/17/2013            DARIAN FARRAR PHD                         454.1     
       VARICOSE VEINS OF LOWER EXTREMITIES WITH INFLAMMATION                   
  

 

 10/17/2013            JEFFREY MORA                         454.1       
     VARICOSE VEINS OF LOWER EXTREMITIES WITH INFLAMMATION                     

 

 10/17/2013            DARIAN FARRAR PHD                         454.1     
       VARICOSE VEINS OF LOWER EXTREMITIES WITH INFLAMMATION                   
  

 

 2014            VENKATA WADDELL EDWARD VAUGHN                         300.00 
           AN ANXIETY UNSPEC                     

 

 2014            LENI YAN, DARIAN JARA                         300.00    
        AN ANXIETY UNSPEC                     

 

 2014            DARIAN FARRAR PHD                         300.00    
        AN ANXIETY UNSPEC                     

 

 2014            LENI YAN, DARIAN JARA                         300.00    
        AN ANXIETY UNSPEC                     

 

 2014            VENKATA WADDELL EDWARD VAUGHN                         300.00 
           AN ANXIETY UNSPEC                     

 

 2014            DARIAN FARRAR PHD                         300.00    
        AN ANXIETY UNSPEC                     

 

 2014            ESSIE MCMANUS DO                         300.00         
   AN ANXIETY UNSPEC                     

 

 2014            JEFFREY MORA                         300.00      
      AN ANXIETY UNSPEC                     

 

 2014            LENI YAN, DARIAN A                         300.00    
        AN ANXIETY UNSPEC                     

 

 2014            JEFFREY MORA                         300.00      
      AN ANXIETY UNSPEC                     

 

 2014            LENI YAN, DARIAN A                         300.00    
        AN ANXIETY UNSPEC                     

 

 2014            DARIAN FARRAR PHD                         V58.69    
        MEDICATION HIGH RISK                     

 

 2014            VENKATA WADDELL EDWARD VAUGHN                         V58.69 
           MEDICATION HIGH RISK                     

 

 2014            DARIAN FARRAR PHD                         V58.69    
        MEDICATION HIGH RISK                     

 

 2014            ESSIE MCMANUS DO                         V58.69         
   MEDICATION HIGH RISK                     

 

 2014            VERONICA APRTU, JEFFREY                         V58.69      
      MEDICATION HIGH RISK                     

 

 2014            LENI PHD, DARIAN JARA                         V58.69    
        MEDICATION HIGH RISK                     

 

 2014            VERONICA APRTU, JEFFREY                         V58.69      
      MEDICATION HIGH RISK                     

 

 2014            LENI PHD, DARIAN JARA                         V58.69    
        MEDICATION HIGH RISK                     

 

 10/30/2014            VERONICA WADDLEL, JEFFREY                         300.15      
      DS DISSOCIATIVE DIS NOS                     

 

 10/30/2014            LENI PHD, DARIAN JARA                         300.15    
        DS DISSOCIATIVE DIS NOS                     

 

 10/30/2014            VERONICA WADDELL, JEFFREY                         300.15      
      DS DISSOCIATIVE DIS NOS                     

 

 10/30/2014            LENI PHD, DARIAN JARA                         300.15    
        DS DISSOCIATIVE DIS NOS                     

 

 2018            TERRANCE CABRAL, EL B            Ot            F32.9        
    MAJOR DEPRESSIVE DISORDER, SINGLE EPISOD                     

 

 2018            TERRANCE CABRAL, EL B            Ot            K21.9        
    GASTRO-ESOPHAGEAL REFLUX DISEASE WITHOUT                     

 

 2018            DELNAFISA DO, EL B            Ot            K57.20       
     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                     

 

 2018            DELNAFISA DO, EL B            Ot            R82.99       
     OTHER ABNORMAL FINDINGS IN URINE                     

 

 2018            TERRANCE CABRAL, EL B            Ot            Z79.899      
      OTHER LONG TERM (CURRENT) DRUG THERAPY                     

 

 2018            TERRANCE CABRAL, EL B            Ot            F32.9        
    MAJOR DEPRESSIVE DISORDER, SINGLE EPISOD                     

 

 2018            EDINNAFISA CABRAL, EL B            Ot            K21.9        
    GASTRO-ESOPHAGEAL REFLUX DISEASE WITHOUT                     

 

 2018            DELMAN DO, EL B            Ot            K57.20       
     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                     

 

 2018            DELMAN DO, EL B            Ot            R82.99       
     OTHER ABNORMAL FINDINGS IN URINE                     

 

 2018            DELNAFISA DO, EL B            Ot            Z79.899      
      OTHER LONG TERM (CURRENT) DRUG THERAPY                     

 

 10/18/2018            TERRANCE CABRAL EL B            Ot            Z01.818      
      ENCOUNTER FOR OTHER PREPROCEDURAL EXAMIN                     



                                                                               
                                                                               
                                                                               
                                                                               
                                                                               
                                                                               
                                                                               
                                                                               
                                                                               
                                                                               
                                                                               
                                               



Procedures

      





 Code            Description            Performed By            Performed On   
     

 

             [id number]                                  PSYCH PHARM MGMT           
                        2012        

 

             14525                                  INDIV PSYTX 45/50 MIN      
                             2012        

 

             [id number]                                  ROUTINE VENIPUNCTURE       
                            2012        

 

             56784                                  CMP                        
           2012        

 

             30703                                  LIPID PANEL                
                   2012        

 

             9035103                                  GFR CALC (RESULT ONLY)   
                                2012        

 

             92877                                  PSYTX PT&/FAMILY 45 MINUTES
                                   2013        

 

             11956                                  PSYTX PT&/FAMILY 45 MINUTES
                                   2013        

 

             93967                                  PSYCH IND W/MED CK 20      
                             2013        

 

             12292                                  ROUTINE VENIPUNCTURE       
                            2013        

 

             43271                                  CMP                        
           2013        

 

             41668                                  LIPID PANEL                
                   2013        

 

             [id number]                                  GFR CALC (RESULT ONLY)   
                                2013        

 

             25060                                  ROUTINE VENIPUNCTURE       
                            2013        

 

             [id number]                                  FERRITIN                   
                2013        

 

             [id number]                                  IRON SERUM                 
                  2013        

 

             30055                                  IRON BNDNG CAP             
                      2013        

 

             [id number]                                  HCV INDEX (RESULT ONLY)  
                                 2013        

 

             67604                                  HEPATITIS PROFILE          
                         2013        

 

             IRGROUP                                  IRON GROUP (Iron,TIBC, 
Ferritin)                                   2013        

 

             45705                                  ROUTINE VENIPUNCTURE       
                            2013        

 

             62801                                  CBC                        
           2013        

 

             49688                                  PT/INR                     
              2013        

 

             21338                                  HEP C PCR QUANT (SERIAL)   
                                2013        

 

             53478                                  PSYTX PT&/FAMILY 45 MINUTES
                                   10/07/2013        

 

             20698                                  PSYTX PT&/FAMILY 45 MINUTES
                                   2013        

 

             84152                                  PSYTX PT&/FAMILY 45 MINUTES
                                   2014        

 

             21205                                  PSYTX PT&/FAMILY 45 MINUTES
                                   2014        

 

             19036                                  PSYTX PT&/FAMILY 45 MINUTES
                                   2014        

 

             19208                                  ROUTINE VENIPUNCTURE       
                            2014        

 

             02554                                  CBC                        
           2014        

 

             2205209                                  GFR CALC (RESULT ONLY)   
                                2014        

 

             31943                                  CMP                        
           2014        

 

             43700                                  LIPID PANEL                
                   2014        

 

             14293                                  IRON SERUM                 
                  2014        

 

             88231                                  IRON BNDNG CAP             
                      2014        

 

             90910                                  FERRITIN                   
                2014        

 

             [id number]                                  HCV INDEX (RESULT ONLY)  
                                 2014        

 

             64333                                  HEPATITIS PROFILE          
                         2014        

 

             96559                                  PSYTX PT&/FAMILY 45 MINUTES
                                   2014        

 

             38646                                  ROUTINE VENIPUNCTURE       
                            2014        

 

             IRGROUP                                  IRON GROUP (Iron,TIBC, 
Ferritin)                                   2014        

 

             2380333                                  GFR CALC (RESULT ONLY)   
                                09/10/2014        

 

             88670                                  CMP                        
           09/10/2014        

 

             64690                                  PSYTX PT&/FAMILY 45 MINUTES
                                   2014        

 

             90589                                  PSYTX PT&/FAMILY 45 MINUTES
                                   2015        



                                                                               
                             



Results

      





 Test            Result            Range        









 Complete blood count (CBC) with automated white blood cell (WBC) differential 
- 18 16:20         









 Blood leukocytes automated count (number/volume)            12.7 10*3/uL      
      4.3-11.0        

 

 Blood erythrocytes automated count (number/volume)            5.21 10*6/uL    
        4.35-5.85        

 

 Venous blood hemoglobin measurement (mass/volume)            16.8 g/dL        
    13.3-17.7        

 

 Blood hematocrit (volume fraction)            48 %            40-54        

 

 Automated erythrocyte mean corpuscular volume            92 [foz_us]          
  80-99        

 

 Automated erythrocyte mean corpuscular hemoglobin (mass per erythrocyte)      
      32 pg            25-34        

 

 Automated erythrocyte mean corpuscular hemoglobin concentration measurement (
mass/volume)            35 g/dL            32-36        

 

 Automated erythrocyte distribution width ratio            13.1 %            
10.0-14.5        

 

 Automated blood platelet count (count/volume)            220 10*3/uL          
  130-400        

 

 Automated blood platelet mean volume measurement            9.4 [foz_us]      
      7.4-10.4        

 

 Automated blood neutrophils/100 leukocytes            83 %            42-75   
     

 

 Automated blood lymphocytes/100 leukocytes            8 %            12-44    
    

 

 Blood monocytes/100 leukocytes            8 %            0-12        

 

 Automated blood eosinophils/100 leukocytes            1 %            0-10     
   

 

 Automated blood basophils/100 leukocytes            0 %            0-10        

 

 Blood neutrophils automated count (number/volume)            10.5 10*3        
    1.8-7.8        

 

 Blood lymphocytes automated count (number/volume)            1.1 10*3         
   1.0-4.0        

 

 Blood monocytes automated count (number/volume)            1.0 10*3            
0.0-1.0        

 

 Automated eosinophil count            0.1 10*3/uL            0.0-0.3        

 

 Automated blood basophil count (count/volume)            0.0 10*3/uL          
  0.0-0.1        









 Comprehensive metabolic panel - 18 16:20         









 Serum or plasma sodium measurement (moles/volume)            138 mmol/L       
     135-145        

 

 Serum or plasma potassium measurement (moles/volume)            3.9 mmol/L    
        3.6-5.0        

 

 Serum or plasma chloride measurement (moles/volume)            105 mmol/L     
               

 

 Carbon dioxide            22 mmol/L            21-32        

 

 Serum or plasma anion gap determination (moles/volume)            11 mmol/L   
         5-14        

 

 Serum or plasma urea nitrogen measurement (mass/volume)            9 mg/dL    
        7-18        

 

 Serum or plasma creatinine measurement (mass/volume)            0.93 mg/dL    
        0.60-1.30        

 

 Serum or plasma urea nitrogen/creatinine mass ratio            10             
NRG        

 

 Serum or plasma creatinine measurement with calculation of estimated 
glomerular filtration rate            >             NRG        

 

 Serum or plasma glucose measurement (mass/volume)            107 mg/dL        
            

 

 Serum or plasma calcium measurement (mass/volume)            9.5 mg/dL        
    8.5-10.1        

 

 Serum or plasma total bilirubin measurement (mass/volume)            1.3 mg/dL
            0.1-1.0        

 

 Serum or plasma alkaline phosphatase measurement (enzymatic activity/volume)  
          85 U/L                    

 

 Serum or plasma aspartate aminotransferase measurement (enzymatic activity/
volume)            17 U/L            5-34        

 

 Serum or plasma alanine aminotransferase measurement (enzymatic activity/volume
)            24 U/L            0-55        

 

 Serum or plasma protein measurement (mass/volume)            7.8 g/dL         
   6.4-8.2        

 

 Serum or plasma albumin measurement (mass/volume)            4.5 g/dL         
   3.2-4.5        

 

 CALCIUM CORRECTED            9.1 mg/dL            8.5-10.1        









 Magnesium - 18 16:20         









 Magnesium            2.2 mg/dL            1.8-2.4        









 Blood lactic acid measurement (moles/volume) - 18 16:55         









 Blood lactic acid measurement (moles/volume)            1.27 mmol/L            
0.50-2.00        









 Complete urinalysis with reflex to culture - 18 17:35         









 Urine color determination            YELLOW             NRG        

 

 Urine clarity determination            CLEAR             NRG        

 

 Urine pH measurement by test strip            5             5-9        

 

 Specific gravity of urine by test strip            1.005             1.016-
1.022        

 

 Urine protein assay by test strip, semi-quantitative            1+             
NEGATIVE        

 

 Urine glucose detection by automated test strip            NEGATIVE           
  NEGATIVE        

 

 Erythrocytes detection in urine sediment by light microscopy            2+    
         NEGATIVE        

 

 Urine ketones detection by automated test strip            1+             
NEGATIVE        

 

 Urine nitrite detection by test strip            NEGATIVE             NEGATIVE
        

 

 Urine total bilirubin detection by test strip            NEGATIVE             
NEGATIVE        

 

 Urine urobilinogen measurement by automated test strip (mass/volume)          
  NORMAL             NORMAL        

 

 Urine leukocyte esterase detection by dipstick            2+             
NEGATIVE        

 

 Automated urine sediment erythrocyte count by microscopy (number/high power 
field)             [HPF]            NRG        

 

 Automated urine sediment leukocyte count by microscopy (number/high power field
)             [HPF]            NRG        

 

 Bacteria detection in urine sediment by light microscopy            FEW       
      NRG        

 

 Crystals detection in urine sediment by light microscopy            NONE      
       NRG        

 

 Casts detection in urine sediment by light microscopy            NONE         
    NRG        

 

 Mucus detection in urine sediment by light microscopy            NEGATIVE     
        NRG        

 

 Complete urinalysis with reflex to culture            YES             NRG     
   









 Bacterial urine culture - 18 17:35         









 Bacterial urine culture            NG             NRG        









 Complete blood count (CBC) with automated white blood cell (WBC) differential 
- 18 05:15         









 Blood leukocytes automated count (number/volume)            10.1 10*3/uL      
      4.3-11.0        

 

 Blood erythrocytes automated count (number/volume)            4.46 10*6/uL    
        4.35-5.85        

 

 Venous blood hemoglobin measurement (mass/volume)            14.3 g/dL        
    13.3-17.7        

 

 Blood hematocrit (volume fraction)            42 %            40-54        

 

 Automated erythrocyte mean corpuscular volume            94 [foz_us]          
  80-99        

 

 Automated erythrocyte mean corpuscular hemoglobin (mass per erythrocyte)      
      32 pg            25-34        

 

 Automated erythrocyte mean corpuscular hemoglobin concentration measurement (
mass/volume)            34 g/dL            32-36        

 

 Automated erythrocyte distribution width ratio            13.0 %            
10.0-14.5        

 

 Automated blood platelet count (count/volume)            175 10*3/uL          
  130-400        

 

 Automated blood platelet mean volume measurement            9.6 [foz_us]      
      7.4-10.4        

 

 Automated blood neutrophils/100 leukocytes            79 %            42-75   
     

 

 Automated blood lymphocytes/100 leukocytes            10 %            12-44   
     

 

 Blood monocytes/100 leukocytes            11 %            0-12        

 

 Automated blood eosinophils/100 leukocytes            0 %            0-10     
   

 

 Automated blood basophils/100 leukocytes            0 %            0-10        

 

 Blood neutrophils automated count (number/volume)            8.0 10*3         
   1.8-7.8        

 

 Blood lymphocytes automated count (number/volume)            1.0 10*3         
   1.0-4.0        

 

 Blood monocytes automated count (number/volume)            1.1 10*3            
0.0-1.0        

 

 Automated eosinophil count            0.0 10*3/uL            0.0-0.3        

 

 Automated blood basophil count (count/volume)            0.0 10*3/uL          
  0.0-0.1        









 Comprehensive metabolic panel - 18 05:15         









 Serum or plasma sodium measurement (moles/volume)            136 mmol/L       
     135-145        

 

 Serum or plasma potassium measurement (moles/volume)            3.6 mmol/L    
        3.6-5.0        

 

 Serum or plasma chloride measurement (moles/volume)            105 mmol/L     
               

 

 Carbon dioxide            18 mmol/L            21-32        

 

 Serum or plasma anion gap determination (moles/volume)            13 mmol/L   
         5-14        

 

 Serum or plasma urea nitrogen measurement (mass/volume)            10 mg/dL   
         7-18        

 

 Serum or plasma creatinine measurement (mass/volume)            0.83 mg/dL    
        0.60-1.30        

 

 Serum or plasma urea nitrogen/creatinine mass ratio            12             
NRG        

 

 Serum or plasma creatinine measurement with calculation of estimated 
glomerular filtration rate            >             NRG        

 

 Serum or plasma glucose measurement (mass/volume)            118 mg/dL        
            

 

 Serum or plasma calcium measurement (mass/volume)            8.5 mg/dL        
    8.5-10.1        

 

 Serum or plasma total bilirubin measurement (mass/volume)            1.4 mg/dL
            0.1-1.0        

 

 Serum or plasma alkaline phosphatase measurement (enzymatic activity/volume)  
          63 U/L                    

 

 Serum or plasma aspartate aminotransferase measurement (enzymatic activity/
volume)            12 U/L            5-34        

 

 Serum or plasma alanine aminotransferase measurement (enzymatic activity/volume
)            17 U/L            0-55        

 

 Serum or plasma protein measurement (mass/volume)            5.8 g/dL         
   6.4-8.2        

 

 Serum or plasma albumin measurement (mass/volume)            3.5 g/dL         
   3.2-4.5        

 

 CALCIUM CORRECTED            8.9 mg/dL            8.5-10.1        









 Complete blood count (CBC) with automated white blood cell (WBC) differential 
- 09/15/18 05:27         









 Blood leukocytes automated count (number/volume)            6.5 10*3/uL       
     4.3-11.0        

 

 Blood erythrocytes automated count (number/volume)            4.28 10*6/uL    
        4.35-5.85        

 

 Venous blood hemoglobin measurement (mass/volume)            13.9 g/dL        
    13.3-17.7        

 

 Blood hematocrit (volume fraction)            40 %            40-54        

 

 Automated erythrocyte mean corpuscular volume            94 [foz_us]          
  80-99        

 

 Automated erythrocyte mean corpuscular hemoglobin (mass per erythrocyte)      
      32 pg            25-34        

 

 Automated erythrocyte mean corpuscular hemoglobin concentration measurement (
mass/volume)            35 g/dL            32-36        

 

 Automated erythrocyte distribution width ratio            12.8 %            
10.0-14.5        

 

 Automated blood platelet count (count/volume)            170 10*3/uL          
  130-400        

 

 Automated blood platelet mean volume measurement            9.8 [foz_us]      
      7.4-10.4        

 

 Automated blood neutrophils/100 leukocytes            66 %            42-75   
     

 

 Automated blood lymphocytes/100 leukocytes            21 %            12-44   
     

 

 Blood monocytes/100 leukocytes            11 %            0-12        

 

 Automated blood eosinophils/100 leukocytes            3 %            0-10     
   

 

 Automated blood basophils/100 leukocytes            0 %            0-10        

 

 Blood neutrophils automated count (number/volume)            4.2 10*3         
   1.8-7.8        

 

 Blood lymphocytes automated count (number/volume)            1.4 10*3         
   1.0-4.0        

 

 Blood monocytes automated count (number/volume)            0.7 10*3            
0.0-1.0        

 

 Automated eosinophil count            0.2 10*3/uL            0.0-0.3        

 

 Automated blood basophil count (count/volume)            0.0 10*3/uL          
  0.0-0.1        









 Comprehensive metabolic panel - 09/15/18 05:27         









 Serum or plasma sodium measurement (moles/volume)            136 mmol/L       
     135-145        

 

 Serum or plasma potassium measurement (moles/volume)            3.8 mmol/L    
        3.6-5.0        

 

 Serum or plasma chloride measurement (moles/volume)            104 mmol/L     
               

 

 Carbon dioxide            18 mmol/L            21-32        

 

 Serum or plasma anion gap determination (moles/volume)            14 mmol/L   
         5-14        

 

 Serum or plasma urea nitrogen measurement (mass/volume)            7 mg/dL    
        7-18        

 

 Serum or plasma creatinine measurement (mass/volume)            0.80 mg/dL    
        0.60-1.30        

 

 Serum or plasma urea nitrogen/creatinine mass ratio            9             
NRG        

 

 Serum or plasma creatinine measurement with calculation of estimated 
glomerular filtration rate            >             NRG        

 

 Serum or plasma glucose measurement (mass/volume)            91 mg/dL         
           

 

 Serum or plasma calcium measurement (mass/volume)            9.0 mg/dL        
    8.5-10.1        

 

 Serum or plasma total bilirubin measurement (mass/volume)            0.9 mg/dL
            0.1-1.0        

 

 Serum or plasma alkaline phosphatase measurement (enzymatic activity/volume)  
          56 U/L                    

 

 Serum or plasma aspartate aminotransferase measurement (enzymatic activity/
volume)            12 U/L            5-34        

 

 Serum or plasma alanine aminotransferase measurement (enzymatic activity/volume
)            12 U/L            0-55        

 

 Serum or plasma protein measurement (mass/volume)            6.4 g/dL         
   6.4-8.2        

 

 Serum or plasma albumin measurement (mass/volume)            3.5 g/dL         
   3.2-4.5        

 

 CALCIUM CORRECTED            9.4 mg/dL            8.5-10.1        









 Automated blood complete blood count (hemogram) panel - 18 05:39         









 Blood leukocytes automated count (number/volume)            4.8 10*3/uL       
     4.3-11.0        

 

 Blood erythrocytes automated count (number/volume)            4.14 10*6/uL    
        4.35-5.85        

 

 Venous blood hemoglobin measurement (mass/volume)            13.1 g/dL        
    13.3-17.7        

 

 Blood hematocrit (volume fraction)            39 %            40-54        

 

 Automated erythrocyte mean corpuscular volume            93 [foz_us]          
  80-99        

 

 Automated erythrocyte mean corpuscular hemoglobin (mass per erythrocyte)      
      32 pg            25-34        

 

 Automated erythrocyte mean corpuscular hemoglobin concentration measurement (
mass/volume)            34 g/dL            32-36        

 

 Automated erythrocyte distribution width ratio            12.5 %            
10.0-14.5        

 

 Automated blood platelet count (count/volume)            211 10*3/uL          
  130-400        

 

 Automated blood platelet mean volume measurement            9.8 [foz_us]      
      7.4-10.4        









 Whole blood basic metabolic panel - 18 05:39         









 Serum or plasma sodium measurement (moles/volume)            137 mmol/L       
     135-145        

 

 Serum or plasma potassium measurement (moles/volume)            3.2 mmol/L    
        3.6-5.0        

 

 Serum or plasma chloride measurement (moles/volume)            109 mmol/L     
               

 

 Carbon dioxide            21 mmol/L            21-32        

 

 Serum or plasma anion gap determination (moles/volume)            7 mmol/L    
        5-14        

 

 Serum or plasma urea nitrogen measurement (mass/volume)            5 mg/dL    
        7-18        

 

 Serum or plasma creatinine measurement (mass/volume)            0.71 mg/dL    
        0.60-1.30        

 

 Serum or plasma urea nitrogen/creatinine mass ratio            7             
NRG        

 

 Serum or plasma creatinine measurement with calculation of estimated 
glomerular filtration rate            >             NRG        

 

 Serum or plasma glucose measurement (mass/volume)            110 mg/dL        
            

 

 Serum or plasma calcium measurement (mass/volume)            8.7 mg/dL        
    8.5-10.1        



                                      



Encounters

      





 ACCT No.            Visit Date/Time            Discharge            Status    
        Pt. Type            Provider            Facility            Loc./Unit  
          Complaint        

 

 [id number]            2015 13:26:00            2015 23:59:59          
  [facility]            Outpatient            DARIAN FARRAR PHD                   
                            

 

 [id number]            2014 15:54:00            2014 23:59:59          
  [facility]            Outpatient            [facility]ON[facility] JEFFREY WADDELL                     
                          

 

 [id number]            2014 09:49:00            2014 23:59:59          
  [facility]S            Outpatient            DARIAN FARRAR PHD                   
                            

 

 [id number]            10/30/2014 14:04:00            10/30/2014 23:59:59          
  CLS            Outpatient            JEFFREY MORA                     
                          

 

 [id number]            2014 14:46:00            2014 23:59:59          
  CLS            Outpatient            ESSIE MCMANUS DO                        
                       

 

 [id number]            2014 12:54:00            2014 23:59:59          
  CLS            Outpatient            DARIAN FARRAR PHD                   
                            

 

 [id number]            2014 12:27:00            2014 23:59:59          
  CLS            Outpatient            [facility]                
                               

 

 [id number]            2014 14:05:00            2014 23:59:59          
  CLS            Outpatient            DARIAN FARRAR PHD                   
                            

 

 [id number]            2014 13:52:00            2014 23:59:59          
  CLS            Outpatient            DARIAN FARRAR PHD                   
                            

 

 [id number]            2014 13:55:00            2014 23:59:59          
  CLS            Outpatient            DARIAN FARRAR PHD                   
                            

 

 [id number]            2014 11:22:00            2014 23:59:59          
  CLS            Outpatient            [facility]                
                               

 

 [id number]            2013 12:47:00            2013 23:59:59          
  CLS            Outpatient            DARIAN FARRAR PHD                   
                            

 

 [id number]            10/17/2013 09:42:00            10/17/2013 23:59:59          
  CLS            Outpatient            ESSIE MCMANUS DO                        
                       

 

 [id number]            10/04/2013 12:42:00            10/04/2013 23:59:59          
  CLS            Outpatient            DARIAN FARRAR PHD                   
                            

 

 [id number]            2013 14:41:00            2013 23:59:59          
  CLS            Outpatient            KYLAH MELGAR PA-C                   
                            

 

 [id number]            2013 15:44:00            2013 23:59:59          
  CLS            Outpatient                                                    
        

 

 [id number]            2013 10:41:00            2013 23:59:59          
  CLS            Outpatient            MARIETTA ESSIE CABRAL                        
                       

 

 [id number]            2013 10:42:00            2013 23:59:59          
  CLS            Outpatient            DARIAN FARRAR PHD                   
                            

 

 [id number]            2012 09:43:00            2012 23:59:59          
  CLS            Outpatient            LOBO RASHI                     
                          

 

 [id number]            2012 17:01:00            2012 23:59:59            
CLS            Outpatient            DARIAN FARRAR PHD                     
                          

 

 [id number]            2013 11:07:00                                      
Document Registration                                                          
  

 

 [id number]            2013 08:08:00                                      
Document Registration                                                          
  

 

 [id number]            2013 16:10:00                                      
Document Registration                                                          
  

 

 [id number]            04/15/2013 15:07:00                                      
Document Registration                                                          
  

 

 [id number]            10/18/2018 14:33:00            10/18/2018 14:39:00    
        DIS            Outpatient            EL CARLOS DO            [facility]            PREOP            COLONOSCOPY        

 

 [id number]            2018 19:15:00            2018 15:55:00    
        DIS            Inpatient            EL CARLOS DO            [facility]            4TH            DIVERTICULITIS        

 

 [id number]            2013 08:49:00            2013 11:15:00    
        DIS            Emergency            ENRIQUE RODRIGUEZ MD            
[facility]            ER            VOMITING        

 

 [id number]            10/22/2018 13:00:00                         PEN       
     Preadmit            EL CARLOS DO            [facility]            ENDO            SCREENING

## 2018-10-22 NOTE — ANESTHESIA-GENERAL POST-OP
MAC


Patient Condition


Mental Status/LOC:  Same as Preop


Cardiovascular:  Satisfactory


Nausea/Vomiting:  Absent


Respiratory:  Satisfactory


Pain:  Controlled


Complications:  Absent





Post Op Complications


Complications


None





Follow Up Care/Instructions


Patient Instructions


None needed.





Anesthesiology Discharge Order


Discharge Order


Patient is doing well, no complaints, stable vital signs, no apparent adverse 

anesthesia problems.   


No complications reported per nursing.











DALILA MONDRAGON CRNA Oct 22, 2018 14:42

## 2018-10-23 NOTE — OPERATIVE REPORT
DATE OF SERVICE:  



PREOPERATIVE DIAGNOSES:

1.  Screening colonoscopy:

2.  History of diverticula.



POSTOPERATIVE DIAGNOSES:

1.  Colon polyps.

2.  Diverticula.

3.  Internal hemorrhoids.



PROCEDURE:

Colonoscopy with snare polypectomy.



SURGEON:

Theo Ruiz DO.



FIRST ASSISTANT:

None.



ANESTHESIA:

IV sedation by the CRNA.



SPECIMEN:

Multiple polyps, I believe about 4 polyps from the transverse colon, one from

the descending colon, two from the ascending colon and then another 4 to 5 from

the transverse colon.



BLOOD LOSS:

Scant.



FLUIDS:

Per anesthesia.



POSTOPERATIVE CONDITION:

Stable.



INDICATION FOR PROCEDURE:

The patient is a 63-year-old male who has had a colonoscopy in over probably 15

years and needed one for screening.  He did have a history of diverticula.



FINDINGS:

The patient had large diverticula in the sigmoid and descending colon, but then

he had multiple polyps, most of them were small, but he had 2 or 3 very large

polyps, large polyps from the transverse colon.  He had two in the ascending

colon and one polyp in the descending colon and anywhere from 3 to 9 in the

transverse colon, unsure of how many we took out.



PROCEDURE NOTE:

After informed consent was obtained, the patient was brought to the endoscopy

suite, placed in the bed left lateral decubitus position.  He was administered

IV sedation by the CRNA who then monitored his vitals the entire time, heart

rate, blood pressure and pulse ox and the scope was inserted through the rectum

and into the sigmoid up to the descending colon and on the way up, saw some

diverticula and took a picture of this and then noted a polyp in the descending

colon, did snare polypectomy to this, continued up to the transverse colon, saw

another large polyp, did snare polypectomy of this and then sought two or three

more polyps, removed these as well.  I then continued all the way to the cecum. 

In the ascending colon, saw 2 polyps, did a snare polypectomy of these and then

pushed toward the cecum, took a picture of the appendiceal orifice, noted the

ileocecal valve and then slowly withdrew the scope insufflating to look

circumferentially at the walls looking at the cecum up the ascending colon to

the hepatic flexure and then into the transverse colon, saw two or three more

large polyps, couple of them had to be taken in one or two bites, removed all of

them, they look like they are bigger than 1 cm, finally we able to get all the

polyps.  There was a little bit of bleeding from the bottom of the polyps, but

removed them all.  Then continued down the transverse colon, splenic flexure

into descending colon, then down into sigmoid and finally in the rectum,

retroflexed in the rectal vault, saw some minimal internal hemorrhoids, took a

picture of this.  At this point, then removed the scope.  The patient tolerated

the procedure and he was recovered in the endoscopy suite and then sent home in

stable condition.





Job ID: [id number]

DocumentID: [id number]

Dictated Date:  10/22/2018 21:53:28

Transcription Date: 10/23/2018 04:42:21

Dictated By: DO CHANDNI HEALY

## 2019-09-30 ENCOUNTER — HOSPITAL ENCOUNTER (OUTPATIENT)
Dept: HOSPITAL 75 - PREOP | Age: 64
Discharge: HOME | End: 2019-09-30
Attending: SURGERY
Payer: OTHER GOVERNMENT

## 2019-09-30 VITALS — HEIGHT: 68.9 IN | WEIGHT: 187.39 LBS | BODY MASS INDEX: 27.76 KG/M2

## 2019-09-30 DIAGNOSIS — Z01.818: Primary | ICD-10-CM

## 2019-10-07 ENCOUNTER — HOSPITAL ENCOUNTER (OUTPATIENT)
Dept: HOSPITAL 75 - ENDO | Age: 64
End: 2019-10-07
Attending: SURGERY
Payer: OTHER GOVERNMENT

## 2019-10-07 VITALS — DIASTOLIC BLOOD PRESSURE: 81 MMHG | SYSTOLIC BLOOD PRESSURE: 118 MMHG

## 2019-10-07 VITALS — BODY MASS INDEX: 27.76 KG/M2 | WEIGHT: 187.39 LBS | HEIGHT: 68.9 IN

## 2019-10-07 VITALS — SYSTOLIC BLOOD PRESSURE: 108 MMHG | DIASTOLIC BLOOD PRESSURE: 71 MMHG

## 2019-10-07 VITALS — SYSTOLIC BLOOD PRESSURE: 118 MMHG | DIASTOLIC BLOOD PRESSURE: 81 MMHG

## 2019-10-07 VITALS — DIASTOLIC BLOOD PRESSURE: 74 MMHG | SYSTOLIC BLOOD PRESSURE: 118 MMHG

## 2019-10-07 VITALS — SYSTOLIC BLOOD PRESSURE: 133 MMHG | DIASTOLIC BLOOD PRESSURE: 80 MMHG

## 2019-10-07 VITALS — DIASTOLIC BLOOD PRESSURE: 69 MMHG | SYSTOLIC BLOOD PRESSURE: 108 MMHG

## 2019-10-07 DIAGNOSIS — Z86.010: ICD-10-CM

## 2019-10-07 DIAGNOSIS — Z83.3: ICD-10-CM

## 2019-10-07 DIAGNOSIS — F32.9: ICD-10-CM

## 2019-10-07 DIAGNOSIS — D12.4: ICD-10-CM

## 2019-10-07 DIAGNOSIS — Z12.11: Primary | ICD-10-CM

## 2019-10-07 DIAGNOSIS — E78.5: ICD-10-CM

## 2019-10-07 DIAGNOSIS — K64.8: ICD-10-CM

## 2019-10-07 DIAGNOSIS — Z79.899: ICD-10-CM

## 2019-10-07 DIAGNOSIS — Z80.9: ICD-10-CM

## 2019-10-07 DIAGNOSIS — K57.30: ICD-10-CM

## 2019-10-07 PROCEDURE — 88305 TISSUE EXAM BY PATHOLOGIST: CPT

## 2019-10-07 NOTE — ENDOSCOPY DISCHARGE INSTRUCT
Endo Procedure/Findings


Findings


1.:  Polyp


2.:  Diverticulosis


3.:  Internal Hemorrhoids





Discharge Instructions


-


Activity: You might feel a little sleepy until tomorrow.  This is due to the 

medicine you received to relax you.





Until tomorrow, you should:  


   NOT drive a car, operate machinery or power tools.


   NOT drink any alcoholic beverages.


   NOT make any important decisions or sign importortant papers.





Do not return to work until tomorrow, unless otherwise instructed. Resume 

previous activities tomorrow.





Diet: Start by taking liquids.  If you tolerate liquids, advance to solid food.





make an appointment for one week





Notify Physician


-


If you experience excessive bleeding, unusual abdominal pain, fever, or chest 

pain, contact your doctor immediately.











EL CARLOS DO                Oct 7, 2019 10:15

## 2019-10-07 NOTE — PROGRESS NOTE-POST OPERATIVE
Post-Operative Progess Note


Surgeon (s)/Assistant (s)


Surgeon


EL CARLOS DO


Assistant:  none





Pre-Operative Diagnosis


pesonal hx of colon polyps





Post-Operative Diagnosis





Polyp


diverticula


internal hemorrhoids





Procedure & Operative Findings


Date of Procedure


10/7/19


Procedure Performed/Findings


colon with hot bx


Anesthesia Type


IV sedation by CRNA





Estimated Blood Loss


Estimated blood loss (mL):  scant





Specimens/Packing


Specimens Removed


Descending colon polyp











EL CARLOS DO                Oct 7, 2019 10:14

## 2019-10-07 NOTE — PROGRESS NOTE-PRE OPERATIVE
Pre-Operative Progress Note


H&P Reviewed


The H&P was reviewed, patient examined and no changes noted.


Time Seen by Provider:  08:50


Date H&P Reviewed:  Oct 7, 2019


Time H&P Reviewed:  08:51


Pre-Operative Diagnosis:  pesonal hx of colon polyps











EL CARLOS DO                Oct 7, 2019 08:52

## 2019-10-09 NOTE — OPERATIVE REPORT
DATE OF SERVICE:  10/07/2019



PREOPERATIVE DIAGNOSIS:

History of multiple polyps.



POSTOPERATIVE DIAGNOSES:

1.  Colon polyp.

2.  Diverticula.

3.  Internal hemorrhoids.



PROCEDURE:

Colonoscopy with hot biopsy.



SURGEON:

Theo Ruiz DO.



FIRST ASSISTANT:

None.



ANESTHESIA:

IV sedation by the CRNA.



SPECIMEN:

Polyp from the descending colon.



BLOOD LOSS:

Scant.



FLUIDS:

Per anesthesia.



POSTOPERATIVE CONDITION:

Stable.



INDICATION FOR PROCEDURE:

The patient is a 64-year-old male who had multiple polyps removed during his

last colonoscopy, so may need a repeat in one year.



FINDINGS:

The patient had one polyp in the descending colon.  He also had diverticula

throughout the colon and he had some internal hemorrhoids.



PROCEDURE NOTE:

After informed consent was obtained, the patient was brought to the endoscopy

suite and placed in bed in the left lateral decubitus position.  He was

administered IV sedation by the CRNA who then monitored his vitals the entire

time, heart rate, blood pressure and pulse ox and the scope was inserted, pushed

all the way about 150 cm, able to get to the cecum.  On the way in, noted

diverticula, took a picture of this, able to get all the way to cecum, took a

picture of appendiceal orifice, noted the ileocecal valve and then slowly

withdrew the scope insufflating the circumferential wall looking at the cecum,

up the ascending colon to the hepatic flexure, then down the transverse colon,

the splenic flexure into the descending colon.  In the descending colon, saw

small polyp, I elected to do a hot biopsy of this, able to remove it almost

completely and then continued down through the sigmoid into the rectum. 

Retroflexed the rectal vault, saw some minimal internal hemorrhoids, took a

picture of this and then removed the scope.  The patient tolerated the

procedure, recovered in endoscopy suite.





Job ID: [id number]

DocumentID: [id number]

Dictated Date:  10/08/2019 17:17:40

Transcription Date: 10/09/2019 00:15:39

Dictated By: THEO RUIZ DO

[facility]

## 2020-07-25 ENCOUNTER — HOSPITAL ENCOUNTER (EMERGENCY)
Dept: HOSPITAL 75 - ER | Age: 65
Discharge: HOME | End: 2020-07-25
Payer: OTHER GOVERNMENT

## 2020-07-25 VITALS — SYSTOLIC BLOOD PRESSURE: 174 MMHG | DIASTOLIC BLOOD PRESSURE: 94 MMHG

## 2020-07-25 VITALS — BODY MASS INDEX: 28.21 KG/M2 | HEIGHT: 68.9 IN | WEIGHT: 190.48 LBS

## 2020-07-25 DIAGNOSIS — F10.229: ICD-10-CM
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
## 2020-07-25 DIAGNOSIS — E78.00: ICD-10-CM
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
## 2020-07-25 DIAGNOSIS — Z82.49: ICD-10-CM

## 2020-07-25 DIAGNOSIS — S51.812A: Primary | ICD-10-CM
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
## 2020-07-25 PROCEDURE — 12015 RPR F/E/E/N/L/M 7.6-12.5 CM: CPT

## 2020-07-25 NOTE — XMS REPORT
[facility]

                             Created on: 2020



Ignacio Valle

External Reference #: [id number]

: 1955

Sex: Male



Demographics





                          Address                   [address] [place]  [address]

 

                          Preferred Language        Unknown

 

                          Marital Status            Unknown

 

                          Jainism Affiliation     Unknown

 

                          Race                      Unknown

 

                          Ethnic Group              Unknown





Author





                          Author                    Ignacio Gomes Doctor

 

                          Organization              [facility]

 

                          Address                   Unknown

 

                          Phone                     Unavailable







Care Team Providers





                    Care Team Member Name Role                Phone

 

                    Migration,  Doctor  Unavailable         Unavailable







PROBLEMS





          Type      Condition ICD9-CM Code GXL66-WM Code Onset Dates Condition S

tatus SNOMED 

Code

 

                          Problem                   Nonspecific elevation of lev

els of transaminase or lactic acid 

dehydrogenase (LDH) 790.4                                  Active       [id number]

2

 

           Problem    Encounter for long-term (current) use of other medications

 V58.69                           

Active                                  [id number]

 

          Problem   Edema     782.3                         Active    509538360

 

          Problem   Spontaneous ecchymoses 782.7                         Active 

   [id number]

 

          Problem   Trigger finger (acquired) 727.03                        Acti

ve    8952579

 

          Problem   Varicose veins of lower extremities with inflammation 454.1 

                        Active    

22424952

 

           Problem    Persistent disorder of initiating or maintaining sleep 307

.42                           Active

                                        [id number]

 

          Problem   Pityriasis versicolor 111.0                         Active  

  10125468

 

           Problem    Unspecified local infection of skin and subcutaneous tissu

e 686.9                            

Active                                  [id number]

 

          Problem   Unspecified viral hepatitis C without hepatic coma 070.70   

                     Active    

15879540

 

          Problem   Vascular disorder of skin 709.1                         Acti

ve    50128808

 

          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       

                 Active    

34471673

 

          Problem   Anxiety state, unspecified 300.00                        Act

kathia    818908738

 

          Problem   Other and unspecified hyperlipidemia 272.4                  

       Active    94747812

 

          Problem   Major depressive disorder, recurrent episode, mild 296.31   

                     Active    

77655731







ALLERGIES

No Information



ENCOUNTERS





                Encounter       Location        Date            Diagnosis

 

                          [facility]   [address] [facility] [id number]

[address] [place] [id number]

                          15 Jesse, 2016              Encounter for other specifie

d administrative purpose Z02.89

 

                          [facility]   [address] [facility] [id number]

[address] [place] [id number]

                          12 May, 2016              Dental examination Z01.20 an

d Dental caries K02.9

 

                          [facility]     [address] [facility] [id number]

[address] [place] [address]

                          14 Aug, 2015              Edema 782.3 and Family histo

ry of coronary artery disease V17.3

 

                          [facility]     [address] [id number]

[address] [place] [address]

                          07 Aug, 2015              Edema 782.3 and Family histo

ry of coronary artery disease V17.3

 

                          [facility]   [address] [id number]

[address] [place] [id number]

                                        Dental examination V72.2

 

                          [facility]     [address] [id number]

[address] [place] [address]

                                         

 

                          [facility]     [address] [id number]

[address] [place] [address]

                                         

 

                          [facility]     [address] [id number]

[address] [place] [address]

                          20 Mar, 2015               

 

                          [facility]     [address] [id number]

[address] [place] [address]

                          20 Mar, 2015               

 

                          [facility]     [address] [id number]

[address] [place] [address]

                                         

 

                          [facility]     [address] [id number]

[address] [place] [address]

                                         

 

                          [facility]     [address] [id number]

[address] [place] [address]

                                         

 

                          [facility]     [address] [id number]

[address] [place] [address]

                                         

 

                          [facility]     [address] [id number]

[address] [place] [address]

                          11 Dec, 2014               

 

                          [facility]     [address] [id number]

[address] [place] [address]

                          11 Dec, 2014               

 

                          [facility]     [address] [id number]

[address] [place] [address]

                                         

 

                          [facility]     [address] [id number]

[address] [place] [address]

                                         

 

                          [facility]     [address] [id number]

[address] [place] [address], [place] [address]

                          30 Oct, 2014               

 

                          [facility]     [address] [id number]

[address] [place] [address], [place] [address]

                          30 Oct, 2014               

 

                          [facility]     [address] [id number]

[address] [place] [address], [place] [address]

                          10 Sep, 2014               

 

                          [facility]     [address] [id number]

[address] [place] [address], [place] [address]

                          09 Sep, 2014               

 

                          [facility]     [address] [id number]

[address] [place] [address], [place] [address]

                          09 Sep, 2014               

 

                          [facility]     [address] [id number]

[address] [place] [address], [place] [address]

                          22 Aug, 2014               

 

                          [facility]     [address] [id number]

[address] [place] [address], [place] [address]

                          22 Aug, 2014               

 

                          [facility]     [address] [id number]

[address] [place] [address], [place] [address]

                          22 Aug, 2014               

 

                          [facility]     [address] [id number]

[address] [place] [address], [place] [address]

                                         

 

                          [facility]     [address] [id number]

[address] [place] [address], [place] [address]

                                         

 

                          [facility]     [address] [id number]

[address] [place] [address], [place] [address]

                                         

 

                          [facility]     [address] [id number]

[address] [place] [address], [place] [address]

                                         

 

                          [facility]     [address] [id number]

[address] [place] [address], [place] [address]

                                         

 

                          [facility]     [address] [id number]

[address] [place] [address], [place] [address]

                                         

 

                          [facility]     [address] [id number]

[address] [place] [address], [place] [address]

                                         

 

                          [facility]     [address] [id number]

[address] [place] [address], [place] [address]

                                         

 

                          [facility]     [address] [id number]

[address] [place] [address], [place] [address]

                          20 May, 2014               

 

                          [facility]     [address] [id number]

[address] [place] [address], [place] [address]

                          20 May, 2014               

 

                          [facility]     [address] [id number]

[address] [place] [address], [place] [address]

                                         

 

                          [facility]     [address] [id number]

[address] [place] [address], [place] [address]

                                         

 

                          [facility]     [address] [id number]

[address] [place] [address], [place] [address]

                                         

 

                          [facility]     [address] [id number]

[address] [place] [address], [place] [address]

                                         

 

                          [facility]     [address] [id number]

[address] [place] [address], [place] [address]

                                         

 

                          [facility]     [address] [id number]

[address] [place] [address], [place] [address]

                                         

 

                          [facility]     [address] [id number]

[address] [place] [address], [place] [address]

                          13 Dec, 2013               

 

                          [facility]     [address] [id number]

[address] [place] [address], [place] [address]

                          13 Dec, 2013               

 

                          [facility]     [address] [id number]

[address] [place] [address], [place] [address]

                          13 Dec, 2013               

 

                          [facility]     [address] [id number]

[address] [place] [address], [place] [address]

                          13 Dec, 2013               

 

                          [facility]     [address] [id number]

[address] [place] [address], [place] [address]

                          17 Oct, 2013               

 

                          [facility]     [address] [id number]

[address] [place] [address], [place] [address]

                          17 Oct, 2013               

 

                          [facility]     [address] [id number]

[address] [place] [address], [place] [address]

                          10 Oct, 2013               

 

                          [facility]     [address] [id number]

[address] [place] [address] [place] [address]

                          10 Oct, 2013               

 

                          [facility]     [address] [id number]

[address] [place] [address], [place] [address]

                          04 Oct, 2013               

 

                          [facility]     [address] [id number]

[address] [place] [address], [place] [address]

                          24 Sep, 2013               

 

                          [facility]     [address] [id number]

[address] [place] [address], [place] [address]

                          19 Sep, 2013               

 

                          [facility]     [address] [id number]

[address] [place] [address], [place] [address]

                          16 Sep, 2013               

 

                          [facility]     [address] [id number]

[address] [place] [address], [place] [address]

                          21 Aug, 2013               

 

                          [facility]     [address] [id number]

[address] [place] [address], [place] [address]

                          17 Aug, 2013               

 

                          [facility]     [address] [id number]

[address] [place] [address], [place] [address]

                          16 Aug, 2013               

 

                          [facility]     [address] [id number]

[address] [place] [address], [place] [address]

                          15 Aug, 2013               

 

                          [facility]     [address] [id number]

[address] [place] [address], [place] [address]

                          13 Aug, 2013               

 

                          [facility]     [address] [id number]

[address] [place] [address], [place] [address]

                          13 Aug, 2013               

 

                          [facility]     [address] [id number]

[address] [place] [address], [place] [address]

                          12 Aug, 2013               

 

                          [facility]     [address] [id number]

[address] [place] [address], [place] [address]

                                         

 

                          [facility]     [address] [id number]

[address] [place] [address], [place] [address]

                                         

 

                          [facility]     [address] [id number]

[address] [place] [address], [place] [address]

                                         

 

                          [facility]     [address] [id number]

[address] [place] [address], [place] [address]

                          10 Jesse, 2013               

 

                          [facility]     [address] [id number]

[address] [place] [address], [place] [address]

                          17 May, 2013               

 

                          [facility]     [address] [id number]

[address] [place] [address], [place] [address]

                          10 May, 2013               

 

                          [facility]     [address] [id number]

[address] [place] [address], [place] [address]

                          15 Apr, 2013               

 

                          [facility]     [address] [id number]

[address] [place] [address], [place] [address]

                                         

 

                          [facility]     [address] [id number]

[address] [place] [address], [place] [address]

                                         

 

                          [facility]     [address] [id number]

[address] [place] [address], [place] [address]

                                         

 

                          [facility]     [address] [id number]

[address] [place] [address], [place] [address]

                                         

 

                          [facility]     [address] [id number]

[address] [place] [address], [place] [address]

                                         

 

                          [facility]     [address] [id number]

[address] [place] [address], [place] [address]

                                         

 

                          [facility]     [address] [id number]

[address] [place] [address], [place] [address]

                                         

 

                          [facility]     [address] [id number]

[address] [place] [address], [place] [address]

                          21 Dec, 2012               

 

                          [facility]     [address] [id number]

[address] [place] [address], [place] [address]

                          21 Dec, 2012               

 

                          [facility]     [address] [id number]

[address] [place] [address], [place] [address]

                          14 Dec, 2012               

 

                          [facility]     [address] [id number]

[address] [place] [address], [place] [address]

                          14 Dec, 2012               

 

                          [facility]     [address] [id number]

[address] [place] [address], [place] [address]

                                         

 

                          [facility]     [address] [id number]

[address] [address]

                          20 2012               

 

                          [facility]     [address] [id number]

[address] [place] [address], [place] [address]

                          16 2012               

 

                          [facility]     [address] [id number]

[address] [place] [address], [place] [address]

                          16 2012               

 

                          [facility]     [address] [id number]

[address] [place] [address], [place] [address]

                          13 2012               

 

                          [facility]     [address] [id number]

[address] [place] [address]

                          13 2012               

 

                          [facility]     [address] [id number]

[address] [place] [address], [place] [address]

                          13 2012               

 

                          [facility]     [address] [id number]

[address] [place] [address]

                          13 2012               

 

                          [facility]     [address] [id number]

[address] [place] [address], [place] [address]

                                         

 

                          [facility]     [address] [id number]

[address] [place] [address]

                                         

 

                          [facility]     [address] [id number]

[address] [place] [address], [place] [address]

                          22 Oct, 2012               

 

                          [facility]     [address] [id number]

[address] [place] [address], [place] [address]

                          22 Oct, 2012               

 

                          [facility]     [address] [id number]

[address] [place] [address]

                          22 Oct, 2012               

 

                          [facility]     [address] [id number]

[address] [place] [address]

                          22 Oct, 2012               

 

                          [facility]     [address] [id number]

[address] [place] [address], [place] [address]

                          10 Oct, 2012               

 

                          [facility]     [address] [id number]

[address] [place] [address]

                          10 Oct, 2012               

 

                          [facility]     [address] [id number]

[address] [place] [address]

                          05 Oct, 2012               

 

                          [facility]     [address] [id number]

[address] [place] [address], [place] [address]

                          05 Oct, 2012               

 

                          [facility]     [address] [id number]

[address] [place] [address], [place] [address]

                          07 Sep, 2012               

 

                          [facility]     [address] [id number]

[address] [place] [address], [place] [address]

                          22 Aug, 2012               

 

                          [facility]     [address] [id number]

[address] [place] [address], [place] [address]

                          03 Aug, 2012               

 

                          [facility]     [address] [id number]

[address] [place] [address], [place] [address]

                                         

 

                          [facility]     [address] [id number]

[address] [place] [address], [place] [address]

                                         

 

                          [facility] [facility], [place], operated [facility]     [address] [id number]

[address] [place] [address], [place] [address]

                                         

 

                          [facility] [place]     [address] [id number]

[address] [place] [address], [place] [address]

                                         

 

                          [facility] [place]     [address] [id number]

[address] [place] [address], [place] [address]

                                         

 

                          [facility] [place]     [address] [id number]

[address] [place] [address], [place] [address]

                                         

 

                          [facility]     [address] [id number]

[address] [place] [address], [place] [address]

                                         

 

                          [facility] [place]     [address] [id number]

[address] [place] [address], [place] [address]

                          30 May, 2012               

 

                          [facility]     [address] [id number]

[address] [place] [address], [place] [address]

                          30 May, 2012               

 

                          [facility]     [address] [id number]

[address] [place] [address], [place] [address]

                          21 May, 2012               

 

                          [facility]     [address] [id number]

[address] [place] [address], [place] [address]

                          14 May, 2012               

 

                          [facility]     [address] [id number]

[address] [place] [address], [place] [address]

                          04 May, 2012               

 

                          [facility]     [address] [id number]

[address] [place] [address], [place] [address]

                          04 May, 2012               

 

                          [facility]     [address] [id number]

[address] [place] [address], [place] [address]

                          04 May, 2012               

 

                          [facility]     [address] [id number]

[address] [place] [address], [place] [address]

                                         

 

                          [facility]     [address] [id number]

[address] [place] [address], [place] [address]

                                         

 

                          [facility]     [address] [id number]

[address] [place] [address], [place] [address]

                          23 Mar, 2012               

 

                          [facility]     [address] [id number]

[address] [place] [address], [place] [address]

                          15 Mar, 2012               

 

                          [facility]     [address] [id number]

[address] [place] [address], [place] [address]

                          13 Mar, 2012               

 

                          [facility]     [address] [id number]

[address] [place] [address], [place] [address]

                          12 Mar, 2012               

 

                          [facility]     [address] [id number]

[address] [place] [address], [place] [address]

                                         

 

                          [facility]     [address] [id number]

[address] [place] [address], [place] [address]

                                         

 

                          [facility]     [address] [id number]

[address] [place] [address], [place] [address]

                                         

 

                          [facility]     [address] [id number]

[address] [place] [address], [place] [address]

                                         

 

                          [facility]     [address] [id number]

[address] [place] [address], [place] [address]

                          29 Dec, 2011               

 

                          [facility]     [address] [id number]

[address] [place] [address], [place] [address]

                          29 Dec, 2011               

 

                          [facility]     [address] [id number]

[address] [place] [address], [place] [address]

                          21 Dec, 2011               

 

                          [facility]     [address] [id number]

[address] [place] [address]

                                         

 

                          [facility]     [address] [facility] [id number]

[address] [place] [address]

                          22 Oct, 2011               

 

                          [facility]     [address] [facility] [id number]

[address] [place] [phone]

                          21 Oct, 2011               







IMMUNIZATIONS

No Known Immunizations



SOCIAL HISTORY

Never Assessed



REASON FOR VISIT





PLAN OF CARE





VITAL SIGNS





                    Height              69 in               2013-10-10

 

                    Weight              201.38 lbs          2013-10-10

 

                    Heart Rate          80 bpm              2013-10-10

 

                    Blood pressure systolic 124 mmHg            2013-10-10

 

                    Blood pressure diastolic 66 mmHg             2013-10-10







MEDICATIONS

Unknown Medications



RESULTS

No Results



PROCEDURES

No Known procedures



INSTRUCTIONS





MEDICATIONS ADMINISTERED

No Known Medications



MEDICAL (GENERAL) HISTORY





                    Type                Description         Date

 

                    Medical History     depression           

 

                    Medical History     hyperlipidemia       

 

                    Hospitalization History staph in right leg

## 2020-07-25 NOTE — XMS REPORT
[facility]

                             Created on: 2020



Ignacio Valle

External Reference #: [id number]

: 1955

Sex: Male



Demographics





                          Address                   [address] [place]  [address]

 

                          Preferred Language        Unknown

 

                          Marital Status            Unknown

 

                          Alevism Affiliation     Unknown

 

                          Race                      Unknown

 

                          Ethnic Group              Unknown





Author





                          Author                    Ignacio FARRAR

 

                          [facility]

 

                          Address                   [address] [place]  [address]



 

                          Phone                     [phone]







Care Team Providers





                    Care Team Member Name Role                Phone

 

                    DARIAN FARRAR    Unavailable         [phone]







PROBLEMS





          Type      Condition ICD9-CM Code VFF70-TO Code Onset Dates Condition S

tatus SNOMED 

Code

 

                          Problem                   Nonspecific elevation of lev

els of transaminase or lactic acid 

dehydrogenase (LDH) 790.4                                  Active       44769027

2

 

           Problem    Encounter for long-term (current) use of other medications

 V58.69                           

Active                                  [id number]

 

          Problem   Edema     782.3                         Active    162425373

 

          Problem   Spontaneous ecchymoses 782.7                         Active 

   [id number]

 

          Problem   Trigger finger (acquired) 727.03                        Acti

ve    0927596

 

          Problem   Varicose veins of lower extremities with inflammation 454.1 

                        Active    

73891632

 

           Problem    Persistent disorder of initiating or maintaining sleep 307

.42                           Active

                                        [id number]

 

          Problem   Pityriasis versicolor 111.0                         Active  

  80301648

 

           Problem    Unspecified local infection of skin and subcutaneous tissu

e 686.9                            

Active                                  469193630

 

          Problem   Unspecified viral hepatitis C without hepatic coma 070.70   

                     Active    

19771186

 

          Problem   Vascular disorder of skin 709.1                         Acti

ve    14091189

 

          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       

                 Active    

48431717

 

          Problem   Anxiety state, unspecified 300.00                        Act

kathia    237488034

 

          Problem   Other and unspecified hyperlipidemia 272.4                  

       Active    22634108

 

          Problem   Major depressive disorder, recurrent episode, mild 296.31   

                     Active    

15564710







ALLERGIES

No Information



ENCOUNTERS





                Encounter       Location        Date            Diagnosis

 

                          [facility]   [id number]

[address] [place] [id number]

                          15 Jesse, 2016              Encounter for other specifie

d administrative purpose Z02.89

 

                          [facility]   [address] [facility] [id number]

[address] [place] [id number]

                          12 May, 2016              Dental examination Z01.20 an

d Dental caries K02.9

 

                          [facility]     [address] [id number]

[address] [place] [address]

                          14 Aug, 2015              Edema 782.3 and Family histo

ry of coronary artery disease V17.3

 

                          [facility]     [address] [id number]

[address] [place] [address]

                          07 Aug, 2015              Edema 782.3 and Family histo

ry of coronary artery disease V17.3

 

                          [facility]   [address] [id number]

[address] [place] [id number]

                                        Dental examination V72.2

 

                          [facility]     [address] [id number]

[address] [place] [address]

                                         

 

                          [facility]     [address] [id number]

[address] [place] [address]

                                         

 

                          [facility]     [address] [id number]

[address] [place] [address]

                          20 Mar, 2015               

 

                          [facility]     [address] [id number]

[address] [place] [address]

                          20 Mar, 2015               

 

                          [facility]     [address] [id number]

[address] [place] [address]

                                         

 

                          [facility]     [address] [id number]

[address] [place] [address]

                                         

 

                          [facility]     [address] [id number]

[address] [place] [address]

                                         

 

                          [facility]     [address] [id number]

[address] [place] [address]

                                         

 

                          [facility]     [address] [id number]

[address] [place] [address]

                          11 Dec, 2014               

 

                          [facility]     [address] [id number]

[address] [place] [address]

                          11 Dec, 2014               

 

                          [facility]     [address] [id number]

[address] [place] [address]

                                         

 

                          [facility]     [address] [id number]

[address] [place] [address] [place] [address]

                                         

 

                          [facility]     [address] [id number]

[address] [place] [address], [place] [address]

                          30 Oct, 2014               

 

                          [facility]     [address] [id number]

[address] [place] [address], [place] [address]

                          30 Oct, 2014               

 

                          [facility]     [address] [id number]

[address] [place] [address], [place] [address]

                          10 Sep, 2014               

 

                          [facility]     [address] [id number]

[address] [place] [address], [place] [address]

                          09 Sep, 2014               

 

                          [facility]     [address] [id number]

[address] [place] [address], [place] [address]

                          09 Sep, 2014               

 

                          [facility]     [address] [id number]

[address] [place] [address], [place] [address]

                          22 Aug, 2014               

 

                          [facility]     [address] [id number]

[address] [place] [address], [place] [address]

                          22 Aug, 2014               

 

                          [facility]     [address] [id number]

[address] [place] [address], [place] [address]

                          22 Aug, 2014               

 

                          [facility]     [address] [id number]

[address] [place] [address], [place] [address]

                                         

 

                          [facility]     [address] [id number]

[address] [place] [address], [place] [address]

                                         

 

                          [facility]     [address] [id number]

[address] [place] [address], [place] [address]

                                         

 

                          [facility]     [address] [id number]

[address] [place] [address], [place] [address]

                                         

 

                          [facility]     [address] [id number]

[address] [place] [address], [place] [address]

                                         

 

                          [facility]     [address] [id number]

[address] [place] [address], [place] [address]

                                         

 

                          [facility]     [address] [id number]

[address] [place] [address], [place] [address]

                                         

 

                          [facility]     [address] [id number]

[address] [place] [address], [place] [address]

                                         

 

                          [facility]     [address] [id number]

[address] [place] [address], [place] [address]

                          20 May, 2014               

 

                          [facility]     [address] [id number]

[address] [place] [address], [place] [address]

                          20 May, 2014               

 

                          [facility]     [address] [id number]

[address] [place] [address], [place] [address]

                                         

 

                          [facility]     [address] [id number]

[address] [place] [address], [place] [address]

                                         

 

                          [facility]     [address] [id number]

[address] [place] [address], [place] [address]

                                         

 

                          [facility]     [address] [id number]

[address] [place] [address], [place] [address]

                                         

 

                          [facility]     [address] [id number]

[address] [place] [address], [place] [address]

                                         

 

                          [facility]     [address] [id number]

[address] [place] [address], [place] [address]

                                         

 

                          [facility]     [address] [id number]

[address] [place] [address], [place] [address]

                          13 Dec, 2013               

 

                          [facility]     [address] [id number]

[address] [place] [address], [place] [address]

                          13 Dec, 2013               

 

                          [facility]     [address] [id number]

[address] [place] [address], [place] [address]

                          13 Dec, 2013               

 

                          [facility]     [address] [id number]

[address] [place] [address], [place] [address]

                          13 Dec, 2013               

 

                          [facility]     [address] [id number]

[address] [place] [address], [place] [address]

                          17 Oct, 2013               

 

                          [facility]     [address] [id number]

[address] [place] [address], [place] [address]

                          17 Oct, 2013               

 

                          [facility]     [address] [id number]

[address] [place] [address], [place] [address]

                          10 Oct, 2013               

 

                          [facility]     [address] [id number]

[address] [place] [address], [place] [address]

                          10 Oct, 2013               

 

                          [facility]     [address] [id number]

[address] [place] [address], [place] [address]

                          04 Oct, 2013               

 

                          [facility]     [address] [id number]

[address] [place] [address], [place] [address]

                          24 Sep, 2013               

 

                          [facility]     [address] [id number]

[address] [place] [address], [place] [address]

                          19 Sep, 2013               

 

                          [facility]     [address] [id number]

[address] [place] [address], [place] [address]

                          16 Sep, 2013               

 

                          [facility]     [address] [id number]

[address] [place] [address], [place] [address]

                          21 Aug, 2013               

 

                          [facility]     [address] [id number]

[address] [place] [address], [place] [address]

                          17 Aug, 2013               

 

                          [facility]     [address] [id number]

[address] [place] [address], [place] [address]

                          16 Aug, 2013               

 

                          [facility]     [address] [id number]

[address] [place] [address], [place] [address]

                          15 Aug, 2013               

 

                          [facility]     [address] [id number]

[address] [place] [address], [place] [address]

                          13 Aug, 2013               

 

                          [facility]     [address] [id number]

[address] [place] [address], [place] [address]

                          13 Aug, 2013               

 

                          [facility]     [address] [id number]

[address] [place] [address], [place] [address]

                          12 Aug, 2013               

 

                          [facility]     [address] [id number]

[address] [place] [address], [place] [address]

                                         

 

                          [facility]     [address] [id number]

[address] [place] [address], [place] [address]

                                         

 

                          [facility]     [address] [id number]

[address] [place] [address], [place] [address]

                                         

 

                          [facility]     [address] [id number]

[address] [place] [address], [place] [address]

                          10 2013               

 

                          [facility]     [address] [id number]

[address] [place] [address], [place] [address]

                          17 May, 2013               

 

                          [facility]     [address] [id number]

[address] [place] [address], [place] [address]

                          10 May, 2013               

 

                          [facility]     [address] [id number]

[address] [place] [address], [place] [address]

                          15 Apr, 2013               

 

                          [facility]     [address] [id number]

[address] [place] [address], [place] [address]

                                         

 

                          [facility]     [address] [id number]

[address] [place] [address], [place] [address]

                                         

 

                          [facility]     [address] [id number]

[address] [place] [address], [place] [address]

                                         

 

                          [facility]     [address] [id number]

[address] [place] [address], [place] [address]

                                         

 

                          [facility]     [address] [id number]

[address] [place] [address], [place] [address]

                                         

 

                          [facility]     [address] [id number]

[address] [place] [address], [place] [address]

                                         

 

                          [facility]     [address] [id number]

[address] [place] [address], [place] [address]

                                         

 

                          [facility]     [address] [id number]

[address] [place] [address], [place] [address]

                          21 Dec, 2012               

 

                          [facility]     [address] [id number]

[address] [place] [address], [place] [address]

                          21 Dec, 2012               

 

                          [facility]     [address] [id number]

[address] [place] [address], [place] [address]

                          14 Dec, 2012               

 

                          [facility]     [address] [id number]

[address] [place] [address], [place] [address]

                          14 Dec, 2012               

 

                          [facility]     [address] [id number]

[address] [place] [address], [place] [address]

                          20 2012               

 

                          [facility]     [address] [id number]

[address] [place] [address], [place] [address]

                          20 2012               

 

                          [facility]     [address] [id number]

[address] [place] [address], [place] [address]

                          16 2012               

 

                          [facility]     [address] [id number]

[address] [place] [address], [place] [address]

                          16 2012               

 

                          [facility]     [address] [id number]

[address] [place] [address], [place] [address]

                          13 2012               

 

                          [facility]     [address] [id number]

[address] [place] [address], [place] [address]

                          13 2012               

 

                          [facility]     [address] [id number]

[address] [place] [address], [place] [address]

                          13 2012               

 

                          [facility]     [address] [id number]

[address] [place] [address], [place] [address]

                                         

 

                          [facility]     [address] [id number]

[address] [place] [address], [place] [address]

                                         

 

                          [facility]     [address] [id number]

[address] [place] [address], [place] [address]

                                         

 

                          [facility]     [address] [id number]

[address] [place] [address], [place] [address]

                          22 Oct, 2012               

 

                          [facility]     [address] [id number]

[address] [place] [address], [place] [address]

                          22 Oct, 2012               

 

                          [facility]     [address] [id number]

[address] [place] [address]

                          22 Oct, 2012               

 

                          [facility]     [address] [id number]

[address] [place] [address], [place] [address]

                          22 Oct, 2012               

 

                          [facility]     [address] [id number]

[address] [place] [address], [place] [address]

                          10 Oct, 2012               

 

                          [facility]     [address] [id number]

[address] [place] [address], [place] [address]

                          10 Oct, 2012               

 

                          [facility]     [address] [id number]

[address] [place] [address], [place] [address]

                          05 Oct, 2012               

 

                          [facility]     [address] [id number]

[address] [place] [address], [place] [address]

                          05 Oct, 2012               

 

                          [facility]     [address] [id number]

[address] [place] [address], [place] [address]

                          07 Sep, 2012               

 

                          [facility]     [address] [id number]

[address] [place] [address], [place] [address]

                          22 Aug, 2012               

 

                          [facility]     [address] [id number]

[address] [place] [address], [place] [address]

                          03 Aug, 2012               

 

                          [facility]     [address] [id number]

[address] [place] [address], [place] [address]

                                         

 

                          [facility]     [address] [id number]

[address] [place] [address], [place] [address]

                                         

 

                          [facility]     [address] [id number]

[address] [place] [address], [place] [address]

                                         

 

                          [facility]     [address] [id number]

[address] [place] [address], [place] [address]

                                         

 

                          [facility]     [address] [id number]

[address] [place] [address], [place] [address]

                                         

 

                          [facility]     [address] [id number]

[address] [place] [address], [place] [address]

                                         

 

                          [facility]     [address] [id number]

[address] [place] [address], [place] [address]

                                         

 

                          [facility]     [address] [id number]

[address] [place] [address], [place] [address]

                          30 May, 2012               

 

                          [facility]     [address] [id number]

[address] [place] [address], [place] [address]

                          30 May, 2012               

 

                          [facility]     [address] [id number]

[address] [place] [address], [place] [address]

                          21 May, 2012               

 

                          [facility]     [address] [id number]

[address] [place] [address], [place] [address]

                          14 May, 2012               

 

                          [facility]     [address] [id number]

[address] [place] [address], [place] [address]

                          04 May, 2012               

 

                          [facility]     [address] [id number]

[address] [place] [address], [place] [address]

                          04 May, 2012               

 

                          [facility]     [address] [id number]

[address] [place] [address], [place] [address]

                          04 May, 2012               

 

                          [facility]     [address] [id number]

[address] [place] [address], [place] [address]

                                         

 

                          [facility]     [address] [id number]

[address] [place] [address], [place] [address]

                                         

 

                          [facility]     [address] [id number]

[address] [place] [address], [place] [address]

                          23 Mar, 2012               

 

                          [facility]     [address] [id number]

[address] [place] [address], [place] [address]

                          15 Mar, 2012               

 

                          [facility]     [address] [id number]

[address] [place] [address], [place] [address]

                          13 Mar, 2012               

 

                          [facility]     [address] [id number]

[address] [place] [address], [place] [address]

                          12 Mar, 2012               

 

                          [facility]     [address] [id number]

[address] [place] [address], [place] [address]

                                         

 

                          [facility]     [address] [id number]

[address] [place] [address], [place] [address]

                                         

 

                          [facility]     [address] [id number]

[address] [place] [address], [place] [address]

                                         

 

                          [facility]     [address] [id number]

[address] [place] [address], [place] [address]

                                         

 

                          [facility]     [address] [id number]

[address] [place] [address], [place] [address]

                          29 Dec, 2011               

 

                          [facility]     [address] [id number]

[address] [place] [address], [place] [address]

                          29 Dec, 2011               

 

                          [facility]     [address] [id number]

[address] [place] [id number]

                          21 Dec, 2011               

 

                          [facility]     [address] [facility] [id number]

[address] [place] [phone]

                                         

 

                          [facility]     [address] [facility] [id number]

[address] [place] [phone]

                          22 Oct, 2011               

 

                          [facility]     [address] [facility] [id number]

[address] [place] [phone]

                          21 Oct, 2011               







IMMUNIZATIONS

No Known Immunizations



SOCIAL HISTORY

Never Assessed



REASON FOR VISIT





PLAN OF CARE





VITAL SIGNS





MEDICATIONS

Unknown Medications



RESULTS

No Results



PROCEDURES





                Procedure       Date Ordered    Result          Body Site

 

                PSYTX PT&/FAMILY 45 MINUTES Dec 13, 2013                     







INSTRUCTIONS





MEDICATIONS ADMINISTERED

No Known Medications



MEDICAL (GENERAL) HISTORY





                    Type                Description         Date

 

                    Medical History     depression           

 

                    Medical History     hyperlipidemia       

 

                    Hospitalization History staph in right leg

## 2020-07-25 NOTE — XMS REPORT
[facility]

                             Created on: 2020



Ignacio Valle

External Reference #: [id number]

: 1955

Sex: Male



Demographics





                          Address                   [address] [place]  [address]

 

                          Preferred Language        Unknown

 

                          Marital Status            Unknown

 

                          Spiritism Affiliation     Unknown

 

                          Race                      Unknown

 

                          Ethnic Group              Unknown





Author





                          Author                    Ignacio Gomes Doctor

 

                          Organization              [facility]

 

                          Address                   Unknown

 

                          Phone                     Unavailable







Care Team Providers





                    Care Team Member Name Role                Phone

 

                    Migration,  Doctor  Unavailable         Unavailable







PROBLEMS





          Type      Condition ICD9-CM Code HJT54-NG Code Onset Dates Condition S

tatus SNOMED 

Code

 

                          Problem                   Nonspecific elevation of lev

els of transaminase or lactic acid 

dehydrogenase (LDH) 790.4                                  Active       44762478

2

 

           Problem    Encounter for long-term (current) use of other medications

 V58.69                           

Active                                  050760720

 

          Problem   Edema     782.3                         Active    875017172

 

          Problem   Spontaneous ecchymoses 782.7                         Active 

   559691227

 

          Problem   Trigger finger (acquired) 727.03                        Acti

ve    0997599

 

          Problem   Varicose veins of lower extremities with inflammation 454.1 

                        Active    

44527030

 

           Problem    Persistent disorder of initiating or maintaining sleep 307

.42                           Active

                                        12601330

 

          Problem   Pityriasis versicolor 111.0                         Active  

  35806625

 

           Problem    Unspecified local infection of skin and subcutaneous tissu

e 686.9                            

Active                                  [id number]

 

          Problem   Unspecified viral hepatitis C without hepatic coma 070.70   

                     Active    

39498941

 

          Problem   Vascular disorder of skin 709.1                         Acti

ve    76541543

 

          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       

                 Active    

96697690

 

          Problem   Anxiety state, unspecified 300.00                        Act

kathia    700341648

 

          Problem   Other and unspecified hyperlipidemia 272.4                  

       Active    63849635

 

          Problem   Major depressive disorder, recurrent episode, mild 296.31   

                     Active    

83032467







ALLERGIES

No Information



ENCOUNTERS





                Encounter       Location        Date            Diagnosis

 

                    [facility] [address] [id number] 15 

Jesse, 2016                               Encounter for other specified administra

tive purpose Z02.89

 

                    [facility] [address] [id number] 12 

May, 2016                               Dental examination Z01.20 and Dental car

ies K02.9

 

                    [facility] [id number] [place] [address] 14 

Aug, 2015                               Edema 782.3 and Family history of corona

ry artery disease V17.3

 

                    [facility] [address] [facility] [place] [address] 07 

Aug, 2015                               Edema 782.3 and Family history of corona

ry artery disease V17.3

 

                    [facility] [address] [facility] [id number] [place] [id number]                                Dental examination V72.2

 

                    [facility] [address] [facility] [place] [address] 14 

2015                                

 

                    [facility] [id number] [place] [address]                                 

 

                    [facility] [id number] [place] [address] 20 

Mar, 2015                                

 

                    [facility] [id number] [place] [address] 20 

Mar, 2015                                

 

                    [facility] [id number] [place] [address]                                 

 

                    [facility] [id number] [place] [address]                                 

 

                    [facility] [id number] [place] [address]                                 

 

                    [facility] [id number] [place] [address]                                 

 

                    [facility] [id number] [place] [address] 11 

Dec, 2014                                

 

                    [facility] [id number] [place] [address] 11 

Dec, 2014                                

 

                    [facility] [id number] [place] [address]                                 

 

                    [facility] [id number] [place] [address]                                 

 

                    [facility] [id number] [place] [address] 30 

Oct, 2014                                

 

                    [facility] [id number] [place] [address] 30 

Oct, 2014                                

 

                    [facility] [place] [facility] [address] [facility] [id number] [facility] [place] [address] 10 

Sep, 2014                                

 

                    [facility] [place] [facility] [address] [facility] [id number] [facility] [place] [address] 09 

Sep, 2014                                

 

                    [facility] [place] [facility] [address] [id number] [facility] [place] [address] 09 

Sep, 2014                                

 

                    [facility] [place] [facility] [address] [facility] [id number] [place] [address] 22 

Aug, 2014                                

 

                    [facility] [place] [facility] [address] [facility] [id number] [place] [address] 22 

Aug, 2014                                

 

                    [facility] [place] [facility] [address] [id number] [facility] [place] [address] 22 

Aug, 2014                                

 

                    [facility] [place] [facility] [address] [facility] [place] [address]                                 

 

                    [facility] [place] [facility] [address] [id number] [place] [address]                                 

 

                    [facility] [place] [facility] [address] [facility] [id number] [facility] [place] [address]                                 

 

                    [facility] [place] [facility] [address] [id number] [facility] [place] [address]                                 

 

                    [facility] [place] [facility] [address] [id number] [place] [address]                                 

 

                    [facility] [place] [facility] [address] [id number] [place] [address]                                 

 

                    [facility] [place] [facility] [address] [facility] [id number] [facility] [place] [address]                                 

 

                    [facility] [place] [facility] [address] [id number] [place] [address]                                 

 

                    [facility] [place] [facility] [address] [facility] [place] [address] 20 

May, 2014                                

 

                    [facility] [place] [facility] [address] [facility] [place] [address] 20 

May, 2014                                

 

                    [facility] [place] [facility] [address] [facility] [place] [address]                                 

 

                    [facility] [place] [facility] [address] [facility] [place] [address]                                 

 

                    [facility] [place] [facility] [address] [id number] [place] [address]                                 

 

                    [facility] [place] [facility] [address] [id number] [place] [address]                                 

 

                    [facility] [place] [facility] [address] [facility] [place] [address]                                 

 

                    [facility] [place] [facility] [address] [id number] [place] [address]                                 

 

                    [facility] [place] [facility] [address] [id number] [place] [address] 13 

Dec, 2013                                

 

                    [facility] [place] [facility] [address] [facility] [place] [address] 13 

Dec, 2013                                

 

                    [facility] [place] [facility] [address] [facility] [place] [address] 13 

Dec, 2013                                

 

                    [facility] [place] [facility] [address] [id number] [place] [address] 13 

Dec, 2013                                

 

                    [facility] [place] [facility] [address] [id number] [place] [address] 17 

Oct, 2013                                

 

                    [facility] [place] [facility] [address] [facility] [place] [address] 17 

Oct, 2013                                

 

                    [facility] [place] [facility] [address] [facility] [place] [address] 10 

Oct, 2013                                

 

                    [facility] [place] [facility] [address] [id number] [place] [address] 10 

Oct, 2013                                

 

                    [facility] [place] [facility] [address] [id number] [place] [address] 04 

Oct, 2013                                

 

                    [facility] [place] [facility] [address] [id number] [place] [address] 24 

Sep, 2013                                

 

                    [facility] [place] [facility] [address] [id number] [place] [address] 19 

Sep, 2013                                

 

                    [facility] [place] [facility] [address] [id number] [facility] [place] [address] 16 

Sep, 2013                                

 

                    [facility] [place] [facility] [address] [facility] [id number] [facility] [place] [address] 21 

Aug, 2013                                

 

                    [facility] [place] [facility] [address] [facility] [id number] [facility] [place] [address] 17 

Aug, 2013                                

 

                    [facility] [place] [facility] [address] [facility] [id number] [facility] [place] [address] 16 

Aug, 2013                                

 

                    [facility] [place] [facility] [address] [facility] [id number] [facility] [place] [address] 15 

Aug, 2013                                

 

                    [facility] [place] [facility] [address] [facility] [id number] [facility] [place] [address] 13 

Aug, 2013                                

 

                    [facility] [place] [facility] [address] [facility] [id number] [place] [address] 13 

Aug, 2013                                

 

                    [facility] [place] [facility] [address] [id number] [place] [address] 12 

Aug, 2013                                

 

                    [facility] [place] [facility] [address] [facility] [id number] [place] [address]                                 

 

                    [facility] [place] [facility] [address] [id number] [place] [address]                                 

 

                    [facility] [place] [facility] [address] [facility] [id number] [place] [address]                                 

 

                    [facility] [place] [facility] [address] [id number] [place] [address] 10 

Jesse, 2013                                

 

                    [facility] [place] [facility] [address] [facility] [id number] [place] [address] 17 

May, 2013                                

 

                    [facility] [place] [facility] [address] [facility] [id number] [place] [address] 10 

May, 2013                                

 

                    [facility] [place] [facility] [address] [facility] [id number] [place] [address] 15 

Apr, 2013                                

 

                    [facility] [place] [facility] [address] [id number] [place] [address]                                 

 

                    [facility] [place] [facility] [address] [id number] [place] [address] 11 

2013                                

 

                    [facility] [place] [facility] [address] [id number] [place] [address]                                 

 

                    [facility] [place] [facility] [address] [id number] [place] [address]                                 

 

                    [facility] [place] [facility] [address] [id number] [place] [address]                                 

 

                    [facility] [place] [facility] [address] [id number] [place] [address]                                 

 

                    [facility] [place] [facility] [address] [id number] [place] [address]                                 

 

                    [facility] [place] [facility] [address] [id number] [place] [address] 21 

Dec, 2012                                

 

                    [facility] [place] [facility] [address] [id number] [place] [address] 21 

Dec, 2012                                

 

                    [facility] [place] [facility] [address] [id number] [place] [address] 14 

Dec, 2012                                

 

                    [facility] [place] [facility] [address] [id number] [place] [address] 14 

Dec, 2012                                

 

                    [facility] [place] [facility] [address] [id number] [place] [address] 20 

2012                                

 

                    [facility] [place] [facility] [address] [id number] [place] [address] 20 

2012                                

 

                    [facility] [place] [facility] [address] [id number] [place] [address] 16 

2012                                

 

                    [facility] [place] [facility] [address] [id number] [place] [address] 16 

2012                                

 

                    [facility] [place] [facility] [id number] [place] [address] 13 

2012                                

 

                    [facility] [place] [facility] [address] [id number] [place] [address] 13 

2012                                

 

                    [facility] [place] [facility] [address] [id number] [place] [address] 13 

2012                                

 

                    [facility] [place] [facility] [address] [id number] [place] [address]                                 

 

                    [facility] [place] [facility] [address] [id number] [place] [address]                                 

 

                    [facility] [place] [facility] [address] [id number] [place] [address]                                 

 

                    [facility] [place] [facility] [address] [id number] [place] [address] 22 

Oct, 2012                                

 

                    [facility] [place] [facility] [address] [id number] [place] [address] 22 

Oct, 2012                                

 

                    [facility] [place] [facility] [address] [id number] [place] [address] 22 

Oct, 2012                                

 

                    [facility] [place] [facility] [address] [id number] [place] [address] 22 

Oct, 2012                                

 

                    [facility] [place] [facility] [address] [id number] [place] [address] 10 

Oct, 2012                                

 

                    [facility] [place] [facility] [address] [id number] [place] [address] 10 

Oct, 2012                                

 

                    [facility] [place] [facility] [address] [id number] [place] [address] 05 

Oct, 2012                                

 

                    [facility] [place] [facility] [address] [id number] [place] [address] 05 

Oct, 2012                                

 

                    [facility] [place] [facility] [address] [facility] [place] [address] 07 

Sep, 2012                                

 

                    [facility] [place] [facility] [address] [id number] [place] [address] 22 

Aug, 2012                                

 

                    [facility] [place] [facility] [address] [id number] [place] [address] 03 

Aug, 2012                                

 

                    [facility] [place] [facility] [address] [id number] [place] [address]                                 

 

                    [facility] [place] [facility] [address] [id number] [place] [address]                                 

 

                    [facility] [place] [facility] [address] [id number] [place] [address]                                 

 

                    [facility] [place] [facility] [address] [id number] [place] [address]                                 

 

                    [facility] [place] [facility] [address] [id number] [place] [address]                                 

 

                    [facility] [place] [facility] [address] [id number] [place] [address]                                 

 

                    [facility] [place] [facility] [address] [id number] [place] [address]                                 

 

                    [facility] [place] [facility] [address] [facility] [id number] [facility] [place] [address] 30 

May, 2012                                

 

                    [facility] [place] [facility] [address] [id number] [place] [address] 30 

May, 2012                                

 

                    [facility] [place] [facility] [address] [id number] [place] [address] 21 

May, 2012                                

 

                    [facility] [place] [facility] [address] [id number] [place] [address] 14 

May, 2012                                

 

                    [facility] [place] [facility] [address] [id number] [place] [address] 04 

May, 2012                                

 

                    [facility] [place] [facility] [address] [id number] [place] [address] 04 

May, 2012                                

 

                    [facility] [place] [facility] [address] [id number] [place] [address] 04 

May, 2012                                

 

                    [facility] [place] [facility] [address] [id number] [place] [address]                                 

 

                    [facility] [place] [facility] [address] [id number] [place] [address]                                 

 

                    [facility] [place] [facility] [address] [id number] [place] [address] 23 

Mar, 2012                                

 

                    [facility] [place] [facility] [address] [id number] [place] [address] 15 

Mar, 2012                                

 

                    [facility] [place] [facility] [address] [id number] [place] [address] 13 

Mar, 2012                                

 

                    [facility] [place] [facility] [address] [id number] [place] [address] 12 

Mar, 2012                                

 

                    [facility] [address] [id number] [place] [address]                                 

 

                    [facility] [address] [facility] [place] [address]                                 

 

                    [facility] [address] [facility] [place] [address]                                 

 

                    [facility] [id number] [place] [address]                                 

 

                    [facility] [id number] [place] [address] 29 

Dec, 2011                                

 

                    [facility] [id number] [place] [address] 29 

Dec, 2011                                

 

                    [facility] [id number] [place] [address] 21 

Dec, 2011                                

 

                    [facility] [address] [facility] [place] [address]                                 

 

                    [facility] [id number] [place] [address] 22 

Oct, 2011                                

 

                    [facility] [address] [facility] [place] [address] 21 

Oct, 2011                                







IMMUNIZATIONS

No Known Immunizations



SOCIAL HISTORY

Never Assessed



REASON FOR VISIT





PLAN OF CARE





VITAL SIGNS





MEDICATIONS

Unknown Medications



RESULTS

No Results



PROCEDURES

No Known procedures



INSTRUCTIONS





MEDICATIONS ADMINISTERED

No Known Medications



MEDICAL (GENERAL) HISTORY





                    Type                Description         Date

 

                    Medical History     depression           

 

                    Medical History     hyperlipidemia       

 

                    Hospitalization History staph in right leg

## 2020-07-25 NOTE — XMS REPORT
[facility]

                             Created on: 2020



Ignacio Valle

External Reference #: [id number]

: 1955

Sex: Male



Demographics





                          Address                   [address] [place]  [address]

 

                          Preferred Language        Unknown

 

                          Marital Status            Unknown

 

                          Islam Affiliation     Unknown

 

                          Race                      Unknown

 

                          Ethnic Group              Unknown





Author





                          Author                    Ignacio SHAH

 

                          Organization              [facility]

 

                          Address                   [address] [place]  [address]



 

                          Phone                     [phone]







Care Team Providers





                    Care Team Member Name Role                Phone

 

                    WARNER SHAH      Unavailable         [phone]







PROBLEMS





          Type      Condition ICD9-CM Code MXI02-QQ Code Onset Dates Condition S

tatus SNOMED 

Code

 

                          Problem                   Nonspecific elevation of lev

els of transaminase or lactic acid 

dehydrogenase (LDH) 790.4                                  Active       15197363

2

 

           Problem    Encounter for long-term (current) use of other medications

 V58.69                           

Active                                  [id number]

 

          Problem   Edema     782.3                         Active    132769384

 

          Problem   Spontaneous ecchymoses 782.7                         Active 

   [id number]

 

          Problem   Trigger finger (acquired) 727.03                        Acti

ve    4090792

 

          Problem   Varicose veins of lower extremities with inflammation 454.1 

                        Active    

48535700

 

           Problem    Persistent disorder of initiating or maintaining sleep 307

.42                           Active

                                        [id number]

 

          Problem   Pityriasis versicolor 111.0                         Active  

  59053764

 

           Problem    Unspecified local infection of skin and subcutaneous tissu

e 686.9                            

Active                                  598469686

 

          Problem   Unspecified viral hepatitis C without hepatic coma 070.70   

                     Active    

27308758

 

          Problem   Vascular disorder of skin 709.1                         Acti

ve    97896931

 

          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       

                 Active    

31168149

 

          Problem   Anxiety state, unspecified 300.00                        Act

kathia    186544797

 

          Problem   Other and unspecified hyperlipidemia 272.4                  

       Active    48117845

 

          Problem   Major depressive disorder, recurrent episode, mild 296.31   

                     Active    

84657600







ALLERGIES

No Information



ENCOUNTERS





                Encounter       Location        Date            Diagnosis

 

                          [facility]   [address] [id number]

[address] [place] [id number]

                          15 Jesse, 2016              Encounter for other specifie

d administrative purpose Z02.89

 

                          [facility]   [address] [id number]

[address] [place] [id number]

                          12 May, 2016              Dental examination Z01.20 an

d Dental caries K02.9

 

                          [facility]     [address] [facility] [id number]

[address] [place] [address]

                          14 Aug, 2015              Edema 782.3 and Family histo

ry of coronary artery disease V17.3

 

                          [facility]     [address] [id number]

[address] [place] [address]

                          07 Aug, 2015              Edema 782.3 and Family histo

ry of coronary artery disease V17.3

 

                          [facility]   [address] [id number]

[address] [place] [id number]

                                        Dental examination V72.2

 

                          [facility]     [address] [id number]

[address] [place] [address]

                                         

 

                          [facility]     [address] [id number]

[address] [place] [address]

                                         

 

                          [facility]     [address] [id number]

[address] [place] [address]

                          20 Mar, 2015               

 

                          [facility]     [address] [id number]

[address] [place] [address]

                          20 Mar, 2015               

 

                          [facility]     [address] [id number]

[address] [place] [address]

                                         

 

                          [facility]     [address] [id number]

[address] [place] [address]

                                         

 

                          [facility]     [address] [id number]

[address] [place] [address]

                                         

 

                          [facility]     [address] [id number]

[address] [place] [address]

                                         

 

                          [facility]     [address] [id number]

[address] [place] [address]

                          11 Dec, 2014               

 

                          [facility]     [address] [id number]

[address] [place] [address]

                          11 Dec, 2014               

 

                          [facility]     [address] [id number]

[address] [place] [address]

                                         

 

                          [facility]     [address] [id number]

[address] [place] [address]

                                         

 

                          [facility]     [address] [id number]

[address] [place] [address], [place] [address]

                          30 Oct, 2014               

 

                          [facility]     [address] [id number]

[address] [place] [address], [place] [address]

                          30 Oct, 2014               

 

                          [facility]     [address] [id number]

[address] [place] [address], [place] [address]

                          10 Sep, 2014               

 

                          [facility]     [address] [id number]

[address] [place] [address], [place] [address]

                          09 Sep, 2014               

 

                          [facility]     [address] [id number]

[address] [place] [address], [place] [address]

                          09 Sep, 2014               

 

                          [facility]     [address] [id number]

[address] [place] [address], [place] [address]

                          22 Aug, 2014               

 

                          [facility]     [address] [id number]

[address] [place] [address], [place] [address]

                          22 Aug, 2014               

 

                          [facility]     [address] [id number]

[address] [place] [address], [place] [address]

                          22 Aug, 2014               

 

                          [facility]     [address] [id number]

[address] [place] [address], [place] [address]

                                         

 

                          [facility]     [address] [id number]

[address] [place] [address], [place] [address]

                                         

 

                          [facility]     [address] [id number]

[address] [place] [address], [place] [address]

                                         

 

                          [facility]     [address] [id number]

[address] [place] [address], [place] [address]

                                         

 

                          [facility]     [address] [id number]

[address] [place] [address], [place] [address]

                                         

 

                          [facility]     [address] [id number]

[address] [place] [address], [place] [address]

                                         

 

                          [facility]     [address] [id number]

[address] [place] [address], [place] [address]

                                         

 

                          [facility]     [address] [id number]

[address] [place] [address], [place] [address]

                                         

 

                          [facility]     [address] [id number]

[address] [place] [address], [place] [address]

                          20 May, 2014               

 

                          [facility]     [address] [id number]

[address] [place] [address], [place] [address]

                          20 May, 2014               

 

                          [facility]     [address] [id number]

[address] [place] [address], [place] [address]

                                         

 

                          [facility]     [address] [id number]

[address] [place] [address], [place] [address]

                                         

 

                          [facility]     [address] [id number]

[address] [place] [address], [place] [address]

                                         

 

                          [facility]     [address] [id number]

[address] [place] [address], [place] [address]

                                         

 

                          [facility]     [address] [id number]

[address] [place] [address], [place] [address]

                                         

 

                          [facility]     [address] [id number]

[address] [place] [address], [place] [address]

                                         

 

                          [facility]     [address] [id number]

[address] [place] [address], [place] [address]

                          13 Dec, 2013               

 

                          [facility]     [address] [id number]

[address] [place] [address], [place] [address]

                          13 Dec, 2013               

 

                          [facility]     [address] [id number]

[address] [place] [address], [place] [address]

                          13 Dec, 2013               

 

                          [facility]     [address] [id number]

[address] [place] [address], [place] [address]

                          13 Dec, 2013               

 

                          [facility]     [address] [id number]

[address] [place] [address], [place] [address]

                          17 Oct, 2013               

 

                          [facility]     [address] [id number]

[address] [place] [address], [place] [address]

                          17 Oct, 2013               

 

                          [facility]     [address] [id number]

[address] [place] [address], [place] [address]

                          10 Oct, 2013               

 

                          [facility]     [address] [id number]

[address] [place] [address], [place] [address]

                          10 Oct, 2013               

 

                          [facility]     [address] [id number]

[address] [place] [address], [place] [address]

                          04 Oct, 2013               

 

                          [facility]     [address] [id number]

[address] [place] [address], [place] [address]

                          24 Sep, 2013               

 

                          [facility]     [address] [id number]

[address] [place] [address], [place] [address]

                          19 Sep, 2013               

 

                          [facility]     [address] [id number]

[address] [place] [address], [place] [address]

                          16 Sep, 2013               

 

                          [facility]     [address] [id number]

[address] [place] [address], [place] [address]

                          21 Aug, 2013               

 

                          [facility]     [address] [id number]

[address] [place] [address], [place] [address]

                          17 Aug, 2013               

 

                          [facility]     [address] [id number]

[address] [place] [address], [place] [address]

                          16 Aug, 2013               

 

                          [facility]     [address] [id number]

[address] [place] [address], [place] [address]

                          15 Aug, 2013               

 

                          [facility]     [address] [id number]

[address] [place] [address], [place] [address]

                          13 Aug, 2013               

 

                          [facility]     [address] [id number]

[address] [place] [address], [place] [address]

                          13 Aug, 2013               

 

                          [facility]     [address] [id number]

[address] [place] [address], [place] [address]

                          12 Aug, 2013               

 

                          [facility]     [address] [id number]

[address] [place] [address], [place] [address]

                                         

 

                          [facility]     [address] [id number]

[address] [place] [address], [place] [address]

                                         

 

                          [facility]     [address] [id number]

[address] [place] [address], [place] [address]

                                         

 

                          [facility]     [address] [id number]

[address] [place] [address], [place] [address]

                          10 2013               

 

                          [facility]     [address] [id number]

[address] [place] [address], [place] [address]

                          17 May, 2013               

 

                          [facility]     [address] [id number]

[address] [place] [address], [place] [address]

                          10 May, 2013               

 

                          [facility]     [address] [id number]

[address] [place] [address], [place] [address]

                          15 Apr, 2013               

 

                          [facility]     [address] [id number]

[address] [place] [address], [place] [address]

                                         

 

                          [facility]     [address] [id number]

[address] [place] [address], [place] [address]

                                         

 

                          [facility]     [address] [id number]

[address] [place] [address], [place] [address]

                          24 2013               

 

                          [facility]     [address] [id number]

[address] [place] [address], [place] [address]

                                         

 

                          [facility]     [address] [id number]

[address] [place] [address], [place] [address]

                                         

 

                          [facility]     [address] [id number]

[address] [place] [address], [place] [address]

                                         

 

                          [facility]     [address] [id number]

[address] [place] [address], [place] [address]

                                         

 

                          [facility]     [address] [id number]

[address] [place] [address], [place] [address]

                          21 Dec, 2012               

 

                          [facility]     [address] [id number]

[address] [place] [address], [place] [address]

                          21 Dec, 2012               

 

                          [facility]     [address] [id number]

[address] [place] [address], [place] [address]

                          14 Dec, 2012               

 

                          [facility]     [address] [id number]

[address] [place] [address], [place] [address]

                          14 Dec, 2012               

 

                          [facility]     [address] [id number]

[address] [place] [address], [place] [address]

                                         

 

                          [facility]     [address] [id number]

[address] [place] [address], [place] [address]

                          20 2012               

 

                          [facility]     [address] [id number]

[address] [place] [address], [place] [address]

                          16 2012               

 

                          [facility]     [address] [id number]

[address] [place] [address], [place] [address]

                          16 2012               

 

                          [facility]     [address] [id number]

[address] [place] [address], [place] [address]

                          13 2012               

 

                          [facility]     [address] [id number]

[address] [place] [address], [place] [address]

                          13 2012               

 

                          [facility]     [address] [id number]

[address] [place] [address], [place] [address]

                          13 2012               

 

                          [facility]     [address] [id number]

[address] [place] [address], [place] [address]

                                         

 

                          [facility]     [address] [id number]

[address] [place] [address], [place] [address]

                                         

 

                          [facility]     [address] [id number]

[address] [place] [address], [place] [address]

                                         

 

                          [facility]     [address] [id number]

[address] [place] [address], [place] [address]

                          22 Oct, 2012               

 

                          [facility]     [address] [id number]

[address] [place] [address], [place] [address]

                          22 Oct, 2012               

 

                          [facility]     [address] [id number]

[address] [place] [address], [place] [address]

                          22 Oct, 2012               

 

                          [facility]     [address] [id number]

[address] [place] [address], [place] [address]

                          22 Oct, 2012               

 

                          [facility]     [address] [id number]

[address] [place] [address], [place] [address]

                          10 Oct, 2012               

 

                          [facility]     [address] [id number]

[address] [place] [address], [place] [address]

                          10 Oct, 2012               

 

                          [facility]     [address] [id number]

[address] [place] [address]

                          05 Oct, 2012               

 

                          [facility]     [address] [id number]

[address] [place] [address], [place] [address]

                          05 Oct, 2012               

 

                          [facility]     [address] [id number]

[address] [place] [address], [place] [address]

                          07 Sep, 2012               

 

                          [facility]     [address] [id number]

[address] [place] [address], [place] [address]

                          22 Aug, 2012               

 

                          [facility]     [address] [id number]

[address] [place] [address], [place] [address]

                          03 Aug, 2012               

 

                          [facility]     [address] [id number]

[address] [place] [address], [place] [address]

                                         

 

                          [facility]     [address] [id number]

[address] [place] [address], [place] [address]

                                         

 

                          [facility]     [address] [id number]

[address] [place] [address], [place] [address]

                                         

 

                          [facility]     [address] [id number]

[address] [place] [address], [place] [address]

                                         

 

                          [facility]     [address] [id number]

[address] [place] [address], [place] [address]

                                         

 

                          [facility]     [address] [id number]

[address] [place] [address], [place] [address]

                                         

 

                          [facility]     [address] [id number]

[address] [place] [address], [place] [address]

                                         

 

                          [facility]     [address] [id number]

[address] [place] [address], [place] [address]

                          30 May, 2012               

 

                          [facility]     [address] [id number]

[address] [place] [address], [place] [address]

                          30 May, 2012               

 

                          [facility]     [address] [id number]

[address] [place] [address], [place] [address]

                          21 May, 2012               

 

                          [facility]     [address] [id number]

[address] [place] [address], [place] [address]

                          14 May, 2012               

 

                          [facility]     [address] [id number]

[address] [place] [address], [place] [address]

                          04 May, 2012               

 

                          [facility]     [address] [id number]

[address] [place] [address], [place] [address]

                          04 May, 2012               

 

                          [facility]     [address] [id number]

[address] [place] [address], [place] [address]

                          04 May, 2012               

 

                          [facility]     [address] [id number]

[address] [place] [address], [place] [address]

                                         

 

                          [facility]     [address] [id number]

[address] [place] [address], [place] [address]

                                         

 

                          [facility]     [address] [id number]

[address] [place] [address], [place] [address]

                          23 Mar, 2012               

 

                          [facility]     [address] [id number]

[address] [place] [address], [place] [address]

                          15 Mar, 2012               

 

                          [facility]     [address] [id number]

[address] [place] [address], [place] [address]

                          13 Mar, 2012               

 

                          [facility]     [address] [id number]

[address] [place] [address], [place] [address]

                          12 Mar, 2012               

 

                          [facility]     [address] [id number]

[address] [place] [address], [place] [address]

                                         

 

                          [facility]     [address] [id number]

[address] [place] [address], [place] [address]

                                         

 

                          [facility]     [address] [id number]

[address] [place] [address], [place] [address]

                                         

 

                          [facility]     [address] [id number]

[address] [place] [address], [place] [address]

                                         

 

                          [facility]     [address] [id number]

[address] [place] [address], [place] [address]

                          29 Dec, 2011               

 

                          [facility]     [address] [id number]

[address] [place] [address], [place] [address]

                          29 Dec, 2011               

 

                          [facility]     [address] [id number]

[address] [place] [address], [place] [address]

                          21 Dec, 2011               

 

                          [facility]     [address] [facility] [id number]

[address] [place] [address]

                                         

 

                          [facility]     [address] [facility] [id number]

[address] [place] [address]

                          22 Oct, 2011               

 

                          [facility]     [address] [facility] [id number]

[address] [place] [address]

                          21 Oct, 2011               







IMMUNIZATIONS

No Known Immunizations



SOCIAL HISTORY

Never Assessed



REASON FOR VISIT





PLAN OF CARE





VITAL SIGNS





                    Height              69 in               2012

 

                    Weight              189 lbs             2012

 

                    Temperature         97.3 degrees Fahrenheit 2012

 

                    Heart Rate          74 bpm              2012

 

                    Respiratory Rate    18                  2012

 

                    Blood pressure systolic 96 mmHg             2012

 

                    Blood pressure diastolic 60 mmHg             2012







MEDICATIONS

Unknown Medications



RESULTS

No Results



PROCEDURES

No Known procedures



INSTRUCTIONS





MEDICATIONS ADMINISTERED

No Known Medications



MEDICAL (GENERAL) HISTORY





                    Type                Description         Date

 

                    Medical History     depression           

 

                    Medical History     hyperlipidemia       

 

                    Hospitalization History staph in right leg

## 2020-07-25 NOTE — XMS REPORT
[facility]

                             Created on: 2020



Ignacio Valle

External Reference #: [id number]

: 1955

Sex: Male



Demographics





                          Address                   [address] [place]  [address]

 

                          Preferred Language        Unknown

 

                          Marital Status            Unknown

 

                          Mu-ism Affiliation     Unknown

 

                          Race                      Unknown

 

                          Ethnic Group              Unknown





Author





                          Author                    Ignacio Gomes Doctor

 

                          Organization              [facility]

 

                          Address                   Unknown

 

                          Phone                     Unavailable







Care Team Providers





                    Care Team Member Name Role                Phone

 

                    Migration,  Doctor  Unavailable         Unavailable







PROBLEMS





          Type      Condition ICD9-CM Code UIN01-HK Code Onset Dates Condition S

tatus SNOMED 

Code

 

                          Problem                   Nonspecific elevation of lev

els of transaminase or lactic acid 

dehydrogenase (LDH) 790.4                                  Active       14855416

2

 

           Problem    Encounter for long-term (current) use of other medications

 V58.69                           

Active                                  19536

 

          Problem   Edema     782.3                         Active    853973442

 

          Problem   Spontaneous ecchymoses 782.7                         Active 

   904415267

 

          Problem   Trigger finger (acquired) 727.03                        Acti

ve    1424051

 

          Problem   Varicose veins of lower extremities with inflammation 454.1 

                        Active    

59948524

 

           Problem    Persistent disorder of initiating or maintaining sleep 307

.42                           Active

                                        18433288

 

          Problem   Pityriasis versicolor 111.0                         Active  

  75188419

 

           Problem    Unspecified local infection of skin and subcutaneous tissu

e 686.9                            

Active                                  071599352

 

          Problem   Unspecified viral hepatitis C without hepatic coma 070.70   

                     Active    

14583191

 

          Problem   Vascular disorder of skin 709.1                         Acti

ve    59404090

 

          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       

                 Active    

42471612

 

          Problem   Anxiety state, unspecified 300.00                        Act

kathia    726441707

 

          Problem   Other and unspecified hyperlipidemia 272.4                  

       Active    59197116

 

          Problem   Major depressive disorder, recurrent episode, mild 296.31   

                     Active    

11802437







ALLERGIES

No Information



ENCOUNTERS





                Encounter       Location        Date            Diagnosis

 

                    [facility] [address] [id number] 15 

Jesse, 2016                               Encounter for other specified administra

tive purpose Z02.89

 

                    [facility] [address] [id number] 12 

May, 2016                               Dental examination Z01.20 and Dental car

ies K02.9

 

                    [facility] [address] [id number] [place] [address] 14 

Aug, 2015                               Edema 782.3 and Family history of corona

ry artery disease V17.3

 

                    [facility] [address] [facility] [place] [address] 07 

Aug, 2015                               Edema 782.3 and Family history of corona

ry artery disease V17.3

 

                    [facility] [address] [facility] [id number] [place] [id number]                                Dental examination V72.2

 

                    [facility] [address] [facility] [place] [address] 14 

2015                                

 

                    [facility] [address] [id number] [place] [address]                                 

 

                    [facility] [address] [id number] [place] [address] 20 

Mar, 2015                                

 

                    [facility] [address] [id number] [place] [address] 20 

Mar, 2015                                

 

                    [facility] [address] [id number] [place] [address]                                 

 

                    [facility] [address] [id number] [place] [address]                                 

 

                    [facility] [address] [id number] [place] [address]                                 

 

                    [facility] [address] [id number] [place] [address]                                 

 

                    [facility] [id number] [place] [address] 11 

Dec, 2014                                

 

                    [facility] [address] [id number] [place] [address] 11 

Dec, 2014                                

 

                    [facility] [address] [id number] [place] [address]                                 

 

                    [facility] [address] [id number] [place] [address]                                 

 

                    [facility] [id number] [place] [address] 30 

Oct, 2014                                

 

                    [facility] [address] [id number] [place] [address] 30 

Oct, 2014                                

 

                    [facility] [place] [facility] [address] [facility] [id number] [facility] [place] [address] 10 

Sep, 2014                                

 

                    [facility] [place] [facility] [address] [facility] [id number] [facility] [place] [address] 09 

Sep, 2014                                

 

                    [facility] [place] [facility] [address] [id number] [facility] [place] [address] 09 

Sep, 2014                                

 

                    [facility] [place] [facility] [address] [facility] [id number] [place] [address] 22 

Aug, 2014                                

 

                    [facility] [place] [facility] [address] [facility] [id number] [place] [address] 22 

Aug, 2014                                

 

                    [facility] [place] [facility] [address] [id number] [facility] [place] [address] 22 

Aug, 2014                                

 

                    [facility] [place] [facility] [address] [facility] [place] [address]                                 

 

                    [facility] [place] [facility] [address] [facility] [place] [address]                                 

 

                    [facility] [place] [facility] [address] [facility] [id number] [facility] [place] [address]                                 

 

                    [facility] [place] [facility] [address] [id number] [facility] [place] [address]                                 

 

                    [facility] [place] [facility] [address] [id number] [place] [address]                                 

 

                    [facility] [place] [facility] [address] [id number] [place] [address]                                 

 

                    [facility] [place] [facility] [address] [facility] [id number] [facility] [place] [address]                                 

 

                    [facility] [place] [facility] [address] [id number] [place] [address]                                 

 

                    [facility] [place] [facility] [address] [id number] [place] [address] 20 

May, 2014                                

 

                    [facility] [place] [facility] [address] [facility] [place] [address] 20 

May, 2014                                

 

                    [facility] [place] [facility] [address] [id number] [place] [address]                                 

 

                    [facility] [place] [facility] [address] [id number] [place] [address]                                 

 

                    [facility] [place] [facility] [address] [id number] [place] [address]                                 

 

                    [facility] [place] [facility] [address] [id number] [place] [address]                                 

 

                    [facility] [place] [facility] [address] [id number] [place] [address]                                 

 

                    [facility] [place] [facility] [address] [id number] [place] [address]                                 

 

                    [facility] [place] [facility] [address] [id number] [place] [address] 13 

Dec, 2013                                

 

                    [facility] [place] [facility] [address] [id number] [place] [address] 13 

Dec, 2013                                

 

                    [facility] [place] [facility] [address] [id number] [place] [address] 13 

Dec, 2013                                

 

                    [facility] [place] [facility] [address] [id number] [place] [address] 13 

Dec, 2013                                

 

                    [facility] [place] [facility] [address] [id number] [place] [address] 17 

Oct, 2013                                

 

                    [facility] [place] [facility] [address] [id number] [place] [address] 17 

Oct, 2013                                

 

                    [facility] [place] [facility] [address] [facility] [place] [address] 10 

Oct, 2013                                

 

                    [facility] [place] [facility] [address] [id number] [place] [address] 10 

Oct, 2013                                

 

                    [facility] [place] [facility] [address] [id number] [place] [address] 04 

Oct, 2013                                

 

                    [facility] [place] [facility] [address] [id number] [place] [address] 24 

Sep, 2013                                

 

                    [facility] [place] [facility] [address] [id number] [place] [address] 19 

Sep, 2013                                

 

                    [facility] [place] [facility] [address] [id number] [facility] [place] [address] 16 

Sep, 2013                                

 

                    [facility] [place] [facility] [address] [facility] [id number] [facility] [place] [address] 21 

Aug, 2013                                

 

                    [facility] [place] [facility] [address] [facility] [id number] [facility] [place] [address] 17 

Aug, 2013                                

 

                    [facility] [place] [facility] [address] [facility] [id number] [facility] [place] [address] 16 

Aug, 2013                                

 

                    [facility] [place] [facility] [address] [facility] [id number] [facility] [place] [address] 15 

Aug, 2013                                

 

                    [facility] [place] [facility] [address] [facility] [id number] [facility] [place] [address] 13 

Aug, 2013                                

 

                    [facility] [place] [facility] [address] [facility] [id number] [place] [address] 13 

Aug, 2013                                

 

                    [facility] [place] [facility] [address] [id number] [place] [address] 12 

Aug, 2013                                

 

                    [facility] [place] [facility] [address] [facility] [id number] [place] [address]                                 

 

                    [facility] [place] [facility] [address] [id number] [place] [address]                                 

 

                    [facility] [place] [facility] [address] [facility] [id number] [place] [address]                                 

 

                    [facility] [place] [facility] [address] [id number] [place] [address] 10 

Jesse, 2013                                

 

                    [facility] [place] [facility] [address] [facility] [id number] [place] [address] 17 

May, 2013                                

 

                    [facility] [place] [facility] [address] [facility] [id number] [place] [address] 10 

May, 2013                                

 

                    [facility] [place] [facility] [address] [facility] [id number] [place] [address] 15 

Apr, 2013                                

 

                    [facility] [place] [facility] [address] [id number] [place] [address]                                 

 

                    [facility] [place] [facility] [address] [id number] [place] [address] 11 

2013                                

 

                    [facility] [place] [facility] [address] [id number] [place] [address]                                 

 

                    [facility] [place] [facility] [address] [id number] [place] [address]                                 

 

                    [facility] [place] [facility] [address] [id number] [place] [address]                                 

 

                    [facility] [place] [facility] [address] [id number] [place] [address]                                 

 

                    [facility] [place] [facility] [address] [id number] [place] [address]                                 

 

                    [facility] [place] [facility] [address] [id number] [place] [address] 21 

Dec, 2012                                

 

                    [facility] [place] [facility] [address] [id number] [place] [address] 21 

Dec, 2012                                

 

                    [facility] [place] [facility] [address] [id number] [place] [address] 14 

Dec, 2012                                

 

                    [facility] [place] [facility] [address] [id number] [place] [address] 14 

Dec, 2012                                

 

                    [facility] [place] [facility] [address] [id number] [place] [address] 20 

2012                                

 

                    [facility] [place] [facility] [address] [facility] [place] [address] 20 

2012                                

 

                    [facility] [place] [facility] [address] [id number] [place] [address] 16 

2012                                

 

                    [facility] [place] [facility] [address] [id number] [place] [address] 16 

2012                                

 

                    [facility] [place] [facility] [address] [id number] [place] [address] 13 

2012                                

 

                    [facility] [place] [facility] [address] [id number] [place] [address] 13 

2012                                

 

                    [facility] [place] [facility] [address] [id number] [place] [address] 13 

2012                                

 

                    [facility] [place] [facility] [address] [id number] [place] [address]                                 

 

                    [facility] [place] [facility] [address] [id number] [place] [address]                                 

 

                    [facility] [place] [facility] [address] [id number] [place] [address]                                 

 

                    [facility] [place] [facility] [address] [id number] [place] [address] 22 

Oct, 2012                                

 

                    [facility] [place] [facility] [address] [id number] [place] [address] 22 

Oct, 2012                                

 

                    [facility] [place] [facility] [address] [id number] [place] [address] 22 

Oct, 2012                                

 

                    [facility] [place] [facility] [address] [facility] [place] [address] 22 

Oct, 2012                                

 

                    [facility] [place] [facility] [address] [facility] [place] [address] 10 

Oct, 2012                                

 

                    [facility] [place] [facility] [address] [id number] [place] [address] 10 

Oct, 2012                                

 

                    [facility] [place] [facility] [address] [id number] [place] [address] 05 

Oct, 2012                                

 

                    [facility] [place] [facility] [address] [id number] [place] [address] 05 

Oct, 2012                                

 

                    [facility] [place] [facility] [address] [facility] [place] [address] 07 

Sep, 2012                                

 

                    [facility] [place] [facility] [address] [id number] [place] [address] 22 

Aug, 2012                                

 

                    [facility] [place] [facility] [address] [id number] [place] [address] 03 

Aug, 2012                                

 

                    [facility] [place] [facility] [address] [id number] [place] [address]                                 

 

                    [facility] [place] [facility] [address] [id number] [place] [address]                                 

 

                    [facility] [place] [facility] [address] [id number] [place] [address]                                 

 

                    [facility] [place] [facility] [address] [id number] [place] [address]                                 

 

                    [facility] [place] [facility] [address] [id number] [place] [address]                                 

 

                    [facility] [place] [facility] [address] [id number] [place] [address]                                 

 

                    [facility] [place] [facility] [address] [id number] [place] [address]                                 

 

                    [facility] [place] [facility] [address] [facility] [id number] [facility] [place] [address] 30 

May, 2012                                

 

                    [facility] [place] [facility] [address] [id number] [place] [address] 30 

May, 2012                                

 

                    [facility] [place] [facility] [address] [id number] [place] [address] 21 

May, 2012                                

 

                    [facility] [place] [facility] [address] [id number] [place] [address] 14 

May, 2012                                

 

                    [facility] [place] [facility] [address] [id number] [place] [address] 04 

May, 2012                                

 

                    [facility] [place] [facility] [address] [id number] [place] [address] 04 

May, 2012                                

 

                    [facility] [place] [facility] [address] [id number] [place] [address] 04 

May, 2012                                

 

                    [facility] [place] [facility] [address] [id number] [place] [address]                                 

 

                    [facility] [place] [facility] [address] [id number] [place] [address]                                 

 

                    [facility] [place] [facility] [address] [id number] [place] [address] 23 

Mar, 2012                                

 

                    [facility] [place] [facility] [address] [id number] [place] [address] 15 

Mar, 2012                                

 

                    [facility] [place] [facility] [address] [id number] [place] [address] 13 

Mar, 2012                                

 

                    [facility] [place] [facility] [address] [id number] [place] [address] 12 

Mar, 2012                                

 

                    [facility] [address] [id number] [place] [address]                                 

 

                    [facility] [address] [id number] [place] [address]                                 

 

                    [facility] [address] [id number] [place] [address]                                 

 

                    [facility] [id number] [place] [address]                                 

 

                    [facility] [address] [id number] [place] [address] 29 

Dec, 2011                                

 

                    [facility] [address] [id number] [place] [address] 29 

Dec, 2011                                

 

                    [facility] [address] [id number] [place] [address] 21 

Dec, 2011                                

 

                    [facility] [address] [id number] [place] [address]                                 

 

                    [facility] [address] [id number] [place] [address] 22 

Oct, 2011                                

 

                    [facility] [address] [id number] [place] [address] 21 

Oct, 2011                                







IMMUNIZATIONS

No Known Immunizations



SOCIAL HISTORY

Never Assessed



REASON FOR VISIT





PLAN OF CARE





VITAL SIGNS





                    Weight              193.12 lbs          2014

 

                    Temperature         98 degrees Fahrenheit 2014

 

                    Heart Rate          76 bpm              2014

 

                    Respiratory Rate    24                  2014

 

                    Blood pressure systolic 118 mmHg            2014

 

                    Blood pressure diastolic 90 mmHg             2014







MEDICATIONS

Unknown Medications



RESULTS

No Results



PROCEDURES

No Known procedures



INSTRUCTIONS





MEDICATIONS ADMINISTERED

No Known Medications



MEDICAL (GENERAL) HISTORY





                    Type                Description         Date

 

                    Medical History     depression           

 

                    Medical History     hyperlipidemia       

 

                    Hospitalization History staph in right leg

## 2020-07-25 NOTE — XMS REPORT
[facility]

                             Created on: 2020



Ignacio Valle

External Reference #: [id number]

: 1955

Sex: Male



Demographics





                          Address                   [address] [place]  [address]

 

                          Preferred Language        Unknown

 

                          Marital Status            Unknown

 

                          Amish Affiliation     Unknown

 

                          Race                      Unknown

 

                          Ethnic Group              Unknown





Author





                          Author                    Ignacio Gomes Doctor

 

                          Organization              [facility]

 

                          Address                   Unknown

 

                          Phone                     Unavailable







Care Team Providers





                    Care Team Member Name Role                Phone

 

                    Migration,  Doctor  Unavailable         Unavailable







PROBLEMS





          Type      Condition ICD9-CM Code UHH88-HP Code Onset Dates Condition S

tatus SNOMED 

Code

 

                          Problem                   Nonspecific elevation of lev

els of transaminase or lactic acid 

dehydrogenase (LDH) 790.4                                  Active       [id number]

2

 

           Problem    Encounter for long-term (current) use of other medications

 V58.69                           

Active                                  [id number]

 

          Problem   Edema     782.3                         Active    782699835

 

          Problem   Spontaneous ecchymoses 782.7                         Active 

   [id number]

 

          Problem   Trigger finger (acquired) 727.03                        Acti

ve    5841387

 

          Problem   Varicose veins of lower extremities with inflammation 454.1 

                        Active    

97160189

 

           Problem    Persistent disorder of initiating or maintaining sleep 307

.42                           Active

                                        [id number]

 

          Problem   Pityriasis versicolor 111.0                         Active  

  21284143

 

           Problem    Unspecified local infection of skin and subcutaneous tissu

e 686.9                            

Active                                  820789011

 

          Problem   Unspecified viral hepatitis C without hepatic coma 070.70   

                     Active    

09592211

 

          Problem   Vascular disorder of skin 709.1                         Acti

ve    34714227

 

          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       

                 Active    

51237893

 

          Problem   Anxiety state, unspecified 300.00                        Act

kathia    560078315

 

          Problem   Other and unspecified hyperlipidemia 272.4                  

       Active    56220564

 

          Problem   Major depressive disorder, recurrent episode, mild 296.31   

                     Active    

22821178







ALLERGIES

No Information



ENCOUNTERS





                Encounter       Location        Date            Diagnosis

 

                          [facility]   [address] [id number]

[address] [id number]

                          15 Jesse, 2016              Encounter for other specifie

d administrative purpose Z02.89

 

                          [facility]   [address] [id number]

[address] [place] [id number]

                          12 May, 2016              Dental examination Z01.20 an

d Dental caries K02.9

 

                          [facility]     [address] [facility] [id number]

[address] [place] [address]

                          14 Aug, 2015              Edema 782.3 and Family histo

ry of coronary artery disease V17.3

 

                          [facility]     [address] [id number]

[address] [place] [address]

                          07 Aug, 2015              Edema 782.3 and Family histo

ry of coronary artery disease V17.3

 

                          [facility]   [address] [id number]

[address] [place] [id number]

                                        Dental examination V72.2

 

                          [facility]     [address] [id number]

[address] [place] [address]

                                         

 

                          [facility]     [address] [id number]

[address] [place] [address]

                                         

 

                          [facility]     [address] [id number]

[address] [place] [address]

                          20 Mar, 2015               

 

                          [facility]     [address] [id number]

[address] [place] [address]

                          20 Mar, 2015               

 

                          [facility]     [address] [id number]

[address] [place] [address]

                                         

 

                          [facility]     [address] [id number]

[address] [place] [address]

                                         

 

                          [facility]     [address] [id number]

[address] [place] [address]

                                         

 

                          [facility]     [address] [id number]

[address] [place] [address]

                                         

 

                          [facility]     [address] [id number]

[address] [place] [address]

                          11 Dec, 2014               

 

                          [facility]     [address] [id number]

[address] [place] [address]

                          11 Dec, 2014               

 

                          [facility]     [address] [id number]

[address] [place] [address]

                                         

 

                          [facility]     [address] [id number]

[address] [place] [address]

                                         

 

                          [facility]     [address] [id number]

[address] [place] [address], [place] [address]

                          30 Oct, 2014               

 

                          [facility]     [address] [id number]

[address] [place] [address], [place] [address]

                          30 Oct, 2014               

 

                          [facility]     [address] [id number]

[address] [place] [address], [place] [address]

                          10 Sep, 2014               

 

                          [facility]     [address] [id number]

[address] [place] [address], [place] [address]

                          09 Sep, 2014               

 

                          [facility]     [address] [id number]

[address] [place] [address], [place] [address]

                          09 Sep, 2014               

 

                          [facility]     [address] [id number]

[address] [place] [address], [place] [address]

                          22 Aug, 2014               

 

                          [facility]     [address] [id number]

[address] [place] [address], [place] [address]

                          22 Aug, 2014               

 

                          [facility]     [address] [id number]

[address] [place] [address], [place] [address]

                          22 Aug, 2014               

 

                          [facility]     [address] [id number]

[address] [place] [address], [place] [address]

                                         

 

                          [facility]     [address] [id number]

[address] [place] [address], [place] [address]

                                         

 

                          [facility]     [address] [id number]

[address] [place] [address], [place] [address]

                                         

 

                          [facility]     [address] [id number]

[address] [place] [address], [place] [address]

                                         

 

                          [facility]     [address] [id number]

[address] [place] [address], [place] [address]

                                         

 

                          [facility]     [address] [id number]

[address] [place] [address], [place] [address]

                                         

 

                          [facility]     [address] [id number]

[address] [place] [address], [place] [address]

                                         

 

                          [facility]     [address] [id number]

[address] [place] [address], [place] [address]

                                         

 

                          [facility]     [address] [id number]

[address] [place] [address], [place] [address]

                          20 May, 2014               

 

                          [facility]     [address] [id number]

[address] [place] [address], [place] [address]

                          20 May, 2014               

 

                          [facility]     [address] [id number]

[address] [place] [address], [place] [address]

                                         

 

                          [facility]     [address] [id number]

[address] [place] [address], [place] [address]

                                         

 

                          [facility]     [address] [id number]

[address] [place] [address], [place] [address]

                                         

 

                          [facility]     [address] [id number]

[address] [place] [address], [place] [address]

                                         

 

                          [facility]     [address] [id number]

[address] [place] [address], [place] [address]

                                         

 

                          [facility]     [address] [id number]

[address] [place] [address], [place] [address]

                                         

 

                          [facility]     [address] [id number]

[address] [place] [address], [place] [address]

                          13 Dec, 2013               

 

                          [facility]     [address] [id number]

[address] [place] [address], [place] [address]

                          13 Dec, 2013               

 

                          [facility]     [address] [id number]

[address] [place] [address], [place] [address]

                          13 Dec, 2013               

 

                          [facility]     [address] [id number]

[address] [place] [address], [place] [address]

                          13 Dec, 2013               

 

                          [facility]     [address] [id number]

[address] [place] [address], [place] [address]

                          17 Oct, 2013               

 

                          [facility]     [address] [id number]

[address] [place] [address], [place] [address]

                          17 Oct, 2013               

 

                          [facility]     [address] [id number]

[address] [place] [address], [place] [address]

                          10 Oct, 2013               

 

                          [facility]     [address] [id number]

[address] [place] [address] [place] [address]

                          10 Oct, 2013               

 

                          [facility]     [address] [id number]

[address] [place] [address], [place] [address]

                          04 Oct, 2013               

 

                          [facility]     [address] [id number]

[address] [place] [address], [place] [address]

                          24 Sep, 2013               

 

                          [facility]     [address] [id number]

[address] [place] [address], [place] [address]

                          19 Sep, 2013               

 

                          [facility]     [address] [id number]

[address] [place] [address], [place] [address]

                          16 Sep, 2013               

 

                          [facility]     [address] [id number]

[address] [place] [address], [place] [address]

                          21 Aug, 2013               

 

                          [facility]     [address] [id number]

[address] [place] [address], [place] [address]

                          17 Aug, 2013               

 

                          [facility]     [address] [id number]

[address] [place] [address], [place] [address]

                          16 Aug, 2013               

 

                          [facility]     [address] [id number]

[address] [place] [address], [place] [address]

                          15 Aug, 2013               

 

                          [facility]     [address] [id number]

[address] [place] [address], [place] [address]

                          13 Aug, 2013               

 

                          [facility]     [address] [id number]

[address] [place] [address], [place] [address]

                          13 Aug, 2013               

 

                          [facility]     [address] [id number]

[address] [place] [address], [place] [address]

                          12 Aug, 2013               

 

                          [facility]     [address] [id number]

[address] [place] [address], [place] [address]

                                         

 

                          [facility]     [address] [id number]

[address] [place] [address], [place] [address]

                                         

 

                          [facility]     [address] [id number]

[address] [place] [address], [place] [address]

                                         

 

                          [facility]     [address] [id number]

[address] [place] [address], [place] [address]

                          10 Jesse, 2013               

 

                          [facility]     [address] [id number]

[address] [place] [address], [place] [address]

                          17 May, 2013               

 

                          [facility]     [address] [id number]

[address] [place] [address], [place] [address]

                          10 May, 2013               

 

                          [facility]     [address] [id number]

[address] [place] [address], [place] [address]

                          15 Apr, 2013               

 

                          [facility]     [address] [id number]

[address] [place] [address], [place] [address]

                                         

 

                          [facility]     [address] [id number]

[address] [place] [address], [place] [address]

                                         

 

                          [facility]     [address] [id number]

[address] [place] [address], [place] [address]

                                         

 

                          [facility]     [address] [id number]

[address] [place] [address], [place] [address]

                                         

 

                          [facility]     [address] [id number]

[address] [place] [address], [place] [address]

                                         

 

                          [facility]     [address] [id number]

[address] [place] [address], [place] [address]

                                         

 

                          [facility]     [address] [id number]

[address] [place] [address], [place] [address]

                                         

 

                          [facility]     [address] [id number]

[address] [place] [address], [place] [address]

                          21 Dec, 2012               

 

                          [facility]     [address] [id number]

[address] [place] [address], [place] [address]

                          21 Dec, 2012               

 

                          [facility]     [address] [id number]

[address] [place] [address], [place] [address]

                          14 Dec, 2012               

 

                          [facility]     [address] [id number]

[address] [place] [address], [place] [address]

                          14 Dec, 2012               

 

                          [facility]     [address] [id number]

[address] [place] [address], [place] [address]

                                         

 

                          [facility]     [address] [id number]

[address] [address]

                          20 2012               

 

                          [facility]     [address] [id number]

[address] [place] [address], [place] [address]

                          16 2012               

 

                          [facility]     [address] [id number]

[address] [place] [address], [place] [address]

                          16 2012               

 

                          [facility]     [address] [id number]

[address] [place] [address], [place] [address]

                          13 2012               

 

                          [facility]     [address] [id number]

[address] [place] [address]

                          13 2012               

 

                          [facility]     [address] [id number]

[address] [place] [address], [place] [address]

                          13 2012               

 

                          [facility]     [address] [id number]

[address] [place] [address]

                          13 2012               

 

                          [facility]     [address] [id number]

[address] [place] [address], [place] [address]

                                         

 

                          [facility]     [address] [id number]

[address] [place] [address]

                                         

 

                          [facility]     [address] [id number]

[address] [place] [address], [place] [address]

                          22 Oct, 2012               

 

                          [facility]     [address] [id number]

[address] [place] [address], [place] [address]

                          22 Oct, 2012               

 

                          [facility]     [address] [id number]

[address] [place] [address]

                          22 Oct, 2012               

 

                          [facility]     [address] [id number]

[address] [place] [address]

                          22 Oct, 2012               

 

                          [facility]     [address] [id number]

[address] [place] [address], [place] [address]

                          10 Oct, 2012               

 

                          [facility]     [address] [id number]

[address] [place] [address]

                          10 Oct, 2012               

 

                          [facility]     [address] [id number]

[address] [place] [address]

                          05 Oct, 2012               

 

                          [facility]     [address] [id number]

[address] [place] [address], [place] [address]

                          05 Oct, 2012               

 

                          [facility]     [address] [id number]

[address] [place] [address], [place] [address]

                          07 Sep, 2012               

 

                          [facility]     [address] [id number]

[address] [place] [address], [place] [address]

                          22 Aug, 2012               

 

                          [facility]     [address] [id number]

[address] [place] [address], [place] [address]

                          03 Aug, 2012               

 

                          [facility]     [address] [id number]

[address] [place] [address], [place] [address]

                                         

 

                          [facility]     [address] [id number]

[address] [place] [address], [place] [address]

                                         

 

                          [facility]     [address] [id number]

[address] [place] [address], [place] [address]

                                         

 

                          [facility]     [address] [id number]

[address] [place] [address], [place] [address]

                                         

 

                          [facility]     [address] [id number]

[address] [place] [address], [place] [address]

                                         

 

                          [facility]     [address] [id number]

[address] [place] [address], [place] [address]

                                         

 

                          [facility]     [address] [id number]

[address] [place] [address], [place] [address]

                                         

 

                          [facility]     [address] [id number]

[address] [place] [address], [place] [address]

                          30 May, 2012               

 

                          [facility]     [address] [id number]

[address] [place] [address], [place] [address]

                          30 May, 2012               

 

                          [facility]     [address] [id number]

[address] [place] [address], [place] [address]

                          21 May, 2012               

 

                          [facility]     [address] [id number]

[address] [place] [address], [place] [address]

                          14 May, 2012               

 

                          [facility]     [address] [id number]

[address] [place] [address], [place] [address]

                          04 May, 2012               

 

                          [facility]     [address] [id number]

[address] [place] [address], [place] [address]

                          04 May, 2012               

 

                          [facility]     [address] [id number]

[address] [place] [address], [place] [address]

                          04 May, 2012               

 

                          [facility]     [address] [id number]

[address] [place] [address], [place] [address]

                                         

 

                          [facility]     [address] [id number]

[address] [place] [address], [place] [address]

                                         

 

                          [facility]     [address] [id number]

[address] [place] [address], [place] [address]

                          23 Mar, 2012               

 

                          [facility]     [address] [id number]

[address] [place] [address], [place] [address]

                          15 Mar, 2012               

 

                          [facility]     [address] [id number]

[address] [place] [address], [place] [address]

                          13 Mar, 2012               

 

                          [facility]     [address] [id number]

[address] [place] [address], [place] [address]

                          12 Mar, 2012               

 

                          [facility]     [address] [id number]

[address] [place] [address], [place] [address]

                                         

 

                          [facility]     [address] [id number]

[address] [place] [address], [place] [address]

                                         

 

                          [facility]     [address] [id number]

[address] [place] [address], [place] [address]

                                         

 

                          [facility]     [address] [id number]

[address] [place] [address], [place] [address]

                                         

 

                          [facility]     [address] [id number]

[address] [place] [address], [place] [address]

                          29 Dec, 2011               

 

                          [facility]     [address] [id number]

[address] [place] [address], [place] [address]

                          29 Dec, 2011               

 

                          [facility]     [address] [id number]

[address] [place] [address], [place] [address]

                          21 Dec, 2011               

 

                          [facility]     [address] [id number]

[address] [place] [address]

                                         

 

                          [facility]     [address] [facility] [id number]

[address] [place] [address]

                          22 Oct, 2011               

 

                          [facility]     [address] [facility] [id number]

[address] [place] [address]

                          21 Oct, 2011               







IMMUNIZATIONS

No Known Immunizations



SOCIAL HISTORY

Never Assessed



REASON FOR VISIT





PLAN OF CARE





VITAL SIGNS





MEDICATIONS

Unknown Medications



RESULTS

No Results



PROCEDURES





                Procedure       Date Ordered    Result          Body Site

 

                IRON BINDING TEST Aug 16, 2013                     

 

                ASSAY OF IRON   Aug 16, 2013                     

 

                ASSAY OF FERRITIN Aug 16, 2013                     

 

                ACUTE HEPATITIS PANEL Aug 16, 2013                     

 

                VENIPUNCT, ROUTINE* Aug 16, 2013                     







INSTRUCTIONS





MEDICATIONS ADMINISTERED

No Known Medications



MEDICAL (GENERAL) HISTORY





                    Type                Description         Date

 

                    Medical History     depression           

 

                    Medical History     hyperlipidemia       

 

                    Hospitalization History staph in right leg

## 2020-07-25 NOTE — XMS REPORT
[facility]

                             Created on: 2020



Leonard Ignacio

External Reference #: [id number]

: 1955

Sex: Male



Demographics





                          Address                   [address] [place]  [address]

 

                          Preferred Language        Unknown

 

                          Marital Status            Unknown

 

                          Holiness Affiliation     Unknown

 

                          Race                      Unknown

 

                          Ethnic Group              Unknown





Author





                          Author                    Ignacio Gomes Doctor

 

                          Organization              [facility]

 

                          Address                   Unknown

 

                          Phone                     Unavailable







Care Team Providers





                    Care Team Member Name Role                Phone

 

                    Migration,  Doctor  Unavailable         Unavailable







PROBLEMS





          Type      Condition ICD9-CM Code ECZ00-BX Code Onset Dates Condition S

tatus SNOMED 

Code

 

                          Problem                   Nonspecific elevation of lev

els of transaminase or lactic acid 

dehydrogenase (LDH) 790.4                                  Active       [id number]

2

 

           Problem    Encounter for long-term (current) use of other medications

 V58.69                           

Active                                  [id number]

 

          Problem   Edema     782.3                         Active    001523253

 

          Problem   Spontaneous ecchymoses 782.7                         Active 

   [id number]

 

          Problem   Trigger finger (acquired) 727.03                        Acti

ve    6256294

 

          Problem   Varicose veins of lower extremities with inflammation 454.1 

                        Active    

21144075

 

           Problem    Persistent disorder of initiating or maintaining sleep 307

.42                           Active

                                        [id number]

 

          Problem   Pityriasis versicolor 111.0                         Active  

  09334004

 

           Problem    Unspecified local infection of skin and subcutaneous tissu

e 686.9                            

Active                                  [id number]

 

          Problem   Unspecified viral hepatitis C without hepatic coma 070.70   

                     Active    

62964772

 

          Problem   Vascular disorder of skin 709.1                         Acti

ve    73945164

 

          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       

                 Active    

52341015

 

          Problem   Anxiety state, unspecified 300.00                        Act

kathia    727280340

 

          Problem   Other and unspecified hyperlipidemia 272.4                  

       Active    19735404

 

          Problem   Major depressive disorder, recurrent episode, mild 296.31   

                     Active    

09009885







ALLERGIES

No Information



ENCOUNTERS





                Encounter       Location        Date            Diagnosis

 

                          [facility]   [address] [id number]

[address] [place] [id number]

                          15 Jesse, 2016              Encounter for other specifie

d administrative purpose Z02.89

 

                          [facility]   [address] [id number]

[address] [place] [phone]

                          12 May, 2016              Dental examination Z01.20 an

d Dental caries K02.9

 

                          [facility]     [address] [id number]

[address] [place] [address]

                          14 Aug, 2015              Edema 782.3 and Family histo

ry of coronary artery disease V17.3

 

                          [facility]     [address] [id number]

[address] [place] [address]

                          07 Aug, 2015              Edema 782.3 and Family histo

ry of coronary artery disease V17.3

 

                          [facility]   [address] [id number]

[address] [place] [id number]

                                        Dental examination V72.2

 

                          [facility] [place]     [address] [id number]

[address] [place] [address]

                                         

 

                          [facility]     [address] [id number]

[address] [place] [address]

                                         

 

                          [facility] [place]     [address] [id number]

[address] [place] [address]

                          20 Mar, 2015               

 

                          [facility] [place]     [address] [id number]

[address] [place] [address]

                          20 Mar, 2015               

 

                          [facility]     [address] [id number]

[address] [place] [address]

                                         

 

                          [facility]     [address] [id number]

[address] [place] [address]

                                         

 

                          [facility]     [address] [id number]

[address] [place] [address]

                                         

 

                          [facility]     [address] [id number]

[address] [place] [address]

                                         

 

                          [facility]     [address] [id number]

[address] [place] [address]

                          11 Dec, 2014               

 

                          [facility]     [address] [id number]

[address] [place] [address]

                          11 Dec, 2014               

 

                          [facility]     [address] [id number]

[address] [place] [address]

                                         

 

                          [facility] [place]     [address] [id number]

[address] [place] [address]

                                         

 

                          [facility] [place]     [address] [id number]

[address] [place] [address], [place] [address]

                          30 Oct, 2014               

 

                          [facility]     [address] [id number]

[address] [place] [address], [place] [address]

                          30 Oct, 2014               

 

                          [facility]     [address] [id number]

[address] [place] [address], [place] [address]

                          10 Sep, 2014               

 

                          [facility]     [address] [id number]

[address] [place] [address], [place] [address]

                          09 Sep, 2014               

 

                          [facility]     [address] [id number]

[address] [place] [address], [place] [address]

                          09 Sep, 2014               

 

                          [facility]     [address] [id number]

[address] [place] [address], [place] [address]

                          22 Aug, 2014               

 

                          [facility]     [address] [id number]

[address] [place] [address], [place] [address]

                          22 Aug, 2014               

 

                          [facility]     [address] [id number]

[address] [place] [address], [place] [address]

                          22 Aug, 2014               

 

                          [facility]     [address] [id number]

[address] [place] [address], [place] [address]

                                         

 

                          [facility]     [address] [id number]

[address] [place] [address], [place] [address]

                                         

 

                          [facility]     [address] [id number]

[address] [place] [address], [place] [address]

                                         

 

                          [facility]     [address] [id number]

[address] [place] [address], [place] [address]

                                         

 

                          [facility]     [address] [id number]

[address] [place] [address], [place] [address]

                                         

 

                          [facility]     [address] [id number]

[address] [place] [address], [place] [address]

                                         

 

                          [facility]     [address] [id number]

[address] [place] [address], [place] [address]

                                         

 

                          [facility]     [address] [id number]

[address] [place] [address], [place] [address]

                                         

 

                          [facility]     [address] [id number]

[address] [place] [address], [place] [address]

                          20 May, 2014               

 

                          [facility]     [address] [id number]

[address] [place] [address], [place] [address]

                          20 May, 2014               

 

                          [facility]     [address] [id number]

[address] [place] [address], [place] [address]

                                         

 

                          [facility]     [address] [id number]

[address] [place] [address], [place] [address]

                                         

 

                          [facility]     [address] [id number]

[address] [place] [address], [place] [address]

                                         

 

                          [facility]     [address] [id number]

[address] [place] [address], [place] [address]

                                         

 

                          [facility]     [address] [id number]

[address] [place] [address], [place] [address]

                                         

 

                          [facility]     [address] [id number]

[address] [place] [address], [place] [address]

                                         

 

                          [facility]     [address] [id number]

[address] [place] [address], [place] [address]

                          13 Dec, 2013               

 

                          [facility]     [address] [id number]

[address] [place] [address], [place] [address]

                          13 Dec, 2013               

 

                          [facility]     [address] [id number]

[address] [place] [address], [place] [address]

                          13 Dec, 2013               

 

                          [facility]     [address] [id number]

[address] [place] [address], [place] [address]

                          13 Dec, 2013               

 

                          [facility]     [address] [id number]

[address] [place] [address], [place] [address]

                          17 Oct, 2013               

 

                          [facility]     [address] [id number]

[address] [place] [address], [place] [address]

                          17 Oct, 2013               

 

                          [facility]     [address] [id number]

[address] [place] [address], [place] [address]

                          10 Oct, 2013               

 

                          [facility]     [address] [id number]

[address] [place] [address] [place] [address]

                          10 Oct, 2013               

 

                          [facility]     [address] [id number]

[address] [place] [address], [place] [address]

                          04 Oct, 2013               

 

                          [facility]     [address] [id number]

[address] [place] [address], [place] [address]

                          24 Sep, 2013               

 

                          [facility]     [address] [id number]

[address] [place] [address], [place] [address]

                          19 Sep, 2013               

 

                          [facility]     [address] [id number]

[address] [place] [address], [place] [address]

                          16 Sep, 2013               

 

                          [facility]     [address] [id number]

[address] [place] [address], [place] [address]

                          21 Aug, 2013               

 

                          [facility]     [address] [id number]

[address] [place] [address], [place] [address]

                          17 Aug, 2013               

 

                          [facility]     [address] [id number]

[address] [place] [address], [place] [address]

                          16 Aug, 2013               

 

                          [facility]     [address] [id number]

[address] [place] [address], [place] [address]

                          15 Aug, 2013               

 

                          [facility]     [address] [id number]

[address] [place] [address], [place] [address]

                          13 Aug, 2013               

 

                          [facility]     [address] [id number]

[address] [place] [address], [place] [address]

                          13 Aug, 2013               

 

                          [facility]     [address] [id number]

[address] [place] [address], [place] [address]

                          12 Aug, 2013               

 

                          [facility]     [address] [id number]

[address] [place] [address], [place] [address]

                                         

 

                          [facility]     [address] [id number]

[address] [place] [address], [place] [address]

                                         

 

                          [facility]     [address] [id number]

[address] [place] [address], [place] [address]

                                         

 

                          [facility]     [address] [id number]

[address] [place] [address], [place] [address]

                          10 Jesse, 2013               

 

                          [facility]     [address] [id number]

[address] [place] [address], [place] [address]

                          17 May, 2013               

 

                          [facility]     [address] [id number]

[address] [place] [address], [place] [address]

                          10 May, 2013               

 

                          [facility]     [address] [id number]

[address] [place] [address], [place] [address]

                          15 Apr, 2013               

 

                          [facility]     [address] [id number]

[address] [place] [address], [place] [address]

                                         

 

                          [facility]     [address] [id number]

[address] [place] [address], [place] [address]

                                         

 

                          [facility]     [address] [id number]

[address] [place] [address], [place] [address]

                                         

 

                          [facility]     [address] [id number]

[address] [place] [address], [place] [address]

                                         

 

                          [facility]     [address] [id number]

[address] [place] [address], [place] [address]

                                         

 

                          [facility]     [address] [id number]

[address] [place] [address], [place] [address]

                                         

 

                          [facility]     [address] [id number]

[address] [place] [address], [place] [address]

                                         

 

                          [facility]     [address] [id number]

[address] [place] [address], [place] [address]

                          21 Dec, 2012               

 

                          [facility]     [address] [id number]

[address] [place] [address], [place] [address]

                          21 Dec, 2012               

 

                          [facility]     [address] [id number]

[address] [place] [address], [place] [address]

                          14 Dec, 2012               

 

                          [facility]     [address] [id number]

[address] [place] [address], [place] [address]

                          14 Dec, 2012               

 

                          [facility]     [address] [id number]

[address] [place] [address], [place] [address]

                                         

 

                          [facility]     [address] [id number]

[address] [address]

                          20 2012               

 

                          [facility]     [address] [id number]

[address] [place] [address], [place] [address]

                          16 2012               

 

                          [facility]     [address] [id number]

[address] [place] [address], [place] [address]

                          16 2012               

 

                          [facility]     [address] [id number]

[address] [place] [address], [place] [address]

                          13 2012               

 

                          [facility]     [address] [id number]

[address] [place] [address]

                          13 2012               

 

                          [facility]     [address] [id number]

[address] [place] [address], [place] [address]

                          13 2012               

 

                          [facility]     [address] [id number]

[address] [place] [address]

                          13 2012               

 

                          [facility]     [address] [id number]

[address] [place] [address], [place] [address]

                                         

 

                          [facility]     [address] [id number]

[address] [place] [address]

                                         

 

                          [facility]     [address] [id number]

[address] [place] [address], [place] [address]

                          22 Oct, 2012               

 

                          [facility]     [address] [id number]

[address] [place] [address], [place] [address]

                          22 Oct, 2012               

 

                          [facility]     [address] [id number]

[address] [place] [address]

                          22 Oct, 2012               

 

                          [facility]     [address] [id number]

[address] [place] [address]

                          22 Oct, 2012               

 

                          [facility]     [address] [id number]

[address] [place] [address], [place] [address]

                          10 Oct, 2012               

 

                          [facility]     [address] [id number]

[address] [place] [address]

                          10 Oct, 2012               

 

                          [facility]     [address] [id number]

[address] [place] [address]

                          05 Oct, 2012               

 

                          [facility]     [address] [id number]

[address] [place] [address], [place] [address]

                          05 Oct, 2012               

 

                          [facility]     [address] [id number]

[address] [place] [address], [place] [address]

                          07 Sep, 2012               

 

                          [facility]     [address] [id number]

[address] [place] [address], [place] [address]

                          22 Aug, 2012               

 

                          [facility]     [address] [id number]

[address] [place] [address], [place] [address]

                          03 Aug, 2012               

 

                          [facility]     [address] [id number]

[address] [place] [address], [place] [address]

                                         

 

                          [facility]     [address] [id number]

[address] [place] [address], [place] [address]

                                         

 

                          [facility]     [address] [id number]

[address] [place] [address], [place] [address]

                                         

 

                          [facility]     [address] [id number]

[address] [place] [address], [place] [address]

                                         

 

                          [facility]     [address] [id number]

[address] [place] [address], [place] [address]

                                         

 

                          [facility]     [address] [id number]

[address] [place] [address], [place] [address]

                                         

 

                          [facility]     [address] [id number]

[address] [place] [address], [place] [address]

                                         

 

                          [facility]     [address] [id number]

[address] [place] [address], [place] [address]

                          30 May, 2012               

 

                          [facility]     [address] [id number]

[address] [place] [address], [place] [address]

                          30 May, 2012               

 

                          [facility]     [address] [id number]

[address] [place] [address], [place] [address]

                          21 May, 2012               

 

                          [facility]     [address] [id number]

[address] [place] [address], [place] [address]

                          14 May, 2012               

 

                          [facility]     [address] [id number]

[address] [place] [address], [place] [address]

                          04 May, 2012               

 

                          [facility]     [address] [id number]

[address] [place] [address], [place] [address]

                          04 May, 2012               

 

                          [facility]     [address] [id number]

[address] [place] [address], [place] [address]

                          04 May, 2012               

 

                          [facility]     [address] [id number]

[address] [place] [address], [place] [address]

                                         

 

                          [facility]     [address] [id number]

[address] [place] [address], [place] [address]

                                         

 

                          [facility]     [address] [id number]

[address] [place] [address], [place] [address]

                          23 Mar, 2012               

 

                          [facility]     [address] [id number]

[address] [place] [address], [place] [address]

                          15 Mar, 2012               

 

                          [facility]     [address] [id number]

[address] [place] [address], [place] [address]

                          13 Mar, 2012               

 

                          [facility]     [address] [id number]

[address] [place] [address], [place] [address]

                          12 Mar, 2012               

 

                          [facility]     [address] [id number]

[address] [place] [address], [place] [address]

                                         

 

                          [facility]     [address] [id number]

[address] [place] [address], [place] [address]

                                         

 

                          [facility]     [address] [id number]

[address] [place] [address], [place] [address]

                                         

 

                          [facility]     [address] [id number]

[address] [place] [address], [place] [address]

                                         

 

                          [facility]     [address] [id number]

[address] [place] [address], [place] [address]

                          29 Dec, 2011               

 

                          [facility]     [address] [id number]

[address] [place] [address], [place] [address]

                          29 Dec, 2011               

 

                          [facility]     [address] [id number]

[address] [place] [address], [place] [address]

                          21 Dec, 2011               

 

                          [facility]     [address] [id number]

[address] [place] [address]

                                         

 

                          [facility]     [address] [facility] [id number]

[address] [place] [address]

                          22 Oct, 2011               

 

                          [facility]     [address] [facility] [id number]

[address] [place] [address]

                          21 Oct, 2011               







IMMUNIZATIONS

No Known Immunizations



SOCIAL HISTORY

Never Assessed



REASON FOR VISIT





PLAN OF CARE





VITAL SIGNS





MEDICATIONS

Unknown Medications



RESULTS

No Results



PROCEDURES





                Procedure       Date Ordered    Result          Body Site

 

                LIPID PANEL     Aug 13, 2013                     

 

                COMPREHEN METABOLIC PANEL Aug 13, 2013                     

 

                VENIPUNCT, ROUTINE* Aug 13, 2013                     







INSTRUCTIONS





MEDICATIONS ADMINISTERED

No Known Medications



MEDICAL (GENERAL) HISTORY





                    Type                Description         Date

 

                    Medical History     depression           

 

                    Medical History     hyperlipidemia       

 

                    Hospitalization History staph in right leg

## 2020-07-25 NOTE — XMS REPORT
[facility]

                             Created on: 01/10/2020



Ignacio Valle

External Reference #: [id number]

: 1955

Sex: Male



Demographics





                          Address                   [address] [place]  [address]

 

                          Preferred Language        Unknown

 

                          Marital Status            Unknown

 

                          Mormon Affiliation     Unknown

 

                          Race                      Unknown

 

                          Ethnic Group              Unknown





Author





                          Author                    Ignacio Gomes Doctor

 

                          Organization              [facility]

 

                          Address                   Unknown

 

                          Phone                     Unavailable







Care Team Providers





                    Care Team Member Name Role                Phone

 

                    Migration,  Doctor  Unavailable         Unavailable







PROBLEMS





          Type      Condition ICD9-CM Code BZY69-FA Code Onset Dates Condition S

tatus SNOMED 

Code

 

                          Problem                   Nonspecific elevation of lev

els of transaminase or lactic acid 

dehydrogenase (LDH) 790.4                                  Active       95884665

2

 

           Problem    Encounter for long-term (current) use of other medications

 V58.69                           

Active                                  [id number]

 

          Problem   Edema     782.3                         Active    107615322

 

          Problem   Spontaneous ecchymoses 782.7                         Active 

   270432815

 

          Problem   Trigger finger (acquired) 727.03                        Acti

ve    3466742

 

          Problem   Varicose veins of lower extremities with inflammation 454.1 

                        Active    

78488676

 

           Problem    Persistent disorder of initiating or maintaining sleep 307

.42                           Active

                                        74581185

 

          Problem   Pityriasis versicolor 111.0                         Active  

  09213462

 

           Problem    Unspecified local infection of skin and subcutaneous tissu

e 686.9                            

Active                                  154280924

 

          Problem   Unspecified viral hepatitis C without hepatic coma 070.70   

                     Active    

02951656

 

          Problem   Vascular disorder of skin 709.1                         Acti

ve    41663036

 

          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       

                 Active    

12330645

 

          Problem   Anxiety state, unspecified 300.00                        Act

kathia    103989127

 

          Problem   Other and unspecified hyperlipidemia 272.4                  

       Active    88570572

 

          Problem   Major depressive disorder, recurrent episode, mild 296.31   

                     Active    

85256269







ALLERGIES

No Information



ENCOUNTERS





                Encounter       Location        Date            Diagnosis

 

                    [facility] [address] [id number] 15 

Jesse, 2016                               Encounter for other specified administra

tive purpose Z02.89

 

                    [facility] [address] [id number] 12 

May, 2016                               Dental examination Z01.20 and Dental car

ies K02.9

 

                    [facility] [id number] [place] [address] 14 

Aug, 2015                               Edema 782.3 and Family history of corona

ry artery disease V17.3

 

                    [facility] [address] [facility] [place] [address] 07 

Aug, 2015                               Edema 782.3 and Family history of corona

ry artery disease V17.3

 

                    [facility] [address] [facility] [id number] [place] [id number]                                Dental examination V72.2

 

                    [facility] [address] [facility] [place] [address] 14 

2015                                

 

                    [facility] [id number] [place] [address]                                 

 

                    [facility] [id number] [place] [address] 20 

Mar, 2015                                

 

                    [facility] [id number] [place] [address] 20 

Mar, 2015                                

 

                    [facility] [id number] [place] [address]                                 

 

                    [facility] [id number] [place] [address]                                 

 

                    [facility] [id number] [place] [address]                                 

 

                    [facility] [id number] [place] [address]                                 

 

                    [facility] [id number] [place] [address] 11 

Dec, 2014                                

 

                    [facility] [id number] [place] [address] 11 

Dec, 2014                                

 

                    [facility] [id number] [place] [address]                                 

 

                    [facility] [id number] [place] [address]                                 

 

                    [facility] [id number] [place] [address] 30 

Oct, 2014                                

 

                    [facility] [id number] [place] [address] 30 

Oct, 2014                                

 

                    [facility] [place] [facility] [address] [facility] [id number] [facility] [place] [address] 10 

Sep, 2014                                

 

                    [facility] [place] [facility] [address] [facility] [id number] [facility] [place] [address] 09 

Sep, 2014                                

 

                    [facility] [place] [facility] [address] [facility] [facility] [place] [address] 09 

Sep, 2014                                

 

                    [facility] [place] [facility] [address] [facility] [id number] [place] [address] 22 

Aug, 2014                                

 

                    [facility] [place] [facility] [address] [facility] [id number] [place] [address] 22 

Aug, 2014                                

 

                    [facility] [place] [facility] [address] [facility] [facility] [place] [address] 22 

Aug, 2014                                

 

                    [facility] [place] [facility] [address] [facility] [place] [address]                                 

 

                    [facility] [place] [facility] [address] [facility] [place] [address]                                 

 

                    [facility] [place] [facility] [address] [facility] [id number] [facility] [place] [address]                                 

 

                    [facility] [place] [facility] [address] [facility] [facility] [place] [address]                                 

 

                    [facility] [place] [facility] [address] [facility] [place] [address]                                 

 

                    [facility] [place] [facility] [address] [facility] [place] [address]                                 

 

                    [facility] [place] [facility] [address] [facility] [id number] [facility] [place] [address]                                 

 

                    [facility] [place] [facility] [address] [facility] [place] [address]                                 

 

                    [facility] [place] [facility] [address] [facility] [place] [address] 20 

May, 2014                                

 

                    [facility] [place] [facility] [address] [facility] [place] [address] 20 

May, 2014                                

 

                    [facility] [place] [facility] [address] [facility] [place] [address]                                 

 

                    [facility] [place] [facility] [address] [facility] [place] [address]                                 

 

                    [facility] [place] [facility] [address] [facility] [place] [address]                                 

 

                    [facility] [place] [facility] [address] [facility] [place] [address]                                 

 

                    [facility] [place] [facility] [address] [facility] [place] [address]                                 

 

                    [facility] [place] [facility] [address] [facility] [place] [address]                                 

 

                    [facility] [place] [facility] [address] [facility] [place] [address] 13 

Dec, 2013                                

 

                    [facility] [place] [facility] [address] [facility] [place] [address] 13 

Dec, 2013                                

 

                    [facility] [place] [facility] [address] [facility] [place] [address] 13 

Dec, 2013                                

 

                    [facility] [place] [facility] [address] [facility] [place] [address] 13 

Dec, 2013                                

 

                    [facility] [place] [facility] [address] [facility] [place] [address] 17 

Oct, 2013                                

 

                    [facility] [place] [facility] [address] [facility] [place] [address] 17 

Oct, 2013                                

 

                    [facility] [place] [facility] [address] [facility] [place] [address] 10 

Oct, 2013                                

 

                    [facility] [place] [facility] [address] [facility] [place] [address] 10 

Oct, 2013                                

 

                    [facility] [place] [facility] [address] [facility] [place] [address] 04 

Oct, 2013                                

 

                    [facility] [place] [facility] [address] [facility] [place] [address] 24 

Sep, 2013                                

 

                    [facility] [place] [facility] [address] [facility] [place] [address] 19 

Sep, 2013                                

 

                    [facility] [place] [facility] [address] [id number] [facility] [place] [address] 16 

Sep, 2013                                

 

                    [facility] [place] [facility] [address] [facility] [id number] [facility] [place] [address] 21 

Aug, 2013                                

 

                    [facility] [place] [facility] [address] [facility] [id number] [facility] [place] [address] 17 

Aug, 2013                                

 

                    [facility] [place] [facility] [address] [facility] [id number] [facility] [place] [address] 16 

Aug, 2013                                

 

                    [facility] [place] [facility] [address] [facility] [id number] [facility] [place] [address] 15 

Aug, 2013                                

 

                    [facility] [place] [facility] [address] [facility] [id number] [facility] [place] [address] 13 

Aug, 2013                                

 

                    [facility] [place] [facility] [address] [facility] [id number] [place] [address] 13 

Aug, 2013                                

 

                    [facility] [place] [facility] [address] [facility] [place] [address] 12 

Aug, 2013                                

 

                    [facility] [place] [facility] [address] [facility] [id number] [place] [address]                                 

 

                    [facility] [place] [facility] [address] [facility] [place] [address]                                 

 

                    [facility] [place] [facility] [address] [facility] [id number] [place] [address]                                 

 

                    [facility] [place] [facility] [address] [facility] [place] [address] 10 

Jesse, 2013                                

 

                    [facility] [place] [facility] [address] [facility] [id number] [place] [address] 17 

May, 2013                                

 

                    [facility] [place] [facility] [address] [facility] [id number] [place] [address] 10 

May, 2013                                

 

                    [facility] [place] [facility] [address] [facility] [id number] [place] [address] 15 

Apr, 2013                                

 

                    [facility] [place] [facility] [address] [facility] [place] [address]                                 

 

                    [facility] [place] [facility] [address] [id number] [place] [address] 11 

2013                                

 

                    [facility] [place] [facility] [address] [facility] [place] [address]                                 

 

                    [facility] [place] [facility] [address] [facility] [place] [address]                                 

 

                    [facility] [place] [facility] [address] [facility] [place] [address]                                 

 

                    [facility] [place] [facility] [address] [facility] [place] [address]                                 

 

                    [facility] [place] [facility] [address] [facility] [place] [address]                                 

 

                    [facility] [place] [facility] [address] [facility] [place] [address] 21 

Dec, 2012                                

 

                    [facility] [place] [facility] [address] [facility] [place] [address] 21 

Dec, 2012                                

 

                    [facility] [place] [facility] [address] [facility] [place] [address] 14 

Dec, 2012                                

 

                    [facility] [place] [facility] [address] [facility] [place] [address] 14 

Dec, 2012                                

 

                    [facility] [place] [facility] [address] [facility] [place] [address] 20 

2012                                

 

                    [facility] [place] [facility] [address] [facility] [place] [address] 20 

2012                                

 

                    [facility] [place] [facility] [address] [facility] [place] [address] 16 

2012                                

 

                    [facility] [place] [facility] [address] [facility] [place] [address] 16 

2012                                

 

                    [facility] [place] [facility] [id number] [place] [address] 13 

2012                                

 

                    [facility] [place] [facility] [address] [facility] [place] [address] 13 

2012                                

 

                    [facility] [place] [facility] [address] [facility] [place] [address] 13 

2012                                

 

                    [facility] [place] [facility] [address] [facility] [place] [address]                                 

 

                    [facility] [place] [facility] [address] [facility] [place] [address]                                 

 

                    [facility] [place] [facility] [address] [facility] [place] [address]                                 

 

                    [facility] [place] [facility] [address] [facility] [place] [address] 22 

Oct, 2012                                

 

                    [facility] [place] [facility] [address] [facility] [place] [address] 22 

Oct, 2012                                

 

                    [facility] [place] [facility] [address] [facility] [place] [address] 22 

Oct, 2012                                

 

                    [facility] [place] [facility] [address] [facility] [place] [address] 22 

Oct, 2012                                

 

                    [facility] [place] [facility] [address] [facility] [place] [address] 10 

Oct, 2012                                

 

                    [facility] [place] [facility] [address] [facility] [place] [address] 10 

Oct, 2012                                

 

                    [facility] [place] [facility] [address] [facility] [place] [address] 05 

Oct, 2012                                

 

                    [facility] [place] [facility] [address] [facility] [place] [address] 05 

Oct, 2012                                

 

                    [facility] [place] [facility] [address] [facility] [place] [address] 07 

Sep, 2012                                

 

                    [facility] [place] [facility] [address] [facility] [place] [address] 22 

Aug, 2012                                

 

                    [facility] [place] [facility] [address] [facility] [place] [address] 03 

Aug, 2012                                

 

                    [facility] [place] [facility] [address] [facility] [place] [address]                                 

 

                    [facility] [place] [facility] [address] [facility] [place] [address]                                 

 

                    [facility] [place] [facility] [address] [facility] [place] [address]                                 

 

                    [facility] [place] [facility] [address] [id number] [place] [address]                                 

 

                    [facility] [place] [facility] [address] [facility] [place] [address]                                 

 

                    [facility] [place] [facility] [address] [facility] [place] [address]                                 

 

                    [facility] [place] [address] [facility] [place] [address]                                 

 

                    [facility] [place] [facility] [address] [facility] [id number] [facility] [place] [address] 30 

May, 2012                                

 

                    [facility] [place] [facility] [address] [facility] [place] [address] 30 

May, 2012                                

 

                    [facility] [place] [facility] [address] [facility] [place] [address] 21 

May, 2012                                

 

                    [facility] [place] [facility] [address] [facility] [place] [address] 14 

May, 2012                                

 

                    [facility] [place] [facility] [address] [facility] [place] [address] 04 

May, 2012                                

 

                    [facility] [place] [facility] [address] [facility] [place] [address] 04 

May, 2012                                

 

                    [facility] [place] [facility] [address] [facility] [place] [address] 04 

May, 2012                                

 

                    [facility] [place] [facility] [address] [facility] [place] [address]                                 

 

                    [facility] [place] [facility] [address] [facility] [place] [address]                                 

 

                    [facility] [place] [facility] [address] [id number] [place] [address] 23 

Mar, 2012                                

 

                    [facility] [place] [facility] [address] [facility] [place] [address] 15 

Mar, 2012                                

 

                    [facility] [place] [facility] [address] [facility] [place] [address] 13 

Mar, 2012                                

 

                    [facility] [place] [facility] [address] [facility] [place] [address] 12 

Mar, 2012                                

 

                    [facility] [address] [facility] [place] [address]                                 

 

                    [facility] [address] [facility] [place] [address]                                 

 

                    [facility] [id number] [place] [address]                                 

 

                    [facility] [id number] [place] [address]                                 

 

                    [facility] [id number] [place] [address] 29 

Dec, 2011                                

 

                    [facility] [id number] [place] [address] 29 

Dec, 2011                                

 

                    [facility] [id number] [place] [address] 21 

Dec, 2011                                

 

                    [facility] [id number] [place] [address]                                 

 

                    [facility] [id number] [place] [address] 22 

Oct, 2011                                

 

                    [facility] [id number] [place] [address] 21 

Oct, 2011                                







IMMUNIZATIONS

No Known Immunizations



SOCIAL HISTORY

Never Assessed



REASON FOR VISIT





PLAN OF CARE





VITAL SIGNS





                    Height              69 in               2014

 

                    Weight              200.38 lbs          2014

 

                    Temperature         98.9 degrees Fahrenheit 2014

 

                    Heart Rate          72 bpm              2014

 

                    Respiratory Rate    28                  2014

 

                    Blood pressure systolic 116 mmHg            2014

 

                    Blood pressure diastolic 80 mmHg             2014







MEDICATIONS

Unknown Medications



RESULTS

No Results



PROCEDURES





                Procedure       Date Ordered    Result          Body Site

 

                PSYTX PT&/FAMILY 45 MINUTES 2014                    







INSTRUCTIONS





MEDICATIONS ADMINISTERED

No Known Medications



MEDICAL (GENERAL) HISTORY





                    Type                Description         Date

 

                    Medical History     depression           

 

                    Medical History     hyperlipidemia       

 

                    Hospitalization History staph in right leg

## 2020-07-25 NOTE — XMS REPORT
[facility]

                             Created on: 2020



Ignacio Vlale

External Reference #: [id number]

: 1955

Sex: Male



Demographics





                          Address                   [address] [place]  [address]

 

                          Preferred Language        Unknown

 

                          Marital Status            Unknown

 

                          Mosque Affiliation     Unknown

 

                          Race                      Unknown

 

                          Ethnic Group              Unknown





Author





                          Author                    Ignacio Gomes Doctor

 

                          Organization              [facility]

 

                          Address                   Unknown

 

                          Phone                     Unavailable







Care Team Providers





                    Care Team Member Name Role                Phone

 

                    Migration,  Doctor  Unavailable         Unavailable







PROBLEMS





          Type      Condition ICD9-CM Code HDY83-ZM Code Onset Dates Condition S

tatus SNOMED 

Code

 

                          Problem                   Nonspecific elevation of lev

els of transaminase or lactic acid 

dehydrogenase (LDH) 790.4                                  Active       43013107

2

 

           Problem    Encounter for long-term (current) use of other medications

 V58.69                           

Active                                  293004051

 

          Problem   Edema     782.3                         Active    920706024

 

          Problem   Spontaneous ecchymoses 782.7                         Active 

   220859084

 

          Problem   Trigger finger (acquired) 727.03                        Acti

ve    1462880

 

          Problem   Varicose veins of lower extremities with inflammation 454.1 

                        Active    

64389597

 

           Problem    Persistent disorder of initiating or maintaining sleep 307

.42                           Active

                                        85501223

 

          Problem   Pityriasis versicolor 111.0                         Active  

  20721644

 

           Problem    Unspecified local infection of skin and subcutaneous tissu

e 686.9                            

Active                                  415477352

 

          Problem   Unspecified viral hepatitis C without hepatic coma 070.70   

                     Active    

98732218

 

          Problem   Vascular disorder of skin 709.1                         Acti

ve    04178885

 

          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       

                 Active    

17768759

 

          Problem   Anxiety state, unspecified 300.00                        Act

kathia    308915705

 

          Problem   Other and unspecified hyperlipidemia 272.4                  

       Active    59805371

 

          Problem   Major depressive disorder, recurrent episode, mild 296.31   

                     Active    

63871329







ALLERGIES

No Information



ENCOUNTERS





                Encounter       Location        Date            Diagnosis

 

                    [facility] [address] [id number] 15 

Jesse, 2016                               Encounter for other specified administra

tive purpose Z02.89

 

                    [facility] [address] [id number] 12 

May, 2016                               Dental examination Z01.20 and Dental car

ies K02.9

 

                    [facility] [id number] [place] [address] 14 

Aug, 2015                               Edema 782.3 and Family history of corona

ry artery disease V17.3

 

                    [facility] [address] [facility] [place] [address] 07 

Aug, 2015                               Edema 782.3 and Family history of corona

ry artery disease V17.3

 

                    [facility] [address] [facility] [id number] [place] [id number]                                Dental examination V72.2

 

                    [facility] [address] [facility] [place] [address] 14 

2015                                

 

                    [facility] [id number] [place] [address]                                 

 

                    [facility] [id number] [place] [address] 20 

Mar, 2015                                

 

                    [facility] [id number] [place] [address] 20 

Mar, 2015                                

 

                    [facility] [id number] [place] [address]                                 

 

                    [facility] [id number] [place] [address]                                 

 

                    [facility] [id number] [place] [address]                                 

 

                    [facility] [id number] [place] [address]                                 

 

                    [facility] [id number] [place] [address] 11 

Dec, 2014                                

 

                    [facility] [id number] [place] [address] 11 

Dec, 2014                                

 

                    [facility] [id number] [place] [address]                                 

 

                    [facility] [id number] [place] [address]                                 

 

                    [facility] [id number] [place] [address] 30 

Oct, 2014                                

 

                    [facility] [id number] [place] [address] 30 

Oct, 2014                                

 

                    [facility] [place] [facility] [address] [facility] [id number] [place] [address] 10 

Sep, 2014                                

 

                    [facility] [place] [facility] [address] [facility] [id number] [facility] [address] 09 

Sep, 2014                                

 

                    [facility] [place] [facility] [address] [facility] [facility] [address] 09 

Sep, 2014                                

 

                    [facility] [place] [facility] [address] [facility] [id number] [place] [address] 22 

Aug, 2014                                

 

                    [facility] [place] [facility] [address] [facility] [id number] [place] [address] 22 

Aug, 2014                                

 

                    [facility] [place] [facility] [address] [facility] [facility] [address] 22 

Aug, 2014                                

 

                    [facility] [place] [facility] [address] [facility] [place] [address]                                 

 

                    [facility] [place] [facility] [address] [facility] [place] [address]                                 

 

                    [facility] [place] [facility] [address] [facility] [id number] [facility] [place] [address]                                 

 

                    [facility] [place] [facility] [address] [facility] [facility] [place] [address]                                 

 

                    [facility] [place] [facility] [address] [facility] [place] [address]                                 

 

                    [facility] [place] [facility] [address] [facility] [place] [address]                                 

 

                    [facility] [place] [facility] [address] [facility] [id number] [facility] [address]                                 

 

                    [facility] [place] [facility] [address] [facility] [place] [address]                                 

 

                    [facility] [place] [facility] [address] [facility] [place] [address] 20 

May, 2014                                

 

                    [facility] [place] [facility] [address] [facility] [place] [address] 20 

May, 2014                                

 

                    [facility] [place] [facility] [address] [facility] [place] [address]                                 

 

                    [facility] [place] [facility] [address] [facility] [place] [address]                                 

 

                    [facility] [place] [facility] [address] [facility] [place] [address]                                 

 

                    [facility] [place] [facility] [address] [facility] [place] [address]                                 

 

                    [facility] [place] [facility] [address] [facility] [place] [address]                                 

 

                    [facility] [place] [facility] [address] [facility] [place] [address]                                 

 

                    [facility] [place] [facility] [address] [facility] [place] [address] 13 

Dec, 2013                                

 

                    [facility] [place] [facility] [address] [facility] [place] [address] 13 

Dec, 2013                                

 

                    [facility] [place] [facility] [address] [facility] [place] [address] 13 

Dec, 2013                                

 

                    [facility] [place] [facility] [address] [facility] [place] [address] 13 

Dec, 2013                                

 

                    [facility] [place] [facility] [address] [facility] [place] [address] 17 

Oct, 2013                                

 

                    [facility] [place] [facility] [address] [facility] [place] [address] 17 

Oct, 2013                                

 

                    [facility] [place] [facility] [address] [facility] [place] [address] 10 

Oct, 2013                                

 

                    [facility] [place] [facility] [address] [facility] [place] [address] 10 

Oct, 2013                                

 

                    [facility] [place] [facility] [address] [facility] [place] [address] 04 

Oct, 2013                                

 

                    [facility] [place] [facility] [address] [facility] [place] [address] 24 

Sep, 2013                                

 

                    [facility] [place] [facility] [address] [facility] [place] [address] 19 

Sep, 2013                                

 

                    [facility] [place] [facility] [address] [id number] [facility] [place] [address] 16 

Sep, 2013                                

 

                    [facility] [place] [facility] [address] [facility] [id number] [facility] [place] [address] 21 

Aug, 2013                                

 

                    [facility] [place] [facility] [address] [facility] [id number] [facility] [place] [address] 17 

Aug, 2013                                

 

                    [facility] [place] [facility] [address] [facility] [id number] [facility] [place] [address] 16 

Aug, 2013                                

 

                    [facility] [place] [facility] [address] [facility] [id number] [facility] [place] [address] 15 

Aug, 2013                                

 

                    [facility] [place] [facility] [address] [facility] [id number] [facility] [address] 13 

Aug, 2013                                

 

                    [facility] [place] [facility] [address] [facility] [id number] [place] [address] 13 

Aug, 2013                                

 

                    [facility] [place] [facility] [address] [id number] [place] [address] 12 

Aug, 2013                                

 

                    [facility] [place] [facility] [address] [facility] [id number] [place] [address]                                 

 

                    [facility] [place] [facility] [address] [id number] [place] [address]                                 

 

                    [facility] [place] [facility] [address] [facility] [id number] [place] [address]                                 

 

                    [facility] [place] [facility] [address] [id number] [place] [address] 10 

Jesse, 2013                                

 

                    [facility] [place] [facility] [address] [facility] [id number] [place] [address] 17 

May, 2013                                

 

                    [facility] [place] [facility] [address] [facility] [id number] [place] [address] 10 

May, 2013                                

 

                    [facility] [place] [facility] [address] [facility] [id number] [place] [address] 15 

Apr, 2013                                

 

                    [facility] [place] [facility] [address] [id number] [place] [address]                                 

 

                    [facility] [place] [facility] [address] [id number] [place] [address] 11 

2013                                

 

                    [facility] [place] [facility] [address] [id number] [place] [address]                                 

 

                    [facility] [place] [facility] [address] [id number] [place] [address]                                 

 

                    [facility] [place] [facility] [address] [id number] [place] [address]                                 

 

                    [facility] [place] [facility] [address] [id number] [place] [address]                                 

 

                    [facility] [place] [facility] [address] [id number] [place] [address]                                 

 

                    [facility] [place] [facility] [address] [id number] [place] [address] 21 

Dec, 2012                                

 

                    [facility] [place] [facility] [address] [id number] [place] [address] 21 

Dec, 2012                                

 

                    [facility] [place] [facility] [address] [id number] [place] [address] 14 

Dec, 2012                                

 

                    [facility] [place] [facility] [address] [id number] [place] [address] 14 

Dec, 2012                                

 

                    [facility] [place] [facility] [address] [id number] [place] [address] 20 

2012                                

 

                    [facility] [place] [facility] [address] [id number] [place] [address] 20 

2012                                

 

                    [facility] [place] [facility] [address] [id number] [place] [address] 16 

2012                                

 

                    [facility] [place] [facility] [address] [id number] [place] [address] 16 

2012                                

 

                    [facility] [place] [facility] [id number] [place] [address] 13 

2012                                

 

                    [facility] [place] [facility] [address] [id number] [place] [address] 13 

2012                                

 

                    [facility] [place] [facility] [address] [id number] [place] [address] 13 

2012                                

 

                    [facility] [place] [facility] [address] [id number] [place] [address]                                 

 

                    [facility] [place] [facility] [address] [id number] [place] [address]                                 

 

                    [facility] [place] [facility] [address] [id number] [place] [address]                                 

 

                    [facility] [place] [facility] [address] [id number] [place] [address] 22 

Oct, 2012                                

 

                    [facility] [place] [facility] [address] [id number] [place] [address] 22 

Oct, 2012                                

 

                    [facility] [place] [facility] [address] [id number] [place] [address] 22 

Oct, 2012                                

 

                    [facility] [place] [facility] [address] [id number] [place] [address] 22 

Oct, 2012                                

 

                    [facility] [place] [facility] [address] [id number] [place] [address] 10 

Oct, 2012                                

 

                    [facility] [place] [facility] [address] [id number] [place] [address] 10 

Oct, 2012                                

 

                    [facility] [place] [facility] [address] [id number] [place] [address] 05 

Oct, 2012                                

 

                    [facility] [place] [facility] [address] [id number] [place] [address] 05 

Oct, 2012                                

 

                    [facility] [place] [facility] [address] [facility] [place] [address] 07 

Sep, 2012                                

 

                    [facility] [place] [facility] [address] [id number] [place] [address] 22 

Aug, 2012                                

 

                    [facility] [place] [facility] [address] [id number] [place] [address] 03 

Aug, 2012                                

 

                    [facility] [place] [facility] [address] [id number] [place] [address]                                 

 

                    [facility] [place] [facility] [address] [id number] [place] [address]                                 

 

                    [facility] [place] [facility] [address] [id number] [place] [address]                                 

 

                    [facility] [place] [facility] [address] [id number] [place] [address]                                 

 

                    [facility] [place] [facility] [address] [id number] [place] [address]                                 

 

                    [facility] [place] [facility] [address] [id number] [place] [address]                                 

 

                    [facility] [place] [facility] [address] [id number] [place] [address]                                 

 

                    [facility] [place] [facility] [address] [facility] [id number] [facility] [place] [address] 30 

May, 2012                                

 

                    [facility] [place] [facility] [address] [id number] [place] [address] 30 

May, 2012                                

 

                    [facility] [place] [facility] [address] [id number] [place] [address] 21 

May, 2012                                

 

                    [facility] [place] [facility] [address] [id number] [place] [address] 14 

May, 2012                                

 

                    [facility] [place] [facility] [address] [id number] [place] [address] 04 

May, 2012                                

 

                    [facility] [place] [facility] [address] [id number] [place] [address] 04 

May, 2012                                

 

                    [facility] [place] [facility] [address] [id number] [place] [address] 04 

May, 2012                                

 

                    [facility] [place] [facility] [address] [id number] [place] [address]                                 

 

                    [facility] [place] [facility] [address] [id number] [place] [address]                                 

 

                    [facility] [place] [facility] [address] [id number] [place] [address] 23 

Mar, 2012                                

 

                    [facility] [place] [facility] [address] [id number] [place] [address] 15 

Mar, 2012                                

 

                    [facility] [place] [facility] [address] [id number] [place] [address] 13 

Mar, 2012                                

 

                    [facility] [place] [facility] [address] [facility] [place] [address] 12 

Mar, 2012                                

 

                    [facility] [address] [facility] [place] [address]                                 

 

                    [facility] [address] [facility] [place] [address]                                 

 

                    [facility] [address] [id number] [place] [address]                                 

 

                    [facility] [id number] [place] [address]                                 

 

                    [facility] [id number] [place] [address] 29 

Dec, 2011                                

 

                    [facility] [id number] [place] [address] 29 

Dec, 2011                                

 

                    [facility] [id number] [place] [address] 21 

Dec, 2011                                

 

                    [facility] [address] [facility] [place] [address]                                 

 

                    [facility] [address] [id number] [place] [address] 22 

Oct, 2011                                

 

                    [facility] [address] [id number] [place] [address] 21 

Oct, 2011                                







IMMUNIZATIONS

No Known Immunizations



SOCIAL HISTORY

Never Assessed



REASON FOR VISIT





PLAN OF CARE





VITAL SIGNS





MEDICATIONS

Unknown Medications



RESULTS

No Results



PROCEDURES

No Known procedures



INSTRUCTIONS





MEDICATIONS ADMINISTERED

No Known Medications



MEDICAL (GENERAL) HISTORY





                    Type                Description         Date

 

                    Medical History     depression           

 

                    Medical History     hyperlipidemia       

 

                    Hospitalization History staph in right leg

## 2020-07-25 NOTE — XMS REPORT
[facility]

                             Created on: 2020



Ignacio Valle

External Reference #: [id number]

: [id number]

Sex: Male



Demographics





                          Address                   [address] [place]  [address]

 

                          Preferred Language        Unknown

 

                          Marital Status            Unknown

 

                          Episcopal Affiliation     Unknown

 

                          Race                      Unknown

 

                          Ethnic Group              Unknown





Author





                          Author                    Ignacio Novak

 

                          [facility]

 

                          Address                   [address] [place]  [address]



 

                          Phone                     [phone]







Care Team Providers





                    Care Team Member Name Role                Phone

 

                    Kishore  RASHI   Unavailable         [phone]







PROBLEMS





          Type      Condition ICD9-CM Code RSQ07-CM Code Onset Dates Condition S

tatus SNOMED 

Code

 

                          Problem                   Nonspecific elevation of lev

els of transaminase or lactic acid 

dehydrogenase (LDH) 790.4                                  Active       00518115

2

 

           Problem    Encounter for long-term (current) use of other medications

 V58.69                           

Active                                  [id number]

 

          Problem   Edema     782.3                         Active    200546820

 

          Problem   Spontaneous ecchymoses 782.7                         Active 

   [id number]

 

          Problem   Trigger finger (acquired) 727.03                        Acti

ve    7035915

 

          Problem   Varicose veins of lower extremities with inflammation 454.1 

                        Active    

22014402

 

           Problem    Persistent disorder of initiating or maintaining sleep 307

.42                           Active

                                        [id number]

 

          Problem   Pityriasis versicolor 111.0                         Active  

  15742844

 

           Problem    Unspecified local infection of skin and subcutaneous tissu

e 686.9                            

Active                                  [id number]

 

          Problem   Unspecified viral hepatitis C without hepatic coma 070.70   

                     Active    

83113113

 

          Problem   Vascular disorder of skin 709.1                         Acti

ve    44057082

 

          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       

                 Active    

42242304

 

          Problem   Anxiety state, unspecified 300.00                        Act

kathia    213551191

 

          Problem   Other and unspecified hyperlipidemia 272.4                  

       Active    13129996

 

          Problem   Major depressive disorder, recurrent episode, mild 296.31   

                     Active    

42697164







ALLERGIES

No Information



ENCOUNTERS





                Encounter       Location        Date            Diagnosis

 

                          [facility]   [id number]

[address] [place] [id number]

                          15 Jesse, 2016              Encounter for other specifie

d administrative purpose Z02.89

 

                          [facility]   [address] [id number]

[address] [place] [id number]

                          12 May, 2016              Dental examination Z01.20 an

d Dental caries K02.9

 

                          [facility]     [address] [id number]

[address] [place] [address]

                          14 Aug, 2015              Edema 782.3 and Family histo

ry of coronary artery disease V17.3

 

                          [facility]     [address] [id number]

[address] [place] [address]

                          07 Aug, 2015              Edema 782.3 and Family histo

ry of coronary artery disease V17.3

 

                          [facility]   [address] [id number]

[address] [place] [id number]

                                        Dental examination V72.2

 

                          [facility]     [address] [id number]

[address] [place] [address]

                                         

 

                          [facility]     [address] [id number]

[address] [place] [phone]

                                         

 

                          [facility]     [address] [id number]

[address] [place] [address]

                          20 Mar, 2015               

 

                          [facility]     [address] [id number]

[address] [place] [phone]

                          20 Mar, 2015               

 

                          [facility]     [address] [id number]

[address] [place] [phone]

                                         

 

                          [facility]     [address] [id number]

[address] [place] [address]

                                         

 

                          [facility]     [address] [id number]

[address] [place] [address]

                                         

 

                          [facility]     [address] [id number]

[address] [place] [address]

                                         

 

                          [facility]     [address] [id number]

[address] [place] [address]

                          11 Dec, 2014               

 

                          [facility]     [address] [id number]

[address] [place] [address]

                          11 Dec, 2014               

 

                          [facility]     [address] [id number]

[address] [place] [phone]

                                         

 

                          [facility]     [address] [id number]

[address] [place] [address], [place] [address]

                                         

 

                          [facility]     [address] [id number]

[address] [place] [address], [place] [address]

                          30 Oct, 2014               

 

                          [facility]     [address] [id number]

[address] [place] [address], [place] [address]

                          30 Oct, 2014               

 

                          [facility]     [address] [id number]

[address] [place] [address], [place] [address]

                          10 Sep, 2014               

 

                          [facility]     [address] [id number]

[address] [place] [address], [place] [address]

                          09 Sep, 2014               

 

                          [facility]     [address] [id number]

[address] [place] [address], [place] [address]

                          09 Sep, 2014               

 

                          [facility]     [address] [id number]

[address] [place] [address], [place] [address]

                          22 Aug, 2014               

 

                          [facility]     [address] [id number]

[address] [place] [address], [place] [address]

                          22 Aug, 2014               

 

                          [facility]     [address] [id number]

[address] [place] [address], [place] [address]

                          22 Aug, 2014               

 

                          [facility]     [address] [id number]

[address] [place] [address], [place] [address]

                                         

 

                          [facility]     [address] [id number]

[address] [place] [address], [place] [address]

                                         

 

                          [facility]     [address] [id number]

[address] [place] [address], [place] [address]

                                         

 

                          [facility]     [address] [id number]

[address] [place] [address], [place] [address]

                                         

 

                          [facility]     [address] [id number]

[address] [place] [address], [place] [address]

                                         

 

                          [facility]     [address] [id number]

[address] [place] [address], [place] [address]

                                         

 

                          [facility]     [address] [id number]

[address] [place] [address], [place] [address]

                                         

 

                          [facility]     [address] [id number]

[address] [place] [address], [place] [address]

                                         

 

                          [facility]     [address] [id number]

[address] [place] [address], [place] [address]

                          20 May, 2014               

 

                          [facility]     [address] [id number]

[address] [place] [address], [place] [address]

                          20 May, 2014               

 

                          [facility]     [address] [id number]

[address] [place] [address], [place] [address]

                                         

 

                          [facility]     [address] [id number]

[address] [place] [address], [place] [address]

                                         

 

                          [facility]     [address] [id number]

[address] [place] [address], [place] [address]

                                         

 

                          [facility]     [address] [id number]

[address] [place] [address], [place] [address]

                                         

 

                          [facility]     [address] [id number]

[address] [place] [address], [place] [address]

                                         

 

                          [facility]     [address] [id number]

[address] [place] [address], [place] [address]

                                         

 

                          [facility]     [address] [id number]

[address] [place] [address], [place] [address]

                          13 Dec, 2013               

 

                          [facility]     [address] [id number]

[address] [place] [address], [place] [address]

                          13 Dec, 2013               

 

                          [facility]     [address] [id number]

[address] [place] [address], [place] [address]

                          13 Dec, 2013               

 

                          [facility]     [address] [id number]

[address] [place] [address], [place] [address]

                          13 Dec, 2013               

 

                          [facility]     [address] [id number]

[address] [place] [address], [place] [address]

                          17 Oct, 2013               

 

                          [facility]     [address] [id number]

[address] [place] [address], [place] [address]

                          17 Oct, 2013               

 

                          [facility]     [address] [id number]

[address] [place] [address] [place] [address]

                          10 Oct, 2013               

 

                          [facility]     [address] [id number]

[address] [place] [address], [place] [address]

                          10 Oct, 2013               

 

                          [facility]     [address] [id number]

[address] [place] [address], [place] [address]

                          04 Oct, 2013               

 

                          [facility]     [address] [id number]

[address] [place] [address], [place] [address]

                          24 Sep, 2013               

 

                          [facility]     [address] [id number]

[address] [place] [address], [place] [address]

                          19 Sep, 2013               

 

                          [facility]     [address] [id number]

[address] [place] [address], [place] [address]

                          16 Sep, 2013               

 

                          [facility]     [address] [id number]

[address] [place] [address], [place] [address]

                          21 Aug, 2013               

 

                          [facility]     [address] [id number]

[address] [place] [address], [place] [address]

                          17 Aug, 2013               

 

                          [facility]     [address] [id number]

[address] [place] [address], [place] [address]

                          16 Aug, 2013               

 

                          [facility]     [address] [id number]

[address] [place] [address], [place] [address]

                          15 Aug, 2013               

 

                          [facility]     [address] [id number]

[address] [place] [address], [place] [address]

                          13 Aug, 2013               

 

                          [facility]     [address] [id number]

[address] [place] [address], [place] [address]

                          13 Aug, 2013               

 

                          [facility]     [address] [id number]

[address] [place] [address], [place] [address]

                          12 Aug, 2013               

 

                          [facility]     [address] [id number]

[address] [place] [address], [place] [address]

                                         

 

                          [facility]     [address] [id number]

[address] [place] [address], [place] [address]

                                         

 

                          [facility]     [address] [id number]

[address] [place] [address], [place] [address]

                                         

 

                          [facility]     [address] [id number]

[address] [place] [address], [place] [address]

                          10 2013               

 

                          [facility]     [address] [id number]

[address] [place] [address], [place] [address]

                          17 May, 2013               

 

                          [facility]     [address] [id number]

[address] [place] [address], [place] [address]

                          10 May, 2013               

 

                          [facility]     [address] [id number]

[address] [place] [address], [place] [address]

                          15 Apr, 2013               

 

                          [facility]     [address] [id number]

[address] [place] [address], [place] [address]

                                         

 

                          [facility]     [address] [id number]

[address] [place] [address], [place] [address]

                                         

 

                          [facility]     [address] [id number]

[address] [place] [address], [place] [address]

                                         

 

                          [facility]     [address] [id number]

[address] [place] [address], [place] [address]

                                         

 

                          [facility]     [address] [id number]

[address] [place] [address], [place] [address]

                                         

 

                          [facility]     [address] [id number]

[address] [place] [address], [place] [address]

                                         

 

                          [facility]     [address] [id number]

[address] [place] [address], [place] [address]

                                         

 

                          [facility]     [address] [id number]

[address] [place] [address], [place] [address]

                          21 Dec, 2012               

 

                          [facility]     [address] [id number]

[address] [place] [address], [place] [address]

                          21 Dec, 2012               

 

                          [facility]     [address] [id number]

[address] [place] [address], [place] [address]

                          14 Dec, 2012               

 

                          [facility]     [address] [id number]

[address] [place] [address], [place] [address]

                          14 Dec, 2012               

 

                          [facility]     [address] [id number]

[address] [address]

                          20 2012               

 

                          [facility]     [address] [id number]

[address] [place] [address], [place] [address]

                          20 2012               

 

                          [facility]     [address] [id number]

[address] [place] [address], [place] [address]

                          16 2012               

 

                          [facility]     [address] [id number]

[address] [place] [address], [place] [address]

                          16 2012               

 

                          [facility]     [address] [id number]

[address] [place] [address]

                          13 2012               

 

                          [facility]     [address] [id number]

[address] [place] [address], [place] [address]

                                         

 

                          [facility]     [address] [id number]

[address] [place] [address]

                          13 2012               

 

                          [facility]     [address] [id number]

[address] [place] [address], [place] [address]

                                         

 

                          [facility]     [address] [id number]

[address] [place] [address]

                                         

 

                          [facility]     [address] [id number]

[address] [place] [address]

                          07 2012               

 

                          [facility]     [address] [id number]

[address] [place] [address], [place] [address]

                          22 Oct, 2012               

 

                          [facility]     [address] [id number]

[address] [place] [address]

                          22 Oct, 2012               

 

                          [facility]     [address] [id number]

[address] [place] [address]

                          22 Oct, 2012               

 

                          [facility]     [address] [id number]

[address] [place] [address], [place] [address]

                          22 Oct, 2012               

 

                          [facility]     [address] [id number]

[address] [place] [address]

                          10 Oct, 2012               

 

                          [facility]     [address] [id number]

[address] [place] [address]

                          10 Oct, 2012               

 

                          [facility]     [address] [id number]

[address] [place] [address], [place] [address]

                          05 Oct, 2012               

 

                          [facility]     [address] [id number]

[address] [place] [address], [place] [address]

                          05 Oct, 2012               

 

                          [facility]     [address] [id number]

[address] [place] [address], [place] [address]

                          07 Sep, 2012               

 

                          [facility]     [address] [id number]

[address] [place] [address], [place] [address]

                          22 Aug, 2012               

 

                          [facility]     [address] [id number]

[address] [place] [address], [place] [address]

                          03 Aug, 2012               

 

                          [facility]     [address] [id number]

[address] [place] [address], [place] [address]

                                         

 

                          [facility]     [address] [id number]

[address] [place] [address], [place] [address]

                                         

 

                          [facility]     [address] [id number]

[address] [place] [address], [place] [address]

                                         

 

                          [facility]     [address] [id number]

[address] [place] [address], [place] [address]

                                         

 

                          [facility]     [address] [id number]

[address] [place] [address], [place] [address]

                                         

 

                          [facility]     [address] [id number]

[address] [place] [address], [place] [address]

                                         

 

                          [facility]     [address] [id number]

[address] [place] [address], [place] [address]

                                         

 

                          [facility]     [address] [id number]

[address] [place] [address], [place] [address]

                          30 May, 2012               

 

                          [facility]     [address] [id number]

[address] [place] [address], [place] [address]

                          30 May, 2012               

 

                          [facility]     [address] [id number]

[address] [place] [address], [place] [address]

                          21 May, 2012               

 

                          [facility]     [address] [id number]

[address] [place] [address], [place] [address]

                          14 May, 2012               

 

                          [facility]     [address] [id number]

[address] [place] [address], [place] [address]

                          04 May, 2012               

 

                          [facility]     [address] [id number]

[address] [place] [address], [place] [address]

                          04 May, 2012               

 

                          [facility]     [address] [id number]

[address] [place] [address], [place] [address]

                          04 May, 2012               

 

                          [facility]     [address] [id number]

[address] [place] [address], [place] [address]

                                         

 

                          [facility]     [address] [id number]

[address] [place] [address], [place] [address]

                                         

 

                          [facility]     [address] [id number]

[address] [place] [address], [place] [address]

                          23 Mar, 2012               

 

                          [facility]     [address] [id number]

[address] [place] [address], [place] [address]

                          15 Mar, 2012               

 

                          [facility]     [address] [id number]

[address] [place] [address], [place] [address]

                          13 Mar, 2012               

 

                          [facility]     [address] [id number]

[address] [place] [address], [place] [address]

                          12 Mar, 2012               

 

                          [facility]     [address] [id number]

[address] [place] [address], [place] [address]

                                         

 

                          [facility]     [address] [id number]

[address] [place] [address], [place] [address]

                                         

 

                          [facility]     [address] [id number]

[address] [place] [address], [place] [address]

                                         

 

                          [facility]     [address] [id number]

[address] [place] [address], [place] [address]

                                         

 

                          [facility]     [address] [id number]

[address] [place] [address], [place] [address]

                          29 Dec, 2011               

 

                          [facility]     [address] [id number]

[address] [place] [address], [place] [address]

                          29 Dec, 2011               

 

                          [facility]     [address] [id number]

[address] [place] [address]

                          21 Dec, 2011               

 

                          [facility]     [address] [facility] [id number]

[address] [place] [address]

                                         

 

                          [facility]     [address] [facility] [id number]

[address] [place] [id number]

                          22 Oct, 2011               

 

                          [facility]     [address] [facility] [id number]

[address] [place] [address]

                          21 Oct, 2011               







IMMUNIZATIONS

No Known Immunizations



SOCIAL HISTORY

Never Assessed



REASON FOR VISIT





PLAN OF CARE





VITAL SIGNS





                    Height              69 in               2013-04-15

 

                    Weight              200.31 lbs          2013-04-15

 

                    Temperature         98.3 degrees Fahrenheit 2013-04-15

 

                    Heart Rate          96 bpm              2013-04-15

 

                    Respiratory Rate    20                  2013-04-15

 

                    Blood pressure systolic 130 mmHg            2013-04-15

 

                    Blood pressure diastolic 78 mmHg             2013-04-15







MEDICATIONS

Unknown Medications



RESULTS

No Results



PROCEDURES

No Known procedures



INSTRUCTIONS





MEDICATIONS ADMINISTERED

No Known Medications



MEDICAL (GENERAL) HISTORY





                    Type                Description         Date

 

                    Medical History     depression           

 

                    Medical History     hyperlipidemia       

 

                    Hospitalization History staph in right leg

## 2020-07-25 NOTE — XMS REPORT
Continuity of Care Document

                             Created on: 2020



ROGERIO RODRIGUEZ

External Reference #: [id number]

: 1955

Sex: Male



Demographics





                          Address                   [address] [place] [address]

 

                          Home Phone                [phone] x

 

                          Preferred Language        Unknown

 

                          Marital Status            Unknown

 

                          Sabianist Affiliation     Unknown

 

                          Race                      Unknown

 

                          Ethnic Group              Unknown





Author





                          Organization              Unknown

 

                          Address                   Unknown

 

                          Phone                     Unavailable



              



Allergies

      



             Active           Description           Code           Type         

  Severity   

                Reaction           Onset           Reported/Identified          

 

Relationship to Patient                 Clinical Status        

 

                Yes             No Known Drug Allergies           [id number]    

       Drug 

Allergy           Unknown           N/A                             2019  

      

                                                             



                  



Medications

      



There is no data.                  



Problems

      



             Date Dx Coded           Attending           Type           Code    

       

Diagnosis                               Diagnosed By        

 

                10/06/2011           DARIAN FARRAR PHD                      

     728.71         

                          Plantar Fascial Fibromatosis                    

 

             10/06/2011           RASHI DIAMOND DO                        728

.71           

Plantar Fascial Fibromatosis                     

 

                10/06/2011           DARIAN FARRAR PHD                      

     728.71         

                          Plantar Fascial Fibromatosis                    

 

             10/06/2011           ESSIE MCMANUS DO                        728.71

           

Plantar Fascial Fibromatosis                     

 

             10/06/2011                                     728.71           [facility]

ntar Fascial 

Fibromatosis                                     

 

             10/06/2011                                     728.71           [facility]

ntar Fascial 

Fibromatosis                                     

 

             10/06/2011                                     728.71           [facility]

ntar Fascial 

Fibromatosis                                     

 

             10/06/2011                                     728.71           [facility]

ntar Fascial 

Fibromatosis                                     

 

             10/06/2011                                     728.71           [facility]

ntar Fascial 

Fibromatosis                                     

 

                10/06/2011           KYLAH MELGAR PA-C                      

     728.71         

                          Plantar Fascial Fibromatosis                    

 

                10/06/2011           DARIAN FARRAR PHD                      

     728.71         

                          Plantar Fascial Fibromatosis                    

 

             10/06/2011           ESSIE MCMANUS DO                        728.71

           

Plantar Fascial Fibromatosis                     

 

                10/06/2011           DARIAN FARRAR PHD                      

     728.71         

                          Plantar Fascial Fibromatosis                    

 

                10/06/2011           EDWARD REYNAGA                   

        728.71      

                          Plantar Fascial Fibromatosis                    

 

                10/06/2011           DARIAN FARRAR PHD                      

     728.71         

                          Plantar Fascial Fibromatosis                    

 

                10/06/2011           DARIAN FARRAR PHD                      

     728.71         

                          Plantar Fascial Fibromatosis                    

 

                10/06/2011           DARIAN FARRAR PHD                      

     728.71         

                          Plantar Fascial Fibromatosis                    

 

                10/06/2011           EDWARD REYNAGA                   

        728.71      

                          Plantar Fascial Fibromatosis                    

 

                10/06/2011           DARIAN FARRAR PHD                      

     728.71         

                          Plantar Fascial Fibromatosis                    

 

             10/06/2011           ESSIE MCMANUS DO                        728.71

           

Plantar Fascial Fibromatosis                     

 

             10/06/2011           VERONICA APRN, JEFFREY                        728

.71           

Plantar Fascial Fibromatosis                     

 

                10/06/2011           DARIAN FARRAR PHD                      

     728.71         

                          Plantar Fascial Fibromatosis                    

 

             10/06/2011           VERONICA APRN, JEFFREY                        728

.71           

Plantar Fascial Fibromatosis                     

 

                10/06/2011           DARIAN FARRAR PHD                      

     728.71         

                          Plantar Fascial Fibromatosis                    

 

             10/21/2011           DARIAN FARRAR PHD                        3

11           

DEPRESSIVE DISORDER NOT ELSEWHERE CLASSIFIED                    

 

                10/21/2011           DARIAN FARRAR PHD                      

     729.5          

                          PAIN IN LIMB                       

 

             10/21/2011           RASHI DIAMOND DO F                        311

           

DEPRESSIVE DISORDER NOT ELSEWHERE CLASSIFIED                    

 

             10/21/2011           RASHI DIAMOND DO F                        729

.5           

PAIN IN LIMB                                     

 

             10/21/2011           DARIAN FARRAR PHD                        3

11           

DEPRESSIVE DISORDER NOT ELSEWHERE CLASSIFIED                    

 

                10/21/2011           DARIAN FARRAR PHD                      

     729.5          

                          PAIN IN LIMB                       

 

             10/21/2011           ESSIE MCMANUS DO                        311   

        

DEPRESSIVE DISORDER NOT ELSEWHERE CLASSIFIED                    

 

             10/21/2011           ESSIE MCMANUS DO                        729.5 

          Pain

In Limb                                          

 

             10/21/2011                                     311           DEPRES

SIVE DISORDER 

NOT ELSEWHERE CLASSIFIED                         

 

             10/21/2011                                     729.5           Pain

 In Limb       

                                                 

 

             10/21/2011                                     311           DEPRES

SIVE DISORDER 

NOT ELSEWHERE CLASSIFIED                         

 

             10/21/2011                                     729.5           Pain

 In Limb       

                                                 

 

             10/21/2011                                     311           DEPRES

SIVE DISORDER 

NOT ELSEWHERE CLASSIFIED                         

 

             10/21/2011                                     729.5           Pain

 In Limb       

                                                 

 

             10/21/2011                                     311           DEPRES

SIVE DISORDER 

NOT ELSEWHERE CLASSIFIED                         

 

             10/21/2011                                     729.5           Pain

 In Limb       

                                                 

 

             10/21/2011                                     311           DEPRES

SIVE DISORDER 

NOT ELSEWHERE CLASSIFIED                         

 

             10/21/2011                                     729.5           Pain

 In Limb       

                                                 

 

             10/21/2011           KYLAH MELGAR PA-C                        3

11           

DEPRESSIVE DISORDER NOT ELSEWHERE CLASSIFIED                    

 

                10/21/2011           KYLAH MELGAR PA-C                      

     729.5          

                          Pain In Limb                       

 

             10/21/2011           DARIAN FARRAR PHD                        3

11           

DEPRESSIVE DISORDER NOT ELSEWHERE CLASSIFIED                    

 

                10/21/2011           DARIAN FARRAR PHD                      

     729.5          

                          Pain In Limb                       

 

             10/21/2011           MCMANUS DO, ESSIE K                        311   

        

DEPRESSIVE DISORDER NOT ELSEWHERE CLASSIFIED                    

 

             10/21/2011           MCMANUS DO, ESSIE K                        729.5 

          Pain

In Limb                                          

 

             10/21/2011           DARIAN FARRAR PHD                        3

11           

DEPRESSIVE DISORDER NOT ELSEWHERE CLASSIFIED                    

 

                10/21/2011           DARIAN FARRAR PHD A                      

     729.5          

                          Pain In Limb                       

 

                10/21/2011           EDWARD REYNAGA                   

        311         

                          DEPRESSIVE DISORDER NOT ELSEWHERE CLASSIFIED          

          

 

                10/21/2011           EDWARD REYNAGA                   

        729.5       

                          Pain In Limb                       

 

             [facility]21/2011           [facility]                        3

11           

DEPRESSIVE DISORDER NOT ELSEWHERE CLASSIFIED                    

 

                10/21/2011           DARIAN FARRAR PHD A                      

     729.5          

                          Pain In Limb                       

 

             10/21/2011           DARIAN FARRAR PHD                        3

11           

DEPRESSIVE DISORDER NOT ELSEWHERE CLASSIFIED                    

 

                10/21/2011           DARIAN FARRAR PHD                      

     729.5          

                          Pain In Limb                       

 

             10/21/2011           DARIAN FARRAR PHD                        3

11           

DEPRESSIVE DISORDER NOT ELSEWHERE CLASSIFIED                    

 

                10/21/2011           DARIAN FARRAR PHD                      

     729.5          

                          Pain In Limb                       

 

                10/21/2011           EDWARD REYNAGA                   

        311         

                          DEPRESSIVE DISORDER NOT ELSEWHERE CLASSIFIED          

          

 

                10/21/2011           EDWARD REYNAGA                   

        729.5       

                          Pain In Limb                       

 

             10/21/2011           DARIAN FARRAR PHD                        3

11           

DEPRESSIVE DISORDER NOT ELSEWHERE CLASSIFIED                    

 

                10/21/2011           DARIAN FARRAR PHD                      

     729.5          

                          Pain In Limb                       

 

             10/21/2011           MCMANUS DO, ESSIE K                        311   

        

DEPRESSIVE DISORDER NOT ELSEWHERE CLASSIFIED                    

 

             10/21/2011           MCMANUS DO ESSIE K                        729.5 

          Pain

In Limb                                          

 

             10/21/2011           VERONICA APRN, JEFFREY                        311

           

DEPRESSIVE DISORDER NOT ELSEWHERE CLASSIFIED                    

 

             10/21/2011           VERONICA APRN, JEFFREY                        729

.5           

Pain In Limb                                     

 

             10/21/2011           DARIAN FARRAR PHD A                        3

11           

DEPRESSIVE DISORDER NOT ELSEWHERE CLASSIFIED                    

 

                10/21/2011           DARIAN FARRAR PHD A                      

     729.5          

                          Pain In Limb                       

 

             10/21/2011           VERONICA APRN, JEFFREY                        311

           

DEPRESSIVE DISORDER NOT ELSEWHERE CLASSIFIED                    

 

             10/21/2011           VERONICA APRN, JEFFREY                        729

.5           

Pain In Limb                                     

 

             10/21/2011           DARIAN FARRAR PHD A                        3

11           

DEPRESSIVE DISORDER NOT ELSEWHERE CLASSIFIED                    

 

                10/21/2011           DARIAN FARRAR PHD A                      

     729.5          

                          Pain In Limb                       

 

                10/22/2011           DARIAN FARRAR PHD                      

     296.30         

                          MO DEPRESSIVE RECURRENT UNSPECIFIED                   

 

 

                10/22/2011           LENI YAN, DARIAN JARA                      

     304.80         

                          SA POLYSUB DEP                     

 

                10/22/2011           DARIAN FARRAR PHD                      

     307.47         

                          SI DYSSOMNIA NOS                    

 

             10/22/2011           RASHI DIAMOND DO F                        296

.30           

MO DEPRESSIVE RECURRENT UNSPECIFIED                    

 

             10/22/2011           LOBO DO, RASHI F                        304

.80           

SA POLYSUB DEP                                   

 

             10/22/2011           LOBO CABRAL, RASHI F                        307

.47           

SI DYSSOMNIA NOS                                 

 

                10/22/2011           DARIAN FARRAR PHD                      

     296.30         

                          MO DEPRESSIVE RECURRENT UNSPECIFIED                   

 

 

                10/22/2011           LENI YAN, DARIAN JARA                      

     304.80         

                          SA POLYSUB DEP                     

 

                10/22/2011           DARIAN FARRAR PHD                      

     307.47         

                          SI DYSSOMNIA NOS                    

 

             10/22/2011           MCMANUS DO, ESSIE K                        296.30

           MO 

DEPRESSIVE RECURRENT UNSPECIFIED                    

 

             10/22/2011           MCMANUS DO, ESSIE K                        304.80

           SA 

POLYSUB DEP                                      

 

             10/22/2011           MCMANUS DO, ESSIE K                        307.47

           SI 

DYSSOMNIA NOS                                    

 

             10/22/2011                                     296.30           MO 

DEPRESSIVE 

RECURRENT UNSPECIFIED                            

 

             10/22/2011                                     304.80           SA 

POLYSUB DEP    

                                                 

 

             10/22/2011                                     307.47           SI 

DYSSOMNIA NOS  

                                                 

 

             10/22/2011                                     296.30           MO 

DEPRESSIVE 

RECURRENT UNSPECIFIED                            

 

             10/22/2011                                     304.80           SA 

POLYSUB DEP    

                                                 

 

             10/22/2011                                     307.47           SI 

DYSSOMNIA NOS  

                                                 

 

             10/22/2011                                     296.30           MO 

DEPRESSIVE 

RECURRENT UNSPECIFIED                            

 

             10/22/2011                                     304.80           SA 

POLYSUB DEP    

                                                 

 

             10/22/2011                                     307.47           SI 

DYSSOMNIA NOS  

                                                 

 

             10/22/2011                                     296.30           MO 

DEPRESSIVE 

RECURRENT UNSPECIFIED                            

 

             10/22/2011                                     304.80           SA 

POLYSUB DEP    

                                                 

 

             10/22/2011                                     307.47           SI 

DYSSOMNIA NOS  

                                                 

 

             10/22/2011                                     296.30           MO 

DEPRESSIVE 

RECURRENT UNSPECIFIED                            

 

             10/22/2011                                     304.80           SA 

POLYSUB DEP    

                                                 

 

             10/22/2011                                     307.47           SI 

DYSSOMNIA NOS  

                                                 

 

                10/22/2011           KYLAH MELGAR PA-C                      

     296.30         

                          MO DEPRESSIVE RECURRENT UNSPECIFIED                   

 

 

                10/22/2011           KYLAH MELGAR PA-C                      

     304.80         

                          SA POLYSUB DEP                     

 

                10/22/2011           KYLAH MELGAR PA-C                      

     307.47         

                          SI DYSSOMNIA NOS                    

 

                10/22/2011           DARIAN FARRAR PHD                      

     296.30         

                          MO DEPRESSIVE RECURRENT UNSPECIFIED                   

 

 

                10/22/2011           DARIAN FARRAR PHD                      

     304.80         

                          SA POLYSUB DEP                     

 

                10/22/2011           DARIAN FARRAR PHD                      

     307.47         

                          SI DYSSOMNIA NOS                    

 

             10/22/2011           MCMANUS DO, ESSIE K                        296.30

           MO 

DEPRESSIVE RECURRENT UNSPECIFIED                    

 

             10/22/2011           MCMANUS DO, ESSIE K                        304.80

           SA 

POLYSUB DEP                                      

 

             10/22/2011           MCMANUS DO, ESSIE K                        307.47

           SI 

DYSSOMNIA NOS                                    

 

                10/22/2011           DARIAN FARRAR PHD                      

     296.30         

                          MO DEPRESSIVE RECURRENT UNSPECIFIED                   

 

 

                10/22/2011           DARIAN FARRAR PHD                      

     304.80         

                          SA POLYSUB DEP                     

 

                10/22/2011           DARIAN FARRAR PHD                      

     307.47         

                          SI DYSSOMNIA NOS                    

 

                10/22/2011           VENKATA WADDELL EDWARD RODERICK                   

        296.30      

                          MO DEPRESSIVE RECURRENT UNSPECIFIED                   

 

 

                10/22/2011           EDWARD REYNAGA                   

        304.80      

                          SA POLYSUB DEP                     

 

                10/22/2011           EDWARD REYNAGA                   

        307.47      

                          SI DYSSOMNIA NOS                    

 

                10/22/2011           DARIAN FARRAR PHD                      

     296.30         

                          MO DEPRESSIVE RECURRENT UNSPECIFIED                   

 

 

                10/22/2011           DARIAN FARRAR PHD                      

     304.80         

                          SA POLYSUB DEP                     

 

                10/22/2011           DARIAN FARRAR PHD                      

     307.47         

                          SI DYSSOMNIA NOS                    

 

                10/22/2011           DARIAN FARRAR PHD                      

     296.30         

                          MO DEPRESSIVE RECURRENT UNSPECIFIED                   

 

 

                10/22/2011           DARIAN FARRAR PHD                      

     304.80         

                          SA POLYSUB DEP                     

 

                10/22/2011           DARIAN FARRAR PHD                      

     307.47         

                          SI DYSSOMNIA NOS                    

 

                10/22/2011           DARIAN FARRAR PHD                      

     296.30         

                          MO DEPRESSIVE RECURRENT UNSPECIFIED                   

 

 

                10/22/2011           DARIAN FARRAR PHD                      

     304.80         

                          SA POLYSUB DEP                     

 

                10/22/2011           DARIAN FARRRA PHD                      

     307.47         

                          SI DYSSOMNIA NOS                    

 

                10/22/2011           VENKATA WADDELL EDWARD RIVERAH                   

        296.30      

                          MO DEPRESSIVE RECURRENT UNSPECIFIED                   

 

 

                10/22/2011           EDWARD REYNAGA                   

        304.80      

                          SA POLYSUB DEP                     

 

                10/22/2011           EDWARD REYNAGA                   

        307.47      

                          SI DYSSOMNIA NOS                    

 

                10/22/2011           DARIAN FARRAR PHD                      

     296.30         

                          MO DEPRESSIVE RECURRENT UNSPECIFIED                   

 

 

                10/22/2011           DARIAN FARRAR PHD                      

     304.80         

                          SA POLYSUB DEP                     

 

                10/22/2011           DARIAN FARRAR PHD                      

     307.47         

                          SI DYSSOMNIA NOS                    

 

             10/22/2011           ESSIE MCMANUS DO                        296.30

           MO 

DEPRESSIVE RECURRENT UNSPECIFIED                    

 

             10/22/2011           MCMANUS DOESSIE K                        304.80

           SA 

POLYSUB DEP                                      

 

             10/22/2011           MCMANUS DOESSIE K                        307.47

           SI 

DYSSOMNIA NOS                                    

 

             10/22/2011           VERONICA APRN, JEFFREY                        296

.30           

MO DEPRESSIVE RECURRENT UNSPECIFIED                    

 

             10/22/2011           VERONICA APRN, JEFFREY                        304

.80           

SA POLYSUB DEP                                   

 

             10/22/2011           VERONICA APRN, JEFFREY                        307

.47           

SI DYSSOMNIA NOS                                 

 

                10/22/2011           DARIAN FARRAR PHD                      

     296.30         

                          MO DEPRESSIVE RECURRENT UNSPECIFIED                   

 

 

                10/22/2011           DARIAN FARRAR PHD                      

     304.80         

                          SA POLYSUB DEP                     

 

                10/22/2011           DARIAN FARRAR PHD                      

     307.47         

                          SI DYSSOMNIA NOS                    

 

             10/22/2011           VERONICA APRN, JEFFREY                        296

.30           

MO DEPRESSIVE RECURRENT UNSPECIFIED                    

 

             10/22/2011           VERONICA APRN, JEFFREY                        304

.80           

SA POLYSUB DEP                                   

 

             10/22/2011           VERONICA APRN, JEFFREY                        307

.47           

SI DYSSOMNIA NOS                                 

 

                10/22/2011           DARIAN FARRAR PHD                      

     296.30         

                          MO DEPRESSIVE RECURRENT UNSPECIFIED                   

 

 

                10/22/2011           DARIAN FARRAR PHD                      

     304.80         

                          SA POLYSUB DEP                     

 

                10/22/2011           DARIAN FARRAR PHD                      

     307.47         

                          SI DYSSOMNIA NOS                    

 

                2011           DARIAN FARRAR PHD                      

     296.32         

                          MO DEPRESSIVE RECURRENT MODERATE                    

 

             2011           RASHI DIAMOND DO                        296

.32           

MO DEPRESSIVE RECURRENT MODERATE                    

 

                2011           DARIAN FARRAR PHD                      

     296.32         

                          MO DEPRESSIVE RECURRENT MODERATE                    

 

             2011           ESSIE MCMANUS DO                        296.32

           MO 

DEPRESSIVE RECURRENT MODERATE                    

 

             2011                                     296.32           MO 

DEPRESSIVE 

RECURRENT MODERATE                               

 

             2011                                     296.32           MO 

DEPRESSIVE 

RECURRENT MODERATE                               

 

             2011                                     296.32           MO 

DEPRESSIVE 

RECURRENT MODERATE                               

 

             2011                                     296.32           MO 

DEPRESSIVE 

RECURRENT MODERATE                               

 

             2011                                     296.32           MO 

DEPRESSIVE 

RECURRENT MODERATE                               

 

                2011           KYLAH MELGAR PA-C                      

     296.32         

                          MO DEPRESSIVE RECURRENT MODERATE                    

 

                2011           [facility]                      

     296.32         

                          MO DEPRESSIVE RECURRENT MODERATE                    

 

             2011           ESSIE MCMANUS DO                        296.32

           MO 

DEPRESSIVE RECURRENT MODERATE                    

 

                2011[facility] , DARIAN JARA                      

     296.32         

                          MO DEPRESSIVE RECURRENT MODERATE                    

 

                2011           [facility]                   

        296.32      

                          MO DEPRESSIVE RECURRENT MODERATE                    

 

                2011           [facility]                      

     296.32         

                          MO DEPRESSIVE RECURRENT MODERATE                    

 

                2011           [facility]                      

     296.32         

                          MO DEPRESSIVE RECURRENT MODERATE                    

 

                2011           [facility]                      

     296.32         

                          MO DEPRESSIVE RECURRENT MODERATE                    

 

                2011           [facility]                   

        296.32      

                          MO DEPRESSIVE RECURRENT MODERATE                    

 

                2011           [facility] , DARIAN A                      

     296.32         

                          MO DEPRESSIVE RECURRENT MODERATE                    

 

             2011           ESSIE MCMANUS DO                        296.32

           MO 

DEPRESSIVE RECURRENT MODERATE                    

 

             2011           VERONICA APRTU, JEFFREY                        296

.32           

MO DEPRESSIVE RECURRENT MODERATE                    

 

                2011           [facility]                      

     296.32         

                          MO DEPRESSIVE RECURRENT MODERATE                    

 

             2011           VERONICA APRN, JEFFREY                        296

.32           

MO DEPRESSIVE RECURRENT MODERATE                    

 

                2011           [facility]                      

     296.32         

                          MO DEPRESSIVE RECURRENT MODERATE                    

 

                2012           DARIAN FARRAR PHD                      

     111.0          

                          P[facility]RIASIS VERSICOLOR                    

 

                2012           DARIAN FARRAR PHD                      

     272.4          

                          OTHER AND UNSPECIFIED HYPERLIPIDEMIA                  

  

 

             2012           RASHI DIAMOND DO                        111

.0           

[facility] VERSICOLOR                            

 

             2012           RASHI DIAMOND DO                        272

.4           

OTHER AND UNSPECIFIED HYPERLIPIDEMIA                    

 

                2012           DARIAN FARRAR PHD                      

     111.0          

                          P[facility]RIASIS VERSICOLOR                    

 

                2012           DARIAN FARRAR PHD                      

     272.4          

                          OTHER AND UNSPECIFIED HYPERLIPIDEMIA                  

  

 

             2012           ESSIE MCMANUS DO                        111.0 

          

PITYRIASIS VERSICOLOR                            

 

             2012           ESSIE MCMANUS DO                        272.4 

          

OTHER AND UNSPECIFIED HYPERLIPIDEMIA                    

 

             2012                                     111.0           PITY

RIASIS 

VERSICOLOR                                       

 

             2012                                     272.4           OTHE

R AND 

UNSPECIFIED HYPERLIPIDEMIA                       

 

             2012                                     111.0           PITY

RIASIS 

VERSICOLOR                                       

 

             2012                                     272.4           OTHE

R AND 

UNSPECIFIED HYPERLIPIDEMIA                       

 

             2012                                     111.0           PITY

RIASIS 

VERSICOLOR                                       

 

             2012                                     272.4           OTHE

R AND 

UNSPECIFIED HYPERLIPIDEMIA                       

 

             2012                                     111.0           PITY

RIASIS 

VERSICOLOR                                       

 

             2012                                     272.4           OTHE

R AND 

UNSPECIFIED HYPERLIPIDEMIA                       

 

             2012                                     111.0           PITY

RIASIS 

VERSICOLOR                                       

 

             2012                                     272.4           OTHE

R AND 

UNSPECIFIED HYPERLIPIDEMIA                       

 

                2012           KYLAH MELGAR PA-C                      

     111.0          

                          PITYRIASIS VERSICOLOR                    

 

                2012           KYLAH MELGAR PA-C                      

     272.4          

                          OTHER AND UNSPECIFIED HYPERLIPIDEMIA                  

  

 

                2012           DARIAN FARRAR PHD                      

     111.0          

                          PITYRIASIS VERSICOLOR                    

 

                2012           DARIAN FARRAR PHD                      

     272.4          

                          OTHER AND UNSPECIFIED HYPERLIPIDEMIA                  

  

 

             2012           MCMANUS DOVERONIKAA K                        111.0 

          

PITYRIASIS VERSICOLOR                            

 

             2012           MCMANUS DO ESSIE K                        272.4 

          

OTHER AND UNSPECIFIED HYPERLIPIDEMIA                    

 

                2012           DARIAN FARRAR PHD A                      

     111.0          

                          PITYRIASIS VERSICOLOR                    

 

                2012           LENI YAN, DARIAN JARA                      

     272.4          

                          OTHER AND UNSPECIFIED HYPERLIPIDEMIA                  

  

 

                2012           [facility]                   

        111.0       

                          PITYRIASIS VERSICOLOR                    

 

                2012           [facility]                   

        272.4       

                          OTHER AND UNSPECIFIED HYPERLIPIDEMIA                  

  

 

                2012           LENI YAN, DARIAN A                      

     111.0          

                          PITYRIASIS VERSICOLOR                    

 

                2012           LENI YAN, DARIAN A                      

     272.4          

                          OTHER AND UNSPECIFIED HYPERLIPIDEMIA                  

  

 

                2012           LENI YAN, DARIAN A                      

     111.0          

                          PITYRIASIS VERSICOLOR                    

 

                2012           DARIAN FARRAR PHD                      

     272.4          

                          OTHER AND UNSPECIFIED HYPERLIPIDEMIA                  

  

 

                2012           LENI YAN, DARIAN A                      

     111.0          

                          PITYRIASIS VERSICOLOR                    

 

                2012           LENI YAN, DARIAN A                      

     272.4          

                          OTHER AND UNSPECIFIED HYPERLIPIDEMIA                  

  

 

                2012           [facility] RE[facility]                   

        111.0       

                          PITYRIASIS VERSICOLOR                    

 

                2012           HASTINGS APRN, EDWARD RODERICK                   

        272.4       

                          OTHER AND UNSPECIFIED HYPERLIPIDEMIA                  

  

 

                2012           DARIAN FARRAR PHD                      

     111.0          

                          PITYRIASIS VERSICOLOR                    

 

                2012           DARIAN FARRAR PHD                      

     272.4          

                          OTHER AND UNSPECIFIED HYPERLIPIDEMIA                  

  

 

             2012           ESSIE MCMANUS DO K                        111.0 

          

PITYRIASIS VERSICOLOR                            

 

             2012           MCMANUS DOVERONIKAA K                        272.4 

          

OTHER AND UNSPECIFIED HYPERLIPIDEMIA                    

 

             2012           VERONICA APRN, JEFFREY                        111

.0           

PITYRIASIS VERSICOLOR                            

 

             2012           VERONICA APRN, JEFFREY                        272

.4           

OTHER AND UNSPECIFIED HYPERLIPIDEMIA                    

 

                2012           DARIAN FARRAR PHD                      

     111.0          

                          PITYRIASIS VERSICOLOR                    

 

                2012           DARIAN FARRAR PHD                      

     272.4          

                          OTHER AND UNSPECIFIED HYPERLIPIDEMIA                  

  

 

             2012           VERONICA APRN, JEFFREY                        111

.0           

PITYRIASIS VERSICOLOR                            

 

             2012           VERONICA APRN, JEFFREY                        272

.4           

OTHER AND UNSPECIFIED HYPERLIPIDEMIA                    

 

                2012           DARIAN FARRAR PHD                      

     111.0          

                          PITYRIASIS VERSICOLOR                    

 

                2012           DARIAN FARRAR PHD                      

     272.4          

                          OTHER AND UNSPECIFIED HYPERLIPIDEMIA                  

  

 

                2012           DARIAN FARRAR PHD                      

     686.9          

                          UNSPECIFIED LOCAL INFECTION OF SKIN AND SUBCUTANEOUS T

ISSUE                    

 

             2012           RASHI DIAMOND DO                        686

.9           

UNSPECIFIED LOCAL INFECTION OF SKIN AND SUBCUTANEOUS TISSUE                    

 

                2012           DARIAN FARRAR PHD                      

     686.9          

                          UNSPECIFIED LOCAL INFECTION OF SKIN AND SUBCUTANEOUS T

ISSUE                    

 

             2012           ESSIE MCMANUS DO                        686.9 

          

UNSPECIFIED LOCAL INFECTION OF SKIN AND SUBCUTANEOUS TISSUE                    

 

             2012                                     686.9           UNSP

ECIFIED LOCAL 

INFECTION OF SKIN AND SUBCUTANEOUS TISSUE                    

 

             2012                                     686.9           UNSP

ECIFIED LOCAL 

INFECTION OF SKIN AND SUBCUTANEOUS TISSUE                    

 

             2012                                     686.9           UNSP

ECIFIED LOCAL 

INFECTION OF SKIN AND SUBCUTANEOUS TISSUE                    

 

             2012                                     686.9           UNSP

ECIFIED LOCAL 

INFECTION OF SKIN AND SUBCUTANEOUS TISSUE                    

 

             2012                                     686.9           UNSP

ECIFIED LOCAL 

INFECTION OF SKIN AND SUBCUTANEOUS TISSUE                    

 

                2012           KYLAH MELGAR PA-C                      

     686.9          

                          UNSPECIFIED LOCAL INFECTION OF SKIN AND SUBCUTANEOUS T

ISSUE                    

 

                2012           DARIAN FARRAR PHD                      

     686.9          

                          UNSPECIFIED LOCAL INFECTION OF SKIN AND SUBCUTANEOUS T

ISSUE                    

 

             2012           ESSIE MCMANUS DO                        686.9 

          

UNSPECIFIED LOCAL INFECTION OF SKIN AND SUBCUTANEOUS TISSUE                    

 

                2012           DARIAN FARRAR PHD                      

     686.9          

                          UNSPECIFIED LOCAL INFECTION OF SKIN AND SUBCUTANEOUS T

ISSUE                    

 

                2012           [facility]LL[facility]                   

        686.9       

                          UNSPECIFIED LOCAL INFECTION OF SKIN AND SUBCUTANEOUS T

ISSUE                  

 

 

                2012           BOEDARIAN PEREZ PHD                      

     686.9          

                          UNSPECIFIED LOCAL INFECTION OF SKIN AND SUBCUTANEOUS T

ISSUE                    

 

                2012           BOEDARIAN PEREZ PHD                      

     686.9          

                          UNSPECIFIED LOCAL INFECTION OF SKIN AND SUBCUTANEOUS T

ISSUE                    

 

                2012           DARIAN FARRAR PHD                      

     686.9          

                          UNSPECIFIED LOCAL INFECTION OF SKIN AND SUBCUTANEOUS T

ISSUE                    

 

                2012           [facility]                   

        686.9       

                          UNSPECIFIED LOCAL INFECTION OF SKIN AND SUBCUTANEOUS T

ISSUE                  

 

 

                2012           DARIAN FARRAR PHD                      

     686.9          

                          UNSPECIFIED LOCAL INFECTION OF SKIN AND SUBCUTANEOUS T

ISSUE                    

 

             2012           ESSIE MCMANUS DO                        686.9 

          

UNSPECIFIED LOCAL INFECTION OF SKIN AND SUBCUTANEOUS TISSUE                    

 

             2012           VERONICA APRTU, JEFFREY                        686

.9           

UNSPECIFIED LOCAL INFECTION OF SKIN AND SUBCUTANEOUS TISSUE                    

 

                2012           DARIAN FARRAR PHD                      

     686.9          

                          UNSPECIFIED LOCAL INFECTION OF SKIN AND SUBCUTANEOUS T

ISSUE                    

 

             2012           VREONICA WADDELL, JEFFREY                        686

.9           

UNSPECIFIED LOCAL INFECTION OF SKIN AND SUBCUTANEOUS TISSUE                    

 

                2012           DARIAN FARRAR PHD                      

     686.9          

                          UNSPECIFIED LOCAL INFECTION OF SKIN AND SUBCUTANEOUS T

ISSUE                    

 

                2012           DARIAN FARRAR PHD                      

     782.3          

                          EDEMA                              

 

                2012           LENI YAN, DARIAN JARA                      

     782.7          

                          petichiae                          

 

             2012           WERRASHI ORTEGA DO F                        782

.3           

EDEMA                                            

 

             2012           WERRASHI ORTEGA DO F                        782

.7           

petichiae                                        

 

                2012           DARIAN FARRAR PHD                      

     782.3          

                          EDEMA                              

 

                2012           DARIAN FARRAR PHD                      

     782.7          

                          petichiae                          

 

             2012           MCMANUS ESSIE CABRAL                        782.3 

          

Edema                                            

 

             2012           MCMANUS ESSIE CABRAL                        782.7 

          

Petichiae                                        

 

           2012                                   782.3           Edema   

           

     

 

             2012                                     782.7           Peti

chiae          

                                                 

 

           2012                                   782.3           Edema   

           

     

 

             2012                                     782.7           Peti

chiae          

                                                 

 

           2012                                   782.3           Edema   

           

     

 

             2012                                     782.7           Peti

chiae          

                                                 

 

           2012                                   782.3           Edema   

           

     

 

             2012                                     782.7           Peti

chiae          

                                                 

 

           2012                                   782.3           Edema   

           

     

 

             2012                                     782.7           Peti

chiae          

                                                 

 

                2012           KYLAH MELGAR PA-C                      

     782.3          

                          Edema                              

 

                2012           KYLAH MELGAR PA-C                      

     782.7          

                          Petichiae                          

 

                2012           LENI PHD, DARIAN A                      

     782.3          

                          Edema                              

 

                2012           BOEANA PHD, DARIAN A                      

     782.7          

                          Petichiae                          

 

             2012           MCMANUS DO, ESSIE K                        782.3 

          

Edema                                            

 

             2012           MCMANUS DO, ESSIE K                        782.7 

          

Petichiae                                        

 

                2012           BOEANA PHD, DARIAN A                      

     782.3          

                          Edema                              

 

                2012           BOEANA PHD, DARIAN A                      

     782.7          

                          Petichiae                          

 

                2012           HASTINGS APRTU, EDWARD RIVERAH                   

        782.3       

                          Edema                              

 

                2012           HASTINGS APRTU, EDWARD RIVERAH                   

        782.7       

                          Petichiae                          

 

                2012           BOEANA PHD, DARIAN A                      

     782.3          

                          Edema                              

 

                2012           BOEANA PHD, DARIAN A                      

     782.7          

                          Petichiae                          

 

                2012           BOEANA PHD, DARIAN A                      

     782.3          

                          Edema                              

 

                2012           BOEANA PHD, DARIAN A                      

     782.7          

                          Petichiae                          

 

                2012           BOEANA PHD, DARIAN A                      

     782.3          

                          Edema                              

 

                2012           BOEANA PHD, DARIAN A                      

     782.7          

                          Petichiae                          

 

                2012           HASTINGS APRN, EDWARD RIVERAH                   

        782.3       

                          Edema                              

 

                2012           HASTINGS APRN, EDWARD RIVERAH                   

        782.7       

                          Petichiae                          

 

                2012           BOEANA PHD, DARIAN A                      

     782.3          

                          Edema                              

 

                2012           BOEANA PHD, DARIAN A                      

     782.7          

                          Petichiae                          

 

             2012           MCMANUS DO, ESSIE K                        782.3 

          

Edema                                            

 

             2012           MCMANUS DO, ESSIE K                        782.7 

          

Petichiae                                        

 

             2012           VERONICA APRN, JEFFREY                        782

.3           

Edema                                            

 

             2012           VERONICA APRN, JEFFREY                        782

.7           

Petichiae                                        

 

                2012           DARIAN FARRAR PHD                      

     782.3          

                          Edema                              

 

                2012           DARIAN FARRAR PHD                      

     782.7          

                          Petichiae                          

 

             2012           VERONICA APRN, JEFFREY                        782

.3           

Edema                                            

 

             2012           VERONICA APRN, JEFFREY                        782

.7           

Petichiae                                        

 

                2012           DARIAN FARRAR PHD                      

     782.3          

                          Edema                              

 

                2012           DARIAN FARRAR PHD                      

     782.7          

                          Petichiae                          

 

                2012           DARIAN FARRAR PHD                      

     709.1          

                          VASCULAR DISORDERS OF SKIN                    

 

             2012           RASHI DIAMOND DO                        709

.1           

VASCULAR DISORDERS OF SKIN                       

 

                2012           DARIAN FARRAR PHD                      

     709.1          

                          VASCULAR DISORDERS OF SKIN                    

 

             2012           ESSIE MCMANUS DO                        709.1 

          

Vascular Disorders Of Skin                       

 

             2012                                     709.1           Vasc

ular Disorders 

Of Skin                                          

 

             2012                                     709.1           Vasc

ular Disorders 

Of Skin                                          

 

             2012                                     709.1           Vasc

ular Disorders 

Of Skin                                          

 

             2012                                     709.1           Vasc

ular Disorders 

Of Skin                                          

 

             2012                                     709.1           Vasc

ular Disorders 

Of Skin                                          

 

                2012           TALIA DENNIS, KYLAH ANDRADE                      

     709.1          

                          Vascular Disorders Of Skin                    

 

                2012           DARIAN FARRAR PHD                      

     709.1          

                          Vascular Disorders Of Skin                    

 

             2012           ESSIE MCMANUS DO                        709.1 

          

Vascular Disorders Of Skin                       

 

                2012           DARIAN FARRAR PHD                      

     709.1          

                          Vascular Disorders Of Skin                    

 

                2012           EDWARD REYNAGA                   

        709.1       

                          Vascular Disorders Of Skin                    

 

                2012           DARIAN FARRAR PHD                      

     709.1          

                          Vascular Disorders Of Skin                    

 

                2012           DARIAN FARRAR PHD                      

     709.1          

                          Vascular Disorders Of Skin                    

 

                2012           DARIAN FARRAR PHD                      

     709.1          

                          Vascular Disorders Of Skin                    

 

                2012           EDWARD REYNAGA                   

        709.1       

                          Vascular Disorders Of Skin                    

 

                2012           DARIAN FARRAR PHD                      

     709.1          

                          Vascular Disorders Of Skin                    

 

             2012           ESSIE MCMANUS DO                        709.1 

          

Vascular Disorders Of Skin                       

 

             2012           JEFFREY MORA                        709

.1           

Vascular Disorders Of Skin                       

 

                2012           DARIAN FARRAR PHD                      

     709.1          

                          Vascular Disorders Of Skin                    

 

             2012           VERONICA WADDELL JEFFREY                        709

.1           

Vascular Disorders Of Skin                       

 

                2012           DARIAN FARRAR PHD                      

     709.1          

                          Vascular Disorders Of Skin                    

 

                2012           DAIRAN FARRAR PHD                      

     296.31         

                          MO DEPRESSIVE RECURRENT MILD                    

 

             2012           RASHI DIAMOND DO                        296

.31           

MO DEPRESSIVE RECURRENT MILD                     

 

                2012           DARIAN FARRAR PHD                      

     296.31         

                          MO DEPRESSIVE RECURRENT MILD                    

 

             2012           ESSIE MCMANUS DO                        296.31

           MO 

DEPRESSIVE RECURRENT MILD                        

 

             2012                                     296.31           MO 

DEPRESSIVE 

RECURRENT MILD                                   

 

             2012                                     296.31           MO 

DEPRESSIVE 

RECURRENT MILD                                   

 

             2012                                     296.31           MO 

DEPRESSIVE 

RECURRENT MILD                                   

 

             2012                                     296.31           MO 

DEPRESSIVE 

RECURRENT MILD                                   

 

             2012                                     296.31           MO 

DEPRESSIVE 

RECURRENT MILD                                   

 

                2012           KYLAH MELGAR PA-C                      

     296.31         

                          MO DEPRESSIVE RECURRENT MILD                    

 

                2012           DARIAN FARRAR PHD                      

     296.31         

                          MO DEPRESSIVE RECURRENT MILD                    

 

             2012           ESSIE MCMANUS DO                        296.31

           MO 

DEPRESSIVE RECURRENT MILD                        

 

                2012           DARIAN FARRAR PHD                      

     296.31         

                          MO DEPRESSIVE RECURRENT MILD                    

 

                2012           EDWARD REYNAGA                   

        296.31      

                          MO DEPRESSIVE RECURRENT MILD                    

 

                2012           DARIAN FARRAR PHD                      

     296.31         

                          MO DEPRESSIVE RECURRENT MILD                    

 

                2012           DARIAN FARRAR PHD                      

     296.31         

                          MO DEPRESSIVE RECURRENT MILD                    

 

                2012           DARIAN FARRAR PHD                      

     296.31         

                          MO DEPRESSIVE RECURRENT MILD                    

 

                2012           EDWARD REYNAGA                   

        296.31      

                          MO DEPRESSIVE RECURRENT MILD                    

 

                2012           DARIAN FARRAR PHD                      

     296.31         

                          MO DEPRESSIVE RECURRENT MILD                    

 

             2012           ESSIE MCMANUS DO                        296.31

           MO 

DEPRESSIVE RECURRENT MILD                        

 

             2012           JEFFREY MORA                        296

.31           

MO DEPRESSIVE RECURRENT MILD                     

 

                2012           DARIAN FARRAR PHD                      

     296.31         

                          MO DEPRESSIVE RECURRENT MILD                    

 

             2012           JEFFREY MORA                        296

.31           

MO DEPRESSIVE RECURRENT MILD                     

 

                2012           LENI YAN, DARIAN JARA                      

     296.31         

                          MO DEPRESSIVE RECURRENT MILD                    

 

                2013           JENNIFER FARNSWORTH, ENRIQUE POLANCO           Ot        

      276.51       

                          DEHYDRATION                        

 

                2013           ENRIQUE RODRIGUEZ MD           Ot        

      787.03       

                          VOMITING ALONE                     

 

                2013           ENRIQUE RODRIGUEZ MD           Ot        

      789.00       

                          ABDOMINAL PAIN, UNSPECIFIED SITE                    

 

             2013                                     727.03           TRI

GGER FINGER 

(ACQUIRED)                                       

 

             2013                                     727.03           TRI

GGER FINGER 

(ACQUIRED)                                       

 

             2013                                     727.03           TRI

GGER FINGER 

(ACQUIRED)                                       

 

                2013           KYLAH MELGAR PA-C                      

     727.03         

                          TRIGGER FINGER (ACQUIRED)                    

 

                2013           DARIAN FARRAR PHD A                      

     727.03         

                          TRIGGER FINGER (ACQUIRED)                    

 

             2013           ESSIE MCMANUS DO                        727.03

           

TRIGGER FINGER (ACQUIRED)                        

 

                2013           DARIAN FARRAR PHD A                      

     727.03         

                          TRIGGER FINGER (ACQUIRED)                    

 

                2013           EDWARD REYNAGA                   

        727.03      

                          TRIGGER FINGER (ACQUIRED)                    

 

                2013           DARIAN FARRAR PHD A                      

     727.03         

                          TRIGGER FINGER (ACQUIRED)                    

 

                2013           DARIAN FARRAR PHD A                      

     727.03         

                          TRIGGER FINGER (ACQUIRED)                    

 

                2013           DARIAN FARRAR PHD A                      

     727.03         

                          TRIGGER FINGER (ACQUIRED)                    

 

                2013           EDWARD REYNAGA                   

        727.03      

                          TRIGGER FINGER (ACQUIRED)                    

 

                2013           DARIAN FARRAR PHD A                      

     727.03         

                          TRIGGER FINGER (ACQUIRED)                    

 

             2013           ESSIE MCMANUS DO                        727.03

           

TRIGGER FINGER (ACQUIRED)                        

 

             2013           JEFFREY MORA                        727

.03           

TRIGGER FINGER (ACQUIRED)                        

 

                2013           DARIAN FARRAR PHD A                      

     727.03         

                          TRIGGER FINGER (ACQUIRED)                    

 

             2013           JEFFREY MORA                        727

.03           

TRIGGER FINGER (ACQUIRED)                        

 

                2013           [facility] , DARIAN JARA                      

     727.03         

                          TRIGGER FINGER (ACQUIRED)                    

 

             2013                                     790.4           ABNO

RMAL LFT 

(ELEVATED)                                       

 

             2013                                     790.4           ABNO

RMAL LFT 

(ELEVATED)                                       

 

                2013           KYLAH MELGAR PA-C                      

     790.4          

                          ABNORMAL LFT (ELEVATED)                    

 

                2013           [facility] , DARIAN JARA                      

     790.4          

                          ABNORMAL LFT (ELEVATED)                    

 

             2013           ESSIE MCMANUS DO                        790.4 

          

ABNORMAL LFT (ELEVATED)                          

 

                2013           [facility] , DARIAN JARA                      

     790.4          

                          ABNORMAL LFT (ELEVATED)                    

 

                2013           EDWARD REYNAGA                   

        790.4       

                          ABNORMAL LFT (ELEVATED)                    

 

                2013           [facility] , DARIAN JARA                      

     790.4          

                          ABNORMAL LFT (ELEVATED)                    

 

                2013           [facility] , DARIAN JARA                      

     790.4          

                          ABNORMAL LFT (ELEVATED)                    

 

                2013           [facility] , DARIAN JARA                      

     790.4          

                          ABNORMAL LFT (ELEVATED)                    

 

                2013           EDWARD REYNAGA                   

        790.4       

                          ABNORMAL LFT (ELEVATED)                    

 

                2013           [facility] , DARIAN JARA                      

     790.4          

                          ABNORMAL LFT (ELEVATED)                    

 

             2013           ESSIE MCMANUS DO                        790.4 

          

ABNORMAL LFT (ELEVATED)                          

 

             2013           JEFFREY MORA                        790

.4           

ABNORMAL LFT (ELEVATED)                          

 

                2013           [facility] , DARIAN JARA                      

     790.4          

                          ABNORMAL LFT (ELEVATED)                    

 

             2013           JEFFREY MORA                        790

.4           

ABNORMAL LFT (ELEVATED)                          

 

                2013           [facility] , DARIAN JARA                      

     790.4          

                          ABNORMAL LFT (ELEVATED)                    

 

             2013                                     070.70           HEP

ATITIS C, 

UNSPEC                                           

 

                2013           KYLAH MELGAR PA-C                      

     070.70         

                          HEPATITIS C, UNSPEC                    

 

                2013           [facility] , DARAIN JARA                      

     070.70         

                          HEPATITIS C, UNSPEC                    

 

             2013           ESSIE MCMANUS DO                        070.70

           

HEPATITIS C, UNSPEC                              

 

                2013           [facility] , DARIAN JARA                      

     070.70         

                          HEPATITIS C, UNSPEC                    

 

                2013           [facility]                   

        070.70      

                          HEPATITIS C, UNSPEC                    

 

                2013           LENI YAN, DARIAN JARA                      

     070.70         

                          HEPATITIS C, UNSPEC                    

 

                2013           LENI YAN, DARIAN A                      

     070.70         

                          HEPATITIS C, UNSPEC                    

 

                2013           BRENDAOUT , DARIAN A                      

     070.70         

                          HEPATITIS C, UNSPEC                    

 

                2013           [facility] EDWARD RODERICK                   

        070.70      

                          HEPATITIS C, UNSPEC                    

 

                2013           BRENDAOUT , DARIAN A                      

     070.70         

                          HEPATITIS C, UNSPEC                    

 

             2013           ESSIE MCMANUS DO                        070.70

           

HEPATITIS C, UNSPEC                              

 

             2013           VERONICA APRTU, JEFFREY                        070

.70           

HEPATITIS C, UNSPEC                              

 

                2013           LENI YAN, DARIAN JARA                      

     070.70         

                          HEPATITIS C, UNSPEC                    

 

             2013           VERONICA APRTU, JEFFREY                        070

.70           

HEPATITIS C, UNSPEC                              

 

                2013           LENI YAN, DARIAN JARA                      

     070.70         

                          HEPATITIS C, UNSPEC                    

 

             10/10/2013           ESSIE MCMANUS DO                        307.42

           

PERSISTENT DISORDER OF INITIATING OR MAINTAINING SLEEP                    

 

                10/10/2013           DARIAN FARRAR PHD                      

     307.42         

                          PERSISTENT DISORDER OF INITIATING OR MAINTAINING SLEEP

                    

 

                10/10/2013           EDWARD REYNAGA                   

        307.42      

                          PERSISTENT DISORDER OF INITIATING OR MAINTAINING SLEEP

                    

 

                10/10/2013           DARIAN FARRAR PHD                      

     307.42         

                          PERSISTENT DISORDER OF INITIATING OR MAINTAINING SLEEP

                    

 

                10/10/2013           DARIAN FARRAR PHD                      

     307.42         

                          PERSISTENT DISORDER OF INITIATING OR MAINTAINING SLEEP

                    

 

                10/10/2013           DARIAN FARRAR PHD                      

     307.42         

                          PERSISTENT DISORDER OF INITIATING OR MAINTAINING SLEEP

                    

 

                10/10/2013           EDWARD REYNAGA                   

        307.42      

                          PERSISTENT DISORDER OF INITIATING OR MAINTAINING SLEEP

                    

 

                10/10/2013           DARIAN FARRAR PHD                      

     307.42         

                          PERSISTENT DISORDER OF INITIATING OR MAINTAINING SLEEP

                    

 

             10/10/2013           ESSIE MCMANUS DO                        307.42

           

PERSISTENT DISORDER OF INITIATING OR MAINTAINING SLEEP                    

 

             10/10/2013           VERONICA APRTU, JEFFREY                        307

.42           

PERSISTENT DISORDER OF INITIATING OR MAINTAINING SLEEP                    

 

                10/10/2013           DARIAN FARRAR PHD                      

     307.42         

                          PERSISTENT DISORDER OF INITIATING OR MAINTAINING SLEEP

                    

 

             10/10/2013           VERONICA APRN, JEFFREY                        307

.42           

PERSISTENT DISORDER OF INITIATING OR MAINTAINING SLEEP                    

 

                10/10/2013           DARIAN FARRAR PHD                      

     307.42         

                          PERSISTENT DISORDER OF INITIATING OR MAINTAINING SLEEP

                    

 

             10/17/2013           ESSIE MCMANUS DO                        454.1 

          

VARICOSE VEINS OF LOWER EXTREMITIES WITH INFLAMMATION                    

 

                10/17/2013           DARIAN FARRAR PHD                      

     454.1          

                          VARICOSE VEINS OF LOWER EXTREMITIES WITH INFLAMMATION 

                   

 

                10/17/2013           EDWARD REYNAGA                   

        454.1       

                          VARICOSE VEINS OF LOWER EXTREMITIES WITH INFLAMMATION 

                   

 

                10/17/2013           DARIAN FARRAR PHD                      

     454.1          

                          VARICOSE VEINS OF LOWER EXTREMITIES WITH INFLAMMATION 

                   

 

                10/17/2013           DARIAN FARRAR PHD                      

     454.1          

                          VARICOSE VEINS OF LOWER EXTREMITIES WITH INFLAMMATION 

                   

 

                10/17/2013           DARIAN FARRAR PHD                      

     454.1          

                          VARICOSE VEINS OF LOWER EXTREMITIES WITH INFLAMMATION 

                   

 

                10/17/2013           EDWARD REYNAGA                   

        454.1       

                          VARICOSE VEINS OF LOWER EXTREMITIES WITH INFLAMMATION 

                   

 

                10/17/2013           DARIAN FARRAR PHD                      

     454.1          

                          VARICOSE VEINS OF LOWER EXTREMITIES WITH INFLAMMATION 

                   

 

             10/17/2013           ESSIE MCMANUS DO                        454.1 

          

VARICOSE VEINS OF LOWER EXTREMITIES WITH INFLAMMATION                    

 

             10/17/2013           JEFFREY MORA                        454

.1           

VARICOSE VEINS OF LOWER EXTREMITIES WITH INFLAMMATION                    

 

                10/17/2013           DARIAN FARRAR PHD                      

     454.1          

                          VARICOSE VEINS OF LOWER EXTREMITIES WITH INFLAMMATION 

                   

 

             10/17/2013           JEFFREY MORA                        454

.1           

VARICOSE VEINS OF LOWER EXTREMITIES WITH INFLAMMATION                    

 

                10/17/2013           DARIAN FARRAR PHD                      

     454.1          

                          VARICOSE VEINS OF LOWER EXTREMITIES WITH INFLAMMATION 

                   

 

                2014           EDWARD REYNAGA                   

        300.00      

                          AN ANXIETY UNSPEC                    

 

                2014           DARIAN FARRAR PHD                      

     300.00         

                          AN ANXIETY UNSPEC                    

 

                2014           DARIAN FARRAR PHD                      

     300.00         

                          AN ANXIETY UNSPEC                    

 

                2014           DARIAN FARRAR PHD                      

     300.00         

                          AN ANXIETY UNSPEC                    

 

                2014           EDWARD REYNAGA                   

        300.00      

                          AN ANXIETY UNSPEC                    

 

                2014           DARIAN FARRAR PHD                      

     300.00         

                          AN ANXIETY UNSPEC                    

 

             2014           ESSIE MCMANUS DO                        300.00

           AN 

ANXIETY UNSPEC                                   

 

             2014           JEFFREY MORA                        300

.00           

AN ANXIETY UNSPEC                                

 

                2014           BOEKHOUT PHD, DARIAN A                      

     300.00         

                          AN ANXIETY UNSPEC                    

 

             2014           VERONICA APRTU, JEFFREY                        300

.00           

AN ANXIETY UNSPEC                                

 

                2014           LENI PHD, DARIAN A                      

     300.00         

                          AN ANXIETY UNSPEC                    

 

                2014           LENI PHD, DARIAN A                      

     V58.69         

                          MEDICATION HIGH RISK                    

 

                2014           VENKATA BAIRONEDWARD                   

        V58.69      

                          MEDICATION HIGH RISK                    

 

                2014           LENI PHD, DARIAN JARA                      

     V58.69         

                          MEDICATION HIGH RISK                    

 

             2014           MARIETTA ESSIE CABRAL                        V58.69

           

MEDICATION HIGH RISK                             

 

             2014           VERONICA APRN, JEFFREY                        V58

.69           

MEDICATION HIGH RISK                             

 

                2014           LENI PHD, DARIAN A                      

     V58.69         

                          MEDICATION HIGH RISK                    

 

             2014           VERONICA APRN, JEFFREY                        V58

.69           

MEDICATION HIGH RISK                             

 

                2014           LENI PHD, DARIAN A                      

     V58.69         

                          MEDICATION HIGH RISK                    

 

             10/30/2014           VERONICA WADDELL, JEFFREY                        300

.15           

DS DISSOCIATIVE DIS NOS                          

 

                10/30/2014           LENI PHD, DARIAN JARA                      

     300.15         

                          DS DISSOCIATIVE DIS NOS                    

 

             10/30/2014           VERONICA LLOYDN, JEFFREY                        300

.15           

DS DISSOCIATIVE DIS NOS                          

 

                10/30/2014           LENI PHD, DARIAN JARA                      

     300.15         

                          DS DISSOCIATIVE DIS NOS                    

 

             2018           TERRANCE CABRAL EL B           Ot           F32.9

           

MAJOR DEPRESSIVE DISORDER, SINGLE EPISOD                    

 

             2018           TERRANCE CABRAL EL B           Ot           K21.9

           

GASTRO-ESOPHAGEAL REFLUX DISEASE WITHOUT                    

 

             2018           TERRANCE CABRAL, EL B           Ot           K57.2

0           

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                    

 

             2018           TERRANCE CABRAL EL B           Ot           R82.9

9           

OTHER ABNORMAL FINDINGS IN URINE                    

 

             2018           TERRANCE CABRAL EL B           Ot           Z79.8

99           

OTHER LONG TERM (CURRENT) DRUG THERAPY                    

 

             2018           TERRANCE CABRAL EL B           Ot           F32.9

           

MAJOR DEPRESSIVE DISORDER, SINGLE EPISOD                    

 

             2018           TERRANCE CABRAL EL B           Ot           K21.9

           

GASTRO-ESOPHAGEAL REFLUX DISEASE WITHOUT                    

 

             2018           DELMAN , EL B           Ot           K57.2

0           

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                    

 

             2018           TERRANCE CABRAL EL B           Ot           R82.9

9           

OTHER ABNORMAL FINDINGS IN URINE                    

 

             2018           TERRANCE CABRAL EL B           Ot           Z79.8

99           

OTHER LONG TERM (CURRENT) DRUG THERAPY                    

 

             10/18/2018           EDINNAFISA EL CABRAL B           Ot           Z01.8

18           

ENCOUNTER FOR OTHER PREPROCEDURAL EXAMIN                    

 

             10/18/2018           TERRANCE CABRALEL B           Ot           Z01.8

18           

ENCOUNTER FOR OTHER PREPROCEDURAL EXAMIN                    

 

             10/26/2018           EDINEL SKELTON DO B           Ot           D12.2

           

BENIGN NEOPLASM OF ASCENDING COLON                    

 

             10/26/2018           EL CARLOS DO B           Ot           D12.3

           

BENIGN NEOPLASM OF TRANSVERSE COLON                    

 

             10/26/2018           TIFFANY CARLOS DOIC B           Ot           D12.4

           

BENIGN NEOPLASM OF DESCENDING COLON                    

 

             10/26/2018           TIFFANY CARLOS DOIC B           Ot           E78.5

           

HYPERLIPIDEMIA, UNSPECIFIED                      

 

             10/26/2018           TIFFANY CARLOS DOIC B           Ot           F32.9

           

MAJOR DEPRESSIVE DISORDER, SINGLE EPISOD                    

 

             10/26/2018           TIFFANY CARLOS DOIC B           Ot           K57.3

0           

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                    

 

             10/26/2018           EL CARLOS DO B           Ot           K64.8

           

OTHER HEMORRHOIDS                                

 

             10/26/2018           EL CARLOS DO B           Ot           Z12.1

1           

ENCOUNTER FOR SCREENING FOR MALIGNANT NE                    

 

             10/26/2018           EL CARLOS DO B           Ot           Z86.1

9           

PERSONAL HISTORY OF OTHER INFECTIOUS AND                    

 

             10/26/2018           EL CARLOS DO B           Ot           Z87.1

9           

PERSONAL HISTORY OF OTHER DISEASES OF TH                    

 

             10/31/2018           EL CARLOS DO B           Ot           D12.2

           

BENIGN NEOPLASM OF ASCENDING COLON                    

 

             10/31/2018           EL CARLOS DO B           Ot           D12.3

           

BENIGN NEOPLASM OF TRANSVERSE COLON                    

 

             10/31/2018           EL CARLOS DO B           Ot           D12.4

           

BENIGN NEOPLASM OF DESCENDING COLON                    

 

             10/31/2018           TIFFANY CARLOS DOIC B           Ot           E78.5

           

HYPERLIPIDEMIA, UNSPECIFIED                      

 

             10/31/2018           EL CARLOS DO B           Ot           F32.9

           

MAJOR DEPRESSIVE DISORDER, SINGLE EPISOD                    

 

             10/31/2018           TIFFANY CARLOS DOIC B           Ot           K57.3

0           

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                    

 

             10/31/2018           TIFFANY CARLOS DOIC B           Ot           K64.8

           

OTHER HEMORRHOIDS                                

 

             10/31/2018           EL CARLOS DO B           Ot           Z12.1

1           

ENCOUNTER FOR SCREENING FOR MALIGNANT NE                    

 

             10/31/2018           EDINNAFISA TIFFANY CABRALIC B           Ot           Z86.1

9           

PERSONAL HISTORY OF OTHER INFECTIOUS AND                    

 

             10/31/2018           EDINNAFISA TIFFANY CABRALIC B           Ot           Z87.1

9           

PERSONAL HISTORY OF OTHER DISEASES OF TH                    

 

             2018           TERRANCE CABRALTIFFANYIC B           Ot           D12.2

           

BENIGN NEOPLASM OF ASCENDING COLON                    

 

             2018           EDINNAFISA DO EL B           Ot           D12.3

           

BENIGN NEOPLASM OF TRANSVERSE COLON                    

 

             2018           TIFFANY CARLOS DOIC B           Ot           D12.4

           

BENIGN NEOPLASM OF DESCENDING COLON                    

 

             2018           EDINNAFISA DO EL B           Ot           E78.5

           

HYPERLIPIDEMIA, UNSPECIFIED                      

 

             2018           TERRANCE DO EL B           Ot           F32.9

           

MAJOR DEPRESSIVE DISORDER, SINGLE EPISOD                    

 

             2018           TERRANCE DO EL B           Ot           K57.3

0           

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                    

 

             2018           TERRANCE CABRAL EL B           Ot           K64.8

           

OTHER HEMORRHOIDS                                

 

             2018           TIFFANY CARLOS DOIC B           Ot           Z12.1

1           

ENCOUNTER FOR SCREENING FOR MALIGNANT NE                    

 

             2018           TIFFANY CARLOS DOIC B           Ot           Z86.1

9           

PERSONAL HISTORY OF OTHER INFECTIOUS AND                    

 

             2018           EDINNAFISA DO EL B           Ot           Z87.1

9           

PERSONAL HISTORY OF OTHER DISEASES OF TH                    

 

             2019           EDINNAFISA TIFFANY CABRALIC B           Ot           D12.2

           

BENIGN NEOPLASM OF ASCENDING COLON                    

 

             2019           TIFFANY CARLOS DOIC B           Ot           D12.3

           

BENIGN NEOPLASM OF TRANSVERSE COLON                    

 

             2019           TIFFANY CARLOS DOIC B           Ot           D12.4

           

BENIGN NEOPLASM OF DESCENDING COLON                    

 

             2019           TIFFANY CARLOS DOIC B           Ot           E78.5

           

HYPERLIPIDEMIA, UNSPECIFIED                      

 

             2019           EDINNAFISA TIFFANY CABRALIC B           Ot           F32.9

           

MAJOR DEPRESSIVE DISORDER, SINGLE EPISOD                    

 

             2019           EDINNAFISA TIFFANY CABRALIC B           Ot           K57.3

0           

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                    

 

             2019           TERRANCE CABRAL LE B           Ot           K64.8

           

OTHER HEMORRHOIDS                                

 

             2019           TERRANCE CABRAL EL B           Ot           Z12.1

1           

ENCOUNTER FOR SCREENING FOR MALIGNANT NE                    

 

             2019           TIFFANY CARLOS DOIC B           Ot           Z86.1

9           

PERSONAL HISTORY OF OTHER INFECTIOUS AND                    

 

             2019           DELMAN DO, EL B           Ot           Z87.1

9           

PERSONAL HISTORY OF OTHER DISEASES OF TH                    

 

             2019           TIFFANY CARLOS DOIC B           Ot           D12.2

           

BENIGN NEOPLASM OF ASCENDING COLON                    

 

             2019           TERRANCE CABRAL, EL B           Ot           D12.3

           

BENIGN NEOPLASM OF TRANSVERSE COLON                    

 

             2019           TERRANCE CABRAL, EL B           Ot           D12.4

           

BENIGN NEOPLASM OF DESCENDING COLON                    

 

             2019           TERRANCE CABRAL, EL B           Ot           E78.5

           

HYPERLIPIDEMIA, UNSPECIFIED                      

 

             2019           TERRANCE CABRAL, EL B           Ot           F32.9

           

MAJOR DEPRESSIVE DISORDER, SINGLE EPISOD                    

 

             2019           TERRANCE CABRAL, EL B           Ot           K57.3

0           

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                    

 

             2019           TERRANCE CABRAL EL B           Ot           K64.8

           

OTHER HEMORRHOIDS                                

 

             2019           TERRANCE CABRAL EL B           Ot           Z12.1

1           

ENCOUNTER FOR SCREENING FOR MALIGNANT NE                    

 

             2019           EDINNAFISA CABRAL EL B           Ot           Z86.1

9           

PERSONAL HISTORY OF OTHER INFECTIOUS AND                    

 

             2019           TERRANCE CABRAL EL B           Ot           Z87.1

9           

PERSONAL HISTORY OF OTHER DISEASES OF TH                    

 

             2019           TERRANCE CABRAL EL B           Ot           D12.2

           

BENIGN NEOPLASM OF ASCENDING COLON                    

 

             2019           TERRANCE CABRAL LE B           Ot           D12.3

           

BENIGN NEOPLASM OF TRANSVERSE COLON                    

 

             2019           TERRANCE CABRAL, EL B           Ot           D12.4

           

BENIGN NEOPLASM OF DESCENDING COLON                    

 

             2019           TERRANCE CABRAL EL B           Ot           E78.5

           

HYPERLIPIDEMIA, UNSPECIFIED                      

 

             2019           TERRANCE CABRAL EL B           Ot           F32.9

           

MAJOR DEPRESSIVE DISORDER, SINGLE EPISOD                    

 

             2019           TERRANCE CABRAL EL B           Ot           K57.3

0           

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                    

 

             2019           EDINNAFISA CABRAL EL B           Ot           K64.8

           

OTHER HEMORRHOIDS                                

 

             2019           EDINNAFISA CABRALTIFFANYIC B           Ot           Z12.1

1           

ENCOUNTER FOR SCREENING FOR MALIGNANT NE                    

 

             2019           EDINNAFISA CABRAL EL B           Ot           Z86.1

9           

PERSONAL HISTORY OF OTHER INFECTIOUS AND                    

 

             2019           EDINNAFISA CABRAL EL B           Ot           Z87.1

9           

PERSONAL HISTORY OF OTHER DISEASES OF                     

 

             10/01/2019           DELMAN DO, EL B           Ot           Z01.8

18           

ENCOUNTER FOR OTHER PREPROCEDURAL EXAMIN                    

 

             10/06/2019           EL CARLOS DO           Ot           Z01.8

18           

ENCOUNTER FOR OTHER PREPROCEDURAL EXAMIN                    

 

             10/12/2019           EL CARLOS DO           Ot           D12.4

           

BENIGN NEOPLASM OF DESCENDING COLON                    

 

             10/12/2019           EL CARLOS DO           Ot           E78.5

           

HYPERLIPIDEMIA, UNSPECIFIED                      

 

             10/12/2019           EL CARLOS DO           Ot           F32.9

           

MAJOR DEPRESSIVE DISORDER, SINGLE EPISOD                    

 

             10/12/2019           EL CARLOS DO           Ot           K57.3

0           

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                    

 

             10/12/2019           EL CARLOS DO           Ot           K64.8

           

OTHER HEMORRHOIDS                                

 

             10/12/2019           EL CARLOS DO           Ot           Z12.1

1           

ENCOUNTER FOR SCREENING FOR MALIGNANT NE                    

 

             10/12/2019           EL CARLOS DO           Ot           Z79.8

99           

OTHER LONG TERM (CURRENT) DRUG THERAPY                    

 

             10/12/2019           EL CARLOS DO           Ot           Z80.9

           

FAMILY HISTORY OF MALIGNANT NEOPLASM, UN                    

 

             10/12/2019           EL CARLOS DO           Ot           Z83.3

           

FAMILY HISTORY OF DIABETES MELLITUS                    

 

             10/12/2019           EL CARLOS DO           Ot           Z86.0

10           

PERSONAL HISTORY OF COLONIC POLYPS                    



                                                                                
                                                                                
                                                                                
                                                                                
                                                                                
                                                                                
                                                                                
                                                                                
                                                                                
                                                                                
                                                                                
                                                                                
                                                                                
         



Procedures

      



                Code            Description           Performed By           Per

alo On        

 

                                      [id number]                                 PSYC

H PHARM MGMT              

                                                    2012        

 

                                      [id number]                                 ZORAN

V PSYTX 45/50 MIN         

                                                    2012        

 

                                      [id number]                                 ROUT

INE VENIPUNCTURE          

                                                    2012        

 

                                [id number]                                 CMP       

                    

                                        2012        

 

                                      [id number]                                 LIPI

D PANEL                   

                                                    2012        

 

                                      [id number]                                 GF

R CALC (RESULT ONLY)      

                                                    2012        

 

                                      [id number]                                 PSYT

X PT&/FAMILY 45 MINUTES   

                                                    2013        

 

                                      08003                                 PSYT

X PT&/FAMILY 45 MINUTES   

                                                    2013        

 

                                      [id number]                                 PSYC

H IND W/MED CK 20         

                                                    2013        

 

                                      [id number]                                 ROUT

INE VENIPUNCTURE          

                                                    2013        

 

                                [id number]                                 CMP       

                    

                                        2013        

 

                                      [id number]                                 LIPI

D PANEL                   

                                                    2013        

 

                                      [id number]                                 GF

R CALC (RESULT ONLY)      

                                                    2013        

 

                                      [id number]                                 ROUT

INE VENIPUNCTURE          

                                                    2013        

 

                                [id number]                                 FERRITIN  

                    

                                        2013        

 

                                      [id number]                                 IRON

 SERUM                    

                                                    2013        

 

                                      [id number]                                 IRON

 BNDNG CAP                

                                                    2013        

 

                                      [id number]                                 HC

V INDEX (RESULT ONLY)     

                                                    2013        

 

                                      [id number]                                 HEPA

TITIS PROFILE             

                                                    2013        

 

                                      IRGROUP                                 IR

ON GROUP (Iron,TIBC, 

Ferritin)                                               2013        

 

                                      [id number]                                 ROUT

INE VENIPUNCTURE          

                                                    2013        

 

                                35794                                 CBC       

                    

                                        2013        

 

                                00705                                 PT/INR    

                    

                                        2013        

 

                                      [id number]                                 HEP 

C PCR QUANT (SERIAL)      

                                                    2013        

 

                                      43739                                 PSYT

X PT&/FAMILY 45 MINUTES   

                                                    10/07/2013        

 

                                      71355                                 PSYT

X PT&/FAMILY 45 MINUTES   

                                                    2013        

 

                                      80623                                 PSYT

X PT&/FAMILY 45 MINUTES   

                                                    2014        

 

                                      81662                                 PSYT

X PT&/FAMILY 45 MINUTES   

                                                    2014        

 

                                      35610                                 PSYT

X PT&/FAMILY 45 MINUTES   

                                                    2014        

 

                                      67464                                 ROUT

INE VENIPUNCTURE          

                                                    2014        

 

                                17665                                 CBC       

                    

                                        2014        

 

                                      [id number]                                 GF

R CALC (RESULT ONLY)      

                                                    2014        

 

                                [id number]                                 CMP       

                    

                                        2014        

 

                                      [id number]                                 LIPI

D PANEL                   

                                                    2014        

 

                                      [id number]                                 IRON

 SERUM                    

                                                    2014        

 

                                      [id number]                                 IRON

 BNDNG CAP                

                                                    2014        

 

                                [id number]                                 FERRITIN  

                    

                                        2014        

 

                                      [id number]                                 HC

V INDEX (RESULT ONLY)     

                                                    2014        

 

                                      [id number]                                 HEPA

TITIS PROFILE             

                                                    2014        

 

                                      [id number]                                 PSYT

X PT&/FAMILY 45 MINUTES   

                                                    2014        

 

                                      [id number]                                 ROUT

INE VENIPUNCTURE          

                                                    2014        

 

                                      IRGROUP                                 IR

ON GROUP (Iron,TIBC, 

Ferritin)                                               2014        

 

                                      [id number]                                 GF

R CALC (RESULT ONLY)      

                                                    09/10/2014        

 

                                [id number]                                 CMP       

                    

                                        09/10/2014        

 

                                      [id number]                                 PSYT

X PT&/FAMILY 45 MINUTES   

                                                    2014        

 

                                      [id number]                                 PSYT

X PT&/FAMILY 45 MINUTES   

                                                    2015        



                                                                                
                           



Results

      



                    Test                Result              Range        

 

                                        Complete blood count (CBC) with automate

d white blood cell (WBC) differential - 

18 16:20         

 

                          Blood leukocytes automated count (number/volume)      

     12.7 10*3/uL         

                                        4.3-11.0        

 

                          Blood erythrocytes automated count (number/volume)    

       5.21 10*6/uL       

                                        4.35-5.85        

 

                    Venous blood hemoglobin measurement (mass/volume)           

16.8 g/dL           

13.3-17.7        

 

                    Blood hematocrit (volume fraction)           48 %           

     40-54        

 

                    Automated erythrocyte mean corpuscular volume           92 [

foz_us]           

80-99        

 

                                        Automated erythrocyte mean corpuscular h

emoglobin (mass per erythrocyte)        

                          32 pg                     25-34        

 

                                        Automated erythrocyte mean corpuscular h

emoglobin concentration measurement 

(mass/volume)             35 g/dL                   32-36        

 

                    Automated erythrocyte distribution width ratio           13.

1 %              10.0-

14.5        

 

                    Automated blood platelet count (count/volume)           220 

10*3/uL           

130-400        

 

                          Automated blood platelet mean volume measurement      

     9.4 [foz_us]         

                                        7.4-10.4        

 

                    Automated blood neutrophils/100 leukocytes           83 %   

             42-75       

 

 

                    Automated blood lymphocytes/100 leukocytes           8 %    

             12-44        

 

                    Blood monocytes/100 leukocytes           8 %                

 0-12        

 

                    Automated blood eosinophils/100 leukocytes           1 %    

             0-10        

 

                    Automated blood basophils/100 leukocytes           0 %      

           0-10        

 

                    Blood neutrophils automated count (number/volume)           

10.5 10*3           

1.8-7.8        

 

                    Blood lymphocytes automated count (number/volume)           

1.1 10*3            

1.0-4.0        

 

                    Blood monocytes automated count (number/volume)           1.

0 10*3            

0.0-1.0        

 

                    Automated eosinophil count           0.1 10*3/uL           0

.0-0.3        

 

                    Automated blood basophil count (count/volume)           0.0 

10*3/uL           

0.0-0.1        

 

                                        Comprehensive metabolic panel - 18

 16:20         

 

                          Serum or plasma sodium measurement (moles/volume)     

      138 mmol/L          

                                        135-145        

 

                          Serum or plasma potassium measurement (moles/volume)  

         3.9 mmol/L       

                                        3.6-5.0        

 

                          Serum or plasma chloride measurement (moles/volume)   

        105 mmol/L        

                                                

 

                    Carbon dioxide           22 mmol/L           21-32        

 

                          Serum or plasma anion gap determination (moles/volume)

           11 mmol/L      

                                        5-14        

 

                          Serum or plasma urea nitrogen measurement (mass/volume

)           9 mg/dL       

                                        7-18        

 

                          Serum or plasma creatinine measurement (mass/volume)  

         0.93 mg/dL       

                                        0.60-1.30        

 

                    Serum or plasma urea nitrogen/creatinine mass ratio         

  10                  NRG 

       

 

                                        Serum or plasma creatinine measurement w

ith calculation of estimated glomerular 

filtration rate           >                         NRG        

 

                    Serum or plasma glucose measurement (mass/volume)           

107 mg/dL           

        

 

                    Serum or plasma calcium measurement (mass/volume)           

9.5 mg/dL           

8.5-10.1        

 

                          Serum or plasma total bilirubin measurement (mass/volu

me)           1.3 mg/dL   

                                        0.1-1.0        

 

                                        Serum or plasma alkaline phosphatase joaquín

surement (enzymatic activity/volume)    

                          85 U/L                            

 

                                        Serum or plasma aspartate aminotransfera

se measurement (enzymatic 

activity/volume)           17 U/L                    5-34        

 

                                        Serum or plasma alanine aminotransferase

 measurement (enzymatic activity/volume)

                          24 U/L                    0-55        

 

                    Serum or plasma protein measurement (mass/volume)           

7.8 g/dL            

6.4-8.2        

 

                    Serum or plasma albumin measurement (mass/volume)           

4.5 g/dL            

3.2-4.5        

 

                    CALCIUM CORRECTED           9.1 mg/dL           8.5-10.1    

    

 

                                        Magnesium - 18 16:20         

 

                    Magnesium           2.2 mg/dL           1.8-2.4        

 

                                        Blood lactic acid measurement (moles/vol

ume) - 18 16:55         

 

                    Blood lactic acid measurement (moles/volume)           1.27 

mmol/L           

0.50-2.00        

 

                                        Complete urinalysis with reflex to cultu

re - 18 17:35         

 

                    Urine color determination           YELLOW              NRG 

       

 

                    Urine clarity determination           CLEAR               NR

G        

 

                    Urine pH measurement by test strip           5              

     5-9        

 

                    Specific gravity of urine by test strip           1.005     

          1.016-1.022  

      

 

                    Urine protein assay by test strip, semi-quantitative        

   1+                  

NEGATIVE        

 

                    Urine glucose detection by automated test strip           NE

GATIVE            

NEGATIVE        

 

                          Erythrocytes detection in urine sediment by light micr

oscopy           2+       

                                        NEGATIVE        

 

                    Urine ketones detection by automated test strip           1+

                  NEGATIVE

        

 

                    Urine nitrite detection by test strip           NEGATIVE    

        NEGATIVE    

    

 

                    Urine total bilirubin detection by test strip           NEGA

TIVE            

NEGATIVE        

 

                          Urine urobilinogen measurement by automated test strip

 (mass/volume)           

NORMAL                                  NORMAL        

 

                    Urine leukocyte esterase detection by dipstick           2+ 

                 NEGATIVE 

       

 

                                        Automated urine sediment erythrocyte cou

nt by microscopy (number/high power 

field)                     [HPF]                    NRG        

 

                                        Automated urine sediment leukocyte count

 by microscopy (number/high power field)

                           [HPF]                    NRG        

 

                          Bacteria detection in urine sediment by light microsco

py           FEW          

                                        NRG        

 

                          Crystals detection in urine sediment by light microsco

py           NONE         

                                        NRG        

 

                    Casts detection in urine sediment by light microscopy       

    NONE                

NRG        

 

                          Mucus detection in urine sediment by light microscopy 

          NEGATIVE        

                                        NRG        

 

                    Complete urinalysis with reflex to culture           YES    

             NRG        

 

                                        Bacterial urine culture - 18 17:35

         

 

                    Bacterial urine culture           NG                  NRG   

     

 

                                        Complete blood count (CBC) with automate

d white blood cell (WBC) differential - 

18 05:15         

 

                          Blood leukocytes automated count (number/volume)      

     10.1 10*3/uL         

                                        4.3-11.0        

 

                          Blood erythrocytes automated count (number/volume)    

       4.46 10*6/uL       

                                        4.35-5.85        

 

                    Venous blood hemoglobin measurement (mass/volume)           

14.3 g/dL           

13.3-17.7        

 

                    Blood hematocrit (volume fraction)           42 %           

     40-54        

 

                    Automated erythrocyte mean corpuscular volume           94 [

foz_us]           

80-99        

 

                                        Automated erythrocyte mean corpuscular h

emoglobin (mass per erythrocyte)        

                          32 pg                     25-34        

 

                                        Automated erythrocyte mean corpuscular h

emoglobin concentration measurement 

(mass/volume)             34 g/dL                   32-36        

 

                    Automated erythrocyte distribution width ratio           13.

0 %              10.0-

14.5        

 

                    Automated blood platelet count (count/volume)           175 

10*3/uL           

130-400        

 

                          Automated blood platelet mean volume measurement      

     9.6 [foz_us]         

                                        7.4-10.4        

 

                    Automated blood neutrophils/100 leukocytes           79 %   

             42-75       

 

 

                    Automated blood lymphocytes/100 leukocytes           10 %   

             12-44       

 

 

                    Blood monocytes/100 leukocytes           11 %               

 0-12        

 

                    Automated blood eosinophils/100 leukocytes           0 %    

             0-10        

 

                    Automated blood basophils/100 leukocytes           0 %      

           0-10        

 

                    Blood neutrophils automated count (number/volume)           

8.0 10*3            

1.8-7.8        

 

                    Blood lymphocytes automated count (number/volume)           

1.0 10*3            

1.0-4.0        

 

                    Blood monocytes automated count (number/volume)           1.

1 10*3            

0.0-1.0        

 

                    Automated eosinophil count           0.0 10*3/uL           0

.0-0.3        

 

                    Automated blood basophil count (count/volume)           0.0 

10*3/uL           

0.0-0.1        

 

                                        Comprehensive metabolic panel - 18

 05:15         

 

                          Serum or plasma sodium measurement (moles/volume)     

      136 mmol/L          

                                        135-145        

 

                          Serum or plasma potassium measurement (moles/volume)  

         3.6 mmol/L       

                                        3.6-5.0        

 

                          Serum or plasma chloride measurement (moles/volume)   

        105 mmol/L        

                                                

 

                    Carbon dioxide           18 mmol/L           21-32        

 

                          Serum or plasma anion gap determination (moles/volume)

           13 mmol/L      

                                        5-14        

 

                          Serum or plasma urea nitrogen measurement (mass/volume

)           10 mg/dL      

                                        7-18        

 

                          Serum or plasma creatinine measurement (mass/volume)  

         0.83 mg/dL       

                                        0.60-1.30        

 

                    Serum or plasma urea nitrogen/creatinine mass ratio         

  12                  NRG 

       

 

                                        Serum or plasma creatinine measurement w

ith calculation of estimated glomerular 

filtration rate           >                         NRG        

 

                    Serum or plasma glucose measurement (mass/volume)           

118 mg/dL           

        

 

                    Serum or plasma calcium measurement (mass/volume)           

8.5 mg/dL           

8.5-10.1        

 

                          Serum or plasma total bilirubin measurement (mass/volu

me)           1.4 mg/dL   

                                        0.1-1.0        

 

                                        Serum or plasma alkaline phosphatase joaquín

surement (enzymatic activity/volume)    

                          63 U/L                            

 

                                        Serum or plasma aspartate aminotransfera

se measurement (enzymatic 

activity/volume)           12 U/L                    5-34        

 

                                        Serum or plasma alanine aminotransferase

 measurement (enzymatic activity/volume)

                          17 U/L                    0-55        

 

                    Serum or plasma protein measurement (mass/volume)           

5.8 g/dL            

6.4-8.2        

 

                    Serum or plasma albumin measurement (mass/volume)           

3.5 g/dL            

3.2-4.5        

 

                    CALCIUM CORRECTED           8.9 mg/dL           8.5-10.1    

    

 

                                        Complete blood count (CBC) with automate

d white blood cell (WBC) differential - 

09/15/18 05:27         

 

                          Blood leukocytes automated count (number/volume)      

     6.5 10*3/uL          

                                        4.3-11.0        

 

                          Blood erythrocytes automated count (number/volume)    

       4.28 10*6/uL       

                                        4.35-5.85        

 

                    Venous blood hemoglobin measurement (mass/volume)           

13.9 g/dL           

13.3-17.7        

 

                    Blood hematocrit (volume fraction)           40 %           

     40-54        

 

                    Automated erythrocyte mean corpuscular volume           94 [

foz_us]           

80-99        

 

                                        Automated erythrocyte mean corpuscular h

emoglobin (mass per erythrocyte)        

                          32 pg                     25-34        

 

                                        Automated erythrocyte mean corpuscular h

emoglobin concentration measurement 

(mass/volume)             35 g/dL                   32-36        

 

                    Automated erythrocyte distribution width ratio           12.

8 %              10.0-

14.5        

 

                    Automated blood platelet count (count/volume)           170 

10*3/uL           

130-400        

 

                          Automated blood platelet mean volume measurement      

     9.8 [foz_us]         

                                        7.4-10.4        

 

                    Automated blood neutrophils/100 leukocytes           66 %   

             42-75       

 

 

                    Automated blood lymphocytes/100 leukocytes           21 %   

             12-44       

 

 

                    Blood monocytes/100 leukocytes           11 %               

 0-12        

 

                    Automated blood eosinophils/100 leukocytes           3 %    

             0-10        

 

                    Automated blood basophils/100 leukocytes           0 %      

           0-10        

 

                    Blood neutrophils automated count (number/volume)           

4.2 10*3            

1.8-7.8        

 

                    Blood lymphocytes automated count (number/volume)           

1.4 10*3            

1.0-4.0        

 

                    Blood monocytes automated count (number/volume)           0.

7 10*3            

0.0-1.0        

 

                    Automated eosinophil count           0.2 10*3/uL           0

.0-0.3        

 

                    Automated blood basophil count (count/volume)           0.0 

10*3/uL           

0.0-0.1        

 

                                        Comprehensive metabolic panel - 09/15/18

 05:27         

 

                          Serum or plasma sodium measurement (moles/volume)     

      136 mmol/L          

                                        135-145        

 

                          Serum or plasma potassium measurement (moles/volume)  

         3.8 mmol/L       

                                        3.6-5.0        

 

                          Serum or plasma chloride measurement (moles/volume)   

        104 mmol/L        

                                                

 

                    Carbon dioxide           18 mmol/L           21-32        

 

                          Serum or plasma anion gap determination (moles/volume)

           14 mmol/L      

                                        5-14        

 

                          Serum or plasma urea nitrogen measurement (mass/volume

)           7 mg/dL       

                                        7-18        

 

                          Serum or plasma creatinine measurement (mass/volume)  

         0.80 mg/dL       

                                        0.60-1.30        

 

                    Serum or plasma urea nitrogen/creatinine mass ratio         

  9                   NRG  

      

 

                                        Serum or plasma creatinine measurement w

ith calculation of estimated glomerular 

filtration rate           >                         NRG        

 

                    Serum or plasma glucose measurement (mass/volume)           

91 mg/dL            

        

 

                    Serum or plasma calcium measurement (mass/volume)           

9.0 mg/dL           

8.5-10.1        

 

                          Serum or plasma total bilirubin measurement (mass/volu

me)           0.9 mg/dL   

                                        0.1-1.0        

 

                                        Serum or plasma alkaline phosphatase joaquín

surement (enzymatic activity/volume)    

                          56 U/L                            

 

                                        Serum or plasma aspartate aminotransfera

se measurement (enzymatic 

activity/volume)           12 U/L                    5-34        

 

                                        Serum or plasma alanine aminotransferase

 measurement (enzymatic activity/volume)

                          12 U/L                    0-55        

 

                    Serum or plasma protein measurement (mass/volume)           

6.4 g/dL            

6.4-8.2        

 

                    Serum or plasma albumin measurement (mass/volume)           

3.5 g/dL            

3.2-4.5        

 

                    CALCIUM CORRECTED           9.4 mg/dL           8.5-10.1    

    

 

                                        Automated blood complete blood count (he

mogram) panel - 18 05:39         

 

                          Blood leukocytes automated count (number/volume)      

     4.8 10*3/uL          

                                        4.3-11.0        

 

                          Blood erythrocytes automated count (number/volume)    

       4.14 10*6/uL       

                                        4.35-5.85        

 

                    Venous blood hemoglobin measurement (mass/volume)           

13.1 g/dL           

13.3-17.7        

 

                    Blood hematocrit (volume fraction)           39 %           

     40-54        

 

                    Automated erythrocyte mean corpuscular volume           93 [

foz_us]           

80-99        

 

                                        Automated erythrocyte mean corpuscular h

emoglobin (mass per erythrocyte)        

                          32 pg                     25-34        

 

                                        Automated erythrocyte mean corpuscular h

emoglobin concentration measurement 

(mass/volume)             34 g/dL                   32-36        

 

                    Automated erythrocyte distribution width ratio           12.

5 %              10.0-

14.5        

 

                    Automated blood platelet count (count/volume)           211 

10*3/uL           

130-400        

 

                          Automated blood platelet mean volume measurement      

     9.8 [foz_us]         

                                        7.4-10.4        

 

                                        Whole blood basic metabolic panel -  05:39         

 

                          Serum or plasma sodium measurement (moles/volume)     

      137 mmol/L          

                                        135-145        

 

                          Serum or plasma potassium measurement (moles/volume)  

         3.2 mmol/L       

                                        3.6-5.0        

 

                          Serum or plasma chloride measurement (moles/volume)   

        109 mmol/L        

                                                

 

                    Carbon dioxide           21 mmol/L           21-32        

 

                          Serum or plasma anion gap determination (moles/volume)

           7 mmol/L       

                                        5-14        

 

                          Serum or plasma urea nitrogen measurement (mass/volume

)           5 mg/dL       

                                        7-18        

 

                          Serum or plasma creatinine measurement (mass/volume)  

         0.71 mg/dL       

                                        0.60-1.30        

 

                    Serum or plasma urea nitrogen/creatinine mass ratio         

  7                   NRG  

      

 

                                        Serum or plasma creatinine measurement w

ith calculation of estimated glomerular 

filtration rate           >                         NRG        

 

                    Serum or plasma glucose measurement (mass/volume)           

110 mg/dL           

        

 

                    Serum or plasma calcium measurement (mass/volume)           

8.7 mg/dL           

8.5-10.1        



                                      



Encounters

      



                ACCT No.           Visit Date/Time           Discharge          

 Status         

             Pt. Type           Provider           Facility           Loc./Unit 

          

Complaint        

 

                [id number]           2015 13:26:00           2015 23:59:

59           [facility]

                Outpatient           LENI PHD, DARIAN JARA                      

         

                                                 

 

                [id number]           2014 15:54:00           2014 23:59:

59           [facility]

                Outpatient           VERONICA APRJEFFREY MAE                        

         

                                                 

 

                [id number]           2014 09:49:00           2014 23:59:

59           [facility]

                Outpatient           BRENDAKIM DARIAN                      

         

                                                 

 

                [id number]           10/30/2014 14:04:00           10/30/2014 23:59:

59           [facility]

                Outpatient           JEFFREY MORA                        

         

                                                 

 

                [id number]           2014 14:46:00           2014 23:59:

59           [facility]S

                Outpatient           ESSIE MCMANUS DO                           

         

                                                 

 

                [id number]           2014 12:54:00           2014 23:59:

59           CLS

                Outpatient           DARIAN FARRAR PHD                      

         

                                                 

 

                [id number]           2014 12:27:00           2014 23:59:

59           CLS

                Outpatient           VENKATA APREDWARD MAE                   

         

                                                 

 

                [id number]           2014 14:05:00           2014 23:59:

59           [facility]S

                Outpatient           DARIAN FARRAR PHD                      

         

                                                 

 

                [id number]           2014 13:52:00           2014 23:59:

59           CLS

                Outpatient           DARIAN FARRAR PHD                      

         

                                                 

 

                [id number]           2014 13:55:00           2014 23:59:

59           CLS

                Outpatient           DIXONDARIAN PEREZ PHD                      

         

                                                 

 

                [id number]           2014 11:22:00           2014 23:59:

59           CLS

                Outpatient           VENKATA APREDWARD MAE                   

         

                                                 

 

                [id number]           2013 12:47:00           2013 23:59:

59           CLS

                Outpatient           DARIAN FARRAR PHD                      

         

                                                 

 

                [id number]           10/17/2013 09:42:00           10/17/2013 23:59:

59           CLS

                Outpatient           ESSIE MCMANUS DO                           

         

                                                 

 

                [id number]           10/04/2013 12:42:00           10/04/2013 23:59:

59           CLS

                Outpatient           DARIAN FARRAR PHD                      

         

                                                 

 

                [id number]           2013 14:41:00           2013 23:59:

59           CLS

                Outpatient           KYLAH MELGAR PA-C                      

         

                                                 

 

                [id number]           2013 15:44:00           2013 23:59:

59           CLS

             Outpatient                                                         

  

 

                [id number]           2013 10:41:00           2013 23:59:

59           CLS

                Outpatient           ESSIE MCMANUS DO                           

         

                                                 

 

                [id number]           2013 10:42:00           2013 23:59:

59           CLS

                Outpatient           DARIAN FARRAR PHD                      

         

                                                 

 

                [id number]           2012 09:43:00           2012 23:59:

59           CLS

                Outpatient           RASHI DIAMOND DO                        

         

                                                 

 

                [id number]            2012 17:01:00           2012 23:59:5

9           CLS  

                Outpatient           DARIAN FARRAR PHD                      

           

                                                 

 

             [id number]           2013 11:07:00                               

      Document 

Registration                                                                    

 

             [id number]           2013 08:08:00                               

      Document 

Registration                                                                    

 

             [id number]           2013 16:10:00                               

      Document 

Registration                                                                    

 

             [id number]           04/15/2013 15:07:00                               

      Document 

Registration                                                                    

 

                    [id number]           10/07/2019 07:43:00           10/07/2

019 11:00:00        

                DIS             Outpatient           TERRANCE CABRAL EL B          

 [facility]           ENDO                      SCREENING        

 

                    [id number]           2019 05:46:00            15:30:00        

                DIS             Outpatient           TERRANCE CABRAL EL B          

 [facility]           PREOP                     COLONOSCOPY        

 

                    [id number]           10/22/2018 11:45:00           10/22/2

018 23:59:59        

                CLS             Outpatient           TERRANCE CABRAL EL B          

 [facility]           ENDO                      SCREENING        

 

                    [id number]           10/18/2018 14:33:00           10/18/2

018 14:39:00        

                DIS             Outpatient           TERRANCE CABRAL EL B          

 [facility]           PREOP                     COLONOSCOPY        

 

                    [id number]           2018 19:15:00            15:55:00        

                DIS             Inpatient           TERRANCE CABRAL EL B           

[facility]           4TH                       DIVERTICULITIS        

 

                    [id number]           2013 08:49:00           

013 11:15:00        

                DIS             Emergency           JENNIFER FARNSWORTH, ENRIQUE POLANCO      

     Via 

[facility]           ER                        VOMITING

## 2020-07-25 NOTE — XMS REPORT
[facility]

                             Created on: 2020



Leonard Ignacio

External Reference #: [id number]

: 1955

Sex: Male



Demographics





                          Address                   [address] [place]  [address]

 

                          Preferred Language        Unknown

 

                          Marital Status            Unknown

 

                          Adventism Affiliation     Unknown

 

                          Race                      Unknown

 

                          Ethnic Group              Unknown





Author





                          Author                    Ignacio Gomes Doctor

 

                          Organization              [facility]

 

                          Address                   Unknown

 

                          Phone                     Unavailable







Care Team Providers





                    Care Team Member Name Role                Phone

 

                    Migration,  Doctor  Unavailable         Unavailable







PROBLEMS





          Type      Condition ICD9-CM Code QPY93-TF Code Onset Dates Condition S

tatus SNOMED 

Code

 

                          Problem                   Nonspecific elevation of lev

els of transaminase or lactic acid 

dehydrogenase (LDH) 790.4                                  Active       39977993

2

 

           Problem    Encounter for long-term (current) use of other medications

 V58.69                           

Active                                  [id number]

 

          Problem   Edema     782.3                         Active    675806115

 

          Problem   Spontaneous ecchymoses 782.7                         Active 

   [id number]

 

          Problem   Trigger finger (acquired) 727.03                        Acti

ve    4739515

 

          Problem   Varicose veins of lower extremities with inflammation 454.1 

                        Active    

19088121

 

           Problem    Persistent disorder of initiating or maintaining sleep 307

.42                           Active

                                        [id number]

 

          Problem   Pityriasis versicolor 111.0                         Active  

  65555369

 

           Problem    Unspecified local infection of skin and subcutaneous tissu

e 686.9                            

Active                                  [id number]

 

          Problem   Unspecified viral hepatitis C without hepatic coma 070.70   

                     Active    

18332630

 

          Problem   Vascular disorder of skin 709.1                         Acti

ve    77281102

 

          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       

                 Active    

25240988

 

          Problem   Anxiety state, unspecified 300.00                        Act

kathia    685779945

 

          Problem   Other and unspecified hyperlipidemia 272.4                  

       Active    42714360

 

          Problem   Major depressive disorder, recurrent episode, mild 296.31   

                     Active    

[id number]







ALLERGIES

No Information



ENCOUNTERS





                Encounter       Location        Date            Diagnosis

 

                          [facility]   [address] [facility] [id number]

[address] [place] [id number]

                          15 Jesse, 2016              Encounter for other specifie

d administrative purpose Z02.89

 

                          [facility]   [address] [facility] [id number]

[address] [place] [id number]

                          12 May, 2016              Dental examination Z01.20 an

d Dental caries K02.9

 

                          [facility]     [address] [facility] [id number]

[address] [place] [address]

                          14 Aug, 2015              Edema 782.3 and Family histo

ry of coronary artery disease V17.3

 

                          [facility]     [address] [id number]

[address] [place] [address]

                          07 Aug, 2015              Edema 782.3 and Family histo

ry of coronary artery disease V17.3

 

                          [facility]   [address] [id number]

[address] [place] [id number]

                                        Dental examination V72.2

 

                          [facility]     [address] [id number]

[address] [place] [address]

                                         

 

                          [facility]     [address] [id number]

[address] [place] [address]

                                         

 

                          [facility]     [address] [id number]

[address] [place] [address]

                          20 Mar, 2015               

 

                          [facility]     [address] [id number]

[address] [place] [address]

                          20 Mar, 2015               

 

                          [facility]     [address] [id number]

[address] [place] [address]

                                         

 

                          [facility]     [address] [id number]

[address] [place] [address]

                                         

 

                          [facility]     [address] [id number]

[address] [place] [address]

                                         

 

                          [facility]     [address] [id number]

[address] [place] [address]

                                         

 

                          [facility]     [address] [id number]

[address] [place] [address]

                          11 Dec, 2014               

 

                          [facility]     [address] [id number]

[address] [place] [address]

                          11 Dec, 2014               

 

                          [facility]     [address] [id number]

[address] [place] [address]

                                         

 

                          [facility]     [address] [id number]

[address] [place] [address]

                                         

 

                          [facility]     [address] [id number]

[address] [place] [address], [place] [address]

                          30 Oct, 2014               

 

                          [facility]     [address] [id number]

[address] [place] [address], [place] [address]

                          30 Oct, 2014               

 

                          [facility]     [address] [id number]

[address] [place] [address], [place] [address]

                          10 Sep, 2014               

 

                          [facility]     [address] [id number]

[address] [place] [address], [place] [address]

                          09 Sep, 2014               

 

                          [facility]     [address] [id number]

[address] [place] [address], [place] [address]

                          09 Sep, 2014               

 

                          [facility]     [address] [id number]

[address] [place] [address], [place] [address]

                          22 Aug, 2014               

 

                          [facility]     [address] [id number]

[address] [place] [address], [place] [address]

                          22 Aug, 2014               

 

                          [facility]     [address] [id number]

[address] [place] [address], [place] [address]

                          22 Aug, 2014               

 

                          [facility]     [address] [id number]

[address] [place] [address], [place] [address]

                                         

 

                          [facility]     [address] [id number]

[address] [place] [address], [place] [address]

                                         

 

                          [facility]     [address] [id number]

[address] [place] [address], [place] [address]

                                         

 

                          [facility]     [address] [id number]

[address] [place] [address], [place] [address]

                                         

 

                          [facility]     [address] [id number]

[address] [place] [address], [place] [address]

                                         

 

                          [facility]     [address] [id number]

[address] [place] [address], [place] [address]

                                         

 

                          [facility]     [address] [id number]

[address] [place] [address], [place] [address]

                                         

 

                          [facility]     [address] [id number]

[address] [place] [address], [place] [address]

                                         

 

                          [facility]     [address] [id number]

[address] [place] [address], [place] [address]

                          20 May, 2014               

 

                          [facility]     [address] [id number]

[address] [place] [address], [place] [address]

                          20 May, 2014               

 

                          [facility]     [address] [id number]

[address] [place] [address], [place] [address]

                                         

 

                          [facility]     [address] [id number]

[address] [place] [address], [place] [address]

                                         

 

                          [facility]     [address] [id number]

[address] [place] [address], [place] [address]

                                         

 

                          [facility]     [address] [id number]

[address] [place] [address], [place] [address]

                                         

 

                          [facility]     [address] [id number]

[address] [place] [address], [place] [address]

                                         

 

                          [facility]     [address] [id number]

[address] [place] [address], [place] [address]

                                         

 

                          [facility]     [address] [id number]

[address] [place] [address], [place] [address]

                          13 Dec, 2013               

 

                          [facility]     [address] [id number]

[address] [place] [address], [place] [address]

                          13 Dec, 2013               

 

                          [facility]     [address] [id number]

[address] [place] [address], [place] [address]

                          13 Dec, 2013               

 

                          [facility]     [address] [id number]

[address] [place] [address], [place] [address]

                          13 Dec, 2013               

 

                          [facility]     [address] [id number]

[address] [place] [address], [place] [address]

                          17 Oct, 2013               

 

                          [facility]     [address] [id number]

[address] [place] [address], [place] [address]

                          17 Oct, 2013               

 

                          [facility]     [address] [id number]

[address] [place] [address], [place] [address]

                          10 Oct, 2013               

 

                          [facility]     [address] [id number]

[address] [place] [address] [place] [address]

                          10 Oct, 2013               

 

                          [facility]     [address] [id number]

[address] [place] [address], [place] [address]

                          04 Oct, 2013               

 

                          [facility]     [address] [id number]

[address] [place] [address], [place] [address]

                          24 Sep, 2013               

 

                          [facility]     [address] [id number]

[address] [place] [address], [place] [address]

                          19 Sep, 2013               

 

                          [facility]     [address] [id number]

[address] [place] [address], [place] [address]

                          16 Sep, 2013               

 

                          [facility]     [address] [id number]

[address] [place] [address], [place] [address]

                          21 Aug, 2013               

 

                          [facility]     [address] [id number]

[address] [place] [address], [place] [address]

                          17 Aug, 2013               

 

                          [facility]     [address] [id number]

[address] [place] [address], [place] [address]

                          16 Aug, 2013               

 

                          [facility]     [address] [id number]

[address] [place] [address], [place] [address]

                          15 Aug, 2013               

 

                          [facility]     [address] [id number]

[address] [place] [address], [place] [address]

                          13 Aug, 2013               

 

                          [facility]     [address] [id number]

[address] [place] [address], [place] [address]

                          13 Aug, 2013               

 

                          [facility]     [address] [id number]

[address] [place] [address], [place] [address]

                          12 Aug, 2013               

 

                          [facility]     [address] [id number]

[address] [place] [address], [place] [address]

                                         

 

                          [facility]     [address] [id number]

[address] [place] [address], [place] [address]

                                         

 

                          [facility]     [address] [id number]

[address] [place] [address], [place] [address]

                                         

 

                          [facility]     [address] [id number]

[address] [place] [address], [place] [address]

                          10 Jesse, 2013               

 

                          [facility]     [address] [id number]

[address] [place] [address], [place] [address]

                          17 May, 2013               

 

                          [facility]     [address] [id number]

[address] [place] [address], [place] [address]

                          10 May, 2013               

 

                          [facility]     [address] [id number]

[address] [place] [address], [place] [address]

                          15 Apr, 2013               

 

                          [facility]     [address] [id number]

[address] [place] [address], [place] [address]

                                         

 

                          [facility]     [address] [id number]

[address] [place] [address], [place] [address]

                                         

 

                          [facility]     [address] [id number]

[address] [place] [address], [place] [address]

                                         

 

                          [facility]     [address] [id number]

[address] [place] [address], [place] [address]

                                         

 

                          [facility]     [address] [id number]

[address] [place] [address], [place] [address]

                                         

 

                          [facility]     [address] [id number]

[address] [place] [address], [place] [address]

                                         

 

                          [facility]     [address] [id number]

[address] [place] [address], [place] [address]

                                         

 

                          [facility]     [address] [id number]

[address] [place] [address], [place] [address]

                          21 Dec, 2012               

 

                          [facility]     [address] [id number]

[address] [place] [address], [place] [address]

                          21 Dec, 2012               

 

                          [facility]     [address] [id number]

[address] [place] [address], [place] [address]

                          14 Dec, 2012               

 

                          [facility]     [address] [id number]

[address] [place] [address], [place] [address]

                          14 Dec, 2012               

 

                          [facility]     [address] [id number]

[address] [place] [address], [place] [address]

                                         

 

                          [facility]     [address] [id number]

[address] [address]

                          20 2012               

 

                          [facility]     [address] [id number]

[address] [place] [address], [place] [address]

                          16 2012               

 

                          [facility]     [address] [id number]

[address] [place] [address], [place] [address]

                          16 2012               

 

                          [facility]     [address] [id number]

[address] [place] [address], [place] [address]

                          13 2012               

 

                          [facility]     [address] [id number]

[address] [place] [address]

                          13 2012               

 

                          [facility]     [address] [id number]

[address] [place] [address], [place] [address]

                          13 2012               

 

                          [facility]     [address] [id number]

[address] [place] [address]

                          13 2012               

 

                          [facility]     [address] [id number]

[address] [place] [address], [place] [address]

                                         

 

                          [facility]     [address] [id number]

[address] [place] [address]

                                         

 

                          [facility]     [address] [id number]

[address] [place] [address], [place] [address]

                          22 Oct, 2012               

 

                          [facility]     [address] [id number]

[address] [place] [address], [place] [address]

                          22 Oct, 2012               

 

                          [facility]     [address] [id number]

[address] [place] [address]

                          22 Oct, 2012               

 

                          [facility]     [address] [id number]

[address] [place] [address]

                          22 Oct, 2012               

 

                          [facility]     [address] [id number]

[address] [place] [address], [place] [address]

                          10 Oct, 2012               

 

                          [facility]     [address] [id number]

[address] [place] [address]

                          10 Oct, 2012               

 

                          [facility]     [address] [id number]

[address] [place] [address]

                          05 Oct, 2012               

 

                          [facility]     [address] [id number]

[address] [place] [address], [place] [address]

                          05 Oct, 2012               

 

                          [facility]     [address] [id number]

[address] [place] [address], [place] [address]

                          07 Sep, 2012               

 

                          [facility]     [address] [id number]

[address] [place] [address], [place] [address]

                          22 Aug, 2012               

 

                          [facility]     [address] [id number]

[address] [place] [address], [place] [address]

                          03 Aug, 2012               

 

                          [facility]     [address] [id number]

[address] [place] [address], [place] [address]

                                         

 

                          [facility]     [address] [id number]

[address] [place] [address], [place] [address]

                                         

 

                          [facility]     [address] [id number]

[address] [place] [address], [place] [address]

                                         

 

                          [facility]     [address] [id number]

[address] [place] [address], [place] [address]

                                         

 

                          [facility]     [address] [id number]

[address] [place] [address], [place] [address]

                                         

 

                          [facility]     [address] [id number]

[address] [place] [address], [place] [address]

                                         

 

                          [facility]     [address] [id number]

[address] [place] [address], [place] [address]

                                         

 

                          [facility]     [address] [id number]

[address] [place] [address], [place] [address]

                          30 May, 2012               

 

                          [facility]     [address] [id number]

[address] [place] [address], [place] [address]

                          30 May, 2012               

 

                          [facility]     [address] [id number]

[address] [place] [address], [place] [address]

                          21 May, 2012               

 

                          [facility]     [address] [id number]

[address] [place] [address], [place] [address]

                          14 May, 2012               

 

                          [facility]     [address] [id number]

[address] [place] [address], [place] [address]

                          04 May, 2012               

 

                          [facility]     [address] [id number]

[address] [place] [address], [place] [address]

                          04 May, 2012               

 

                          [facility]     [address] [id number]

[address] [place] [address], [place] [address]

                          04 May, 2012               

 

                          [facility]     [address] [id number]

[address] [place] [address], [place] [address]

                                         

 

                          [facility]     [address] [id number]

[address] [place] [address], [place] [address]

                                         

 

                          [facility]     [address] [id number]

[address] [place] [address], [place] [address]

                          23 Mar, 2012               

 

                          [facility]     [address] [id number]

[address] [place] [address], [place] [address]

                          15 Mar, 2012               

 

                          [facility]     [address] [id number]

[address] [place] [address], [place] [address]

                          13 Mar, 2012               

 

                          [facility]     [address] [id number]

[address] [place] [address], [place] [address]

                          12 Mar, 2012               

 

                          [facility]     [address] [id number]

[address] [place] [address], [place] [address]

                                         

 

                          [facility]     [address] [id number]

[address] [place] [address], [place] [address]

                                         

 

                          [facility]     [address] [id number]

[address] [place] [address], [place] [address]

                                         

 

                          [facility]     [address] [id number]

[address] [place] [address], [place] [address]

                                         

 

                          [facility]     [address] [id number]

[address] [place] [address], [place] [address]

                          29 Dec, 2011               

 

                          [facility]     [address] [id number]

[address] [place] [address], [place] [address]

                          29 Dec, 2011               

 

                          [facility]     [address] [id number]

[address] [place] [address], [place] [address]

                          21 Dec, 2011               

 

                          [facility]     [address] [id number]

[address] [place] [id number]

                                         

 

                          [facility]     [address] [facility] [id number]

[address] [place] [address]

                          22 Oct, 2011               

 

                          [facility]     [address] [facility] [id number]

[address] [place] [address]

                          21 Oct, 2011               







IMMUNIZATIONS

No Known Immunizations



SOCIAL HISTORY

Never Assessed



REASON FOR VISIT





PLAN OF CARE





VITAL SIGNS





MEDICATIONS

Unknown Medications



RESULTS

No Results



PROCEDURES

No Known procedures



INSTRUCTIONS





MEDICATIONS ADMINISTERED

No Known Medications



MEDICAL (GENERAL) HISTORY





                    Type                Description         Date

 

                    Medical History     depression           

 

                    Medical History     hyperlipidemia       

 

                    Hospitalization History staph in right leg

## 2020-07-25 NOTE — XMS REPORT
[facility]

                             Created on: 2020



Ignacio Valle

External Reference #: [id number]

: 1955

Sex: Male



Demographics





                          Address                   [address] [place]  [address]

 

                          Preferred Language        Unknown

 

                          Marital Status            Unknown

 

                          Amish Affiliation     Unknown

 

                          Race                      Unknown

 

                          Ethnic Group              Unknown





Author





                          Author                    Ignacio SHAH

 

                          Organization              [facility]

 

                          Address                   [address] [place]  [address]



 

                          Phone                     [phone]







Care Team Providers





                    Care Team Member Name Role                Phone

 

                    WARNER SHAH      Unavailable         [phone]







PROBLEMS





          Type      Condition ICD9-CM Code ARJ27-UY Code Onset Dates Condition S

tatus SNOMED 

Code

 

                          Problem                   Nonspecific elevation of lev

els of transaminase or lactic acid 

dehydrogenase (LDH) 790.4                                  Active       37774534

2

 

           Problem    Encounter for long-term (current) use of other medications

 V58.69                           

Active                                  [id number]

 

          Problem   Edema     782.3                         Active    506641441

 

          Problem   Spontaneous ecchymoses 782.7                         Active 

   [id number]

 

          Problem   Trigger finger (acquired) 727.03                        Acti

ve    2007451

 

          Problem   Varicose veins of lower extremities with inflammation 454.1 

                        Active    

67981748

 

           Problem    Persistent disorder of initiating or maintaining sleep 307

.42                           Active

                                        15897543

 

          Problem   Pityriasis versicolor 111.0                         Active  

  56738128

 

           Problem    Unspecified local infection of skin and subcutaneous tissu

e 686.9                            

Active                                  [id number]

 

          Problem   Unspecified viral hepatitis C without hepatic coma 070.70   

                     Active    

97305162

 

          Problem   Vascular disorder of skin 709.1                         Acti

ve    26644270

 

          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       

                 Active    

68775012

 

          Problem   Anxiety state, unspecified 300.00                        Act

kathia    858459346

 

          Problem   Other and unspecified hyperlipidemia 272.4                  

       Active    08267664

 

          Problem   Major depressive disorder, recurrent episode, mild 296.31   

                     Active    

07484562







ALLERGIES

No Information



ENCOUNTERS





                Encounter       Location        Date            Diagnosis

 

                          [facility]   [address] [id number]

[address] [place] [id number]

                          15 Jesse, 2016              Encounter for other specifie

d administrative purpose Z02.89

 

                          [facility]   [address] [id number]

[address] [place] [id number]

                          12 May, 2016              Dental examination Z01.20 an

d Dental caries K02.9

 

                          [facility]     [address] [facility] [id number]

[address] [place] [address]

                          14 Aug, 2015              Edema 782.3 and Family histo

ry of coronary artery disease V17.3

 

                          [facility]     [address] [id number]

[address] [place] [address]

                          07 Aug, 2015              Edema 782.3 and Family histo

ry of coronary artery disease V17.3

 

                          [facility]   [address] [id number]

[address] [place] [id number]

                                        Dental examination V72.2

 

                          [facility]     [address] [id number]

[address] [place] [address]

                                         

 

                          [facility]     [address] [id number]

[address] [place] [address]

                                         

 

                          [facility]     [address] [id number]

[address] [place] [address]

                          20 Mar, 2015               

 

                          [facility]     [address] [id number]

[address] [place] [address]

                          20 Mar, 2015               

 

                          [facility]     [address] [id number]

[address] [place] [address]

                                         

 

                          [facility]     [address] [id number]

[address] [place] [address]

                                         

 

                          [facility]     [address] [id number]

[address] [place] [address]

                                         

 

                          [facility]     [address] [id number]

[address] [place] [address]

                                         

 

                          [facility]     [address] [id number]

[address] [place] [address]

                          11 Dec, 2014               

 

                          [facility]     [address] [id number]

[address] [place] [address]

                          11 Dec, 2014               

 

                          [facility]     [address] [id number]

[address] [place] [address]

                                         

 

                          [facility]     [address] [id number]

[address] [place] [address]

                                         

 

                          [facility]     [address] [id number]

[address] [place] [address], [place] [address]

                          30 Oct, 2014               

 

                          [facility]     [address] [id number]

[address] [place] [address], [place] [address]

                          30 Oct, 2014               

 

                          [facility]     [address] [id number]

[address] [place] [address], [place] [address]

                          10 Sep, 2014               

 

                          [facility]     [address] [id number]

[address] [place] [address], [place] [address]

                          09 Sep, 2014               

 

                          [facility]     [address] [id number]

[address] [place] [address], [place] [address]

                          09 Sep, 2014               

 

                          [facility]     [address] [id number]

[address] [place] [address], [place] [address]

                          22 Aug, 2014               

 

                          [facility]     [address] [id number]

[address] [place] [address], [place] [address]

                          22 Aug, 2014               

 

                          [facility]     [address] [id number]

[address] [place] [address], [place] [address]

                          22 Aug, 2014               

 

                          [facility]     [address] [id number]

[address] [place] [address], [place] [address]

                                         

 

                          [facility]     [address] [id number]

[address] [place] [address], [place] [address]

                                         

 

                          [facility]     [address] [id number]

[address] [place] [address], [place] [address]

                                         

 

                          [facility]     [address] [id number]

[address] [place] [address], [place] [address]

                                         

 

                          [facility]     [address] [id number]

[address] [place] [address], [place] [address]

                                         

 

                          [facility]     [address] [id number]

[address] [place] [address], [place] [address]

                                         

 

                          [facility]     [address] [id number]

[address] [place] [address], [place] [address]

                                         

 

                          [facility]     [address] [id number]

[address] [place] [address], [place] [address]

                                         

 

                          [facility]     [address] [id number]

[address] [place] [address], [place] [address]

                          20 May, 2014               

 

                          [facility]     [address] [id number]

[address] [place] [address], [place] [address]

                          20 May, 2014               

 

                          [facility]     [address] [id number]

[address] [place] [address], [place] [address]

                                         

 

                          [facility]     [address] [id number]

[address] [place] [address], [place] [address]

                                         

 

                          [facility]     [address] [id number]

[address] [place] [address], [place] [address]

                                         

 

                          [facility]     [address] [id number]

[address] [place] [address], [place] [address]

                                         

 

                          [facility]     [address] [id number]

[address] [place] [address], [place] [address]

                                         

 

                          [facility]     [address] [id number]

[address] [place] [address], [place] [address]

                                         

 

                          [facility]     [address] [id number]

[address] [place] [address], [place] [address]

                          13 Dec, 2013               

 

                          [facility]     [address] [id number]

[address] [place] [address], [place] [address]

                          13 Dec, 2013               

 

                          [facility]     [address] [id number]

[address] [place] [address], [place] [address]

                          13 Dec, 2013               

 

                          [facility]     [address] [id number]

[address] [place] [address], [place] [address]

                          13 Dec, 2013               

 

                          [facility]     [address] [id number]

[address] [place] [address], [place] [address]

                          17 Oct, 2013               

 

                          [facility]     [address] [id number]

[address] [place] [address], [place] [address]

                          17 Oct, 2013               

 

                          [facility]     [address] [id number]

[address] [place] [address], [place] [address]

                          10 Oct, 2013               

 

                          [facility]     [address] [id number]

[address] [place] [address], [place] [address]

                          10 Oct, 2013               

 

                          [facility]     [address] [id number]

[address] [place] [address], [place] [address]

                          04 Oct, 2013               

 

                          [facility]     [address] [id number]

[address] [place] [address], [place] [address]

                          24 Sep, 2013               

 

                          [facility]     [address] [id number]

[address] [place] [address], [place] [address]

                          19 Sep, 2013               

 

                          [facility]     [address] [id number]

[address] [place] [address], [place] [address]

                          16 Sep, 2013               

 

                          [facility]     [address] [id number]

[address] [place] [address], [place] [address]

                          21 Aug, 2013               

 

                          [facility]     [address] [id number]

[address] [place] [address], [place] [address]

                          17 Aug, 2013               

 

                          [facility]     [address] [id number]

[address] [place] [address], [place] [address]

                          16 Aug, 2013               

 

                          [facility]     [address] [id number]

[address] [place] [address], [place] [address]

                          15 Aug, 2013               

 

                          [facility]     [address] [id number]

[address] [place] [address], [place] [address]

                          13 Aug, 2013               

 

                          [facility]     [address] [id number]

[address] [place] [address], [place] [address]

                          13 Aug, 2013               

 

                          [facility]     [address] [id number]

[address] [place] [address], [place] [address]

                          12 Aug, 2013               

 

                          [facility]     [address] [id number]

[address] [place] [address], [place] [address]

                                         

 

                          [facility]     [address] [id number]

[address] [place] [address], [place] [address]

                                         

 

                          [facility]     [address] [id number]

[address] [place] [address], [place] [address]

                                         

 

                          [facility]     [address] [id number]

[address] [place] [address], [place] [address]

                          10 2013               

 

                          [facility]     [address] [id number]

[address] [place] [address], [place] [address]

                          17 May, 2013               

 

                          [facility]     [address] [id number]

[address] [place] [address], [place] [address]

                          10 May, 2013               

 

                          [facility]     [address] [id number]

[address] [place] [address], [place] [address]

                          15 Apr, 2013               

 

                          [facility]     [address] [id number]

[address] [place] [address], [place] [address]

                                         

 

                          [facility]     [address] [id number]

[address] [place] [address], [place] [address]

                                         

 

                          [facility]     [address] [id number]

[address] [place] [address], [place] [address]

                          24 2013               

 

                          [facility]     [address] [id number]

[address] [place] [address], [place] [address]

                                         

 

                          [facility]     [address] [id number]

[address] [place] [address], [place] [address]

                                         

 

                          [facility]     [address] [id number]

[address] [place] [address], [place] [address]

                                         

 

                          [facility]     [address] [id number]

[address] [place] [address], [place] [address]

                                         

 

                          [facility]     [address] [id number]

[address] [place] [address], [place] [address]

                          21 Dec, 2012               

 

                          [facility]     [address] [id number]

[address] [place] [address], [place] [address]

                          21 Dec, 2012               

 

                          [facility]     [address] [id number]

[address] [place] [address], [place] [address]

                          14 Dec, 2012               

 

                          [facility]     [address] [id number]

[address] [place] [address], [place] [address]

                          14 Dec, 2012               

 

                          [facility]     [address] [id number]

[address] [place] [address], [place] [address]

                                         

 

                          [facility]     [address] [id number]

[address] [place] [address], [place] [address]

                          20 2012               

 

                          [facility]     [address] [id number]

[address] [place] [address], [place] [address]

                          16 2012               

 

                          [facility]     [address] [id number]

[address] [place] [address], [place] [address]

                          16 2012               

 

                          [facility]     [address] [id number]

[address] [place] [address], [place] [address]

                          13 2012               

 

                          [facility]     [address] [id number]

[address] [place] [address], [place] [address]

                          13 2012               

 

                          [facility]     [address] [id number]

[address] [place] [address], [place] [address]

                          13 2012               

 

                          [facility]     [address] [id number]

[address] [place] [address], [place] [address]

                                         

 

                          [facility]     [address] [id number]

[address] [place] [address], [place] [address]

                                         

 

                          [facility]     [address] [id number]

[address] [place] [address], [place] [address]

                                         

 

                          [facility]     [address] [id number]

[address] [place] [address], [place] [address]

                          22 Oct, 2012               

 

                          [facility]     [address] [id number]

[address] [place] [address], [place] [address]

                          22 Oct, 2012               

 

                          [facility]     [address] [id number]

[address] [place] [address], [place] [address]

                          22 Oct, 2012               

 

                          [facility]     [address] [id number]

[address] [place] [address], [place] [address]

                          22 Oct, 2012               

 

                          [facility]     [address] [id number]

[address] [place] [address], [place] [address]

                          10 Oct, 2012               

 

                          [facility]     [address] [id number]

[address] [place] [address], [place] [address]

                          10 Oct, 2012               

 

                          [facility]     [address] [id number]

[address] [place] [address]

                          05 Oct, 2012               

 

                          [facility]     [address] [id number]

[address] [place] [address], [place] [address]

                          05 Oct, 2012               

 

                          [facility]     [address] [id number]

[address] [place] [address], [place] [address]

                          07 Sep, 2012               

 

                          [facility]     [address] [id number]

[address] [place] [address], [place] [address]

                          22 Aug, 2012               

 

                          [facility]     [address] [id number]

[address] [place] [address], [place] [address]

                          03 Aug, 2012               

 

                          [facility]     [address] [id number]

[address] [place] [address], [place] [address]

                                         

 

                          [facility]     [address] [id number]

[address] [place] [address], [place] [address]

                                         

 

                          [facility]     [address] [id number]

[address] [place] [address], [place] [address]

                                         

 

                          [facility]     [address] [id number]

[address] [place] [address], [place] [address]

                                         

 

                          [facility]     [address] [id number]

[address] [place] [address], [place] [address]

                                         

 

                          [facility]     [address] [id number]

[address] [place] [address], [place] [address]

                                         

 

                          [facility]     [address] [id number]

[address] [place] [address], [place] [address]

                                         

 

                          [facility]     [address] [id number]

[address] [place] [address], [place] [address]

                          30 May, 2012               

 

                          [facility]     [address] [id number]

[address] [place] [address], [place] [address]

                          30 May, 2012               

 

                          [facility]     [address] [id number]

[address] [place] [address], [place] [address]

                          21 May, 2012               

 

                          [facility]     [address] [id number]

[address] [place] [address], [place] [address]

                          14 May, 2012               

 

                          [facility]     [address] [id number]

[address] [place] [address], [place] [address]

                          04 May, 2012               

 

                          [facility]     [address] [id number]

[address] [place] [address], [place] [address]

                          04 May, 2012               

 

                          [facility]     [address] [id number]

[address] [place] [address], [place] [address]

                          04 May, 2012               

 

                          [facility]     [address] [id number]

[address] [place] [address], [place] [address]

                                         

 

                          [facility]     [address] [id number]

[address] [place] [address], [place] [address]

                                         

 

                          [facility]     [address] [id number]

[address] [place] [address], [place] [address]

                          23 Mar, 2012               

 

                          [facility]     [address] [id number]

[address] [place] [address], [place] [address]

                          15 Mar, 2012               

 

                          [facility]     [address] [id number]

[address] [place] [address], [place] [address]

                          13 Mar, 2012               

 

                          [facility]     [address] [id number]

[address] [place] [address], [place] [address]

                          12 Mar, 2012               

 

                          [facility]     [address] [id number]

[address] [place] [address], [place] [address]

                                         

 

                          [facility]     [address] [id number]

[address] [place] [address], [place] [address]

                                         

 

                          [facility]     [address] [id number]

[address] [place] [address], [place] [address]

                                         

 

                          [facility]     [address] [id number]

[address] [place] [address], [place] [address]

                                         

 

                          [facility]     [address] [id number]

[address] [place] [address], [place] [address]

                          29 Dec, 2011               

 

                          [facility]     [address] [id number]

[address] [place] [address], [place] [address]

                          29 Dec, 2011               

 

                          [facility]     [address] [id number]

[address] [place] [address], [place] [address]

                          21 Dec, 2011               

 

                          [facility]     [address] [facility] [id number]

[address] [place] [address]

                                         

 

                          [facility]     [address] [facility] [id number]

[address] [place] [phone]

                          22 Oct, 2011               

 

                          [facility]     [address] [facility] [id number]

[address] [place] [address]

                          21 Oct, 2011               







IMMUNIZATIONS

No Known Immunizations



SOCIAL HISTORY

Never Assessed



REASON FOR VISIT





PLAN OF CARE





VITAL SIGNS





MEDICATIONS

Unknown Medications



RESULTS

No Results



PROCEDURES

No Known procedures



INSTRUCTIONS





MEDICATIONS ADMINISTERED

No Known Medications



MEDICAL (GENERAL) HISTORY





                    Type                Description         Date

 

                    Medical History     depression           

 

                    Medical History     hyperlipidemia       

 

                    Hospitalization History staph in right leg

## 2020-07-25 NOTE — XMS REPORT
[facility]

                             Created on: 07/10/2020



Ignacio Valle

External Reference #: [id number]

: 1955

Sex: Male



Demographics





                          Address                   [address] [place]  [address]

 

                          Preferred Language        Unknown

 

                          Marital Status            Unknown

 

                          [employer] Affiliation     Unknown

 

                          Race                      Unknown

 

                          Ethnic Group              Unknown





Author





                          Author                    Ignacio Novak

 

                          [facility]

 

                          Address                   [address] [place]  [address]



 

                          Phone                     [phone]







Care Team Providers





                    Care Team Member Name Role                Phone

 

                    KishoreBEVEN   Unavailable         [phone]







PROBLEMS





          Type      Condition ICD9-CM Code MFB93-FU Code Onset Dates Condition S

tatus SNOMED 

Code

 

                          Problem                   Nonspecific elevation of lev

els of transaminase or lactic acid 

dehydrogenase (LDH) 790.4                                  Active       02355856

2

 

           Problem    Encounter for long-term (current) use of other medications

 V58.69                           

Active                                  [id number]

 

          Problem   Edema     782.3                         Active    723417528

 

          Problem   Spontaneous ecchymoses 782.7                         Active 

   [id number]

 

          Problem   Trigger finger (acquired) 727.03                        Acti

ve    4138940

 

          Problem   Varicose veins of lower extremities with inflammation 454.1 

                        Active    

[id number]

 

           Problem    Persistent disorder of initiating or maintaining sleep 307

.42                           Active

                                        [id number]

 

          Problem   Pityriasis versicolor 111.0                         Active  

  62347119

 

           Problem    Unspecified local infection of skin and subcutaneous tissu

e 686.9                            

Active                                  099391930

 

          Problem   Unspecified viral hepatitis C without hepatic coma 070.70   

                     Active    

37570602

 

          Problem   Vascular disorder of skin 709.1                         Acti

ve    35468438

 

          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       

                 Active    

85196968

 

          Problem   Anxiety state, unspecified 300.00                        Act

kathia    178890818

 

          Problem   Other and unspecified hyperlipidemia 272.4                  

       Active    50756227

 

          Problem   Major depressive disorder, recurrent episode, mild 296.31   

                     Active    

82983743







ALLERGIES

No Information



ENCOUNTERS





                Encounter       Location        Date            Diagnosis

 

                          [facility]   [id number]

[address] [place] [id number]

                          15 Jesse, 2016              Encounter for other specifie

d administrative purpose Z02.89

 

                          [facility]   [address] [id number]

[address] [place] [id number]

                          12 May, 2016              Dental examination Z01.20 an

d Dental caries K02.9

 

                          [facility]     [address] [id number]

[address] [place] [address]

                          14 Aug, 2015              Edema 782.3 and Family histo

ry of coronary artery disease V17.3

 

                          [facility]     [address] [id number]

[address] [place] [address]

                          07 Aug, 2015              Edema 782.3 and Family histo

ry of coronary artery disease V17.3

 

                          [facility]   [address] [id number]

[address] [place] [id number]

                                        Dental examination V72.2

 

                          [facility]     [address] [id number]

[address] [place] [address]

                                         

 

                          [facility]     [address] [id number]

[address] [place] [address]

                                         

 

                          [facility]     [address] [id number]

[address] [place] [address]

                          20 Mar, 2015               

 

                          [facility]     [address] [id number]

[address] [place] [address]

                          20 Mar, 2015               

 

                          [facility]     [address] [id number]

[address] [place] [address]

                                         

 

                          [facility]     [address] [id number]

[address] [place] [address]

                                         

 

                          [facility]     [address] [id number]

[address] [place] [address]

                                         

 

                          [facility]     [address] [id number]

[address] [place] [address]

                                         

 

                          [facility]     [address] [id number]

[address] [place] [address]

                          11 Dec, 2014               

 

                          [facility]     [address] [id number]

[address] [place] [address]

                          11 Dec, 2014               

 

                          [facility]     [address] [id number]

[address] [place] [address]

                                         

 

                          [facility]     [address] [id number]

[address] [place] [address], [place] [address]

                                         

 

                          [facility]     [address] [id number]

[address] [place] [address], [place] [address]

                          30 Oct, 2014               

 

                          [facility]     [address] [id number]

[address] [place] [address], [place] [address]

                          30 Oct, 2014               

 

                          [facility]     [address] [id number]

[address] [place] [address], [place] [address]

                          10 Sep, 2014               

 

                          [facility]     [address] [id number]

[address] [place] [address], [place] [address]

                          09 Sep, 2014               

 

                          [facility]     [address] [id number]

[address] [place] [address], [place] [address]

                          09 Sep, 2014               

 

                          [facility]     [address] [id number]

[address] [place] [address], [place] [address]

                          22 Aug, 2014               

 

                          [facility]     [address] [id number]

[address] [place] [address], [place] [address]

                          22 Aug, 2014               

 

                          [facility]     [address] [id number]

[address] [place] [address], [place] [address]

                          22 Aug, 2014               

 

                          [facility]     [address] [id number]

[address] [place] [address], [place] [address]

                                         

 

                          [facility]     [address] [id number]

[address] [place] [address], [place] [address]

                                         

 

                          [facility]     [address] [id number]

[address] [place] [address], [place] [address]

                                         

 

                          [facility]     [address] [id number]

[address] [place] [address], [place] [address]

                                         

 

                          [facility]     [address] [id number]

[address] [place] [address], [place] [address]

                                         

 

                          [facility]     [address] [id number]

[address] [place] [address], [place] [address]

                                         

 

                          [facility]     [address] [id number]

[address] [place] [address], [place] [address]

                                         

 

                          [facility]     [address] [id number]

[address] [place] [address], [place] [address]

                                         

 

                          [facility]     [address] [id number]

[address] [place] [address], [place] [address]

                          20 May, 2014               

 

                          [facility]     [address] [id number]

[address] [place] [address], [place] [address]

                          20 May, 2014               

 

                          [facility]     [address] [id number]

[address] [place] [address], [place] [address]

                                         

 

                          [facility]     [address] [id number]

[address] [place] [address], [place] [address]

                                         

 

                          [facility]     [address] [id number]

[address] [place] [address], [place] [address]

                                         

 

                          [facility]     [address] [id number]

[address] [place] [address], [place] [address]

                                         

 

                          [facility]     [address] [id number]

[address] [place] [address], [place] [address]

                                         

 

                          [facility]     [address] [id number]

[address] [place] [address], [place] [address]

                                         

 

                          [facility]     [address] [id number]

[address] [place] [address], [place] [address]

                          13 Dec, 2013               

 

                          [facility]     [address] [id number]

[address] [place] [address], [place] [address]

                          13 Dec, 2013               

 

                          [facility]     [address] [id number]

[address] [place] [address], [place] [address]

                          13 Dec, 2013               

 

                          [facility]     [address] [id number]

[address] [place] [address], [place] [address]

                          13 Dec, 2013               

 

                          [facility]     [address] [id number]

[address] [place] [address], [place] [address]

                          17 Oct, 2013               

 

                          [facility]     [address] [id number]

[address] [place] [address], [place] [address]

                          17 Oct, 2013               

 

                          [facility]     [address] [id number]

[address] [place] [address] [place] [address]

                          10 Oct, 2013               

 

                          [facility]     [address] [id number]

[address] [place] [address], [place] [address]

                          10 Oct, 2013               

 

                          [facility]     [address] [id number]

[address] [place] [address], [place] [address]

                          04 Oct, 2013               

 

                          [facility]     [address] [id number]

[address] [place] [address], [place] [address]

                          24 Sep, 2013               

 

                          [facility]     [address] [id number]

[address] [place] [address], [place] [address]

                          19 Sep, 2013               

 

                          [facility]     [address] [id number]

[address] [place] [address], [place] [address]

                          16 Sep, 2013               

 

                          [facility]     [address] [id number]

[address] [place] [address], [place] [address]

                          21 Aug, 2013               

 

                          [facility]     [address] [id number]

[address] [place] [address], [place] [address]

                          17 Aug, 2013               

 

                          [facility]     [address] [id number]

[address] [place] [address], [place] [address]

                          16 Aug, 2013               

 

                          [facility]     [address] [id number]

[address] [place] [address], [place] [address]

                          15 Aug, 2013               

 

                          [facility]     [address] [id number]

[address] [place] [address], [place] [address]

                          13 Aug, 2013               

 

                          [facility]     [address] [id number]

[address] [place] [address], [place] [address]

                          13 Aug, 2013               

 

                          [facility]     [address] [id number]

[address] [place] [address], [place] [address]

                          12 Aug, 2013               

 

                          [facility]     [address] [id number]

[address] [place] [address], [place] [address]

                                         

 

                          [facility]     [address] [id number]

[address] [place] [address], [place] [address]

                                         

 

                          [facility]     [address] [id number]

[address] [place] [address], [place] [address]

                                         

 

                          [facility]     [address] [id number]

[address] [place] [address], [place] [address]

                          10 2013               

 

                          [facility]     [address] [id number]

[address] [place] [address], [place] [address]

                          17 May, 2013               

 

                          [facility]     [address] [id number]

[address] [place] [address], [place] [address]

                          10 May, 2013               

 

                          [facility]     [address] [id number]

[address] [place] [address], [place] [address]

                          15 Apr, 2013               

 

                          [facility]     [address] [id number]

[address] [place] [address], [place] [address]

                                         

 

                          [facility]     [address] [id number]

[address] [place] [address], [place] [address]

                                         

 

                          [facility]     [address] [id number]

[address] [place] [address], [place] [address]

                                         

 

                          [facility]     [address] [id number]

[address] [place] [address], [place] [address]

                                         

 

                          [facility]     [address] [id number]

[address] [place] [address], [place] [address]

                                         

 

                          [facility]     [address] [id number]

[address] [place] [address], [place] [address]

                                         

 

                          [facility]     [address] [id number]

[address] [place] [address], [place] [address]

                                         

 

                          [facility]     [address] [id number]

[address] [place] [address], [place] [address]

                          21 Dec, 2012               

 

                          [facility]     [address] [id number]

[address] [place] [address], [place] [address]

                          21 Dec, 2012               

 

                          [facility]     [address] [id number]

[address] [place] [address], [place] [address]

                          14 Dec, 2012               

 

                          [facility]     [address] [id number]

[address] [place] [address], [place] [address]

                          14 Dec, 2012               

 

                          [facility]     [address] [id number]

[address] [address]

                          20 2012               

 

                          [facility]     [address] [id number]

[address] [place] [address], [place] [address]

                          20 2012               

 

                          [facility]     [address] [id number]

[address] [place] [address], [place] [address]

                          16 2012               

 

                          [facility]     [address] [id number]

[address] [place] [address], [place] [address]

                          16 2012               

 

                          [facility]     [address] [id number]

[address] [place] [address]

                          13 2012               

 

                          [facility]     [address] [id number]

[address] [place] [address], [place] [address]

                                         

 

                          [facility]     [address] [id number]

[address] [place] [address]

                          13 2012               

 

                          [facility]     [address] [id number]

[address] [place] [address], [place] [address]

                                         

 

                          [facility]     [address] [id number]

[address] [place] [address]

                                         

 

                          [facility]     [address] [id number]

[address] [place] [address]

                          07 2012               

 

                          [facility]     [address] [id number]

[address] [place] [address], [place] [address]

                          22 Oct, 2012               

 

                          [facility]     [address] [id number]

[address] [place] [address]

                          22 Oct, 2012               

 

                          [facility]     [address] [id number]

[address] [place] [address]

                          22 Oct, 2012               

 

                          [facility]     [address] [id number]

[address] [place] [address], [place] [address]

                          22 Oct, 2012               

 

                          [facility]     [address] [id number]

[address] [place] [address]

                          10 Oct, 2012               

 

                          [facility]     [address] [id number]

[address] [place] [address]

                          10 Oct, 2012               

 

                          [facility]     [address] [id number]

[address] [place] [address], [place] [address]

                          05 Oct, 2012               

 

                          [facility]     [address] [id number]

[address] [place] [address], [place] [address]

                          05 Oct, 2012               

 

                          [facility]     [address] [id number]

[address] [place] [address], [place] [address]

                          07 Sep, 2012               

 

                          [facility]     [address] [id number]

[address] [place] [address], [place] [address]

                          22 Aug, 2012               

 

                          [facility]     [address] [id number]

[address] [place] [address], [place] [address]

                          03 Aug, 2012               

 

                          [facility]     [address] [id number]

[address] [place] [address], [place] [address]

                                         

 

                          [facility]     [address] [id number]

[address] [place] [address], [place] [address]

                                         

 

                          [facility]     [address] [id number]

[address] [place] [address], [place] [address]

                                         

 

                          [facility]     [address] [id number]

[address] [place] [address], [place] [address]

                                         

 

                          [facility]     [address] [id number]

[address] [place] [address], [place] [address]

                                         

 

                          [facility]     [address] [id number]

[address] [place] [address], [place] [address]

                                         

 

                          [facility]     [address] [id number]

[address] [place] [address], [place] [address]

                                         

 

                          [facility]     [address] [id number]

[address] [place] [address], [place] [address]

                          30 May, 2012               

 

                          [facility]     [address] [id number]

[address] [place] [address], [place] [address]

                          30 May, 2012               

 

                          [facility]     [address] [id number]

[address] [place] [address], [place] [address]

                          21 May, 2012               

 

                          [facility]     [address] [id number]

[address] [place] [address], [place] [address]

                          14 May, 2012               

 

                          [facility]     [address] [id number]

[address] [place] [address], [place] [address]

                          04 May, 2012               

 

                          [facility]     [address] [id number]

[address] [place] [address], [place] [address]

                          04 May, 2012               

 

                          [facility]     [address] [id number]

[address] [place] [address], [place] [address]

                          04 May, 2012               

 

                          [facility]     [address] [id number]

[address] [place] [address], [place] [address]

                                         

 

                          [facility]     [address] [id number]

[address] [place] [address], [place] [address]

                                         

 

                          [facility]     [address] [id number]

[address] [place] [address], [place] [address]

                          23 Mar, 2012               

 

                          [facility]     [address] [id number]

[address] [place] [address], [place] [address]

                          15 Mar, 2012               

 

                          [facility]     [address] [id number]

[address] [place] [address], [place] [address]

                          13 Mar, 2012               

 

                          [facility]     [address] [id number]

[address] [place] [address], [place] [address]

                          12 Mar, 2012               

 

                          [facility]     [address] [id number]

[address] [place] [address], [place] [address]

                                         

 

                          [facility]     [address] [id number]

[address] [place] [address], [place] [address]

                                         

 

                          [facility]     [address] [id number]

[address] [place] [address], [place] [address]

                                         

 

                          [facility]     [address] [id number]

[address] [place] [address], [place] [address]

                                         

 

                          [facility]     [address] [id number]

[address] [place] [address], [place] [address]

                          29 Dec, 2011               

 

                          [facility]     [address] [id number]

[address] [place] [address], [place] [address]

                          29 Dec, 2011               

 

                          [facility]     [address] [id number]

[address] [place] [address]

                          21 Dec, 2011               

 

                          [facility]     [address] [facility] [id number]

[address] [place] [address]

                                         

 

                          [facility]     [address] [facility] [id number]

[address] [place] [address]

                          22 Oct, 2011               

 

                          [facility]     [address] [facility] [id number]

[address] [place] [address]

                          21 Oct, 2011               







IMMUNIZATIONS

No Known Immunizations



SOCIAL HISTORY

Never Assessed



REASON FOR VISIT





PLAN OF CARE





VITAL SIGNS





MEDICATIONS

Unknown Medications



RESULTS

No Results



PROCEDURES

No Known procedures



INSTRUCTIONS





MEDICATIONS ADMINISTERED

No Known Medications



MEDICAL (GENERAL) HISTORY





                    Type                Description         Date

 

                    Medical History     depression           

 

                    Medical History     hyperlipidemia       

 

                    Hospitalization History staph in right leg

## 2020-07-25 NOTE — XMS REPORT
[facility]

                             Created on: 2020



Leonard Ignacio

External Reference #: [id number]

: [id number]

Sex: Male



Demographics





                          Address                   [address] [place]  [address]

 

                          Preferred Language        Unknown

 

                          Marital Status            Unknown

 

                          Lutheran Affiliation     Unknown

 

                          Race                      Unknown

 

                          Ethnic Group              Unknown





Author





                          Author                    Ignacio Gomes Doctor

 

                          Organization              [facility]

 

                          Address                   Unknown

 

                          Phone                     Unavailable







Care Team Providers





                    Care Team Member Name Role                Phone

 

                    Migration,  Doctor  Unavailable         Unavailable







PROBLEMS





          Type      Condition ICD9-CM Code FLQ31-GT Code Onset Dates Condition S

tatus SNOMED 

Code

 

                          Problem                   Nonspecific elevation of lev

els of transaminase or lactic acid 

dehydrogenase (LDH) 790.4                                  Active       38119183

2

 

           Problem    Encounter for long-term (current) use of other medications

 V58.69                           

Active                                  [id number]

 

          Problem   Edema     782.3                         Active    836060981

 

          Problem   Spontaneous ecchymoses 782.7                         Active 

   [id number]

 

          Problem   Trigger finger (acquired) 727.03                        Acti

ve    5754719

 

          Problem   Varicose veins of lower extremities with inflammation 454.1 

                        Active    

15059673

 

           Problem    Persistent disorder of initiating or maintaining sleep 307

.42                           Active

                                        [id number]

 

          Problem   Pityriasis versicolor 111.0                         Active  

  23818311

 

           Problem    Unspecified local infection of skin and subcutaneous tissu

e 686.9                            

Active                                  [id number]

 

          Problem   Unspecified viral hepatitis C without hepatic coma 070.70   

                     Active    

45386558

 

          Problem   Vascular disorder of skin 709.1                         Acti

ve    46813144

 

          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       

                 Active    

58138611

 

          Problem   Anxiety state, unspecified 300.00                        Act

kathia    349874960

 

          Problem   Other and unspecified hyperlipidemia 272.4                  

       Active    93912671

 

          Problem   Major depressive disorder, recurrent episode, mild 296.31   

                     Active    

[id number]







ALLERGIES

No Information



ENCOUNTERS





                Encounter       Location        Date            Diagnosis

 

                          [facility]   [address] [facility] [id number]

[address] [place] [id number]

                          15 Jesse, 2016              Encounter for other specifie

d administrative purpose Z02.89

 

                          [facility]   [address] [facility] [id number]

[address] [place] [id number]

                          12 May, 2016              Dental examination Z01.20 an

d Dental caries K02.9

 

                          [facility]     [address] [facility] [id number]

[address] [place] [address]

                          14 Aug, 2015              Edema 782.3 and Family histo

ry of coronary artery disease V17.3

 

                          [facility]     [address] [id number]

[address] [place] [address]

                          07 Aug, 2015              Edema 782.3 and Family histo

ry of coronary artery disease V17.3

 

                          [facility]   [address] [id number]

[address] [place] [id number]

                                        Dental examination V72.2

 

                          [facility]     [address] [id number]

[address] [place] [address]

                                         

 

                          [facility]     [address] [id number]

[address] [place] [address]

                                         

 

                          [facility]     [address] [id number]

[address] [place] [address]

                          20 Mar, 2015               

 

                          [facility]     [address] [id number]

[address] [place] [address]

                          20 Mar, 2015               

 

                          [facility]     [address] [id number]

[address] [place] [address]

                                         

 

                          [facility]     [address] [id number]

[address] [place] [address]

                                         

 

                          [facility]     [address] [id number]

[address] [place] [address]

                                         

 

                          [facility]     [address] [id number]

[address] [place] [address]

                                         

 

                          [facility]     [address] [id number]

[address] [place] [address]

                          11 Dec, 2014               

 

                          [facility]     [address] [id number]

[address] [place] [address]

                          11 Dec, 2014               

 

                          [facility]     [address] [id number]

[address] [place] [address]

                                         

 

                          [facility]     [address] [id number]

[address] [place] [address]

                                         

 

                          [facility]     [address] [id number]

[address] [place] [address], [place] [address]

                          30 Oct, 2014               

 

                          [facility]     [address] [id number]

[address] [place] [address], [place] [address]

                          30 Oct, 2014               

 

                          [facility]     [address] [id number]

[address] [place] [address], [place] [address]

                          10 Sep, 2014               

 

                          [facility]     [address] [id number]

[address] [place] [address], [place] [address]

                          09 Sep, 2014               

 

                          [facility]     [address] [id number]

[address] [place] [address], [place] [address]

                          09 Sep, 2014               

 

                          [facility]     [address] [id number]

[address] [place] [address], [place] [address]

                          22 Aug, 2014               

 

                          [facility]     [address] [id number]

[address] [place] [address], [place] [address]

                          22 Aug, 2014               

 

                          [facility]     [address] [id number]

[address] [place] [address], [place] [address]

                          22 Aug, 2014               

 

                          [facility]     [address] [id number]

[address] [place] [address], [place] [address]

                                         

 

                          [facility]     [address] [id number]

[address] [place] [address], [place] [address]

                                         

 

                          [facility]     [address] [id number]

[address] [place] [address], [place] [address]

                                         

 

                          [facility]     [address] [id number]

[address] [place] [address], [place] [address]

                                         

 

                          [facility]     [address] [id number]

[address] [place] [address], [place] [address]

                                         

 

                          [facility]     [address] [id number]

[address] [place] [address], [place] [address]

                                         

 

                          [facility]     [address] [id number]

[address] [place] [address], [place] [address]

                                         

 

                          [facility]     [address] [id number]

[address] [place] [address], [place] [address]

                                         

 

                          [facility]     [address] [id number]

[address] [place] [address], [place] [address]

                          20 May, 2014               

 

                          [facility]     [address] [id number]

[address] [place] [address], [place] [address]

                          20 May, 2014               

 

                          [facility]     [address] [id number]

[address] [place] [address], [place] [address]

                                         

 

                          [facility]     [address] [id number]

[address] [place] [address], [place] [address]

                                         

 

                          [facility]     [address] [id number]

[address] [place] [address], [place] [address]

                                         

 

                          [facility]     [address] [id number]

[address] [place] [address], [place] [address]

                                         

 

                          [facility]     [address] [id number]

[address] [place] [address], [place] [address]

                                         

 

                          [facility]     [address] [id number]

[address] [place] [address], [place] [address]

                                         

 

                          [facility]     [address] [id number]

[address] [place] [address], [place] [address]

                          13 Dec, 2013               

 

                          [facility]     [address] [id number]

[address] [place] [address], [place] [address]

                          13 Dec, 2013               

 

                          [facility]     [address] [id number]

[address] [place] [address], [place] [address]

                          13 Dec, 2013               

 

                          [facility]     [address] [id number]

[address] [place] [address], [place] [address]

                          13 Dec, 2013               

 

                          [facility]     [address] [id number]

[address] [place] [address], [place] [address]

                          17 Oct, 2013               

 

                          [facility]     [address] [id number]

[address] [place] [address], [place] [address]

                          17 Oct, 2013               

 

                          [facility]     [address] [id number]

[address] [place] [address], [place] [address]

                          10 Oct, 2013               

 

                          [facility]     [address] [id number]

[address] [place] [address] [place] [address]

                          10 Oct, 2013               

 

                          [facility]     [address] [id number]

[address] [place] [address], [place] [address]

                          04 Oct, 2013               

 

                          [facility]     [address] [id number]

[address] [place] [address], [place] [address]

                          24 Sep, 2013               

 

                          [facility]     [address] [id number]

[address] [place] [address], [place] [address]

                          19 Sep, 2013               

 

                          [facility]     [address] [id number]

[address] [place] [address], [place] [address]

                          16 Sep, 2013               

 

                          [facility]     [address] [id number]

[address] [place] [address], [place] [address]

                          21 Aug, 2013               

 

                          [facility]     [address] [id number]

[address] [place] [address], [place] [address]

                          17 Aug, 2013               

 

                          [facility]     [address] [id number]

[address] [place] [address], [place] [address]

                          16 Aug, 2013               

 

                          [facility]     [address] [id number]

[address] [place] [address], [place] [address]

                          15 Aug, 2013               

 

                          [facility]     [address] [id number]

[address] [place] [address], [place] [address]

                          13 Aug, 2013               

 

                          [facility]     [address] [id number]

[address] [place] [address], [place] [address]

                          13 Aug, 2013               

 

                          [facility]     [address] [id number]

[address] [place] [address], [place] [address]

                          12 Aug, 2013               

 

                          [facility]     [address] [id number]

[address] [place] [address], [place] [address]

                                         

 

                          [facility]     [address] [id number]

[address] [place] [address], [place] [address]

                                         

 

                          [facility]     [address] [id number]

[address] [place] [address], [place] [address]

                                         

 

                          [facility]     [address] [id number]

[address] [place] [address], [place] [address]

                          10 Jesse, 2013               

 

                          [facility]     [address] [id number]

[address] [place] [address], [place] [address]

                          17 May, 2013               

 

                          [facility]     [address] [id number]

[address] [place] [address], [place] [address]

                          10 May, 2013               

 

                          [facility]     [address] [id number]

[address] [place] [address], [place] [address]

                          15 Apr, 2013               

 

                          [facility]     [address] [id number]

[address] [place] [address], [place] [address]

                                         

 

                          [facility]     [address] [id number]

[address] [place] [address], [place] [address]

                                         

 

                          [facility]     [address] [id number]

[address] [place] [address], [place] [address]

                                         

 

                          [facility]     [address] [id number]

[address] [place] [address], [place] [address]

                                         

 

                          [facility]     [address] [id number]

[address] [place] [address], [place] [address]

                                         

 

                          [facility]     [address] [id number]

[address] [place] [address], [place] [address]

                                         

 

                          [facility]     [address] [id number]

[address] [place] [address], [place] [address]

                                         

 

                          [facility]     [address] [id number]

[address] [place] [address], [place] [address]

                          21 Dec, 2012               

 

                          [facility]     [address] [id number]

[address] [place] [address], [place] [address]

                          21 Dec, 2012               

 

                          [facility]     [address] [id number]

[address] [place] [address], [place] [address]

                          14 Dec, 2012               

 

                          [facility]     [address] [id number]

[address] [place] [address], [place] [address]

                          14 Dec, 2012               

 

                          [facility]     [address] [id number]

[address] [place] [address], [place] [address]

                                         

 

                          [facility]     [address] [id number]

[address] [address]

                          20 2012               

 

                          [facility]     [address] [id number]

[address] [place] [address], [place] [address]

                          16 2012               

 

                          [facility]     [address] [id number]

[address] [place] [address], [place] [address]

                          16 2012               

 

                          [facility]     [address] [id number]

[address] [place] [address], [place] [address]

                          13 2012               

 

                          [facility]     [address] [id number]

[address] [place] [address]

                          13 2012               

 

                          [facility]     [address] [id number]

[address] [place] [address], [place] [address]

                          13 2012               

 

                          [facility]     [address] [id number]

[address] [place] [address]

                          13 2012               

 

                          [facility]     [address] [id number]

[address] [place] [address], [place] [address]

                                         

 

                          [facility]     [address] [id number]

[address] [place] [address]

                                         

 

                          [facility]     [address] [id number]

[address] [place] [address], [place] [address]

                          22 Oct, 2012               

 

                          [facility]     [address] [id number]

[address] [place] [address], [place] [address]

                          22 Oct, 2012               

 

                          [facility]     [address] [id number]

[address] [place] [address]

                          22 Oct, 2012               

 

                          [facility]     [address] [id number]

[address] [place] [address]

                          22 Oct, 2012               

 

                          [facility]     [address] [id number]

[address] [place] [address], [place] [address]

                          10 Oct, 2012               

 

                          [facility]     [address] [id number]

[address] [place] [address]

                          10 Oct, 2012               

 

                          [facility]     [address] [id number]

[address] [place] [address]

                          05 Oct, 2012               

 

                          [facility]     [address] [id number]

[address] [place] [address], [place] [address]

                          05 Oct, 2012               

 

                          [facility]     [address] [id number]

[address] [place] [address], [place] [address]

                          07 Sep, 2012               

 

                          [facility]     [address] [id number]

[address] [place] [address], [place] [address]

                          22 Aug, 2012               

 

                          [facility]     [address] [id number]

[address] [place] [address], [place] [address]

                          03 Aug, 2012               

 

                          [facility]     [address] [id number]

[address] [place] [address], [place] [address]

                                         

 

                          [facility]     [address] [id number]

[address] [place] [address], [place] [address]

                                         

 

                          [facility]     [address] [id number]

[address] [place] [address], [place] [address]

                                         

 

                          [facility]     [address] [id number]

[address] [place] [address], [place] [address]

                                         

 

                          [facility]     [address] [id number]

[address] [place] [address], [place] [address]

                                         

 

                          [facility]     [address] [id number]

[address] [place] [address], [place] [address]

                                         

 

                          [facility]     [address] [id number]

[address] [place] [address], [place] [address]

                                         

 

                          [facility]     [address] [id number]

[address] [place] [address], [place] [address]

                          30 May, 2012               

 

                          [facility]     [address] [id number]

[address] [place] [address], [place] [address]

                          30 May, 2012               

 

                          [facility]     [address] [id number]

[address] [place] [address], [place] [address]

                          21 May, 2012               

 

                          [facility]     [address] [id number]

[address] [place] [address], [place] [address]

                          14 May, 2012               

 

                          [facility]     [address] [id number]

[address] [place] [address], [place] [address]

                          04 May, 2012               

 

                          [facility]     [address] [id number]

[address] [place] [address], [place] [address]

                          04 May, 2012               

 

                          [facility]     [address] [id number]

[address] [place] [address], [place] [address]

                          04 May, 2012               

 

                          [facility]     [address] [id number]

[address] [place] [address], [place] [address]

                                         

 

                          [facility]     [address] [id number]

[address] [place] [address], [place] [address]

                                         

 

                          [facility]     [address] [id number]

[address] [place] [address], [place] [address]

                          23 Mar, 2012               

 

                          [facility]     [address] [id number]

[address] [place] [address], [place] [address]

                          15 Mar, 2012               

 

                          [facility]     [address] [id number]

[address] [place] [address], [place] [address]

                          13 Mar, 2012               

 

                          [facility]     [address] [id number]

[address] [place] [address], [place] [address]

                          12 Mar, 2012               

 

                          [facility]     [address] [id number]

[address] [place] [address], [place] [address]

                                         

 

                          [facility]     [address] [id number]

[address] [place] [address], [place] [address]

                                         

 

                          [facility]     [address] [id number]

[address] [place] [address], [place] [address]

                                         

 

                          [facility]     [address] [id number]

[address] [place] [address], [place] [address]

                                         

 

                          [facility]     [address] [id number]

[address] [place] [address], [place] [address]

                          29 Dec, 2011               

 

                          [facility]     [address] [id number]

[address] [place] [address], [place] [address]

                          29 Dec, 2011               

 

                          [facility]     [address] [id number]

[address] [place] [address], [place] [address]

                          21 Dec, 2011               

 

                          [facility]     [address] [id number]

[address] [place] [id number]

                                         

 

                          [facility]     [address] [facility] [id number]

[address] [place] [id number]

                          22 Oct, 2011               

 

                          [facility]     [address] [facility] [id number]

[address] [place] [address]

                          21 Oct, 2011               







IMMUNIZATIONS

No Known Immunizations



SOCIAL HISTORY

Never Assessed



REASON FOR VISIT





PLAN OF CARE





VITAL SIGNS





MEDICATIONS

Unknown Medications



RESULTS

No Results



PROCEDURES

No Known procedures



INSTRUCTIONS





MEDICATIONS ADMINISTERED

No Known Medications



MEDICAL (GENERAL) HISTORY





                    Type                Description         Date

 

                    Medical History     depression           

 

                    Medical History     hyperlipidemia       

 

                    Hospitalization History [facility]aph in right leg

## 2020-07-25 NOTE — XMS REPORT
[facility]

                             Created on: 2020



Ignacio Valle

External Reference #: [id number]

: 1955

Sex: Male



Demographics





                          Address                   [address] [place]  [address]

 

                          Preferred Language        Unknown

 

                          Marital Status            Unknown

 

                          Sabianist Affiliation     Unknown

 

                          Race                      Unknown

 

                          Ethnic Group              Unknown





Author





                          Author                    Ignacio FARRAR

 

                          Organization              [facility] [place]

 

                          Address                   [address] [place]  [address]



 

                          Phone                     [phone]







Care Team Providers





                    Care Team Member Name Role                Phone

 

                    DARIAN FARRAR    Unavailable         [phone]







PROBLEMS





          Type      Condition ICD9-CM Code IHN56-WN Code Onset Dates Condition S

tatus SNOMED 

Code

 

                          Problem                   Nonspecific elevation of lev

els of transaminase or lactic acid 

dehydrogenase (LDH) 790.4                                  Active       69502347

2

 

           Problem    Encounter for long-term (current) use of other medications

 V58.69                           

Active                                  [id number]

 

          Problem   Edema     782.3                         Active    321423368

 

          Problem   Spontaneous ecchymoses 782.7                         Active 

   406002236

 

          Problem   Trigger finger (acquired) 727.03                        Acti

ve    3982455

 

          Problem   Varicose veins of lower extremities with inflammation 454.1 

                        Active    

32529073

 

           Problem    Persistent disorder of initiating or maintaining sleep 307

.42                           Active

                                        50993052

 

          Problem   Pityriasis versicolor 111.0                         Active  

  50468999

 

           Problem    Unspecified local infection of skin and subcutaneous tissu

e 686.9                            

Active                                  390562059

 

          Problem   Unspecified viral hepatitis C without hepatic coma 070.70   

                     Active    

82956859

 

          Problem   Vascular disorder of skin 709.1                         Acti

ve    99946475

 

          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       

                 Active    

05157133

 

          Problem   Anxiety state, unspecified 300.00                        Act

akthia    570005708

 

          Problem   Other and unspecified hyperlipidemia 272.4                  

       Active    50037523

 

          Problem   Major depressive disorder, recurrent episode, mild 296.31   

                     Active    

82733931







ALLERGIES

No Information



ENCOUNTERS





                Encounter       Location        Date            Diagnosis

 

                    [facility] [address] [id number] 15 

Jesse, 2016                               Encounter for other specified administra

tive purpose Z02.89

 

                    [facility] [address] [id number] 12 

May, 2016                               Dental examination Z01.20 and Dental car

ies K02.9

 

                    [facility] [place] [id number] [place] [address] 14 

Aug, 2015                               Edema 782.3 and Family history of corona

ry artery disease V17.3

 

                    [facility] [place] [id number] [place] [address] 07 

Aug, 2015                               Edema 782.3 and Family history of corona

ry artery disease V17.3

 

                    [facility] [address] [facility] [place] [id number]                                Dental examination V72.2

 

                    [facility] [place] [id number] [place] [address]                                 

 

                    [facility] [place] [address] [address]                                 

 

                    [facility] [place] [id number] [place] [address] 20 

Mar, 2015                                

 

                    [facility] [place] [address] [address] 20 

Mar, 2015                                

 

                    [facility] [place] [id number] [place] [address]                                 

 

                    [facility] [place] [address] [address]                                 

 

                    [facility] [place] [address] [address]                                 

 

                    [facility] [place] [address] [address]                                 

 

                    [facility] [place] [id number] [place] [address] 11 

Dec, 2014                                

 

                    [facility] [place] [id number] [place] [address] 11 

Dec, 2014                                

 

                    [facility] [place] [id number] [place] [address]                                 

 

                    [facility] [place] [id number] [place] [address]                                 

 

                    [facility] [place] [address] [address] 30 

Oct, 2014                                

 

                    [facility] [address] [facility] [address] [id number] [facility] [place] [address] 30 

Oct, 2014                                

 

                    [facility] [address] [facility] [address] [id number] [facility] [place] [address] 10 

Sep, 2014                                

 

                    [facility] [address] [facility] [facility] [place] [address] 09 

Sep, 2014                                

 

                    [facility] [address] [id number] [facility] [place] [address] 09 

Sep, 2014                                

 

                    [facility] [address] [facility] [address] [id number] [facility] [place] [address] 22 

Aug, 2014                                

 

                    [facility] [address] [facility] [place] [address] 22 

Aug, 2014                                

 

                    [facility] [address] [id number] [place] [address] 22 

Aug, 2014                                

 

                    [facility] [address] [facility] [place] [address]                                 

 

                    [facility] [address] [id number] [place] [address]                                 

 

                    [facility] [address] [id number] [place] [address]                                 

 

                    [facility] [address] [id number] [place] [address]                                 

 

                    [facility] [address] [id number] [place] [address]                                 

 

                    [facility] [address] [id number] [place] [address]                                 

 

                    [facility] [address] [id number] [place] [address]                                 

 

                    [facility] [address] [id number] [place] [address]                                 

 

                    [facility] [address] [id number] [place] [address] 20 

May, 2014                                

 

                    [facility] [address] [id number] [place] [address] 20 

May, 2014                                

 

                    [facility] [address] [id number] [place] [address]                                 

 

                    [facility] [address] [id number] [place] [address]                                 

 

                    [facility] [address] [id number] [place] [address]                                 

 

                    [facility] [address] [id number] [place] [address]                                 

 

                    [facility] [address] [id number] [place] [address]                                 

 

                    [facility] [address] [id number] [place] [address]                                 

 

                    [facility] [id number] [place] [address] 13 

Dec, 2013                                

 

                    [facility] [address] [id number] [place] [address] 13 

Dec, 2013                                

 

                    [facility] [address] [id number] [place] [address] 13 

Dec, 2013                                

 

                    [facility] [address] [id number] [place] [address] 13 

Dec, 2013                                

 

                    [facility] [address] [facility] [place] [address] 17 

Oct, 2013                                

 

                    [facility] [address] [facility] [place] [address] 17 

Oct, 2013                                

 

                    [facility] [address] [facility] [place] [address] 10 

Oct, 2013                                

 

                    [facility] [id number] [place] [address] 10 

Oct, 2013                                

 

                    [facility] [address] [id number] [place] [address] 04 

Oct, 2013                                

 

                    [facility] [address] [facility] [place] [address] 24 

Sep, 2013                                

 

                    [facility] [address] [id number] [place] [address] 19 

Sep, 2013                                

 

                    [facility] [address] [facility] [address] [id number] [facility] [place] [address] 16 

Sep, 2013                                

 

                    [facility] [address] [id number] [place] [address] 21 

Aug, 2013                                

 

                    [facility] [address] [id number] [facility] [place] [address] 17 

Aug, 2013                                

 

                    [facility] [address] [facility] [address] [id number] [place] [address] 16 

Aug, 2013                                

 

                    [facility] [address] [id number] [place] [address] 15 

Aug, 2013                                

 

                    [facility] [address] [id number] [place] [address] 13 

Aug, 2013                                

 

                    [facility] [address] [id number] [place] [address] 13 

Aug, 2013                                

 

                    [facility] [address] [id number] [place] [address] 12 

Aug, 2013                                

 

                    [facility] [address] [id number] [place] [address]                                 

 

                    [facility] [address] [id number] [place] [address]                                 

 

                    [facility] [address] [id number] [place] [address]                                 

 

                    [facility] [address] [id number] [place] [address] 10 

Jesse, 2013                                

 

                    [facility] [address] [id number] [place] [address] 17 

May, 2013                                

 

                    [facility] [address] [id number] [place] [address] 10 

May, 2013                                

 

                    [facility] [address] [id number] [place] [address] 15 

Apr, 2013                                

 

                    [facility] [address] [id number] [place] [address]                                 

 

                    [facility] [address] [id number] [place] [address]                                 

 

                    [facility] [address] [id number] [place] [address]                                 

 

                    [facility] [address] [id number] [place] [address]                                 

 

                    [facility] [address] [id number] [place] [address]                                 

 

                    [facility] [address] [id number] [place] [address]                                 

 

                    [facility] [address] [id number] [place] [address]                                 

 

                    [facility] [address] [id number] [place] [address] 21 

Dec, 2012                                

 

                    [facility] [address] [id number] [place] [address] 21 

Dec, 2012                                

 

                    [facility] [address] [id number] [place] [address] 14 

Dec, 2012                                

 

                    [facility] [address] [id number] [place] [address] 14 

Dec, 2012                                

 

                    [facility] [address] [id number] [place] [address]                                 

 

                    [facility] [address] [id number] [place] [address] 20 

2012                                

 

                    [facility] [address] [id number] [place] [address] 16 

2012                                

 

                    [facility] [address] [id number] [place] [address] 16 

2012                                

 

                    [facility] [address] [id number] [place] [address] 13 

2012                                

 

                    [facility] [address] [id number] [place] [address] 13 

2012                                

 

                    [facility] [address] [id number] [place] [address]                                 

 

                    [facility] [address] [id number] [place] [address]                                 

 

                    [facility] [address] [id number] [place] [address]                                 

 

                    [facility] [address] [id number] [place] [address]                                 

 

                    [facility] [address] [id number] [place] [address] 22 

Oct, 2012                                

 

                    [facility] [address] [id number] [place] [address] 22 

Oct, 2012                                

 

                    [facility] [address] [id number] [place] [address] 22 

Oct, 2012                                

 

                    [facility] [address] [id number] [place] [address] 22 

Oct, 2012                                

 

                    [facility] [address] [id number] [place] [address] 10 

Oct, 2012                                

 

                    [facility] [address] [id number] [place] [address] 10 

Oct, 2012                                

 

                    [facility] [address] [id number] [place] [address] 05 

Oct, 2012                                

 

                    [facility] [address] [id number] [place] [address] 05 

Oct, 2012                                

 

                    [facility] [address] [id number] [place] [address] 07 

Sep, 2012                                

 

                    [facility] [address] [id number] [place] [address] 22 

Aug, 2012                                

 

                    [facility] [address] [id number] [place] [address] 03 

Aug, 2012                                

 

                    [facility] [address] [id number] [place] [address]                                 

 

                    [facility] [address] [id number] [place] [address]                                 

 

                    [facility] [address] [id number] [place] [address]                                 

 

                    [facility] [address] [id number] [facility] [place] [address]                                 

 

                    [facility] [address] [id number] [place] [address]                                 

 

                    [facility] [address] [id number] [place] [address]                                 

 

                    [facility] [address] [id number] [place] [address]                                 

 

                    [facility] [address] [id number] [place] [address] 30 

May, 2012                                

 

                    [facility] [address] [id number] [place] [address] 30 

May, 2012                                

 

                    [facility] [address] [id number] [place] [address] 21 

May, 2012                                

 

                    [facility] [address] [id number] [place] [address] 14 

May, 2012                                

 

                    [facility] [address] [id number] [place] [address] 04 

May, 2012                                

 

                    [facility] [address] [id number] [place] [address] 04 

May, 2012                                

 

                    [facility] [address] [id number] [place] [address] 04 

May, 2012                                

 

                    [facility] [address] [id number] [place] [address]                                 

 

                    [facility] [address] [id number] [place] [address]                                 

 

                    [facility] [address] [id number] [place] [address] 23 

Mar, 2012                                

 

                    [facility] [address] [id number] [place] [address] 15 

Mar, 2012                                

 

                    [facility] [facility] [address] [id number] [place] [address] 13 

Mar, 2012                                

 

                    [facility] [place] [address] [id number] [place] [address] 12 

Mar, 2012                                

 

                    [facility] [place] [address] [id number] [place] [address]                                 

 

                    [facility] [place] [id number] [place] [address]                                 

 

                    [facility] [place] [id number] [place] [address]                                 

 

                    [facility] [place] [id number] [place] [address]                                 

 

                    [facility] [place] [id number] [place] [address] 29 

Dec, 2011                                

 

                    [facility] [place] [id number] [place] [address] 29 

Dec, 2011                                

 

                    [facility] [place] [id number] [place] [address] 21 

Dec, 2011                                

 

                    [facility] [place] [id number] [place] [address]                                 

 

                    [facility] [place] [id number] [place] [address] 22 

Oct, 2011                                

 

                    [facility] [place] [id number] [place] [address] 21 

Oct, 2011                                







IMMUNIZATIONS

No Known Immunizations



SOCIAL HISTORY

Never Assessed



REASON FOR VISIT





PLAN OF CARE





VITAL SIGNS





MEDICATIONS

Unknown Medications



RESULTS

No Results



PROCEDURES





                Procedure       Date Ordered    Result          Body Site

 

                PSYTX PT&/FAMILY 45 MINUTES 2014                     







INSTRUCTIONS





MEDICATIONS ADMINISTERED

No Known Medications



MEDICAL (GENERAL) HISTORY





                    Type                Description         Date

 

                    Medical History     depression           

 

                    Medical History     hyperlipidemia       

 

                    Hospitalization History staph in right leg

## 2020-07-25 NOTE — XMS REPORT
[facility]

                             Created on: 2020



Ignacio Valle

External Reference #: [id number]

: 1955

Sex: Male



Demographics





                          Address                   [address] [place]  [address]

 

                          Preferred Language        Unknown

 

                          Marital Status            Unknown

 

                          Druze Affiliation     Unknown

 

                          Race                      Unknown

 

                          Ethnic Group              Unknown





Author





                          Author                    Ignacio FARRAR

 

                          [facility]

 

                          Address                   [address] [place]  [address]



 

                          Phone                     [phone]







Care Team Providers





                    Care Team Member Name Role                Phone

 

                    DARIAN FARRAR    Unavailable         [phone]







PROBLEMS





          Type      Condition ICD9-CM Code TGN65-HH Code Onset Dates Condition S

tatus SNOMED 

Code

 

                          Problem                   Nonspecific elevation of lev

els of transaminase or lactic acid 

dehydrogenase (LDH) 790.4                                  Active       [id number]

2

 

           Problem    Encounter for long-term (current) use of other medications

 V58.69                           

Active                                  [id number]

 

          Problem   Edema     782.3                         Active    [id number]

 

          Problem   Spontaneous ecchymoses 782.7                         Active 

   [id number]

 

          Problem   Trigger finger (acquired) 727.03                        Acti

ve    [id number]

 

          Problem   Varicose veins of lower extremities with inflammation 454.1 

                        Active    

[id number]

 

           Problem    Persistent disorder of initiating or maintaining sleep 307

.42                           Active

                                        [id number]

 

          Problem   Pityriasis versicolor 111.0                         Active  

  30190810

 

           Problem    Unspecified local infection of skin and subcutaneous tissu

e 686.9                            

Active                                  [id number]

 

          Problem   Unspecified viral hepatitis C without hepatic coma 070.70   

                     Active    

[id number]

 

          Problem   Vascular disorder of skin 709.1                         Acti

ve    16714370

 

          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       

                 Active    

97070611

 

          Problem   Anxiety state, unspecified 300.00                        Act

kathia    283813241

 

          Problem   Other and unspecified hyperlipidemia 272.4                  

       Active    79019993

 

          Problem   Major depressive disorder, recurrent episode, mild 296.31   

                     Active    

[id number]







ALLERGIES

No Information



ENCOUNTERS





                Encounter       Location        Date            Diagnosis

 

                          [facility]   [id number]

[address] [place] [id number]

                          15 Jesse, 2016              Encounter for other specifie

d administrative purpose Z02.89

 

                          [facility]   [address] [facility] [id number]

[address] [place] [id number]

                          12 May, 2016              Dental examination Z01.20 an

d Dental caries K02.9

 

                          [facility]     [address] [id number]

[address] [place] [address]

                          14 Aug, 2015              Edema 782.3 and Family histo

ry of coronary artery disease V17.3

 

                          [facility]     [address] [id number]

[address] [place] [address]

                          07 Aug, 2015              Edema 782.3 and Family histo

ry of coronary artery disease V17.3

 

                          [facility]   [address] [id number]

[address] [place] [id number]

                                        Dental examination V72.2

 

                          [facility]     [address] [id number]

[address] [place] [address]

                                         

 

                          [facility]     [address] [id number]

[address] [place] [address]

                                         

 

                          [facility]     [address] [id number]

[address] [place] [address]

                          20 Mar, 2015               

 

                          [facility]     [address] [id number]

[address] [place] [address]

                          20 Mar, 2015               

 

                          [facility]     [address] [id number]

[address] [place] [address]

                                         

 

                          [facility]     [address] [id number]

[address] [place] [address]

                                         

 

                          [facility]     [address] [id number]

[address] [place] [address]

                                         

 

                          [facility]     [address] [id number]

[address] [place] [address]

                                         

 

                          [facility]     [address] [id number]

[address] [place] [address]

                          11 Dec, 2014               

 

                          [facility]     [address] [id number]

[address] [place] [address]

                          11 Dec, 2014               

 

                          [facility]     [address] [id number]

[address] [place] [address]

                                         

 

                          [facility]     [address] [id number]

[address] [place] [address] [place] [address]

                                         

 

                          [facility]     [address] [id number]

[address] [place] [address], [place] [address]

                          30 Oct, 2014               

 

                          [facility]     [address] [id number]

[address] [place] [address], [place] [address]

                          30 Oct, 2014               

 

                          [facility]     [address] [id number]

[address] [place] [address], [place] [address]

                          10 Sep, 2014               

 

                          [facility]     [address] [id number]

[address] [place] [address], [place] [address]

                          09 Sep, 2014               

 

                          [facility]     [address] [id number]

[address] [place] [address], [place] [address]

                          09 Sep, 2014               

 

                          [facility]     [address] [id number]

[address] [place] [address], [place] [address]

                          22 Aug, 2014               

 

                          [facility]     [address] [id number]

[address] [place] [address], [place] [address]

                          22 Aug, 2014               

 

                          [facility]     [address] [id number]

[address] [place] [address], [place] [address]

                          22 Aug, 2014               

 

                          [facility]     [address] [id number]

[address] [place] [address], [place] [address]

                                         

 

                          [facility]     [address] [id number]

[address] [place] [address], [place] [address]

                                         

 

                          [facility]     [address] [id number]

[address] [place] [address], [place] [address]

                                         

 

                          [facility]     [address] [id number]

[address] [place] [address], [place] [address]

                                         

 

                          [facility]     [address] [id number]

[address] [place] [address], [place] [address]

                                         

 

                          [facility]     [address] [id number]

[address] [place] [address], [place] [address]

                                         

 

                          [facility]     [address] [id number]

[address] [place] [address], [place] [address]

                                         

 

                          [facility]     [address] [id number]

[address] [place] [address], [place] [address]

                                         

 

                          [facility]     [address] [id number]

[address] [place] [address], [place] [address]

                          20 May, 2014               

 

                          [facility]     [address] [id number]

[address] [place] [address], [place] [address]

                          20 May, 2014               

 

                          [facility]     [address] [id number]

[address] [place] [address], [place] [address]

                                         

 

                          [facility]     [address] [id number]

[address] [place] [address], [place] [address]

                                         

 

                          [facility]     [address] [id number]

[address] [place] [address], [place] [address]

                                         

 

                          [facility]     [address] [id number]

[address] [place] [address], [place] [address]

                                         

 

                          [facility]     [address] [id number]

[address] [place] [address], [place] [address]

                                         

 

                          [facility]     [address] [id number]

[address] [place] [address], [place] [address]

                                         

 

                          [facility]     [address] [id number]

[address] [place] [address], [place] [address]

                          13 Dec, 2013               

 

                          [facility]     [address] [id number]

[address] [place] [address], [place] [address]

                          13 Dec, 2013               

 

                          [facility]     [address] [id number]

[address] [place] [address], [place] [address]

                          13 Dec, 2013               

 

                          [facility]     [address] [id number]

[address] [place] [address], [place] [address]

                          13 Dec, 2013               

 

                          [facility]     [address] [id number]

[address] [place] [address], [place] [address]

                          17 Oct, 2013               

 

                          [facility]     [address] [id number]

[address] [place] [address], [place] [address]

                          17 Oct, 2013               

 

                          [facility]     [address] [id number]

[address] [place] [address], [place] [address]

                          10 Oct, 2013               

 

                          [facility]     [address] [id number]

[address] [place] [address], [place] [address]

                          10 Oct, 2013               

 

                          [facility]     [address] [id number]

[address] [place] [address], [place] [address]

                          04 Oct, 2013               

 

                          [facility]     [address] [id number]

[address] [place] [address], [place] [address]

                          24 Sep, 2013               

 

                          [facility]     [address] [id number]

[address] [place] [address], [place] [address]

                          19 Sep, 2013               

 

                          [facility]     [address] [id number]

[address] [place] [address], [place] [address]

                          16 Sep, 2013               

 

                          [facility]     [address] [id number]

[address] [place] [address], [place] [address]

                          21 Aug, 2013               

 

                          [facility]     [address] [id number]

[address] [place] [address], [place] [address]

                          17 Aug, 2013               

 

                          [facility]     [address] [id number]

[address] [place] [address], [place] [address]

                          16 Aug, 2013               

 

                          [facility]     [address] [id number]

[address] [place] [address], [place] [address]

                          15 Aug, 2013               

 

                          [facility]     [address] [id number]

[address] [place] [address], [place] [address]

                          13 Aug, 2013               

 

                          [facility]     [address] [id number]

[address] [place] [address], [place] [address]

                          13 Aug, 2013               

 

                          [facility]     [address] [id number]

[address] [place] [address], [place] [address]

                          12 Aug, 2013               

 

                          [facility]     [address] [id number]

[address] [place] [address], [place] [address]

                                         

 

                          [facility]     [address] [id number]

[address] [place] [address], [place] [address]

                                         

 

                          [facility]     [address] [id number]

[address] [place] [address], [place] [address]

                                         

 

                          [facility]     [address] [id number]

[address] [place] [address], [place] [address]

                          10 2013               

 

                          [facility]     [address] [id number]

[address] [place] [address], [place] [address]

                          17 May, 2013               

 

                          [facility]     [address] [id number]

[address] [place] [address], [place] [address]

                          10 May, 2013               

 

                          [facility]     [address] [id number]

[address] [place] [address], [place] [address]

                          15 Apr, 2013               

 

                          [facility]     [address] [id number]

[address] [place] [address], [place] [address]

                                         

 

                          [facility]     [address] [id number]

[address] [place] [address], [place] [address]

                                         

 

                          [facility]     [address] [id number]

[address] [place] [address], [place] [address]

                                         

 

                          [facility]     [address] [id number]

[address] [place] [address], [place] [address]

                                         

 

                          [facility]     [address] [id number]

[address] [place] [address], [place] [address]

                                         

 

                          [facility]     [address] [id number]

[address] [place] [address], [place] [address]

                                         

 

                          [facility]     [address] [id number]

[address] [place] [address], [place] [address]

                                         

 

                          [facility]     [address] [id number]

[address] [place] [address], [place] [address]

                          21 Dec, 2012               

 

                          [facility]     [address] [id number]

[address] [place] [address], [place] [address]

                          21 Dec, 2012               

 

                          [facility]     [address] [id number]

[address] [place] [address], [place] [address]

                          14 Dec, 2012               

 

                          [facility]     [address] [id number]

[address] [place] [address], [place] [address]

                          14 Dec, 2012               

 

                          [facility]     [address] [id number]

[address] [place] [address], [place] [address]

                          20 2012               

 

                          [facility]     [address] [id number]

[address] [place] [address], [place] [address]

                          20 2012               

 

                          [facility]     [address] [id number]

[address] [place] [address], [place] [address]

                          16 2012               

 

                          [facility]     [address] [id number]

[address] [place] [address], [place] [address]

                          16 2012               

 

                          [facility]     [address] [id number]

[address] [place] [address], [place] [address]

                          13 2012               

 

                          [facility]     [address] [id number]

[address] [place] [address], [place] [address]

                          13 2012               

 

                          [facility]     [address] [id number]

[address] [place] [address], [place] [address]

                          13 2012               

 

                          [facility]     [address] [id number]

[address] [place] [address], [place] [address]

                                         

 

                          [facility]     [address] [id number]

[address] [place] [address], [place] [address]

                                         

 

                          [facility]     [address] [id number]

[address] [place] [address], [place] [address]

                                         

 

                          [facility]     [address] [id number]

[address] [place] [address], [place] [address]

                          22 Oct, 2012               

 

                          [facility]     [address] [id number]

[address] [place] [address], [place] [address]

                          22 Oct, 2012               

 

                          [facility]     [address] [id number]

[address] [place] [address]

                          22 Oct, 2012               

 

                          [facility]     [address] [id number]

[address] [place] [address], [place] [address]

                          22 Oct, 2012               

 

                          [facility]     [address] [id number]

[address] [place] [address], [place] [address]

                          10 Oct, 2012               

 

                          [facility]     [address] [id number]

[address] [place] [address], [place] [address]

                          10 Oct, 2012               

 

                          [facility]     [address] [id number]

[address] [place] [address], [place] [address]

                          05 Oct, 2012               

 

                          [facility]     [address] [id number]

[address] [place] [address], [place] [address]

                          05 Oct, 2012               

 

                          [facility]     [address] [id number]

[address] [place] [address], [place] [address]

                          07 Sep, 2012               

 

                          [facility]     [address] [id number]

[address] [place] [address], [place] [address]

                          22 Aug, 2012               

 

                          [facility]     [address] [id number]

[address] [place] [address], [place] [address]

                          03 Aug, 2012               

 

                          [facility]     [address] [id number]

[address] [place] [address], [place] [address]

                                         

 

                          [facility]     [address] [id number]

[address] [place] [address], [place] [address]

                                         

 

                          [facility]     [address] [id number]

[address] [place] [address], [place] [address]

                                         

 

                          [facility]     [address] [id number]

[address] [place] [address], [place] [address]

                                         

 

                          [facility]     [address] [id number]

[address] [place] [address], [place] [address]

                                         

 

                          [facility]     [address] [id number]

[address] [place] [address], [place] [address]

                                         

 

                          [facility]     [address] [id number]

[address] [place] [address], [place] [address]

                                         

 

                          [facility]     [address] [id number]

[address] [place] [address], [place] [address]

                          30 May, 2012               

 

                          [facility] [facility]     [address] [id number]

[address] [place] [address], [place] [address]

                          30 May, 2012               

 

                          [facility]     [address] [id number]

[address] [place] [address], [place] [address]

                          21 May, 2012               

 

                          [facility]     [address] [id number]

[address] [place] [address], [place] [address]

                          14 May, 2012               

 

                          [facility]     [address] [id number]

[address] [place] [address], [place] [address]

                          04 May, 2012               

 

                          [facility]     [address] [id number]

[address] [place] [address], [place] [address]

                          04 May, 2012               

 

                          [facility]     [address] [id number]

[address] [place] [address], [place] [address]

                          04 May, 2012               

 

                          [facility]     [address] [id number]

[address] [place] [address], [place] [address]

                                         

 

                          [facility]     [address] [id number]

[address] [place] [address], [place] [address]

                                         

 

                          [facility]     [address] [id number]

[address] [place] [address], [place] [address]

                          23 Mar, 2012               

 

                          [facility]     [address] [id number]

[address] [place] [address], [place] [address]

                          15 Mar, 2012               

 

                          [facility]     [address] [id number]

[address] [place] [address], [place] [address]

                          13 Mar, 2012               

 

                          [facility]     [address] [id number]

[address] [place] [address], [place] [address]

                          12 Mar, 2012               

 

                          [facility]     [address] [id number]

[address] [place] [address], [place] [address]

                                         

 

                          [facility]     [address] [id number]

[address] [place] [address], [place] [address]

                                         

 

                          [facility]     [address] [id number]

[address] [place] [address], [place] [address]

                                         

 

                          [facility]     [address] [id number]

[address] [place] [address], [place] [address]

                                         

 

                          [facility]     [address] [id number]

[address] [place] [address], [place] [address]

                          29 Dec, 2011               

 

                          [facility]     [address] [id number]

[address] [place] [address], [place] [address]

                          29 Dec, 2011               

 

                          [facility]     [address] [id number]

[address] [place] [phone]

                          21 Dec, 2011               

 

                          [facility]     [address] [facility] [id number]

[address] [place] [phone]

                                         

 

                          [facility]     [address] [facility] [id number]

[address] [place] [address]

                          22 Oct, 2011               

 

                          [facility]     [address] [facility] [id number]

[address] [place] [phone]

                          21 Oct, 2011               







IMMUNIZATIONS

No Known Immunizations



SOCIAL HISTORY

Never Assessed



REASON FOR VISIT





PLAN OF CARE





VITAL SIGNS





MEDICATIONS

Unknown Medications



RESULTS

No Results



PROCEDURES

No Known procedures



INSTRUCTIONS





MEDICATIONS ADMINISTERED

No Known Medications



MEDICAL (GENERAL) HISTORY





                    Type                Description         Date

 

                    Medical History     depression           

 

                    Medical History     hyperlipidemia       

 

                    Hospitalization History staph in right leg

## 2020-07-25 NOTE — XMS REPORT
[facility]

                             Created on: 2020



Ignacio Valle

External Reference #: [id number]

: 1955

Sex: Male



Demographics





                          Address                   [address] [place]  [address]

 

                          Preferred Language        Unknown

 

                          Marital Status            Unknown

 

                          Worship Affiliation     Unknown

 

                          Race                      Unknown

 

                          Ethnic Group              Unknown





Author





                          Author                    Ignacio FARRAR

 

                          Organization              [facility]

 

                          Address                   [address] [place]  [address]



 

                          Phone                     [phone]







Care Team Providers





                    Care Team Member Name Role                Phone

 

                    DARIAN FARRAR    Unavailable         [phone]







PROBLEMS





          Type      Condition ICD9-CM Code AVC99-ZR Code Onset Dates Condition S

tatus SNOMED 

Code

 

                          Problem                   Nonspecific elevation of lev

els of transaminase or lactic acid 

dehydrogenase (LDH) 790.4                                  Active       24500562

2

 

           Problem    Encounter for long-term (current) use of other medications

 V58.69                           

Active                                  058401192

 

          Problem   Edema     782.3                         Active    629990351

 

          Problem   Spontaneous ecchymoses 782.7                         Active 

   063427822

 

          Problem   Trigger finger (acquired) 727.03                        Acti

ve    0773145

 

          Problem   Varicose veins of lower extremities with inflammation 454.1 

                        Active    

38927554

 

           Problem    Persistent disorder of initiating or maintaining sleep 307

.42                           Active

                                        95077161

 

          Problem   Pityriasis versicolor 111.0                         Active  

  38639703

 

           Problem    Unspecified local infection of skin and subcutaneous tissu

e 686.9                            

Active                                  373911626

 

          Problem   Unspecified viral hepatitis C without hepatic coma 070.70   

                     Active    

91225248

 

          Problem   Vascular disorder of skin 709.1                         Acti

ve    25594255

 

          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       

                 Active    

13756559

 

          Problem   Anxiety state, unspecified 300.00                        Act

kathia    611620309

 

          Problem   Other and unspecified hyperlipidemia 272.4                  

       Active    29387476

 

          Problem   Major depressive disorder, recurrent episode, mild 296.31   

                     Active    

53245218







ALLERGIES

No Information



ENCOUNTERS





                Encounter       Location        Date            Diagnosis

 

                    [facility] [address] [id number] 15 

Jes[place], 2016                               Encounter for other specified administra

tive purpose Z02.89

 

                    [facility] [address] [id number] 12 

May, 2016                               Dental examination Z01.20 and Dental car

ies K02.9

 

                    [facility] [id number] [place] [address] 14 

Aug, 2015                               Edema 782.3 and Family history of corona

ry artery disease V17.3

 

                    [facility] [id number] [place] [address] 07 

Aug, 2015                               Edema 782.3 and Family history of corona

ry artery disease V17.3

 

                    [facility] [address] [place] [id number]                                Dental examination V72.2

 

                    [facility] [id number] [place] [address]                                 

 

                    [facility] [address] [address]                                 

 

                    [facility] [id number] [place] [address] 20 

Mar, 2015                                

 

                    [facility] [address] [address] 20 

Mar, 2015                                

 

                    [facility] [id number] [place] [address]                                 

 

                    [facility] [address] [address]                                 

 

                    [facility] [address] [address]                                 

 

                    [facility] [address] [address]                                 

 

                    [facility] [id number] [place] [address] 11 

Dec, 2014                                

 

                    [facility] [id number] [place] [address] 11 

Dec, 2014                                

 

                    [facility] [id number] [place] [address]                                 

 

                    [facility] [id number] [place] [address]                                 

 

                    [facility] [address] [address] 30 

Oct, 2014                                

 

                    [facility] [address] [facility] [id number] [facility] [place] [address] 30 

Oct, 2014                                

 

                    [facility] [address] [facility] [id number] [facility] [place] [address] 10 

Sep, 2014                                

 

                    [facility] [address] [facility] [facility] [place] [address] 09 

Sep, 2014                                

 

                    [facility] [address] [facility] [facility] [place] [address] 09 

Sep, 2014                                

 

                    [facility] [address] [facility] [id number] [facility] [place] [address] 22 

Aug, 2014                                

 

                    [facility] [address] [facility] [place] [address] 22 

Aug, 2014                                

 

                    [facility] [address] [facility] [place] [address] 22 

Aug, 2014                                

 

                    [facility] [address] [facility] [place] [address]                                 

 

                    [facility] [address] [id number] [place] [address]                                 

 

                    [facility] [address] [id number] [place] [address]                                 

 

                    [facility] [address] [id number] [place] [address]                                 

 

                    [facility] [address] [id number] [place] [address]                                 

 

                    [facility] [address] [facility] [place] [address]                                 

 

                    [facility] [address] [id number] [place] [address]                                 

 

                    [facility] [address] [id number] [place] [address]                                 

 

                    [facility] [address] [facility] [place] [address] 20 

May, 2014                                

 

                    [facility] [address] [facility] [place] [address] 20 

May, 2014                                

 

                    [facility] [address] [facility] [place] [address]                                 

 

                    [facility] [address] [id number] [place] [address]                                 

 

                    [facility] [address] [id number] [place] [address]                                 

 

                    [facility] [address] [id number] [place] [address]                                 

 

                    [facility] [address] [id number] [place] [address]                                 

 

                    [facility] [address] [id number] [place] [address]                                 

 

                    [facility] [id number] [place] [address] 13 

Dec, 2013                                

 

                    [facility] [address] [facility] [place] [address] 13 

Dec, 2013                                

 

                    [facility] [address] [facility] [place] [address] 13 

Dec, 2013                                

 

                    [facility] [address] [facility] [place] [address] 13 

Dec, 2013                                

 

                    [facility] [address] [facility] [place] [address] 17 

Oct, 2013                                

 

                    [facility] [address] [facility] [place] [address] 17 

Oct, 2013                                

 

                    [facility] [address] [facility] [place] [address] 10 

Oct, 2013                                

 

                    [facility] [id number] [place] [address] 10 

Oct, 2013                                

 

                    [facility] [address] [facility] [place] [address] 04 

Oct, 2013                                

 

                    [facility] [address] [facility] [place] [address] 24 

Sep, 2013                                

 

                    [facility] [address] [id number] [place] [address] 19 

Sep, 2013                                

 

                    [facility] [address] [facility] [id number] [facility] [place] [address] 16 

Sep, 2013                                

 

                    [facility] [address] [facility] [id number] [place] [address] 21 

Aug, 2013                                

 

                    [facility] [address] [id number] [facility] [place] [address] 17 

Aug, 2013                                

 

                    [facility] [address] [facility] [id number] [place] [address] 16 

Aug, 2013                                

 

                    [facility] [address] [facility] [place] [address] 15 

Aug, 2013                                

 

                    [facility] [address] [facility] [place] [address] 13 

Aug, 2013                                

 

                    [facility] [address] [id number] [place] [address] 13 

Aug, 2013                                

 

                    [facility] [address] [id number] [place] [address] 12 

Aug, 2013                                

 

                    [facility] [address] [id number] [place] [address]                                 

 

                    [facility] [address] [id number] [place] [address]                                 

 

                    [facility] [address] [id number] [place] [address]                                 

 

                    [facility] [address] [id number] [place] [address] 10 

Jesse, 2013                                

 

                    [facility] [address] [id number] [place] [address] 17 

May, 2013                                

 

                    [facility] [address] [id number] [place] [address] 10 

May, 2013                                

 

                    [facility] [address] [id number] [place] [address] 15 

Apr, 2013                                

 

                    [facility] [address] [id number] [place] [address]                                 

 

                    [facility] [address] [id number] [place] [address]                                 

 

                    [facility] [address] [id number] [place] [address]                                 

 

                    [facility] [address] [id number] [place] [address]                                 

 

                    [facility] [address] [id number] [place] [address]                                 

 

                    [facility] [address] [id number] [place] [address]                                 

 

                    [facility] [address] [id number] [place] [address]                                 

 

                    [facility] [address] [id number] [place] [address] 21 

Dec, 2012                                

 

                    [facility] [address] [id number] [place] [address] 21 

Dec, 2012                                

 

                    [facility] [address] [id number] [place] [address] 14 

Dec, 2012                                

 

                    [facility] [address] [id number] [place] [address] 14 

Dec, 2012                                

 

                    [facility] [address] [id number] [place] [address]                                 

 

                    [facility] [address] [id number] [place] [address] 20 

2012                                

 

                    [facility] [address] [id number] [place] [address] 16 

2012                                

 

                    [facility] [address] [id number] [place] [address] 16 

2012                                

 

                    [facility] [address] [id number] [place] [address] 13 

2012                                

 

                    [facility] [address] [id number] [place] [address] 13 

2012                                

 

                    [facility] [address] [id number] [place] [address]                                 

 

                    [facility] [address] [id number] [place] [address]                                 

 

                    [facility] [address] [id number] [place] [address]                                 

 

                    [facility] [address] [id number] [place] [address]                                 

 

                    [facility] [address] [id number] [place] [address] 22 

Oct, 2012                                

 

                    [facility] [address] [id number] [place] [address] 22 

Oct, 2012                                

 

                    [facility] [address] [id number] [place] [address] 22 

Oct, 2012                                

 

                    [facility] [address] [id number] [place] [address] 22 

Oct, 2012                                

 

                    [facility] [address] [id number] [place] [address] 10 

Oct, 2012                                

 

                    [facility] [address] [id number] [place] [address] 10 

Oct, 2012                                

 

                    [facility] [address] [id number] [place] [address] 05 

Oct, 2012                                

 

                    [facility] [address] [id number] [place] [address] 05 

Oct, 2012                                

 

                    [facility] [address] [id number] [place] [address] 07 

Sep, 2012                                

 

                    [facility] [address] [id number] [place] [address] 22 

Aug, 2012                                

 

                    [facility] [address] [id number] [place] [address] 03 

Aug, 2012                                

 

                    [facility] [address] [id number] [place] [address]                                 

 

                    [facility] [address] [id number] [place] [address]                                 

 

                    [facility] [address] [facility] [id number] [place] [address]                                 

 

                    [facility] [address] [id number] [facility] [place] [address]                                 

 

                    [facility] [address] [id number] [place] [address]                                 

 

                    [facility] [address] [id number] [place] [address]                                 

 

                    [facility] [address] [facility] [id number] [place] [address]                                 

 

                    [facility] [address] [id number] [place] [address] 30 

May, 2012                                

 

                    [facility] [address] [id number] [place] [address] 30 

May, 2012                                

 

                    [facility] [address] [id number] [place] [address] 21 

May, 2012                                

 

                    [facility] [address] [id number] [place] [address] 14 

May, 2012                                

 

                    [facility] [address] [id number] [place] [address] 04 

May, 2012                                

 

                    [facility] [address] [id number] [place] [address] 04 

May, 2012                                

 

                    [facility] [address] [id number] [place] [address] 04 

May, 2012                                

 

                    [facility] [address] [id number] [place] [address]                                 

 

                    [facility] [address] [id number] [place] [address]                                 

 

                    [facility] [address] [id number] [place] [address] 23 

Mar, 2012                                

 

                    [facility] [address] [id number] [place] [address] 15 

Mar, 2012                                

 

                    [facility] [address] [id number] [place] [address] 13 

Mar, 2012                                

 

                    [facility] [address] [facility] [place] [address] 12 

Mar, 2012                                

 

                    [facility] [address] [facility] [place] [address]                                 

 

                    [facility] [id number] [place] [address]                                 

 

                    [facility] [id number] [place] [address]                                 

 

                    [facility] [id number] [place] [address]                                 

 

                    [facility] [id number] [place] [address] 29 

Dec, 2011                                

 

                    [facility] [id number] [place] [address] 29 

Dec, 2011                                

 

                    [facility] [id number] [place] [address] 21 

Dec, 2011                                

 

                    [facility] [id number] [place] [address]                                 

 

                    [facility] [id number] [place] [address] 22 

Oct, 2011                                

 

                    [facility] [id number] [place] [address] 21 

Oct, 2011                                







IMMUNIZATIONS

No Known Immunizations



SOCIAL HISTORY

Never Assessed



REASON FOR VISIT





PLAN OF CARE





VITAL SIGNS





MEDICATIONS

Unknown Medications



RESULTS

No Results



PROCEDURES

No Known procedures



INSTRUCTIONS





MEDICATIONS ADMINISTERED

No Known Medications



MEDICAL (GENERAL) HISTORY





                    Type                Description         Date

 

                    Medical History     depression           

 

                    Medical History     hyperlipidemia       

 

                    Hospitalization History staph in right leg
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
## 2020-07-25 NOTE — XMS REPORT
[facility]

                             Created on: 2020



Ignacio Valle

External Reference #: [id number]

: 1955

Sex: Male



Demographics





                          Address                   [address] [place]  [address]

 

                          Preferred Language        Unknown

 

                          Marital Status            Unknown

 

                          Catholic Affiliation     Unknown

 

                          Race                      Unknown

 

                          Ethnic Group              Unknown





Author





                          Author                    Ignacio FARRAR

 

                          [facility]

 

                          Address                   [address] [place]  [address]



 

                          Phone                     [phone]







Care Team Providers





                    Care Team Member Name Role                Phone

 

                    DARIAN FARRAR    Unavailable         [phone]







PROBLEMS





          Type      Condition ICD9-CM Code RBL67-BG Code Onset Dates Condition S

tatus SNOMED 

Code

 

                          Problem                   Nonspecific elevation of lev

els of transaminase or lactic acid 

dehydrogenase (LDH) 790.4                                  Active       59061111

2

 

           Problem    Encounter for long-term (current) use of other medications

 V58.69                           

Active                                  [id number]

 

          Problem   Edema     782.3                         Active    268991464

 

          Problem   Spontaneous ecchymoses 782.7                         Active 

   [id number]

 

          Problem   Trigger finger (acquired) 727.03                        Acti

ve    3336146

 

          Problem   Varicose veins of lower extremities with inflammation 454.1 

                        Active    

97387179

 

           Problem    Persistent disorder of initiating or maintaining sleep 307

.42                           Active

                                        [id number]

 

          Problem   Pityriasis versicolor 111.0                         Active  

  16873667

 

           Problem    Unspecified local infection of skin and subcutaneous tissu

e 686.9                            

Active                                  [id number]

 

          Problem   Unspecified viral hepatitis C without hepatic coma 070.70   

                     Active    

11745592

 

          Problem   Vascular disorder of skin 709.1                         Acti

ve    64359797

 

          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       

                 Active    

49073112

 

          Problem   Anxiety state, unspecified 300.00                        Act

kathia    080122377

 

          Problem   Other and unspecified hyperlipidemia 272.4                  

       Active    20466989

 

          Problem   Major depressive disorder, recurrent episode, mild 296.31   

                     Active    

81405655







ALLERGIES

No Information



ENCOUNTERS





                Encounter       Location        Date            Diagnosis

 

                          [facility]   [id number]

[address] [place] [id number]

                          15 Jesse, 2016              Encounter for other specifie

d administrative purpose Z02.89

 

                          [facility]   [address] [facility] [id number]

[address] [place] [id number]

                          12 May, 2016              Dental examination Z01.20 an

d Dental caries K02.9

 

                          [facility]     [address] [id number]

[address] [place] [address]

                          14 Aug, 2015              Edema 782.3 and Family histo

ry of coronary artery disease V17.3

 

                          [facility]     [address] [id number]

[address] [place] [address]

                          07 Aug, 2015              Edema 782.3 and Family histo

ry of coronary artery disease V17.3

 

                          [facility]   [address] [id number]

[address] [place] [id number]

                                        Dental examination V72.2

 

                          [facility]     [address] [id number]

[address] [place] [address]

                                         

 

                          [facility]     [address] [id number]

[address] [place] [address]

                                         

 

                          [facility]     [address] [id number]

[address] [place] [address]

                          20 Mar, 2015               

 

                          [facility]     [address] [id number]

[address] [place] [address]

                          20 Mar, 2015               

 

                          [facility]     [address] [id number]

[address] [place] [address]

                                         

 

                          [facility]     [address] [id number]

[address] [place] [address]

                                         

 

                          [facility]     [address] [id number]

[address] [place] [address]

                                         

 

                          [facility]     [address] [id number]

[address] [place] [address]

                                         

 

                          [facility]     [address] [id number]

[address] [place] [address]

                          11 Dec, 2014               

 

                          [facility]     [address] [id number]

[address] [place] [address]

                          11 Dec, 2014               

 

                          [facility]     [address] [id number]

[address] [place] [address]

                                         

 

                          [facility]     [address] [id number]

[address] [place] [address] [place] [address]

                                         

 

                          [facility]     [address] [id number]

[address] [place] [address], [place] [address]

                          30 Oct, 2014               

 

                          [facility]     [address] [id number]

[address] [place] [address], [place] [address]

                          30 Oct, 2014               

 

                          [facility]     [address] [id number]

[address] [place] [address], [place] [address]

                          10 Sep, 2014               

 

                          [facility]     [address] [id number]

[address] [place] [address], [place] [address]

                          09 Sep, 2014               

 

                          [facility]     [address] [id number]

[address] [place] [address], [place] [address]

                          09 Sep, 2014               

 

                          [facility]     [address] [id number]

[address] [place] [address], [place] [address]

                          22 Aug, 2014               

 

                          [facility]     [address] [id number]

[address] [place] [address], [place] [address]

                          22 Aug, 2014               

 

                          [facility]     [address] [id number]

[address] [place] [address], [place] [address]

                          22 Aug, 2014               

 

                          [facility]     [address] [id number]

[address] [place] [address], [place] [address]

                                         

 

                          [facility]     [address] [id number]

[address] [place] [address], [place] [address]

                                         

 

                          [facility]     [address] [id number]

[address] [place] [address], [place] [address]

                                         

 

                          [facility]     [address] [id number]

[address] [place] [address], [place] [address]

                                         

 

                          [facility]     [address] [id number]

[address] [place] [address], [place] [address]

                                         

 

                          [facility]     [address] [id number]

[address] [place] [address], [place] [address]

                                         

 

                          [facility]     [address] [id number]

[address] [place] [address], [place] [address]

                                         

 

                          [facility]     [address] [id number]

[address] [place] [address], [place] [address]

                                         

 

                          [facility]     [address] [id number]

[address] [place] [address], [place] [address]

                          20 May, 2014               

 

                          [facility]     [address] [id number]

[address] [place] [address], [place] [address]

                          20 May, 2014               

 

                          [facility]     [address] [id number]

[address] [place] [address], [place] [address]

                                         

 

                          [facility]     [address] [id number]

[address] [place] [address], [place] [address]

                                         

 

                          [facility]     [address] [id number]

[address] [place] [address], [place] [address]

                                         

 

                          [facility]     [address] [id number]

[address] [place] [address], [place] [address]

                                         

 

                          [facility]     [address] [id number]

[address] [place] [address], [place] [address]

                                         

 

                          [facility]     [address] [id number]

[address] [place] [address], [place] [address]

                                         

 

                          [facility]     [address] [id number]

[address] [place] [address], [place] [address]

                          13 Dec, 2013               

 

                          [facility]     [address] [id number]

[address] [place] [address], [place] [address]

                          13 Dec, 2013               

 

                          [facility]     [address] [id number]

[address] [place] [address], [place] [address]

                          13 Dec, 2013               

 

                          [facility]     [address] [id number]

[address] [place] [address], [place] [address]

                          13 Dec, 2013               

 

                          [facility]     [address] [id number]

[address] [place] [address], [place] [address]

                          17 Oct, 2013               

 

                          [facility]     [address] [id number]

[address] [place] [address], [place] [address]

                          17 Oct, 2013               

 

                          [facility]     [address] [id number]

[address] [place] [address], [place] [address]

                          10 Oct, 2013               

 

                          [facility]     [address] [id number]

[address] [place] [address], [place] [address]

                          10 Oct, 2013               

 

                          [facility]     [address] [id number]

[address] [place] [address], [place] [address]

                          04 Oct, 2013               

 

                          [facility]     [address] [id number]

[address] [place] [address], [place] [address]

                          24 Sep, 2013               

 

                          [facility]     [address] [id number]

[address] [place] [address], [place] [address]

                          19 Sep, 2013               

 

                          [facility]     [address] [id number]

[address] [place] [address], [place] [address]

                          16 Sep, 2013               

 

                          [facility]     [address] [id number]

[address] [place] [address], [place] [address]

                          21 Aug, 2013               

 

                          [facility]     [address] [id number]

[address] [place] [address], [place] [address]

                          17 Aug, 2013               

 

                          [facility]     [address] [id number]

[address] [place] [address], [place] [address]

                          16 Aug, 2013               

 

                          [facility]     [address] [id number]

[address] [place] [address], [place] [address]

                          15 Aug, 2013               

 

                          [facility]     [address] [id number]

[address] [place] [address], [place] [address]

                          13 Aug, 2013               

 

                          [facility]     [address] [id number]

[address] [place] [address], [place] [address]

                          13 Aug, 2013               

 

                          [facility]     [address] [id number]

[address] [place] [address], [place] [address]

                          12 Aug, 2013               

 

                          [facility]     [address] [id number]

[address] [place] [address], [place] [address]

                                         

 

                          [facility]     [address] [id number]

[address] [place] [address], [place] [address]

                                         

 

                          [facility]     [address] [id number]

[address] [place] [address], [place] [address]

                                         

 

                          [facility]     [address] [id number]

[address] [place] [address], [place] [address]

                          10 2013               

 

                          [facility]     [address] [id number]

[address] [place] [address], [place] [address]

                          17 May, 2013               

 

                          [facility]     [address] [id number]

[address] [place] [address], [place] [address]

                          10 May, 2013               

 

                          [facility]     [address] [id number]

[address] [place] [address], [place] [address]

                          15 Apr, 2013               

 

                          [facility]     [address] [id number]

[address] [place] [address], [place] [address]

                                         

 

                          [facility]     [address] [id number]

[address] [place] [address], [place] [address]

                                         

 

                          [facility]     [address] [id number]

[address] [place] [address], [place] [address]

                                         

 

                          [facility]     [address] [id number]

[address] [place] [address], [place] [address]

                                         

 

                          [facility]     [address] [id number]

[address] [place] [address], [place] [address]

                                         

 

                          [facility]     [address] [id number]

[address] [place] [address], [place] [address]

                                         

 

                          [facility]     [address] [id number]

[address] [place] [address], [place] [address]

                                         

 

                          [facility]     [address] [id number]

[address] [place] [address], [place] [address]

                          21 Dec, 2012               

 

                          [facility]     [address] [id number]

[address] [place] [address], [place] [address]

                          21 Dec, 2012               

 

                          [facility]     [address] [id number]

[address] [place] [address], [place] [address]

                          14 Dec, 2012               

 

                          [facility]     [address] [id number]

[address] [place] [address], [place] [address]

                          14 Dec, 2012               

 

                          [facility]     [address] [id number]

[address] [place] [address], [place] [address]

                          20 2012               

 

                          [facility]     [address] [id number]

[address] [place] [address], [place] [address]

                          20 2012               

 

                          [facility]     [address] [id number]

[address] [place] [address], [place] [address]

                          16 2012               

 

                          [facility]     [address] [id number]

[address] [place] [address], [place] [address]

                          16 2012               

 

                          [facility]     [address] [id number]

[address] [place] [address], [place] [address]

                          13 2012               

 

                          [facility]     [address] [id number]

[address] [place] [address], [place] [address]

                          13 2012               

 

                          [facility]     [address] [id number]

[address] [place] [address], [place] [address]

                          13 2012               

 

                          [facility]     [address] [id number]

[address] [place] [address], [place] [address]

                                         

 

                          [facility]     [address] [id number]

[address] [place] [address], [place] [address]

                                         

 

                          [facility]     [address] [id number]

[address] [place] [address], [place] [address]

                                         

 

                          [facility]     [address] [id number]

[address] [place] [address], [place] [address]

                          22 Oct, 2012               

 

                          [facility]     [address] [id number]

[address] [place] [address], [place] [address]

                          22 Oct, 2012               

 

                          [facility]     [address] [id number]

[address] [place] [address]

                          22 Oct, 2012               

 

                          [facility]     [address] [id number]

[address] [place] [address], [place] [address]

                          22 Oct, 2012               

 

                          [facility]     [address] [id number]

[address] [place] [address], [place] [address]

                          10 Oct, 2012               

 

                          [facility]     [address] [id number]

[address] [place] [address], [place] [address]

                          10 Oct, 2012               

 

                          [facility]     [address] [id number]

[address] [place] [address], [place] [address]

                          05 Oct, 2012               

 

                          [facility]     [address] [id number]

[address] [place] [address], [place] [address]

                          05 Oct, 2012               

 

                          [facility]     [address] [id number]

[address] [place] [address], [place] [address]

                          07 Sep, 2012               

 

                          [facility]     [address] [id number]

[address] [place] [address], [place] [address]

                          22 Aug, 2012               

 

                          [facility]     [address] [id number]

[address] [place] [address], [place] [address]

                          03 Aug, 2012               

 

                          [facility]     [address] [id number]

[address] [place] [address], [place] [address]

                                         

 

                          [facility]     [address] [id number]

[address] [place] [address], [place] [address]

                                         

 

                          [facility]     [address] [id number]

[address] [place] [address], [place] [address]

                                         

 

                          [facility]     [address] [id number]

[address] [place] [address], [place] [address]

                                         

 

                          [facility]     [address] [id number]

[address] [place] [address], [place] [address]

                                         

 

                          [facility]     [address] [address] [id number]

[address] [place] [address], [place] [address]

                                         

 

                          [facility]     [address] [id number]

[address] [place] [address], [place] [address]

                                         

 

                          [facility]     [address] [id number]

[address] [place] [address], [place] [address]

                          30 May, 2012               

 

                          [facility]     [address] [id number]

[address] [place] [address], [place] [address]

                          30 May, 2012               

 

                          [facility]     [address] [id number]

[address] [place] [address], [place] [address]

                          21 May, 2012               

 

                          [facility]     [address] [id number]

[address] [place] [address], [place] [address]

                          14 May, 2012               

 

                          [facility]     [address] [id number]

[address] [place] [address], [place] [address]

                          04 May, 2012               

 

                          [facility]     [address] [id number]

[address] [place] [address], [place] [address]

                          04 May, 2012               

 

                          [facility]     [address] [id number]

[address] [place] [address], [place] [address]

                          04 May, 2012               

 

                          [facility]     [address] [id number]

[address] [place] [address], [place] [address]

                                         

 

                          [facility]     [address] [id number]

[address] [place] [address], [place] [address]

                                         

 

                          [facility]     [address] [id number]

[address] [place] [address], [place] [address]

                          23 Mar, 2012               

 

                          [facility]     [address] [id number]

[address] [place] [address], [place] [address]

                          15 Mar, 2012               

 

                          [facility]     [address] [id number]

[address] [place] [address], [place] [address]

                          13 Mar, 2012               

 

                          [facility]     [address] [id number]

[address] [place] [address], [place] [address]

                          12 Mar, 2012               

 

                          [facility]     [address] [id number]

[address] [place] [address], [place] [address]

                                         

 

                          [facility]     [address] [id number]

[address] [place] [address], [place] [address]

                                         

 

                          [facility]     [address] [id number]

[address] [place] [address], [place] [address]

                                         

 

                          [facility]     [address] [id number]

[address] [place] [address], [place] [address]

                                         

 

                          [facility]     [address] [id number]

[address] [place] [address], [place] [address]

                          29 Dec, 2011               

 

                          [facility]     [address] [id number]

[address] [place] [address], [place] [address]

                          29 Dec, 2011               

 

                          [facility]     [address] [id number]

[address] [place] [address]

                          21 Dec, 2011               

 

                          [facility]     [address] [facility] [id number]

[address] [place] [address]

                                         

 

                          [facility]     [address] [facility] [id number]

[address] [place] [address]

                          22 Oct, 2011               

 

                          [facility]     [address] [facility] [id number]

[address] [place] [address]

                          21 Oct, 2011               







IMMUNIZATIONS

No Known Immunizations



SOCIAL HISTORY

Never Assessed



REASON FOR VISIT





PLAN OF CARE





VITAL SIGNS





MEDICATIONS

Unknown Medications



RESULTS

No Results



PROCEDURES





                Procedure       Date Ordered    Result          Body Site

 

                PSYTX PT&/FAMILY 45 MINUTES 2013                    







INSTRUCTIONS





MEDICATIONS ADMINISTERED

No Known Medications



MEDICAL (GENERAL) HISTORY





                    Type                Description         Date

 

                    Medical History     depression           

 

                    Medical History     hyperlipidemia       

 

                    Hospitalization History staph in right leg

## 2020-07-25 NOTE — XMS REPORT
[facility]

                             Created on: 2020



Leonard Ignacio

External Reference #: [id number]

: 1955

Sex: Male



Demographics





                          Address                   [address] [place]  [address]

 

                          Preferred Language        Unknown

 

                          Marital Status            Unknown

 

                          Roman Catholic Affiliation     Unknown

 

                          Race                      Unknown

 

                          Ethnic Group              Unknown





Author





                          Author                    Ignacio Gomes Doctor

 

                          Organization              [facility]

 

                          Address                   Unknown

 

                          Phone                     Unavailable







Care Team Providers





                    Care Team Member Name Role                Phone

 

                    Migration,  Doctor  Unavailable         Unavailable







PROBLEMS





          Type      Condition ICD9-CM Code SCD21-PD Code Onset Dates Condition S

tatus SNOMED 

Code

 

                          Problem                   Nonspecific elevation of lev

els of transaminase or lactic acid 

dehydrogenase (LDH) 790.4                                  Active       26038550

2

 

           Problem    Encounter for long-term (current) use of other medications

 V58.69                           

Active                                  [id number]

 

          Problem   Edema     782.3                         Active    034115888

 

          Problem   Spontaneous ecchymoses 782.7                         Active 

   [id number]

 

          Problem   Trigger finger (acquired) 727.03                        Acti

ve    4526884

 

          Problem   Varicose veins of lower extremities with inflammation 454.1 

                        Active    

99845486

 

           Problem    Persistent disorder of initiating or maintaining sleep 307

.42                           Active

                                        37212309

 

          Problem   Pityriasis versicolor 111.0                         Active  

  17768589

 

           Problem    Unspecified local infection of skin and subcutaneous tissu

e 686.9                            

Active                                  [id number]

 

          Problem   Unspecified viral hepatitis C without hepatic coma 070.70   

                     Active    

65858572

 

          Problem   Vascular disorder of skin 709.1                         Acti

ve    88368863

 

          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       

                 Active    

17336538

 

          Problem   Anxiety state, unspecified 300.00                        Act

kathia    320859437

 

          Problem   Other and unspecified hyperlipidemia 272.4                  

       Active    61337227

 

          Problem   Major depressive disorder, recurrent episode, mild 296.31   

                     Active    

59385178







ALLERGIES

No Information



ENCOUNTERS





                Encounter       Location        Date            Diagnosis

 

                          [facility]   [address] [facility] [id number]

[address] [place] [id number]

                          15 Jesse, 2016              Encounter for other specifie

d administrative purpose Z02.89

 

                          [facility]   [address] [facility] [id number]

[address] [place] [id number]

                          12 May, 2016              Dental examination Z01.20 an

d Dental caries K02.9

 

                          [facility] [place]     [address] [facility] [id number]

[address] [place] [address]

                          14 Aug, 2015              Edema 782.3 and Family histo

ry of coronary artery disease V17.3

 

                          [facility]     [address] [id number]

[address] [place] [address]

                          07 Aug, 2015              Edema 782.3 and Family histo

ry of coronary artery disease V17.3

 

                          [facility]   [address] [id number]

[address] [place] [id number]

                                        Dental examination V72.2

 

                          [facility]     [address] [id number]

[address] [place] [address]

                                         

 

                          [facility]     [address] [id number]

[address] [place] [address]

                                         

 

                          [facility]     [address] [id number]

[address] [place] [address]

                          20 Mar, 2015               

 

                          [facility]     [address] [id number]

[address] [place] [address]

                          20 Mar, 2015               

 

                          [facility]     [address] [id number]

[address] [place] [address]

                                         

 

                          [facility]     [address] [id number]

[address] [place] [address]

                                         

 

                          [facility]     [address] [id number]

[address] [place] [address]

                                         

 

                          [facility]     [address] [id number]

[address] [place] [address]

                                         

 

                          [facility]     [address] [id number]

[address] [place] [address]

                          11 Dec, 2014               

 

                          [facility]     [address] [id number]

[address] [place] [address]

                          11 Dec, 2014               

 

                          [facility]     [address] [id number]

[address] [place] [address]

                                         

 

                          [facility]     [address] [id number]

[address] [place] [address]

                                         

 

                          [facility]     [address] [id number]

[address] [place] [address], [place] [address]

                          30 Oct, 2014               

 

                          [facility]     [address] [id number]

[address] [place] [address], [place] [address]

                          30 Oct, 2014               

 

                          [facility]     [address] [id number]

[address] [place] [address], [place] [address]

                          10 Sep, 2014               

 

                          [facility]     [address] [id number]

[address] [place] [address], [place] [address]

                          09 Sep, 2014               

 

                          [facility]     [address] [id number]

[address] [place] [address], [place] [address]

                          09 Sep, 2014               

 

                          [facility]     [address] [id number]

[address] [place] [address], [place] [address]

                          22 Aug, 2014               

 

                          [facility]     [address] [id number]

[address] [place] [address], [place] [address]

                          22 Aug, 2014               

 

                          [facility]     [address] [id number]

[address] [place] [address], [place] [address]

                          22 Aug, 2014               

 

                          [facility]     [address] [id number]

[address] [place] [address], [place] [address]

                                         

 

                          [facility]     [address] [id number]

[address] [place] [address], [place] [address]

                                         

 

                          [facility]     [address] [id number]

[address] [place] [address], [place] [address]

                                         

 

                          [facility]     [address] [id number]

[address] [place] [address], [place] [address]

                                         

 

                          [facility]     [address] [id number]

[address] [place] [address], [place] [address]

                                         

 

                          [facility]     [address] [id number]

[address] [place] [address], [place] [address]

                                         

 

                          [facility]     [address] [id number]

[address] [place] [address], [place] [address]

                                         

 

                          [facility]     [address] [id number]

[address] [place] [address], [place] [address]

                                         

 

                          [facility]     [address] [id number]

[address] [place] [address], [place] [address]

                          20 May, 2014               

 

                          [facility]     [address] [id number]

[address] [place] [address], [place] [address]

                          20 May, 2014               

 

                          [facility]     [address] [id number]

[address] [place] [address], [place] [address]

                                         

 

                          [facility]     [address] [id number]

[address] [place] [address], [place] [address]

                                         

 

                          [facility]     [address] [id number]

[address] [place] [address], [place] [address]

                                         

 

                          [facility]     [address] [id number]

[address] [place] [address], [place] [address]

                                         

 

                          [facility]     [address] [id number]

[address] [place] [address], [place] [address]

                                         

 

                          [facility]     [address] [id number]

[address] [place] [address], [place] [address]

                                         

 

                          [facility]     [address] [id number]

[address] [place] [address], [place] [address]

                          13 Dec, 2013               

 

                          [facility]     [address] [id number]

[address] [place] [address], [place] [address]

                          13 Dec, 2013               

 

                          [facility]     [address] [id number]

[address] [place] [address], [place] [address]

                          13 Dec, 2013               

 

                          [facility]     [address] [id number]

[address] [place] [address], [place] [address]

                          13 Dec, 2013               

 

                          [facility]     [address] [id number]

[address] [place] [address], [place] [address]

                          17 Oct, 2013               

 

                          [facility]     [address] [id number]

[address] [place] [address], [place] [address]

                          17 Oct, 2013               

 

                          [facility]     [address] [id number]

[address] [place] [address], [place] [address]

                          10 Oct, 2013               

 

                          [facility]     [address] [id number]

[address] [place] [address] [place] [address]

                          10 Oct, 2013               

 

                          [facility]     [address] [id number]

[address] [place] [address], [place] [address]

                          04 Oct, 2013               

 

                          [facility]     [address] [id number]

[address] [place] [address], [place] [address]

                          24 Sep, 2013               

 

                          [facility]     [address] [id number]

[address] [place] [address], [place] [address]

                          19 Sep, 2013               

 

                          [facility]     [address] [id number]

[address] [place] [address], [place] [address]

                          16 Sep, 2013               

 

                          [facility]     [address] [id number]

[address] [place] [address], [place] [address]

                          21 Aug, 2013               

 

                          [facility]     [address] [id number]

[address] [place] [address], [place] [address]

                          17 Aug, 2013               

 

                          [facility]     [address] [id number]

[address] [place] [address], [place] [address]

                          16 Aug, 2013               

 

                          [facility]     [address] [id number]

[address] [place] [address], [place] [address]

                          15 Aug, 2013               

 

                          [facility]     [address] [id number]

[address] [place] [address], [place] [address]

                          13 Aug, 2013               

 

                          [facility]     [address] [id number]

[address] [place] [address], [place] [address]

                          13 Aug, 2013               

 

                          [facility]     [address] [id number]

[address] [place] [address], [place] [address]

                          12 Aug, 2013               

 

                          [facility]     [address] [id number]

[address] [place] [address], [place] [address]

                                         

 

                          [facility]     [address] [id number]

[address] [place] [address], [place] [address]

                                         

 

                          [facility]     [address] [id number]

[address] [place] [address], [place] [address]

                                         

 

                          [facility]     [address] [id number]

[address] [place] [address], [place] [address]

                          10 Jesse, 2013               

 

                          [facility]     [address] [id number]

[address] [place] [address], [place] [address]

                          17 May, 2013               

 

                          [facility]     [address] [id number]

[address] [place] [address], [place] [address]

                          10 May, 2013               

 

                          [facility]     [address] [id number]

[address] [place] [address], [place] [address]

                          15 Apr, 2013               

 

                          [facility]     [address] [id number]

[address] [place] [address], [place] [address]

                                         

 

                          [facility]     [address] [id number]

[address] [place] [address], [place] [address]

                                         

 

                          [facility]     [address] [id number]

[address] [place] [address], [place] [address]

                                         

 

                          [facility]     [address] [id number]

[address] [place] [address], [place] [address]

                                         

 

                          [facility]     [address] [id number]

[address] [place] [address], [place] [address]

                                         

 

                          [facility]     [address] [id number]

[address] [place] [address], [place] [address]

                                         

 

                          [facility]     [address] [id number]

[address] [place] [address], [place] [address]

                                         

 

                          [facility]     [address] [id number]

[address] [place] [address], [place] [address]

                          21 Dec, 2012               

 

                          [facility]     [address] [id number]

[address] [place] [address], [place] [address]

                          21 Dec, 2012               

 

                          [facility]     [address] [id number]

[address] [place] [address], [place] [address]

                          14 Dec, 2012               

 

                          [facility]     [address] [id number]

[address] [place] [address], [place] [address]

                          14 Dec, 2012               

 

                          [facility]     [address] [id number]

[address] [place] [address], [place] [address]

                                         

 

                          [facility]     [address] [id number]

[address] [address]

                          20 2012               

 

                          [facility]     [address] [id number]

[address] [place] [address], [place] [address]

                          16 2012               

 

                          [facility]     [address] [id number]

[address] [place] [address], [place] [address]

                          16 2012               

 

                          [facility]     [address] [id number]

[address] [place] [address], [place] [address]

                          13 2012               

 

                          [facility]     [address] [id number]

[address] [place] [address]

                          13 2012               

 

                          [facility]     [address] [id number]

[address] [place] [address], [place] [address]

                          13 2012               

 

                          [facility]     [address] [id number]

[address] [place] [address]

                          13 2012               

 

                          [facility]     [address] [id number]

[address] [place] [address], [place] [address]

                                         

 

                          [facility]     [address] [id number]

[address] [place] [address]

                                         

 

                          [facility]     [address] [id number]

[address] [place] [address], [place] [address]

                          22 Oct, 2012               

 

                          [facility]     [address] [id number]

[address] [place] [address], [place] [address]

                          22 Oct, 2012               

 

                          [facility]     [address] [id number]

[address] [place] [address]

                          22 Oct, 2012               

 

                          [facility]     [address] [id number]

[address] [place] [address]

                          22 Oct, 2012               

 

                          [facility]     [address] [id number]

[address] [place] [address], [place] [address]

                          10 Oct, 2012               

 

                          [facility]     [address] [id number]

[address] [place] [address]

                          10 Oct, 2012               

 

                          [facility]     [address] [id number]

[address] [place] [address]

                          05 Oct, 2012               

 

                          [facility]     [address] [id number]

[address] [place] [address], [place] [address]

                          05 Oct, 2012               

 

                          [facility]     [address] [id number]

[address] [place] [address], [place] [address]

                          07 Sep, 2012               

 

                          [facility]     [address] [id number]

[address] [place] [address], [place] [address]

                          22 Aug, 2012               

 

                          [facility]     [address] [id number]

[address] [place] [address], [place] [address]

                          03 Aug, 2012               

 

                          [facility]     [address] [id number]

[address] [place] [address], [place] [address]

                                         

 

                          [facility]     [address] [id number]

[address] [place] [address], [place] [address]

                                         

 

                          [facility]     [address] [id number]

[address] [place] [address], [place] [address]

                                         

 

                          [facility]     [address] [id number]

[address] [place] [address], [place] [address]

                                         

 

                          [facility]     [address] [id number]

[address] [place] [address], [place] [address]

                                         

 

                          [facility]     [address] [address] [id number]

[address] [place] [address], [place] [address]

                                         

 

                          [facility]     [address] [id number]

[address] [place] [address], [place] [address]

                                         

 

                          [facility]     [address] [id number]

[address] [place] [address], [place] [address]

                          30 May, 2012               

 

                          [facility]     [address] [id number]

[address] [place] [address], [place] [address]

                          30 May, 2012               

 

                          [facility]     [address] [id number]

[address] [place] [address], [place] [address]

                          21 May, 2012               

 

                          [facility]     [address] [id number]

[address] [place] [address], [place] [address]

                          14 May, 2012               

 

                          [facility]     [address] [id number]

[address] [place] [address], [place] [address]

                          04 May, 2012               

 

                          [facility]     [address] [id number]

[address] [place] [address], [place] [address]

                          04 May, 2012               

 

                          [facility]     [address] [id number]

[address] [place] [address], [place] [address]

                          04 May, 2012               

 

                          [facility]     [address] [id number]

[address] [place] [address], [place] [address]

                                         

 

                          [facility]     [address] [id number]

[address] [place] [address], [place] [address]

                                         

 

                          [facility]     [address] [id number]

[address] [place] [address], [place] [address]

                          23 Mar, 2012               

 

                          [facility]     [address] [id number]

[address] [place] [address], [place] [address]

                          15 Mar, 2012               

 

                          [facility]     [address] [id number]

[address] [place] [address], [place] [address]

                          13 Mar, 2012               

 

                          [facility]     [address] [id number]

[address] [place] [address], [place] [address]

                          12 Mar, 2012               

 

                          [facility]     [address] [id number]

[address] [place] [address], [place] [address]

                                         

 

                          [facility]     [address] [id number]

[address] [place] [address], [place] [address]

                                         

 

                          [facility]     [address] [id number]

[address] [place] [address], [place] [address]

                                         

 

                          [facility]     [address] [id number]

[address] [place] [address], [place] [address]

                                         

 

                          [facility]     [address] [id number]

[address] [place] [address], [place] [address]

                          29 Dec, 2011               

 

                          [facility]     [address] [id number]

[address] [place] [address], [place] [address]

                          29 Dec, 2011               

 

                          [facility]     [address] [id number]

[address] [place] [address], [place] [address]

                          21 Dec, 2011               

 

                          [facility]     [address] [id number]

[address] [place] [address]

                                         

 

                          [facility] [place]     [address] [facility] [id number]

[address] [place] [address]

                          22 Oct, 2011               

 

                          [facility] [place]     [address] [facility] [id number]

[address] [place] [address]

                          21 Oct, 2011               







IMMUNIZATIONS

No Known Immunizations



SOCIAL HISTORY

Never Assessed



REASON FOR VISIT





PLAN OF CARE





VITAL SIGNS





                    Height              69 in               2013-10-17

 

                    Weight              195.4 lbs           2013-10-17

 

                    Temperature         97.4 degrees Fahrenheit 2013-10-17

 

                    Heart Rate          84 bpm              2013-10-17

 

                    Respiratory Rate    18                  2013-10-17

 

                    Blood pressure systolic 118 mmHg            2013-10-17

 

                    Blood pressure diastolic 72 mmHg             2013-10-17







MEDICATIONS

Unknown Medications



RESULTS

No Results



PROCEDURES

No Known procedures



INSTRUCTIONS





MEDICATIONS ADMINISTERED

No Known Medications



MEDICAL (GENERAL) HISTORY





                    Type                Description         Date

 

                    Medical History     depression           

 

                    Medical History     hyperlipidemia       

 

                    Hospitalization History [facility] in right leg

## 2020-07-25 NOTE — XMS REPORT
[facility]

                             Created on: 2020



Ignacio Valle

External Reference #: [id number]

: 1955

Sex: Male



Demographics





                          Address                   [address] [place]  [address]

 

                          Preferred Language        Unknown

 

                          Marital Status            Unknown

 

                          Samaritan Affiliation     Unknown

 

                          Race                      Unknown

 

                          Ethnic Group              Unknown





Author





                          Author                    Ignacio Gomes Doctor

 

                          Organization              [facility]

 

                          Address                   Unknown

 

                          Phone                     Unavailable







Care Team Providers





                    Care Team Member Name Role                Phone

 

                    Migration,  Doctor  Unavailable         Unavailable







PROBLEMS





          Type      Condition ICD9-CM Code NJF47-LJ Code Onset Dates Condition S

tatus SNOMED 

Code

 

                          Problem                   Nonspecific elevation of lev

els of transaminase or lactic acid 

dehydrogenase (LDH) 790.4                                  Active       [id number]

2

 

           Problem    Encounter for long-term (current) use of other medications

 V58.69                           

Active                                  [id number]

 

          Problem   Edema     782.3                         Active    660535096

 

          Problem   Spontaneous ecchymoses 782.7                         Active 

   [id number]

 

          Problem   Trigger finger (acquired) 727.03                        Acti

ve    5285130

 

          Problem   Varicose veins of lower extremities with inflammation 454.1 

                        Active    

55589639

 

           Problem    Persistent disorder of initiating or maintaining sleep 307

.42                           Active

                                        [id number]

 

          Problem   Pityriasis versicolor 111.0                         Active  

  24242458

 

           Problem    Unspecified local infection of skin and subcutaneous tissu

e 686.9                            

Active                                  [id number]

 

          Problem   Unspecified viral hepatitis C without hepatic coma 070.70   

                     Active    

64836304

 

          Problem   Vascular disorder of skin 709.1                         Acti

ve    13090535

 

          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       

                 Active    

84539321

 

          Problem   Anxiety state, unspecified 300.00                        Act

kathia    103763488

 

          Problem   Other and unspecified hyperlipidemia 272.4                  

       Active    87621506

 

          Problem   Major depressive disorder, recurrent episode, mild 296.31   

                     Active    

19284673







ALLERGIES

No Information



ENCOUNTERS





                Encounter       Location        Date            Diagnosis

 

                          [facility]   [address] [facility] [id number]

[address] [place] [id number]

                          15 Jesse, 2016              Encounter for other specifie

d administrative purpose Z02.89

 

                          [facility]   [address] [facility] [id number]

[address] [place] [address] [id number]

                          12 May, 2016              Dental examination Z01.20 an

d Dental caries K02.9

 

                          [facility]     [address] [facility] [id number]

[address] [place] [address]

                          14 Aug, 2015              Edema 782.3 and Family histo

ry of coronary artery disease V17.3

 

                          [facility]     [address] [id number]

[address] [place] [address]

                          07 Aug, 2015              Edema 782.3 and Family histo

ry of coronary artery disease V17.3

 

                          [facility]   [address] [id number]

[address] [place] [id number]

                                        Dental examination V72.2

 

                          [facility]     [address] [id number]

[address] [place] [address]

                                         

 

                          [facility]     [address] [id number]

[address] [place] [address]

                                         

 

                          [facility]     [address] [id number]

[address] [place] [address]

                          20 Mar, 2015               

 

                          [facility]     [address] [id number]

[address] [place] [address]

                          20 Mar, 2015               

 

                          [facility]     [address] [id number]

[address] [place] [address]

                                         

 

                          [facility]     [address] [id number]

[address] [place] [address]

                                         

 

                          [facility]     [address] [id number]

[address] [place] [address]

                                         

 

                          [facility]     [address] [id number]

[address] [place] [address]

                                         

 

                          [facility]     [address] [id number]

[address] [place] [address]

                          11 Dec, 2014               

 

                          [facility]     [address] [id number]

[address] [place] [address]

                          11 Dec, 2014               

 

                          [facility]     [address] [id number]

[address] [place] [address]

                                         

 

                          [facility]     [address] [id number]

[address] [place] [address]

                                         

 

                          [facility]     [address] [id number]

[address] [place] [address], [place] [address]

                          30 Oct, 2014               

 

                          [facility]     [address] [id number]

[address] [place] [address], [place] [address]

                          30 Oct, 2014               

 

                          [facility]     [address] [id number]

[address] [place] [address], [place] [address]

                          10 Sep, 2014               

 

                          [facility]     [address] [id number]

[address] [place] [address], [place] [address]

                          09 Sep, 2014               

 

                          [facility]     [address] [id number]

[address] [place] [address], [place] [address]

                          09 Sep, 2014               

 

                          [facility]     [address] [id number]

[address] [place] [address], [place] [address]

                          22 Aug, 2014               

 

                          [facility]     [address] [id number]

[address] [place] [address], [place] [address]

                          22 Aug, 2014               

 

                          [facility]     [address] [id number]

[address] [place] [address], [place] [address]

                          22 Aug, 2014               

 

                          [facility]     [address] [id number]

[address] [place] [address], [place] [address]

                                         

 

                          [facility]     [address] [id number]

[address] [place] [address], [place] [address]

                                         

 

                          [facility]     [address] [id number]

[address] [place] [address], [place] [address]

                                         

 

                          [facility]     [address] [id number]

[address] [place] [address], [place] [address]

                                         

 

                          [facility]     [address] [id number]

[address] [place] [address], [place] [address]

                                         

 

                          [facility]     [address] [id number]

[address] [place] [address], [place] [address]

                                         

 

                          [facility]     [address] [id number]

[address] [place] [address], [place] [address]

                                         

 

                          [facility]     [address] [id number]

[address] [place] [address], [place] [address]

                                         

 

                          [facility]     [address] [id number]

[address] [place] [address], [place] [address]

                          20 May, 2014               

 

                          [facility]     [address] [id number]

[address] [place] [address], [place] [address]

                          20 May, 2014               

 

                          [facility]     [address] [id number]

[address] [place] [address], [place] [address]

                                         

 

                          [facility]     [address] [id number]

[address] [place] [address], [place] [address]

                                         

 

                          [facility]     [address] [id number]

[address] [place] [address], [place] [address]

                                         

 

                          [facility]     [address] [id number]

[address] [place] [address], [place] [address]

                                         

 

                          [facility]     [address] [id number]

[address] [place] [address], [place] [address]

                                         

 

                          [facility]     [address] [id number]

[address] [place] [address], [place] [address]

                                         

 

                          [facility]     [address] [id number]

[address] [place] [address], [place] [address]

                          13 Dec, 2013               

 

                          [facility]     [address] [id number]

[address] [place] [address], [place] [address]

                          13 Dec, 2013               

 

                          [facility]     [address] [id number]

[address] [place] [address], [place] [address]

                          13 Dec, 2013               

 

                          [facility]     [address] [id number]

[address] [place] [address], [place] [address]

                          13 Dec, 2013               

 

                          [facility]     [address] [id number]

[address] [place] [address], [place] [address]

                          17 Oct, 2013               

 

                          [facility]     [address] [id number]

[address] [place] [address], [place] [address]

                          17 Oct, 2013               

 

                          [facility]     [address] [id number]

[address] [place] [address], [place] [address]

                          10 Oct, 2013               

 

                          [facility]     [address] [id number]

[address] [place] [address] [place] [address]

                          10 Oct, 2013               

 

                          [facility]     [address] [id number]

[address] [place] [address], [place] [address]

                          04 Oct, 2013               

 

                          [facility]     [address] [id number]

[address] [place] [address], [place] [address]

                          24 Sep, 2013               

 

                          [facility]     [address] [id number]

[address] [place] [address], [place] [address]

                          19 Sep, 2013               

 

                          [facility]     [address] [id number]

[address] [place] [address], [place] [address]

                          16 Sep, 2013               

 

                          [facility]     [address] [id number]

[address] [place] [address], [place] [address]

                          21 Aug, 2013               

 

                          [facility]     [address] [id number]

[address] [place] [address], [place] [address]

                          17 Aug, 2013               

 

                          [facility]     [address] [id number]

[address] [place] [address], [place] [address]

                          16 Aug, 2013               

 

                          [facility]     [address] [id number]

[address] [place] [address], [place] [address]

                          15 Aug, 2013               

 

                          [facility]     [address] [id number]

[address] [place] [address], [place] [address]

                          13 Aug, 2013               

 

                          [facility]     [address] [id number]

[address] [place] [address], [place] [address]

                          13 Aug, 2013               

 

                          [facility]     [address] [id number]

[address] [place] [address], [place] [address]

                          12 Aug, 2013               

 

                          [facility]     [address] [id number]

[address] [place] [address], [place] [address]

                                         

 

                          [facility]     [address] [id number]

[address] [place] [address], [place] [address]

                                         

 

                          [facility]     [address] [id number]

[address] [place] [address], [place] [address]

                                         

 

                          [facility]     [address] [id number]

[address] [place] [address], [place] [address]

                          10 Jesse, 2013               

 

                          [facility]     [address] [id number]

[address] [place] [address], [place] [address]

                          17 May, 2013               

 

                          [facility]     [address] [id number]

[address] [place] [address], [place] [address]

                          10 May, 2013               

 

                          [facility]     [address] [id number]

[address] [place] [address], [place] [address]

                          15 Apr, 2013               

 

                          [facility]     [address] [id number]

[address] [place] [address], [place] [address]

                                         

 

                          [facility]     [address] [id number]

[address] [place] [address], [place] [address]

                                         

 

                          [facility]     [address] [id number]

[address] [place] [address], [place] [address]

                                         

 

                          [facility]     [address] [id number]

[address] [place] [address], [place] [address]

                                         

 

                          [facility]     [address] [id number]

[address] [place] [address], [place] [address]

                                         

 

                          [facility]     [address] [id number]

[address] [place] [address], [place] [address]

                                         

 

                          [facility]     [address] [id number]

[address] [place] [address], [place] [address]

                                         

 

                          [facility]     [address] [id number]

[address] [place] [address], [place] [address]

                          21 Dec, 2012               

 

                          [facility]     [address] [id number]

[address] [place] [address], [place] [address]

                          21 Dec, 2012               

 

                          [facility]     [address] [id number]

[address] [place] [address], [place] [address]

                          14 Dec, 2012               

 

                          [facility]     [address] [id number]

[address] [place] [address], [place] [address]

                          14 Dec, 2012               

 

                          [facility]     [address] [id number]

[address] [place] [address], [place] [address]

                                         

 

                          [facility]     [address] [id number]

[address] [address]

                          20 2012               

 

                          [facility]     [address] [id number]

[address] [place] [address], [place] [address]

                          16 2012               

 

                          [facility]     [address] [id number]

[address] [place] [address], [place] [address]

                          16 2012               

 

                          [facility]     [address] [id number]

[address] [place] [address], [place] [address]

                          13 2012               

 

                          [facility]     [address] [id number]

[address] [place] [address]

                          13 2012               

 

                          [facility]     [address] [id number]

[address] [place] [address], [place] [address]

                          13 2012               

 

                          [facility]     [address] [id number]

[address] [place] [address]

                          13 2012               

 

                          [facility]     [address] [id number]

[address] [place] [address], [place] [address]

                                         

 

                          [facility]     [address] [id number]

[address] [place] [address]

                                         

 

                          [facility]     [address] [id number]

[address] [place] [address], [place] [address]

                          22 Oct, 2012               

 

                          [facility]     [address] [id number]

[address] [place] [address], [place] [address]

                          22 Oct, 2012               

 

                          [facility]     [address] [id number]

[address] [place] [address]

                          22 Oct, 2012               

 

                          [facility]     [address] [id number]

[address] [place] [address]

                          22 Oct, 2012               

 

                          [facility]     [address] [id number]

[address] [place] [address], [place] [address]

                          10 Oct, 2012               

 

                          [facility]     [address] [id number]

[address] [place] [address]

                          10 Oct, 2012               

 

                          [facility]     [address] [id number]

[address] [place] [address]

                          05 Oct, 2012               

 

                          [facility]     [address] [id number]

[address] [place] [address], [place] [address]

                          05 Oct, 2012               

 

                          [facility]     [address] [id number]

[address] [place] [address], [place] [address]

                          07 Sep, 2012               

 

                          [facility]     [address] [id number]

[address] [place] [address], [place] [address]

                          22 Aug, 2012               

 

                          [facility]     [address] [id number]

[address] [place] [address], [place] [address]

                          03 Aug, 2012               

 

                          [facility]     [address] [id number]

[address] [place] [address], [place] [address]

                                         

 

                          [facility]     [address] [id number]

[address] [place] [address], [place] [address]

                                         

 

                          [facility]     [address] [id number]

[address] [place] [address], [place] [address]

                                         

 

                          [facility]     [address] [id number]

[address] [place] [address], [place] [address]

                                         

 

                          [facility]     [address] [id number]

[address] [place] [address], [place] [address]

                                         

 

                          [facility]     [address] [id number]

[address] [place] [address], [place] [address]

                                         

 

                          [facility]     [address] [id number]

[address] [place] [address], [place] [address]

                                         

 

                          [facility]     [address] [id number]

[address] [place] [address] [address]

                          30 May, 2012               

 

                          [facility]     [address] [id number]

[address] [place] [address], [place] [address]

                          30 May, 2012               

 

                          [facility]     [address] [id number]

[address] [place] [address], [place] [address]

                          21 May, 2012               

 

                          [facility]     [address] [id number]

[address] [place] [address], [place] [address]

                          14 May, 2012               

 

                          [facility]     [address] [id number]

[address] [place] [address], [place] [address]

                          04 May, 2012               

 

                          [facility]     [address] [id number]

[address] [place] [address], [place] [address]

                          04 May, 2012               

 

                          [facility]     [address] [id number]

[address] [place] [address], [place] [address]

                          04 May, 2012               

 

                          [facility]     [address] [id number]

[address] [place] [address], [place] [address]

                                         

 

                          [facility]     [address] [id number]

[address] [place] [address], [place] [address]

                                         

 

                          [facility]     [address] [id number]

[address] [place] [address], [place] [address]

                          23 Mar, 2012               

 

                          [facility]     [address] [id number]

[address] [place] [address], [place] [address]

                          15 Mar, 2012               

 

                          [facility]     [address] [id number]

[address] [place] [address], [place] [address]

                          13 Mar, 2012               

 

                          [facility]     [address] [id number]

[address] [place] [address], [place] [address]

                          12 Mar, 2012               

 

                          [facility]     [address] [id number]

[address] [place] [address], [place] [address]

                                         

 

                          [facility]     [address] [id number]

[address] [place] [address], [place] [address]

                                         

 

                          [facility]     [address] [id number]

[address] [place] [address], [place] [address]

                                         

 

                          [facility]     [address] [id number]

[address] [place] [address], [place] [address]

                                         

 

                          [facility]     [address] [id number]

[address] [place] [address], [place] [address]

                          29 Dec, 2011               

 

                          [facility]     [address] [id number]

[address] [place] [address], [place] [address]

                          29 Dec, 2011               

 

                          [facility]     [address] [id number]

[address] [place] [address], [place] [address]

                          21 Dec, 2011               

 

                          [facility]     [address] [id number]

[address] [place] [address]

                                         

 

                          [facility]     [address] [facility] [id number]

[address] [place] [address]

                          22 Oct, 2011               

 

                          [facility]     [address] [facility] [id number]

[address] [place] [address]

                          21 Oct, 2011               







IMMUNIZATIONS

No Known Immunizations



SOCIAL HISTORY

Never Assessed



REASON FOR VISIT





PLAN OF CARE





VITAL SIGNS





                    Height              69 in               2012

 

                    Weight              182.75 lbs          2012

 

                    Temperature         98 degrees Fahrenheit 2012

 

                    Heart Rate          60 bpm              2012

 

                    Respiratory Rate    20                  2012

 

                    Blood pressure systolic 108 mmHg            2012

 

                    Blood pressure diastolic 74 mmHg             2012







MEDICATIONS

Unknown Medications



RESULTS

No Results



PROCEDURES





                Procedure       Date Ordered    Result          Body Site

 

                COMPLETE CBC W/AUTO DIFF WBC May 30, 2012                     

 

                PROTHROMBIN TIME May 30, 2012                     

 

                ASSAY THYROID STIM HORMONE May 30, 2012                     

 

                NATRIURETIC PEPTIDE May 30, 2012                     

 

                COMPREHEN METABOLIC PANEL May 30, 2012                     

 

                VENIPUNCT, ROUTINE* May 30, 2012                     







INSTRUCTIONS





MEDICATIONS ADMINISTERED

No Known Medications



MEDICAL (GENERAL) HISTORY





                    Type                Description         Date

 

                    Medical History     depression           

 

                    Medical History     hyperlipidemia       

 

                    Hospitalization History staph in right leg

## 2020-07-25 NOTE — XMS REPORT
[facility]

                             Created on: 2020



Ignacio Valle

External Reference #: [id number]

: 1955

Sex: Male



Demographics





                          Address                   [address] [place]  [address]

 

                          Preferred Language        Unknown

 

                          Marital Status            Unknown

 

                          Caodaism Affiliation     Unknown

 

                          Race                      Unknown

 

                          Ethnic Group              Unknown





Author





                          Author                    Ignacio FARRAR

 

                          Organization              [facility]

 

                          Address                   [address] [place]  [address]



 

                          Phone                     [phone]







Care Team Providers





                    Care Team Member Name Role                Phone

 

                    DARIAN FARRAR    Unavailable         [phone]







PROBLEMS





          Type      Condition ICD9-CM Code DQP78-YQ Code Onset Dates Condition S

tatus SNOMED 

Code

 

                          Problem                   Nonspecific elevation of lev

els of transaminase or lactic acid 

dehydrogenase (LDH) 790.4                                  Active       39721757

2

 

           Problem    Encounter for long-term (current) use of other medications

 V58.69                           

Active                                  600841201

 

          Problem   Edema     782.3                         Active    671222657

 

          Problem   Spontaneous ecchymoses 782.7                         Active 

   652233864

 

          Problem   Trigger finger (acquired) 727.03                        Acti

ve    6067625

 

          Problem   Varicose veins of lower extremities with inflammation 454.1 

                        Active    

19041131

 

           Problem    Persistent disorder of initiating or maintaining sleep 307

.42                           Active

                                        67456762

 

          Problem   Pityriasis versicolor 111.0                         Active  

  37626385

 

           Problem    Unspecified local infection of skin and subcutaneous tissu

e 686.9                            

Active                                  936148318

 

          Problem   Unspecified viral hepatitis C without hepatic coma 070.70   

                     Active    

61679632

 

          Problem   Vascular disorder of skin 709.1                         Acti

ve    17833841

 

          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       

                 Active    

59014661

 

          Problem   Anxiety state, unspecified 300.00                        Act

kathia    358558207

 

          Problem   Other and unspecified hyperlipidemia 272.4                  

       Active    10326315

 

          Problem   Major depressive disorder, recurrent episode, mild 296.31   

                     Active    

95634126







ALLERGIES

No Information



ENCOUNTERS





                Encounter       Location        Date            Diagnosis

 

                    [facility] [address] [id number] 15 

Jesse, 2016                               Encounter for other specified administra

tive purpose Z02.89

 

                    [facility] [address] [id number] 12 

May, 2016                               Dental examination Z01.20 and Dental car

ies K02.9

 

                    [facility] [id number] [place] [address] 14 

Aug, 2015                               Edema 782.3 and Family history of corona

ry artery disease V17.3

 

                    [facility] [id number] [place] [address] 07 

Aug, 2015                               Edema 782.3 and Family history of corona

ry artery disease V17.3

 

                    [facility] [address] [facility] [place] [id number]                                Dental examination V72.2

 

                    [facility] [id number] [place] [address]                                 

 

                    [facility] [address] [address]                                 

 

                    [facility] [address] [place] [address] 20 

Mar, 2015                                

 

                    [facility] [address] [address] 20 

Mar, 2015                                

 

                    [facility] [address] [place] [address]                                 

 

                    [facility] [address] [address]                                 

 

                    [facility] [address] [address]                                 

 

                    [facility] [address] [address]                                 

 

                    [facility] [id number] [place] [address] 11 

Dec, 2014                                

 

                    [facility] [address] [place] [address] 11 

Dec, 2014                                

 

                    [facility] [address] [place] [address]                                 

 

                    [facility] [address] [place] [address]                                 

 

                    [facility] [address] [address] 30 

Oct, 2014                                

 

                    [facility] [address] [facility] [id number] [facility] [place] [address] 30 

Oct, 2014                                

 

                    [facility] [address] [facility] [id number] [facility] [place] [address] 10 

Sep, 2014                                

 

                    [facility] [address] [facility] [facility] [place] [address] 09 

Sep, 2014                                

 

                    [facility] [address] [facility] [facility] [place] [address] 09 

Sep, 2014                                

 

                    [facility] [address] [facility] [id number] [facility] [place] [address] 22 

Aug, 2014                                

 

                    [facility] [address] [facility] [place] [address] 22 

Aug, 2014                                

 

                    [facility] [address] [facility] [place] [address] 22 

Aug, 2014                                

 

                    [facility] [address] [facility] [place] [address]                                 

 

                    [facility] [address] [facility] [place] [address]                                 

 

                    [facility] [address] [facility] [place] [address]                                 

 

                    [facility] [address] [facility] [place] [address]                                 

 

                    [facility] [address] [facility] [place] [address]                                 

 

                    [facility] [address] [facility] [place] [address]                                 

 

                    [facility] [address] [facility] [place] [address]                                 

 

                    [facility] [address] [facility] [place] [address]                                 

 

                    [facility] [address] [facility] [place] [address] 20 

May, 2014                                

 

                    [facility] [address] [facility] [place] [address] 20 

May, 2014                                

 

                    [facility] [address] [facility] [place] [address]                                 

 

                    [facility] [address] [facility] [place] [address]                                 

 

                    [facility] [address] [facility] [place] [address]                                 

 

                    [facility] [address] [facility] [place] [address]                                 

 

                    [facility] [address] [facility] [place] [address]                                 

 

                    [facility] [address] [id number] [place] [address]                                 

 

                    [facility] [id number] [place] [address] 13 

Dec, 2013                                

 

                    [facility] [address] [facility] [place] [address] 13 

Dec, 2013                                

 

                    [facility] [address] [facility] [place] [address] 13 

Dec, 2013                                

 

                    [facility] [address] [facility] [place] [address] 13 

Dec, 2013                                

 

                    [facility] [address] [facility] [place] [address] 17 

Oct, 2013                                

 

                    [facility] [address] [facility] [place] [address] 17 

Oct, 2013                                

 

                    [facility] [address] [facility] [place] [address] 10 

Oct, 2013                                

 

                    [facility] [id number] [place] [address] 10 

Oct, 2013                                

 

                    [facility] [address] [facility] [place] [address] 04 

Oct, 2013                                

 

                    [facility] [address] [facility] [place] [address] 24 

Sep, 2013                                

 

                    [facility] [address] [id number] [place] [address] 19 

Sep, 2013                                

 

                    [facility] [address] [facility] [id number] [facility] [place] [address] 16 

Sep, 2013                                

 

                    [facility] [address] [facility] [id number] [place] [address] 21 

Aug, 2013                                

 

                    [facility] [address] [id number] [facility] [place] [address] 17 

Aug, 2013                                

 

                    [facility] [address] [facility] [id number] [place] [address] 16 

Aug, 2013                                

 

                    [facility] [address] [facility] [place] [address] 15 

Aug, 2013                                

 

                    [facility] [address] [facility] [place] [address] 13 

Aug, 2013                                

 

                    [facility] [address] [facility] [place] [address] 13 

Aug, 2013                                

 

                    [facility] [address] [facility] [place] [address] 12 

Aug, 2013                                

 

                    [facility] [address] [id number] [place] [address]                                 

 

                    [facility] [address] [facility] [place] [address]                                 

 

                    [facility] [address] [facility] [place] [address]                                 

 

                    [facility] [address] [id number] [place] [address] 10 

Jesse, 2013                                

 

                    [facility] [address] [id number] [place] [address] 17 

May, 2013                                

 

                    [facility] [address] [id number] [place] [address] 10 

May, 2013                                

 

                    [facility] [address] [id number] [place] [address] 15 

Apr, 2013                                

 

                    [facility] [address] [id number] [place] [address]                                 

 

                    [facility] [address] [id number] [place] [address]                                 

 

                    [facility] [address] [id number] [place] [address]                                 

 

                    [facility] [address] [id number] [place] [address]                                 

 

                    [facility] [address] [id number] [place] [address]                                 

 

                    [facility] [address] [id number] [place] [address]                                 

 

                    [facility] [address] [id number] [place] [address]                                 

 

                    [facility] [address] [facility] [place] [address] 21 

Dec, 2012                                

 

                    [facility] [address] [id number] [place] [address] 21 

Dec, 2012                                

 

                    [facility] [address] [id number] [place] [address] 14 

Dec, 2012                                

 

                    [facility] [address] [id number] [place] [address] 14 

Dec, 2012                                

 

                    [facility] [address] [id number] [place] [address]                                 

 

                    [facility] [address] [id number] [place] [address] 20 

2012                                

 

                    [facility] [address] [id number] [place] [address] 16 

2012                                

 

                    [facility] [address] [id number] [place] [address] 16 

2012                                

 

                    [facility] [address] [id number] [place] [address] 13 

2012                                

 

                    [facility] [address] [id number] [place] [address] 13 

2012                                

 

                    [facility] [address] [id number] [place] [address]                                 

 

                    [facility] [address] [id number] [place] [address]                                 

 

                    [facility] [address] [facility] [place] [address]                                 

 

                    [facility] [address] [id number] [place] [address]                                 

 

                    [facility] [address] [facility] [place] [address] 22 

Oct, 2012                                

 

                    [facility] [address] [id number] [place] [address] 22 

Oct, 2012                                

 

                    [facility] [address] [facility] [place] [address] 22 

Oct, 2012                                

 

                    [facility] [address] [id number] [place] [address] 22 

Oct, 2012                                

 

                    [facility] [address] [id number] [place] [address] 10 

Oct, 2012                                

 

                    [facility] [address] [id number] [place] [address] 10 

Oct, 2012                                

 

                    [facility] [address] [id number] [place] [address] 05 

Oct, 2012                                

 

                    [facility] [address] [id number] [place] [address] 05 

Oct, 2012                                

 

                    [facility] [address] [id number] [place] [address] 07 

Sep, 2012                                

 

                    [facility] [address] [facility] [place] [address] 22 

Aug, 2012                                

 

                    [facility] [address] [id number] [place] [address] 03 

Aug, 2012                                

 

                    [facility] [address] [id number] [place] [address]                                 

 

                    [facility] [address] [id number] [place] [address]                                 

 

                    [facility] [address] [facility] [id number] [place] [address]                                 

 

                    [facility] [address] [facility] [facility] [place] [address]                                 

 

                    [facility] [address] [id number] [place] [address]                                 

 

                    [facility] [address] [id number] [place] [address]                                 

 

                    [facility] [address] [facility] [id number] [place] [address]                                 

 

                    [facility] [address] [id number] [place] [address] 30 

May, 2012                                

 

                    [facility] [address] [id number] [place] [address] 30 

May, 2012                                

 

                    [facility] [address] [id number] [place] [address] 21 

May, 2012                                

 

                    [facility] [address] [id number] [place] [address] 14 

May, 2012                                

 

                    [facility] [address] [facility] [place] [address] 04 

May, 2012                                

 

                    [facility] [address] [facility] [place] [address] 04 

May, 2012                                

 

                    [facility] [address] [facility] [place] [address] 04 

May, 2012                                

 

                    [facility] [address] [facility] [place] [address]                                 

 

                    [facility] [address] [facility] [place] [address]                                 

 

                    [facility] [address] [id number] [place] [address] 23 

Mar, 2012                                

 

                    [facility] [address] [facility] [place] [address] 15 

Mar, 2012                                

 

                    [facility] [address] [facility] [place] [address] 13 

Mar, 2012                                

 

                    [facility] [address] [facility] [place] [address] 12 

Mar, 2012                                

 

                    [facility] [address] [facility] [place] [address]                                 

 

                    [facility] [id number] [place] [address]                                 

 

                    [facility] [id number] [place] [address]                                 

 

                    [facility] [id number] [place] [address]                                 

 

                    [facility] [id number] [place] [address] 29 

Dec, 2011                                

 

                    [facility] [id number] [place] [address] 29 

Dec, 2011                                

 

                    [facility] [id number] [place] [address] 21 

Dec, 2011                                

 

                    [facility] [id number] [place] [address]                                 

 

                    [facility] [id number] [place] [address] 22 

Oct, 2011                                

 

                    [facility] [id number] [place] [address] 21 

Oct, 2011                                







IMMUNIZATIONS

No Known Immunizations



SOCIAL HISTORY

Never Assessed



REASON FOR VISIT





PLAN OF CARE





VITAL SIGNS





MEDICATIONS

Unknown Medications



RESULTS

No Results



PROCEDURES





                Procedure       Date Ordered    Result          Body Site

 

                PSYTX PT&/FAMILY 45 MINUTES 2014                   







INSTRUCTIONS





MEDICATIONS ADMINISTERED

No Known Medications



MEDICAL (GENERAL) HISTORY





                    Type                Description         Date

 

                    Medical History     depression           

 

                    Medical History     hyperlipidemia       

 

                    Hospitalization History staph in right leg

## 2020-07-25 NOTE — XMS REPORT
[facility]

                             Created on: 2020



Ignacio Valle

External Reference #: [id number]

: 1955

Sex: Male



Demographics





                          Address                   [address] [place]  [address]

 

                          Preferred Language        Unknown

 

                          Marital Status            Unknown

 

                          Rastafari Affiliation     Unknown

 

                          Race                      Unknown

 

                          Ethnic Group              Unknown





Author





                          Author                    Ignacio Novak

 

                          [facility]

 

                          Address                   [address] [place]  [address]



 

                          Phone                     [phone]







Care Team Providers





                    Care Team Member Name Role                Phone

 

                    KishoreBEVEN   Unavailable         [phone]







PROBLEMS





          Type      Condition ICD9-CM Code RDD15-OC Code Onset Dates Condition S

tatus SNOMED 

Code

 

                          Problem                   Nonspecific elevation of lev

els of transaminase or lactic acid 

dehydrogenase (LDH) 790.4                                  Active       60863258

2

 

           Problem    Encounter for long-term (current) use of other medications

 V58.69                           

Active                                  [id number]

 

          Problem   Edema     782.3                         Active    626530976

 

          Problem   Spontaneous ecchymoses 782.7                         Active 

   [id number]

 

          Problem   Trigger finger (acquired) 727.03                        Acti

ve    8509532

 

          Problem   Varicose veins of lower extremities with inflammation 454.1 

                        Active    

98231911

 

           Problem    Persistent disorder of initiating or maintaining sleep 307

.42                           Active

                                        [id number]

 

          Problem   Pityriasis versicolor 111.0                         Active  

  27979166

 

           Problem    Unspecified local infection of skin and subcutaneous tissu

e 686.9                            

Active                                  318148047

 

          Problem   Unspecified viral hepatitis C without hepatic coma 070.70   

                     Active    

75788294

 

          Problem   Vascular disorder of skin 709.1                         Acti

ve    32988721

 

          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       

                 Active    

92552881

 

          Problem   Anxiety state, unspecified 300.00                        Act

kathia    422990327

 

          Problem   Other and unspecified hyperlipidemia 272.4                  

       Active    51943868

 

          Problem   Major depressive disorder, recurrent episode, mild 296.31   

                     Active    

84058388







ALLERGIES

No Information



ENCOUNTERS





                Encounter       Location        Date            Diagnosis

 

                          [facility]   [id number]

[address] [place] [id number]

                          15 Jesse, 2016              Encounter for other specifie

d administrative purpose Z02.89

 

                          [facility]   [address] [id number]

[address] [place] [id number]

                          12 May, 2016              Dental examination Z01.20 an

d Dental caries K02.9

 

                          [facility]     [address] [id number]

[address] [place] [address]

                          14 Aug, 2015              Edema 782.3 and Family histo

ry of coronary artery disease V17.3

 

                          [facility]     [address] [id number]

[address] [place] [address]

                          07 Aug, 2015              Edema 782.3 and Family histo

ry of coronary artery disease V17.3

 

                          [facility]   [address] [id number]

[address] [place] [id number]

                                        Dental examination V72.2

 

                          [facility]     [address] [id number]

[address] [place] [address]

                                         

 

                          [facility]     [address] [id number]

[address] [place] [address]

                                         

 

                          [facility]     [address] [id number]

[address] [place] [address]

                          20 Mar, 2015               

 

                          [facility]     [address] [id number]

[address] [place] [address]

                          20 Mar, 2015               

 

                          [facility]     [address] [id number]

[address] [place] [address]

                                         

 

                          [facility]     [address] [id number]

[address] [place] [address]

                                         

 

                          [facility]     [address] [id number]

[address] [place] [address]

                                         

 

                          [facility]     [address] [id number]

[address] [place] [address]

                                         

 

                          [facility]     [address] [id number]

[address] [place] [address]

                          11 Dec, 2014               

 

                          [facility]     [address] [id number]

[address] [place] [address]

                          11 Dec, 2014               

 

                          [facility]     [address] [id number]

[address] [place] [address]

                                         

 

                          [facility]     [address] [id number]

[address] [place] [address], [place] [address]

                                         

 

                          [facility]     [address] [id number]

[address] [place] [address], [place] [address]

                          30 Oct, 2014               

 

                          [facility]     [address] [id number]

[address] [place] [address], [place] [address]

                          30 Oct, 2014               

 

                          [facility]     [address] [id number]

[address] [place] [address], [place] [address]

                          10 Sep, 2014               

 

                          [facility]     [address] [id number]

[address] [place] [address], [place] [address]

                          09 Sep, 2014               

 

                          [facility]     [address] [id number]

[address] [place] [address], [place] [address]

                          09 Sep, 2014               

 

                          [facility]     [address] [id number]

[address] [place] [address], [place] [address]

                          22 Aug, 2014               

 

                          [facility]     [address] [id number]

[address] [place] [address], [place] [address]

                          22 Aug, 2014               

 

                          [facility]     [address] [id number]

[address] [place] [address], [place] [address]

                          22 Aug, 2014               

 

                          [facility]     [address] [id number]

[address] [place] [address], [place] [address]

                                         

 

                          [facility]     [address] [id number]

[address] [place] [address], [place] [address]

                                         

 

                          [facility]     [address] [id number]

[address] [place] [address], [place] [address]

                                         

 

                          [facility]     [address] [id number]

[address] [place] [address], [place] [address]

                                         

 

                          [facility]     [address] [id number]

[address] [place] [address], [place] [address]

                                         

 

                          [facility]     [address] [id number]

[address] [place] [address], [place] [address]

                                         

 

                          [facility]     [address] [id number]

[address] [place] [address], [place] [address]

                                         

 

                          [facility]     [address] [id number]

[address] [place] [address], [place] [address]

                                         

 

                          [facility]     [address] [id number]

[address] [place] [address], [place] [address]

                          20 May, 2014               

 

                          [facility]     [address] [id number]

[address] [place] [address], [place] [address]

                          20 May, 2014               

 

                          [facility]     [address] [id number]

[address] [place] [address], [place] [address]

                                         

 

                          [facility]     [address] [id number]

[address] [place] [address], [place] [address]

                                         

 

                          [facility]     [address] [id number]

[address] [place] [address], [place] [address]

                                         

 

                          [facility]     [address] [id number]

[address] [place] [address], [place] [address]

                                         

 

                          [facility]     [address] [id number]

[address] [place] [address], [place] [address]

                                         

 

                          [facility]     [address] [id number]

[address] [place] [address], [place] [address]

                                         

 

                          [facility]     [address] [id number]

[address] [place] [address], [place] [address]

                          13 Dec, 2013               

 

                          [facility]     [address] [id number]

[address] [place] [address], [place] [address]

                          13 Dec, 2013               

 

                          [facility]     [address] [id number]

[address] [place] [address], [place] [address]

                          13 Dec, 2013               

 

                          [facility]     [address] [id number]

[address] [place] [address], [place] [address]

                          13 Dec, 2013               

 

                          [facility]     [address] [id number]

[address] [place] [address], [place] [address]

                          17 Oct, 2013               

 

                          [facility]     [address] [id number]

[address] [place] [address], [place] [address]

                          17 Oct, 2013               

 

                          [facility]     [address] [id number]

[address] [place] [address] [place] [address]

                          10 Oct, 2013               

 

                          [facility]     [address] [id number]

[address] [place] [address], [place] [address]

                          10 Oct, 2013               

 

                          [facility]     [address] [id number]

[address] [place] [address], [place] [address]

                          04 Oct, 2013               

 

                          [facility]     [address] [id number]

[address] [place] [address], [place] [address]

                          24 Sep, 2013               

 

                          [facility]     [address] [id number]

[address] [place] [address], [place] [address]

                          19 Sep, 2013               

 

                          [facility]     [address] [id number]

[address] [place] [address], [place] [address]

                          16 Sep, 2013               

 

                          [facility]     [address] [id number]

[address] [place] [address], [place] [address]

                          21 Aug, 2013               

 

                          [facility]     [address] [id number]

[address] [place] [address], [place] [address]

                          17 Aug, 2013               

 

                          [facility]     [address] [id number]

[address] [place] [address], [place] [address]

                          16 Aug, 2013               

 

                          [facility]     [address] [id number]

[address] [place] [address], [place] [address]

                          15 Aug, 2013               

 

                          [facility]     [address] [id number]

[address] [place] [address], [place] [address]

                          13 Aug, 2013               

 

                          [facility]     [address] [id number]

[address] [place] [address], [place] [address]

                          13 Aug, 2013               

 

                          [facility]     [address] [id number]

[address] [place] [address], [place] [address]

                          12 Aug, 2013               

 

                          [facility]     [address] [id number]

[address] [place] [address], [place] [address]

                                         

 

                          [facility]     [address] [id number]

[address] [place] [address], [place] [address]

                                         

 

                          [facility]     [address] [id number]

[address] [place] [address], [place] [address]

                                         

 

                          [facility]     [address] [id number]

[address] [place] [address], [place] [address]

                          10 2013               

 

                          [facility]     [address] [id number]

[address] [place] [address], [place] [address]

                          17 May, 2013               

 

                          [facility]     [address] [id number]

[address] [place] [address], [place] [address]

                          10 May, 2013               

 

                          [facility]     [address] [id number]

[address] [place] [address], [place] [address]

                          15 Apr, 2013               

 

                          [facility]     [address] [id number]

[address] [place] [address], [place] [address]

                                         

 

                          [facility]     [address] [id number]

[address] [place] [address], [place] [address]

                                         

 

                          [facility]     [address] [id number]

[address] [place] [address], [place] [address]

                                         

 

                          [facility]     [address] [id number]

[address] [place] [address], [place] [address]

                                         

 

                          [facility]     [address] [id number]

[address] [place] [address], [place] [address]

                                         

 

                          [facility]     [address] [id number]

[address] [place] [address], [place] [address]

                                         

 

                          [facility]     [address] [id number]

[address] [place] [address], [place] [address]

                                         

 

                          [facility]     [address] [id number]

[address] [place] [address], [place] [address]

                          21 Dec, 2012               

 

                          [facility]     [address] [id number]

[address] [place] [address], [place] [address]

                          21 Dec, 2012               

 

                          [facility]     [address] [id number]

[address] [place] [address], [place] [address]

                          14 Dec, 2012               

 

                          [facility]     [address] [id number]

[address] [place] [address], [place] [address]

                          14 Dec, 2012               

 

                          [facility]     [address] [id number]

[address] [address]

                          20 2012               

 

                          [facility]     [address] [id number]

[address] [place] [address], [place] [address]

                          20 2012               

 

                          [facility]     [address] [id number]

[address] [place] [address], [place] [address]

                          16 2012               

 

                          [facility]     [address] [id number]

[address] [place] [address], [place] [address]

                          16 2012               

 

                          [facility]     [address] [id number]

[address] [place] [address]

                          13 2012               

 

                          [facility]     [address] [id number]

[address] [place] [address], [place] [address]

                                         

 

                          [facility]     [address] [id number]

[address] [place] [address]

                          13 2012               

 

                          [facility]     [address] [id number]

[address] [place] [address], [place] [address]

                                         

 

                          [facility]     [address] [id number]

[address] [place] [address]

                                         

 

                          [facility]     [address] [id number]

[address] [place] [address]

                          07 2012               

 

                          [facility]     [address] [id number]

[address] [place] [address], [place] [address]

                          22 Oct, 2012               

 

                          [facility]     [address] [id number]

[address] [place] [address]

                          22 Oct, 2012               

 

                          [facility]     [address] [id number]

[address] [place] [address]

                          22 Oct, 2012               

 

                          [facility]     [address] [id number]

[address] [place] [address], [place] [address]

                          22 Oct, 2012               

 

                          [facility]     [address] [id number]

[address] [place] [address]

                          10 Oct, 2012               

 

                          [facility]     [address] [id number]

[address] [place] [address]

                          10 Oct, 2012               

 

                          [facility]     [address] [id number]

[address] [place] [address], [place] [address]

                          05 Oct, 2012               

 

                          [facility]     [address] [id number]

[address] [place] [address], [place] [address]

                          05 Oct, 2012               

 

                          [facility]     [address] [id number]

[address] [place] [address], [place] [address]

                          07 Sep, 2012               

 

                          [facility]     [address] [id number]

[address] [place] [address], [place] [address]

                          22 Aug, 2012               

 

                          [facility]     [address] [id number]

[address] [place] [address], [place] [address]

                          03 Aug, 2012               

 

                          [facility]     [address] [id number]

[address] [place] [address], [place] [address]

                                         

 

                          [facility]     [address] [id number]

[address] [place] [address], [place] [address]

                                         

 

                          [facility]     [address] [id number]

[address] [place] [address], [place] [address]

                                         

 

                          [facility]     [address] [id number]

[address] [place] [address], [place] [address]

                                         

 

                          [facility]     [address] [id number]

[address] [place] [address], [place] [address]

                                         

 

                          [facility]     [address] [id number]

[address] [place] [address], [place] [address]

                                         

 

                          [facility]     [address] [id number]

[address] [place] [address], [place] [address]

                                         

 

                          [facility]     [address] [id number]

[address] [place] [address], [place] [address]

                          30 May, 2012               

 

                          [facility]     [address] [id number]

[address] [place] [address], [place] [address]

                          30 May, 2012               

 

                          [facility]     [address] [id number]

[address] [place] [address], [place] [address]

                          21 May, 2012               

 

                          [facility]     [address] [id number]

[address] [place] [address], [place] [address]

                          14 May, 2012               

 

                          [facility]     [address] [id number]

[address] [place] [address], [place] [address]

                          04 May, 2012               

 

                          [facility]     [address] [id number]

[address] [place] [address], [place] [address]

                          04 May, 2012               

 

                          [facility]     [address] [id number]

[address] [place] [address], [place] [address]

                          04 May, 2012               

 

                          [facility]     [address] [id number]

[address] [place] [address], [place] [address]

                                         

 

                          [facility]     [address] [id number]

[address] [place] [address], [place] [address]

                                         

 

                          [facility]     [address] [id number]

[address] [place] [address], [place] [address]

                          23 Mar, 2012               

 

                          [facility]     [address] [id number]

[address] [place] [address], [place] [address]

                          15 Mar, 2012               

 

                          [facility]     [address] [id number]

[address] [place] [address], [place] [address]

                          13 Mar, 2012               

 

                          [facility]     [address] [id number]

[address] [place] [address], [place] [address]

                          12 Mar, 2012               

 

                          [facility]     [address] [id number]

[address] [place] [address], [place] [address]

                                         

 

                          [facility]     [address] [id number]

[address] [place] [address], [place] [address]

                                         

 

                          [facility]     [address] [id number]

[address] [place] [address], [place] [address]

                                         

 

                          [facility]     [address] [id number]

[address] [place] [address], [place] [address]

                                         

 

                          [facility]     [address] [id number]

[address] [place] [address], [place] [address]

                          29 Dec, 2011               

 

                          [facility]     [address] [id number]

[address] [place] [address], [place] [address]

                          29 Dec, 2011               

 

                          [facility]     [address] [id number]

[address] [place] [phone]

                          21 Dec, 2011               

 

                          [facility]     [address] [facility] [id number]

[address] [place] [address]

                                         

 

                          [facility]     [address] [facility] [id number]

[address] [place] [address]

                          22 Oct, 2011               

 

                          [facility]     [address] [facility] [id number]

[address] [place] [address]

                          21 Oct, 2011               







IMMUNIZATIONS

No Known Immunizations



SOCIAL HISTORY

Never Assessed



REASON FOR VISIT





PLAN OF CARE





VITAL SIGNS





MEDICATIONS

Unknown Medications



RESULTS

No Results



PROCEDURES

No Known procedures



INSTRUCTIONS





MEDICATIONS ADMINISTERED

No Known Medications



MEDICAL (GENERAL) HISTORY





                    Type                Description         Date

 

                    Medical History     depression           

 

                    Medical History     hyperlipidemia       

 

                    Hospitalization History staph in right leg

## 2020-07-25 NOTE — XMS REPORT
[facility]

                             Created on: 2020



Ignacio Valle

External Reference #: [id number]

: 1955

Sex: Male



Demographics





                          Address                   [address] [place]  [address]

 

                          Preferred Language        Unknown

 

                          Marital Status            Unknown

 

                          Scientology Affiliation     Unknown

 

                          Race                      Unknown

 

                          Ethnic Group              Unknown





Author





                          Author                    Ignacio FARRAR

 

                          [facility]

 

                          Address                   [address] [place]  [address]



 

                          Phone                     [phone]







Care Team Providers





                    Care Team Member Name Role                Phone

 

                    DARIAN FARRAR    Unavailable         [phone]







PROBLEMS





          Type      Condition ICD9-CM Code JRI34-PU Code Onset Dates Condition S

tatus SNOMED 

Code

 

                          Problem                   Nonspecific elevation of lev

els of transaminase or lactic acid 

dehydrogenase (LDH) 790.4                                  Active       90918439

2

 

           Problem    Encounter for long-term (current) use of other medications

 V58.69                           

Active                                  [id number]

 

          Problem   Edema     782.3                         Active    155608826

 

          Problem   Spontaneous ecchymoses 782.7                         Active 

   [id number]

 

          Problem   Trigger finger (acquired) 727.03                        Acti

ve    8742206

 

          Problem   Varicose veins of lower extremities with inflammation 454.1 

                        Active    

34358771

 

           Problem    Persistent disorder of initiating or maintaining sleep 307

.42                           Active

                                        [id number]

 

          Problem   Pityriasis versicolor 111.0                         Active  

  33204095

 

           Problem    Unspecified local infection of skin and subcutaneous tissu

e 686.9                            

Active                                  134114142

 

          Problem   Unspecified viral hepatitis C without hepatic coma 070.70   

                     Active    

43614725

 

          Problem   Vascular disorder of skin 709.1                         Acti

ve    91533111

 

          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       

                 Active    

86549375

 

          Problem   Anxiety state, unspecified 300.00                        Act

kathia    928082932

 

          Problem   Other and unspecified hyperlipidemia 272.4                  

       Active    89871414

 

          Problem   Major depressive disorder, recurrent episode, mild 296.31   

                     Active    

15383455







ALLERGIES

No Information



ENCOUNTERS





                Encounter       Location        Date            Diagnosis

 

                          [facility]   [id number]

[address] [place] [id number]

                          15 Jesse, 2016              Encounter for other specifie

d administrative purpose Z02.89

 

                          [facility]   [address] [facility] [id number]

[address] [place] [address] [id number]

                          12 May, 2016              Dental examination Z01.20 an

d Dental caries K02.9

 

                          [facility]     [address] [id number]

[address] [place] [address]

                          14 Aug, 2015              Edema 782.3 and Family histo

ry of coronary artery disease V17.3

 

                          [facility]     [address] [id number]

[address] [place] [address]

                          07 Aug, 2015              Edema 782.3 and Family histo

ry of coronary artery disease V17.3

 

                          [facility]   [address] [id number]

[address] [place] [id number]

                                        Dental examination V72.2

 

                          [facility]     [address] [id number]

[address] [place] [address]

                                         

 

                          [facility]     [address] [id number]

[address] [place] [address]

                                         

 

                          [facility]     [address] [id number]

[address] [place] [address]

                          20 Mar, 2015               

 

                          [facility]     [address] [id number]

[address] [place] [address]

                          20 Mar, 2015               

 

                          [facility]     [address] [id number]

[address] [place] [address]

                                         

 

                          [facility]     [address] [id number]

[address] [place] [address]

                                         

 

                          [facility]     [address] [id number]

[address] [place] [address]

                                         

 

                          [facility]     [address] [id number]

[address] [place] [address]

                                         

 

                          [facility]     [address] [id number]

[address] [place] [address]

                          11 Dec, 2014               

 

                          [facility]     [address] [id number]

[address] [place] [address]

                          11 Dec, 2014               

 

                          [facility]     [address] [id number]

[address] [place] [address]

                                         

 

                          [facility]     [address] [id number]

[address] [place] [address] [place] [address]

                                         

 

                          [facility]     [address] [id number]

[address] [place] [address], [place] [address]

                          30 Oct, 2014               

 

                          [facility]     [address] [id number]

[address] [place] [address], [place] [address]

                          30 Oct, 2014               

 

                          [facility]     [address] [id number]

[address] [place] [address], [place] [address]

                          10 Sep, 2014               

 

                          [facility]     [address] [id number]

[address] [place] [address], [place] [address]

                          09 Sep, 2014               

 

                          [facility]     [address] [id number]

[address] [place] [address], [place] [address]

                          09 Sep, 2014               

 

                          [facility]     [address] [id number]

[address] [place] [address], [place] [address]

                          22 Aug, 2014               

 

                          [facility]     [address] [id number]

[address] [place] [address], [place] [address]

                          22 Aug, 2014               

 

                          [facility]     [address] [id number]

[address] [place] [address], [place] [address]

                          22 Aug, 2014               

 

                          [facility]     [address] [id number]

[address] [place] [address], [place] [address]

                                         

 

                          [facility]     [address] [id number]

[address] [place] [address], [place] [address]

                                         

 

                          [facility]     [address] [id number]

[address] [place] [address], [place] [address]

                                         

 

                          [facility]     [address] [id number]

[address] [place] [address], [place] [address]

                                         

 

                          [facility]     [address] [id number]

[address] [place] [address], [place] [address]

                                         

 

                          [facility]     [address] [id number]

[address] [place] [address], [place] [address]

                                         

 

                          [facility]     [address] [id number]

[address] [place] [address], [place] [address]

                                         

 

                          [facility]     [address] [id number]

[address] [place] [address], [place] [address]

                                         

 

                          [facility]     [address] [id number]

[address] [place] [address], [place] [address]

                          20 May, 2014               

 

                          [facility]     [address] [id number]

[address] [place] [address], [place] [address]

                          20 May, 2014               

 

                          [facility]     [address] [id number]

[address] [place] [address], [place] [address]

                                         

 

                          [facility]     [address] [id number]

[address] [place] [address], [place] [address]

                                         

 

                          [facility]     [address] [id number]

[address] [place] [address], [place] [address]

                                         

 

                          [facility]     [address] [id number]

[address] [place] [address], [place] [address]

                                         

 

                          [facility]     [address] [id number]

[address] [place] [address], [place] [address]

                                         

 

                          [facility]     [address] [id number]

[address] [place] [address], [place] [address]

                                         

 

                          [facility]     [address] [id number]

[address] [place] [address], [place] [address]

                          13 Dec, 2013               

 

                          [facility]     [address] [id number]

[address] [place] [address], [place] [address]

                          13 Dec, 2013               

 

                          [facility]     [address] [id number]

[address] [place] [address], [place] [address]

                          13 Dec, 2013               

 

                          [facility]     [address] [id number]

[address] [place] [address], [place] [address]

                          13 Dec, 2013               

 

                          [facility]     [address] [id number]

[address] [place] [address], [place] [address]

                          17 Oct, 2013               

 

                          [facility]     [address] [id number]

[address] [place] [address], [place] [address]

                          17 Oct, 2013               

 

                          [facility]     [address] [id number]

[address] [place] [address], [place] [address]

                          10 Oct, 2013               

 

                          [facility]     [address] [id number]

[address] [place] [address], [place] [address]

                          10 Oct, 2013               

 

                          [facility]     [address] [id number]

[address] [place] [address], [place] [address]

                          04 Oct, 2013               

 

                          [facility]     [address] [id number]

[address] [place] [address], [place] [address]

                          24 Sep, 2013               

 

                          [facility]     [address] [id number]

[address] [place] [address], [place] [address]

                          19 Sep, 2013               

 

                          [facility]     [address] [id number]

[address] [place] [address], [place] [address]

                          16 Sep, 2013               

 

                          [facility]     [address] [id number]

[address] [place] [address], [place] [address]

                          21 Aug, 2013               

 

                          [facility]     [address] [id number]

[address] [place] [address], [place] [address]

                          17 Aug, 2013               

 

                          [facility]     [address] [id number]

[address] [place] [address], [place] [address]

                          16 Aug, 2013               

 

                          [facility]     [address] [id number]

[address] [place] [address], [place] [address]

                          15 Aug, 2013               

 

                          [facility]     [address] [id number]

[address] [place] [address], [place] [address]

                          13 Aug, 2013               

 

                          [facility]     [address] [id number]

[address] [place] [address], [place] [address]

                          13 Aug, 2013               

 

                          [facility]     [address] [id number]

[address] [place] [address], [place] [address]

                          12 Aug, 2013               

 

                          [facility]     [address] [id number]

[address] [place] [address], [place] [address]

                                         

 

                          [facility]     [address] [id number]

[address] [place] [address], [place] [address]

                                         

 

                          [facility]     [address] [id number]

[address] [place] [address], [place] [address]

                                         

 

                          [facility]     [address] [id number]

[address] [place] [address], [place] [address]

                          10 2013               

 

                          [facility]     [address] [id number]

[address] [place] [address], [place] [address]

                          17 May, 2013               

 

                          [facility]     [address] [id number]

[address] [place] [address], [place] [address]

                          10 May, 2013               

 

                          [facility]     [address] [id number]

[address] [place] [address], [place] [address]

                          15 Apr, 2013               

 

                          [facility]     [address] [id number]

[address] [place] [address], [place] [address]

                                         

 

                          [facility]     [address] [id number]

[address] [place] [address], [place] [address]

                                         

 

                          [facility]     [address] [id number]

[address] [place] [address], [place] [address]

                                         

 

                          [facility]     [address] [id number]

[address] [place] [address], [place] [address]

                                         

 

                          [facility]     [address] [id number]

[address] [place] [address], [place] [address]

                                         

 

                          [facility]     [address] [id number]

[address] [place] [address], [place] [address]

                                         

 

                          [facility]     [address] [id number]

[address] [place] [address], [place] [address]

                                         

 

                          [facility]     [address] [id number]

[address] [place] [address], [place] [address]

                          21 Dec, 2012               

 

                          [facility]     [address] [id number]

[address] [place] [address], [place] [address]

                          21 Dec, 2012               

 

                          [facility]     [address] [id number]

[address] [place] [address], [place] [address]

                          14 Dec, 2012               

 

                          [facility]     [address] [id number]

[address] [place] [address], [place] [address]

                          14 Dec, 2012               

 

                          [facility]     [address] [id number]

[address] [place] [address], [place] [address]

                          20 2012               

 

                          [facility]     [address] [id number]

[address] [place] [address], [place] [address]

                          20 2012               

 

                          [facility]     [address] [id number]

[address] [place] [address], [place] [address]

                          16 2012               

 

                          [facility]     [address] [id number]

[address] [place] [address], [place] [address]

                          16 2012               

 

                          [facility]     [address] [id number]

[address] [place] [address], [place] [address]

                          13 2012               

 

                          [facility]     [address] [id number]

[address] [place] [address], [place] [address]

                          13 2012               

 

                          [facility]     [address] [id number]

[address] [place] [address], [place] [address]

                          13 2012               

 

                          [facility]     [address] [id number]

[address] [place] [address], [place] [address]

                                         

 

                          [facility]     [address] [id number]

[address] [place] [address], [place] [address]

                                         

 

                          [facility]     [address] [id number]

[address] [place] [address], [place] [address]

                                         

 

                          [facility]     [address] [id number]

[address] [place] [address], [place] [address]

                          22 Oct, 2012               

 

                          [facility]     [address] [id number]

[address] [place] [address], [place] [address]

                          22 Oct, 2012               

 

                          [facility]     [address] [id number]

[address] [place] [address]

                          22 Oct, 2012               

 

                          [facility]     [address] [id number]

[address] [place] [address], [place] [address]

                          22 Oct, 2012               

 

                          [facility]     [address] [id number]

[address] [place] [address], [place] [address]

                          10 Oct, 2012               

 

                          [facility]     [address] [id number]

[address] [place] [address], [place] [address]

                          10 Oct, 2012               

 

                          [facility]     [address] [id number]

[address] [place] [address], [place] [address]

                          05 Oct, 2012               

 

                          [facility]     [address] [id number]

[address] [place] [address], [place] [address]

                          05 Oct, 2012               

 

                          [facility]     [address] [id number]

[address] [place] [address], [place] [address]

                          07 Sep, 2012               

 

                          [facility]     [address] [id number]

[address] [place] [address], [place] [address]

                          22 Aug, 2012               

 

                          [facility]     [address] [id number]

[address] [place] [address], [place] [address]

                          03 Aug, 2012               

 

                          [facility]     [address] [id number]

[address] [place] [address], [place] [address]

                                         

 

                          [facility]     [address] [id number]

[address] [place] [address], [place] [address]

                                         

 

                          [facility]     [address] [id number]

[address] [place] [address], [place] [address]

                                         

 

                          [facility]     [address] [id number]

[address] [place] [address], [place] [address]

                                         

 

                          [facility]     [address] [id number]

[address] [place] [address], [place] [address]

                                         

 

                          [facility]     [address] [id number]

[address] [place] [address], [place] [address]

                                         

 

                          [facility]     [address] [id number]

[address] [place] [address], [place] [address]

                                         

 

                          [facility]     [address] [id number]

[address] [place] [address], [place] [address]

                          30 May, 2012               

 

                          [facility]     [address] [id number]

[address] [place] [address], [place] [address]

                          30 May, 2012               

 

                          [facility]     [address] [id number]

[address] [place] [address], [place] [address]

                          21 May, 2012               

 

                          [facility]     [address] [id number]

[address] [place] [address], [place] [address]

                          14 May, 2012               

 

                          [facility]     [address] [id number]

[address] [place] [address], [place] [address]

                          04 May, 2012               

 

                          [facility]     [address] [id number]

[address] [place] [address], [place] [address]

                          04 May, 2012               

 

                          [facility]     [address] [id number]

[address] [place] [address], [place] [address]

                          04 May, 2012               

 

                          [facility]     [address] [id number]

[address] [place] [address], [place] [address]

                                         

 

                          [facility]     [address] [id number]

[address] [place] [address], [place] [address]

                                         

 

                          [facility]     [address] [id number]

[address] [place] [address], [place] [address]

                          23 Mar, 2012               

 

                          [facility]     [address] [id number]

[address] [place] [address], [place] [address]

                          15 Mar, 2012               

 

                          [facility]     [address] [id number]

[address] [place] [address], [place] [address]

                          13 Mar, 2012               

 

                          [facility]     [address] [id number]

[address] [place] [address], [place] [address]

                          12 Mar, 2012               

 

                          [facility]     [address] [id number]

[address] [place] [address], [place] [address]

                                         

 

                          [facility]     [address] [id number]

[address] [place] [address], [place] [address]

                                         

 

                          [facility]     [address] [id number]

[address] [place] [address], [place] [address]

                                         

 

                          [facility]     [address] [id number]

[address] [place] [address], [place] [address]

                                         

 

                          [facility]     [address] [id number]

[address] [place] [address], [place] [address]

                          29 Dec, 2011               

 

                          [facility]     [address] [id number]

[address] [place] [address], [place] [address]

                          29 Dec, 2011               

 

                          [facility]     [address] [id number]

[address] [place] [phone]

                          21 Dec, 2011               

 

                          [facility]     [address] [facility] [id number]

[address] [place] [phone]

                                         

 

                          [facility]     [address] [facility] [id number]

[address] [place] [address]

                          22 Oct, 2011               

 

                          [facility]     [address] [facility] [id number]

[address] [place] [phone]

                          21 Oct, 2011               







IMMUNIZATIONS

No Known Immunizations



SOCIAL HISTORY

Never Assessed



REASON FOR VISIT





PLAN OF CARE





VITAL SIGNS





MEDICATIONS

Unknown Medications



RESULTS

No Results



PROCEDURES





                Procedure       Date Ordered    Result          Body Site

 

                PSYTX PT&/FAMILY 45 MINUTES 2015                     







INSTRUCTIONS





MEDICATIONS ADMINISTERED

No Known Medications



MEDICAL (GENERAL) HISTORY





                    Type                Description         Date

 

                    Medical History     depression           

 

                    Medical History     hyperlipidemia       

 

                    Hospitalization History staph in right leg

## 2020-08-05 ENCOUNTER — HOSPITAL ENCOUNTER (EMERGENCY)
Dept: HOSPITAL 75 - ER | Age: 65
Discharge: HOME | End: 2020-08-05
Payer: OTHER GOVERNMENT

## 2020-08-05 DIAGNOSIS — Z48.02: Primary | ICD-10-CM

## 2020-08-05 NOTE — XMS REPORT
[facility]

                             Created on: 2020



Leonard Ignacio

External Reference #: [id number]

: 1955

Sex: Male



Demographics





                          Address                   [address] [place]  [address]

 

                          Preferred Language        Unknown

 

                          Marital Status            Unknown

 

                          Druze Affiliation     Unknown

 

                          Race                      Unknown

 

                          Ethnic Group              Unknown





Author





                          Author                    Ignacio Gomes Doctor

 

                          Organization              [facility]

 

                          Address                   Unknown

 

                          Phone                     Unavailable







Care Team Providers





                    Care Team Member Name Role                Phone

 

                    Migration,  Doctor  Unavailable         Unavailable







PROBLEMS





          Type      Condition ICD9-CM Code QEU44-HH Code Onset Dates Condition S

tatus SNOMED 

Code

 

                          Problem                   Nonspecific elevation of lev

els of transaminase or lactic acid 

dehydrogenase (LDH) 790.4                                  Active       [id number]

2

 

           Problem    Encounter for long-term (current) use of other medications

 V58.69                           

Active                                  [id number]

 

          Problem   Edema     782.3                         Active    [id number]

 

          Problem   Spontaneous ecchymoses 782.7                         Active 

   [id number]

 

          Problem   Trigger finger (acquired) 727.03                        Acti

ve    7370997

 

          Problem   Varicose veins of lower extremities with inflammation 454.1 

                        Active    

31719935

 

           Problem    Persistent disorder of initiating or maintaining sleep 307

.42                           Active

                                        [id number]

 

          Problem   Pityriasis versicolor 111.0                         Active  

  71373074

 

           Problem    Unspecified local infection of skin and subcutaneous tissu

e 686.9                            

Active                                  [id number]

 

          Problem   Unspecified viral hepatitis C without hepatic coma 070.70   

                     Active    

56549872

 

          Problem   Vascular disorder of skin 709.1                         Acti

ve    70907151

 

          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       

                 Active    

60950711

 

          Problem   Anxiety state, unspecified 300.00                        Act

kathia    682296458

 

          Problem   Other and unspecified hyperlipidemia 272.4                  

       Active    15682210

 

          Problem   Major depressive disorder, recurrent episode, mild 296.31   

                     Active    

30671929







ALLERGIES

No Information



ENCOUNTERS





                Encounter       Location        Date            Diagnosis

 

                          [facility]   [address] [facility] [id number]

[address] [place] [id number]

                          15 Jesse, 2016              Encounter for other specifie

d administrative purpose Z02.89

 

                          [facility]   [address] [facility] [id number]

[address] [place] [id number]

                          12 May, 2016              Dental examination Z01.20 an

d Dental caries K02.9

 

                          [facility]     [address] [facility] [id number]

[address] [place] [address]

                          14 Aug, 2015              Edema 782.3 and Family histo

ry of coronary artery disease V17.3

 

                          [facility]     [address] [id number]

[address] [place] [address]

                          07 Aug, 2015              Edema 782.3 and Family histo

ry of coronary artery disease V17.3

 

                          [facility]   [address] [id number]

[address] [place] [id number]

                                        Dental examination V72.2

 

                          [facility]     [address] [id number]

[address] [place] [address]

                                         

 

                          [facility]     [address] [id number]

[address] [place] [address]

                                         

 

                          [facility]     [address] [id number]

[address] [place] [phone]

                          20 Mar, 2015               

 

                          [facility]     [address] [id number]

[address] [place] [address]

                          20 Mar, 2015               

 

                          [facility]     [address] [id number]

[address] [place] [address]

                                         

 

                          [facility]     [address] [id number]

[address] [place] [address]

                                         

 

                          [facility]     [address] [id number]

[address] [place] [address]

                                         

 

                          [facility]     [address] [id number]

[address] [place] [address]

                                         

 

                          [facility]     [address] [id number]

[address] [place] [address]

                          11 Dec, 2014               

 

                          [facility]     [address] [id number]

[address] [place] [address]

                          11 Dec, 2014               

 

                          [facility]     [address] [id number]

[address] [place] [address]

                                         

 

                          [facility]     [address] [id number]

[address] [place] [address]

                                         

 

                          [facility]     [address] [id number]

[address] [place] [address], [place] [address]

                          30 Oct, 2014               

 

                          [facility]     [address] [id number]

[address] [place] [address], [place] [address]

                          30 Oct, 2014               

 

                          [facility]     [address] [id number]

[address] [place] [address], [place] [address]

                          10 Sep, 2014               

 

                          [facility]     [address] [id number]

[address] [place] [address], [place] [address]

                          09 Sep, 2014               

 

                          [facility]     [address] [id number]

[address] [place] [address], [place] [address]

                          09 Sep, 2014               

 

                          [facility]     [address] [id number]

[address] [place] [address], [place] [address]

                          22 Aug, 2014               

 

                          [facility]     [address] [id number]

[address] [place] [address], [place] [address]

                          22 Aug, 2014               

 

                          [facility]     [address] [id number]

[address] [place] [address], [place] [address]

                          22 Aug, 2014               

 

                          [facility]     [address] [id number]

[address] [place] [address], [place] [address]

                                         

 

                          [facility]     [address] [id number]

[address] [place] [address], [place] [address]

                                         

 

                          [facility]     [address] [id number]

[address] [place] [address], [place] [address]

                                         

 

                          [facility]     [address] [id number]

[address] [place] [address], [place] [address]

                                         

 

                          [facility]     [address] [id number]

[address] [place] [address], [place] [address]

                                         

 

                          [facility]     [address] [id number]

[address] [place] [address], [place] [address]

                                         

 

                          [facility]     [address] [id number]

[address] [place] [address], [place] [address]

                                         

 

                          [facility]     [address] [id number]

[address] [place] [address], [place] [address]

                                         

 

                          [facility]     [address] [id number]

[address] [place] [address], [place] [address]

                          20 May, 2014               

 

                          [facility]     [address] [id number]

[address] [place] [address], [place] [address]

                          20 May, 2014               

 

                          [facility]     [address] [id number]

[address] [place] [address], [place] [address]

                                         

 

                          [facility]     [address] [id number]

[address] [place] [address], [place] [address]

                                         

 

                          [facility]     [address] [id number]

[address] [place] [address], [place] [address]

                                         

 

                          [facility]     [address] [id number]

[address] [place] [address], [place] [address]

                                         

 

                          [facility]     [address] [id number]

[address] [place] [address], [place] [address]

                                         

 

                          [facility]     [address] [id number]

[address] [place] [address], [place] [address]

                                         

 

                          [facility]     [address] [id number]

[address] [place] [address], [place] [address]

                          13 Dec, 2013               

 

                          [facility]     [address] [id number]

[address] [place] [address], [place] [address]

                          13 Dec, 2013               

 

                          [facility]     [address] [id number]

[address] [place] [address], [place] [address]

                          13 Dec, 2013               

 

                          [facility]     [address] [id number]

[address] [place] [address], [place] [address]

                          13 Dec, 2013               

 

                          [facility]     [address] [id number]

[address] [place] [address], [place] [address]

                          17 Oct, 2013               

 

                          [facility]     [address] [id number]

[address] [place] [address], [place] [address]

                          17 Oct, 2013               

 

                          [facility]     [address] [id number]

[address] [place] [address], [place] [address]

                          10 Oct, 2013               

 

                          [facility]     [address] [id number]

[address] [place] [address] [place] [address]

                          10 Oct, 2013               

 

                          [facility]     [address] [id number]

[address] [place] [address], [place] [address]

                          04 Oct, 2013               

 

                          [facility]     [address] [id number]

[address] [place] [address], [place] [address]

                          24 Sep, 2013               

 

                          [facility]     [address] [id number]

[address] [place] [address], [place] [address]

                          19 Sep, 2013               

 

                          [facility]     [address] [id number]

[address] [place] [address], [place] [address]

                          16 Sep, 2013               

 

                          [facility]     [address] [id number]

[address] [place] [address], [place] [address]

                          21 Aug, 2013               

 

                          [facility]     [address] [id number]

[address] [place] [address], [place] [address]

                          17 Aug, 2013               

 

                          [facility]     [address] [id number]

[address] [place] [address], [place] [address]

                          16 Aug, 2013               

 

                          [facility]     [address] [id number]

[address] [place] [address], [place] [address]

                          15 Aug, 2013               

 

                          [facility]     [address] [id number]

[address] [place] [address], [place] [address]

                          13 Aug, 2013               

 

                          [facility]     [address] [id number]

[address] [place] [address], [place] [address]

                          13 Aug, 2013               

 

                          [facility]     [address] [id number]

[address] [place] [address], [place] [address]

                          12 Aug, 2013               

 

                          [facility]     [address] [id number]

[address] [place] [address], [place] [address]

                                         

 

                          [facility]     [address] [id number]

[address] [place] [address], [place] [address]

                                         

 

                          [facility]     [address] [id number]

[address] [place] [address], [place] [address]

                                         

 

                          [facility]     [address] [id number]

[address] [place] [address], [place] [address]

                          10 Jesse, 2013               

 

                          [facility]     [address] [id number]

[address] [place] [address], [place] [address]

                          17 May, 2013               

 

                          [facility]     [address] [id number]

[address] [place] [address], [place] [address]

                          10 May, 2013               

 

                          [facility]     [address] [id number]

[address] [place] [address], [place] [address]

                          15 Apr, 2013               

 

                          [facility]     [address] [id number]

[address] [place] [address], [place] [address]

                                         

 

                          [facility]     [address] [id number]

[address] [place] [address], [place] [address]

                                         

 

                          [facility]     [address] [id number]

[address] [place] [address], [place] [address]

                                         

 

                          [facility]     [address] [id number]

[address] [place] [address], [place] [address]

                                         

 

                          [facility]     [address] [id number]

[address] [place] [address], [place] [address]

                                         

 

                          [facility]     [address] [id number]

[address] [place] [address], [place] [address]

                                         

 

                          [facility]     [address] [id number]

[address] [place] [address], [place] [address]

                                         

 

                          [facility]     [address] [id number]

[address] [place] [address], [place] [address]

                          21 Dec, 2012               

 

                          [facility]     [address] [id number]

[address] [place] [address], [place] [address]

                          21 Dec, 2012               

 

                          [facility]     [address] [id number]

[address] [place] [address], [place] [address]

                          14 Dec, 2012               

 

                          [facility]     [address] [id number]

[address] [place] [address], [place] [address]

                          14 Dec, 2012               

 

                          [facility]     [address] [id number]

[address] [place] [address], [place] [address]

                                         

 

                          [facility]     [address] [id number]

[address] [address]

                          20 2012               

 

                          [facility]     [address] [id number]

[address] [place] [address], [place] [address]

                          16 2012               

 

                          [facility]     [address] [id number]

[address] [place] [address], [place] [address]

                          16 2012               

 

                          [facility]     [address] [id number]

[address] [place] [address], [place] [address]

                          13 2012               

 

                          [facility]     [address] [id number]

[address] [place] [address]

                          13 2012               

 

                          [facility]     [address] [id number]

[address] [place] [address], [place] [address]

                          13 2012               

 

                          [facility]     [address] [id number]

[address] [place] [address]

                          13 2012               

 

                          [facility]     [address] [id number]

[address] [place] [address], [place] [address]

                                         

 

                          [facility]     [address] [id number]

[address] [place] [address]

                                         

 

                          [facility]     [address] [id number]

[address] [place] [address], [place] [address]

                          22 Oct, 2012               

 

                          [facility]     [address] [id number]

[address] [place] [address], [place] [address]

                          22 Oct, 2012               

 

                          [facility]     [address] [id number]

[address] [place] [address]

                          22 Oct, 2012               

 

                          [facility]     [address] [id number]

[address] [place] [address]

                          22 Oct, 2012               

 

                          [facility]     [address] [id number]

[address] [place] [address], [place] [address]

                          10 Oct, 2012               

 

                          [facility]     [address] [id number]

[address] [place] [address]

                          10 Oct, 2012               

 

                          [facility]     [address] [id number]

[address] [place] [address]

                          05 Oct, 2012               

 

                          [facility]     [address] [id number]

[address] [place] [address], [place] [address]

                          05 Oct, 2012               

 

                          [facility]     [address] [id number]

[address] [place] [address], [place] [address]

                          07 Sep, 2012               

 

                          [facility]     [address] [id number]

[address] [place] [address], [place] [address]

                          22 Aug, 2012               

 

                          [facility]     [address] [id number]

[address] [place] [address], [place] [address]

                          03 Aug, 2012               

 

                          [facility]     [address] [id number]

[address] [place] [address], [place] [address]

                                         

 

                          [facility]     [address] [id number]

[address] [place] [address], [place] [address]

                                         

 

                          [facility]     [address] [id number]

[address] [place] [address], [place] [address]

                                         

 

                          [facility]     [address] [id number]

[address] [place] [address], [place] [address]

                                         

 

                          [facility]     [address] [id number]

[address] [place] [address], [place] [address]

                                         

 

                          [facility]     [address] [id number]

[address] [place] [address], [place] [address]

                                         

 

                          [facility]     [address] [id number]

[address] [place] [address], [place] [address]

                                         

 

                          [facility]     [address] [id number]

[address] [place] [address], [place] [address]

                          30 May, 2012               

 

                          [facility]     [address] [id number]

[address] [place] [address], [place] [address]

                          30 May, 2012               

 

                          [facility]     [address] [id number]

[address] [place] [address], [place] [address]

                          21 May, 2012               

 

                          [facility]     [address] [id number]

[address] [place] [address], [place] [address]

                          14 May, 2012               

 

                          [facility]     [address] [id number]

[address] [place] [address], [place] [address]

                          04 May, 2012               

 

                          [facility]     [address] [id number]

[address] [place] [address], [place] [address]

                          04 May, 2012               

 

                          [facility]     [address] [id number]

[address] [place] [address], [place] [address]

                          04 May, 2012               

 

                          [facility]     [address] [id number]

[address] [place] [address], [place] [address]

                                         

 

                          [facility]     [address] [id number]

[address] [place] [address], [place] [address]

                                         

 

                          [facility]     [address] [id number]

[address] [place] [address], [place] [address]

                          23 Mar, 2012               

 

                          [facility]     [address] [id number]

[address] [place] [address], [place] [address]

                          15 Mar, 2012               

 

                          [facility]     [address] [id number]

[address] [place] [address], [place] [address]

                          13 Mar, 2012               

 

                          [facility]     [address] [id number]

[address] [place] [address], [place] [address]

                          12 Mar, 2012               

 

                          [facility]     [address] [id number]

[address] [place] [address], [place] [address]

                                         

 

                          [facility]     [address] [id number]

[address] [place] [address], [place] [address]

                                         

 

                          [facility]     [address] [id number]

[address] [place] [address], [place] [address]

                                         

 

                          [facility]     [address] [id number]

[address] [place] [address], [place] [address]

                                         

 

                          [facility]     [address] [id number]

[address] [place] [address], [place] [address]

                          29 Dec, 2011               

 

                          [facility]     [address] [id number]

[address] [place] [address], [place] [address]

                          29 Dec, 2011               

 

                          [facility]     [address] [id number]

[address] [place] [address], [place] [address]

                          21 Dec, 2011               

 

                          [facility]     [address] [id number]

[address] [place] [address]

                                         

 

                          [facility]     [address] [facility] [id number]

[address] [place] [address]

                          22 Oct, 2011               

 

                          [facility]     [address] [facility] [id number]

[address] [place] [address]

                          21 Oct, 2011               







IMMUNIZATIONS

No Known Immunizations



SOCIAL HISTORY

Never Assessed



REASON FOR VISIT





PLAN OF CARE





VITAL SIGNS





MEDICATIONS

Unknown Medications



RESULTS

No Results



PROCEDURES

No Known procedures



INSTRUCTIONS





MEDICATIONS ADMINISTERED

No Known Medications



MEDICAL (GENERAL) HISTORY





                    Type                Description         Date

 

                    Medical History     depression           

 

                    Medical History     hyperlipidemia       

 

                    Hospitalization History staph in right leg

## 2020-08-05 NOTE — XMS REPORT
Continuity of Care Document

                             Created on: 2020



ROGERIO RODRIGUEZ

External Reference #: [id number]

: 1955

Sex: Male



Demographics





                          Address                   [address] [place] [address]

 

                          Home Phone                [phone] x

 

                          Preferred Language        Unknown

 

                          Marital Status            Unknown

 

                          Gnosticist Affiliation     Unknown

 

                          Race                      Unknown

 

                          Ethnic Group              Unknown





Author





                          Organization              Unknown

 

                          Address                   Unknown

 

                          Phone                     Unavailable



              



Allergies

      



             Active           Description           Code           Type         

  Severity   

                Reaction           Onset           Reported/Identified          

 

Relationship to Patient                 Clinical Status        

 

                Yes             No Known Drug Allergies           [id number]    

       Drug 

Allergy           Unknown           N/A                             2019  

      

                                                             



                  



Medications

      



There is no data.                  



Problems

      



             Date Dx Coded           Attending           Type           Code    

       

Diagnosis                               Diagnosed By        

 

                10/06/2011           DARIAN FARRAR PHD                      

     728.71         

                          Plantar Fascial Fibromatosis                    

 

             10/06/2011           RASHI DIAMOND DO                        728

.71           

Plantar Fascial Fibromatosis                     

 

                10/06/2011           DARIAN FARRAR PHD                      

     728.71         

                          Plantar Fascial Fibromatosis                    

 

             10/06/2011           ESSIE MCMANUS DO                        728.71

           

Plantar Fascial Fibromatosis                     

 

             10/06/2011                                     728.71           [facility]

ntar Fascial 

Fibromatosis                                     

 

             10/06/2011                                     728.71           [facility]

ntar Fascial 

Fibromatosis                                     

 

             10/06/2011                                     728.71           [facility]

ntar Fascial 

Fibromatosis                                     

 

             10/06/2011                                     728.71           [facility]

ntar Fascial 

Fibromatosis                                     

 

             10/06/2011                                     728.71           [facility]

ntar Fascial 

Fibromatosis                                     

 

                10/06/2011           KYLAH MELGAR PA-C                      

     728.71         

                          Plantar Fascial Fibromatosis                    

 

                10/06/2011           DARIAN FARRAR PHD                      

     728.71         

                          Plantar Fascial Fibromatosis                    

 

             10/06/2011           ESSIE MCMANUS DO                        728.71

           

Plantar Fascial Fibromatosis                     

 

                10/06/2011           DARIAN FARRAR PHD                      

     728.71         

                          Plantar Fascial Fibromatosis                    

 

                10/06/2011           EDWARD REYNAGA                   

        728.71      

                          Plantar Fascial Fibromatosis                    

 

                10/06/2011           DARIAN FARRAR PHD                      

     728.71         

                          Plantar Fascial Fibromatosis                    

 

                10/06/2011           DARIAN FARRAR PHD                      

     728.71         

                          Plantar Fascial Fibromatosis                    

 

                10/06/2011           DARIAN FARRAR PHD                      

     728.71         

                          Plantar Fascial Fibromatosis                    

 

                10/06/2011           EDWARD REYNAGA                   

        728.71      

                          Plantar Fascial Fibromatosis                    

 

                10/06/2011           DARIAN FARRAR PHD                      

     728.71         

                          Plantar Fascial Fibromatosis                    

 

             10/06/2011           ESSIE MCMANUS DO                        728.71

           

Plantar Fascial Fibromatosis                     

 

             10/06/2011           VERONICA APRN, JEFFREY                        728

.71           

Plantar Fascial Fibromatosis                     

 

                10/06/2011           DARIAN FARRAR PHD                      

     728.71         

                          Plantar Fascial Fibromatosis                    

 

             10/06/2011           VERONICA APRN, JEFFREY                        728

.71           

Plantar Fascial Fibromatosis                     

 

                10/06/2011           DARIAN FARRAR PHD                      

     728.71         

                          Plantar Fascial Fibromatosis                    

 

             10/21/2011           DARIAN FARRAR PHD                        3

11           

DEPRESSIVE DISORDER NOT ELSEWHERE CLASSIFIED                    

 

                10/21/2011           DARIAN FARRAR PHD                      

     729.5          

                          PAIN IN LIMB                       

 

             10/21/2011           RASHI DIAMOND DO F                        311

           

DEPRESSIVE DISORDER NOT ELSEWHERE CLASSIFIED                    

 

             10/21/2011           RASHI DIAMOND DO F                        729

.5           

PAIN IN LIMB                                     

 

             10/21/2011           DARIAN FARRAR PHD                        3

11           

DEPRESSIVE DISORDER NOT ELSEWHERE CLASSIFIED                    

 

                10/21/2011           DARIAN FARRAR PHD                      

     729.5          

                          PAIN IN LIMB                       

 

             10/21/2011           ESSIE MCMANUS DO                        311   

        

DEPRESSIVE DISORDER NOT ELSEWHERE CLASSIFIED                    

 

             10/21/2011           ESSIE MCMANUS DO                        729.5 

          Pain

In Limb                                          

 

             10/21/2011                                     311           DEPRES

SIVE DISORDER 

NOT ELSEWHERE CLASSIFIED                         

 

             10/21/2011                                     729.5           Pain

 In Limb       

                                                 

 

             10/21/2011                                     311           DEPRES

SIVE DISORDER 

NOT ELSEWHERE CLASSIFIED                         

 

             10/21/2011                                     729.5           Pain

 In Limb       

                                                 

 

             10/21/2011                                     311           DEPRES

SIVE DISORDER 

NOT ELSEWHERE CLASSIFIED                         

 

             10/21/2011                                     729.5           Pain

 In Limb       

                                                 

 

             10/21/2011                                     311           DEPRES

SIVE DISORDER 

NOT ELSEWHERE CLASSIFIED                         

 

             10/21/2011                                     729.5           Pain

 In Limb       

                                                 

 

             10/21/2011                                     311           DEPRES

SIVE DISORDER 

NOT ELSEWHERE CLASSIFIED                         

 

             10/21/2011                                     729.5           Pain

 In Limb       

                                                 

 

             10/21/2011           KYLAH MELGAR PA-C                        3

11           

DEPRESSIVE DISORDER NOT ELSEWHERE CLASSIFIED                    

 

                10/21/2011           KYLAH MELGAR PA-C                      

     729.5          

                          Pain In Limb                       

 

             10/21/2011           DARIAN FARRAR PHD                        3

11           

DEPRESSIVE DISORDER NOT ELSEWHERE CLASSIFIED                    

 

                10/21/2011           DARIAN FARRAR PHD                      

     729.5          

                          Pain In Limb                       

 

             10/21/2011           MCMANUS DO, ESSIE K                        311   

        

DEPRESSIVE DISORDER NOT ELSEWHERE CLASSIFIED                    

 

             10/21/2011           MCMANUS DO, ESSIE K                        729.5 

          Pain

In Limb                                          

 

             10/21/2011           DARIAN FARRAR PHD                        3

11           

DEPRESSIVE DISORDER NOT ELSEWHERE CLASSIFIED                    

 

                10/21/2011           DARIAN FARRAR PHD A                      

     729.5          

                          Pain In Limb                       

 

                10/21/2011           EDWARD REYNAGA                   

        311         

                          DEPRESSIVE DISORDER NOT ELSEWHERE CLASSIFIED          

          

 

                10/21/2011           EDWARD REYNAGA                   

        729.5       

                          Pain In Limb                       

 

             [facility]21/2011           [facility]                        3

11           

DEPRESSIVE DISORDER NOT ELSEWHERE CLASSIFIED                    

 

                10/21/2011           DARIAN FARRAR PHD A                      

     729.5          

                          Pain In Limb                       

 

             10/21/2011           DARIAN FARRAR PHD                        3

11           

DEPRESSIVE DISORDER NOT ELSEWHERE CLASSIFIED                    

 

                10/21/2011           DARIAN FARRAR PHD                      

     729.5          

                          Pain In Limb                       

 

             10/21/2011           DARIAN FARRAR PHD                        3

11           

DEPRESSIVE DISORDER NOT ELSEWHERE CLASSIFIED                    

 

                10/21/2011           DARIAN FARRAR PHD                      

     729.5          

                          Pain In Limb                       

 

                10/21/2011           EDWARD REYNAGA                   

        311         

                          DEPRESSIVE DISORDER NOT ELSEWHERE CLASSIFIED          

          

 

                10/21/2011           EDWARD REYNAGA                   

        729.5       

                          Pain In Limb                       

 

             10/21/2011           DARIAN FARRAR PHD                        3

11           

DEPRESSIVE DISORDER NOT ELSEWHERE CLASSIFIED                    

 

                10/21/2011           DARIAN FARRAR PHD                      

     729.5          

                          Pain In Limb                       

 

             10/21/2011           MCMANUS DO, ESSIE K                        311   

        

DEPRESSIVE DISORDER NOT ELSEWHERE CLASSIFIED                    

 

             10/21/2011           MCMANUS DO ESSIE K                        729.5 

          Pain

In Limb                                          

 

             10/21/2011           VERONICA APRN, JEFFREY                        311

           

DEPRESSIVE DISORDER NOT ELSEWHERE CLASSIFIED                    

 

             10/21/2011           VERONICA APRN, JEFFREY                        729

.5           

Pain In Limb                                     

 

             10/21/2011           DARIAN FARRAR PHD A                        3

11           

DEPRESSIVE DISORDER NOT ELSEWHERE CLASSIFIED                    

 

                10/21/2011           DARIAN FARRAR PHD A                      

     729.5          

                          Pain In Limb                       

 

             10/21/2011           VERONICA APRN, JEFFREY                        311

           

DEPRESSIVE DISORDER NOT ELSEWHERE CLASSIFIED                    

 

             10/21/2011           VERONICA APRN, JEFFREY                        729

.5           

Pain In Limb                                     

 

             10/21/2011           DARIAN FARRAR PHD A                        3

11           

DEPRESSIVE DISORDER NOT ELSEWHERE CLASSIFIED                    

 

                10/21/2011           DARIAN FARRAR PHD A                      

     729.5          

                          Pain In Limb                       

 

                10/22/2011           DARIAN FARRAR PHD                      

     296.30         

                          MO DEPRESSIVE RECURRENT UNSPECIFIED                   

 

 

                10/22/2011           LENI YAN, DARIAN JARA                      

     304.80         

                          SA POLYSUB DEP                     

 

                10/22/2011           DARIAN FARRAR PHD                      

     307.47         

                          SI DYSSOMNIA NOS                    

 

             10/22/2011           RASHI DIAMOND DO F                        296

.30           

MO DEPRESSIVE RECURRENT UNSPECIFIED                    

 

             10/22/2011           LOBO DO, RASHI F                        304

.80           

SA POLYSUB DEP                                   

 

             10/22/2011           LOBO CABRAL, RASHI F                        307

.47           

SI DYSSOMNIA NOS                                 

 

                10/22/2011           DARIAN FARRAR PHD                      

     296.30         

                          MO DEPRESSIVE RECURRENT UNSPECIFIED                   

 

 

                10/22/2011           LENI YAN, DARIAN JARA                      

     304.80         

                          SA POLYSUB DEP                     

 

                10/22/2011           DARIAN FARRAR PHD                      

     307.47         

                          SI DYSSOMNIA NOS                    

 

             10/22/2011           MCMANUS DO, ESSIE K                        296.30

           MO 

DEPRESSIVE RECURRENT UNSPECIFIED                    

 

             10/22/2011           MCMANUS DO, ESSIE K                        304.80

           SA 

POLYSUB DEP                                      

 

             10/22/2011           MCMANUS DO, ESSIE K                        307.47

           SI 

DYSSOMNIA NOS                                    

 

             10/22/2011                                     296.30           MO 

DEPRESSIVE 

RECURRENT UNSPECIFIED                            

 

             10/22/2011                                     304.80           SA 

POLYSUB DEP    

                                                 

 

             10/22/2011                                     307.47           SI 

DYSSOMNIA NOS  

                                                 

 

             10/22/2011                                     296.30           MO 

DEPRESSIVE 

RECURRENT UNSPECIFIED                            

 

             10/22/2011                                     304.80           SA 

POLYSUB DEP    

                                                 

 

             10/22/2011                                     307.47           SI 

DYSSOMNIA NOS  

                                                 

 

             10/22/2011                                     296.30           MO 

DEPRESSIVE 

RECURRENT UNSPECIFIED                            

 

             10/22/2011                                     304.80           SA 

POLYSUB DEP    

                                                 

 

             10/22/2011                                     307.47           SI 

DYSSOMNIA NOS  

                                                 

 

             10/22/2011                                     296.30           MO 

DEPRESSIVE 

RECURRENT UNSPECIFIED                            

 

             10/22/2011                                     304.80           SA 

POLYSUB DEP    

                                                 

 

             10/22/2011                                     307.47           SI 

DYSSOMNIA NOS  

                                                 

 

             10/22/2011                                     296.30           MO 

DEPRESSIVE 

RECURRENT UNSPECIFIED                            

 

             10/22/2011                                     304.80           SA 

POLYSUB DEP    

                                                 

 

             10/22/2011                                     307.47           SI 

DYSSOMNIA NOS  

                                                 

 

                10/22/2011           KYLAH MELGAR PA-C                      

     296.30         

                          MO DEPRESSIVE RECURRENT UNSPECIFIED                   

 

 

                10/22/2011           KYLAH MELGAR PA-C                      

     304.80         

                          SA POLYSUB DEP                     

 

                10/22/2011           KYLAH MELGAR PA-C                      

     307.47         

                          SI DYSSOMNIA NOS                    

 

                10/22/2011           DARIAN FARRAR PHD                      

     296.30         

                          MO DEPRESSIVE RECURRENT UNSPECIFIED                   

 

 

                10/22/2011           DARIAN FARRAR PHD                      

     304.80         

                          SA POLYSUB DEP                     

 

                10/22/2011           DARIAN FARRAR PHD                      

     307.47         

                          SI DYSSOMNIA NOS                    

 

             10/22/2011           MCMANUS DO, ESSIE K                        296.30

           MO 

DEPRESSIVE RECURRENT UNSPECIFIED                    

 

             10/22/2011           MCMANUS DO, ESSIE K                        304.80

           SA 

POLYSUB DEP                                      

 

             10/22/2011           MCMANUS DO, ESSIE K                        307.47

           SI 

DYSSOMNIA NOS                                    

 

                10/22/2011           DARIAN FARRAR PHD                      

     296.30         

                          MO DEPRESSIVE RECURRENT UNSPECIFIED                   

 

 

                10/22/2011           DARIAN FARRAR PHD                      

     304.80         

                          SA POLYSUB DEP                     

 

                10/22/2011           DARIAN FARRAR PHD                      

     307.47         

                          SI DYSSOMNIA NOS                    

 

                10/22/2011           VENKATA WADDELL EDWARD RODERICK                   

        296.30      

                          MO DEPRESSIVE RECURRENT UNSPECIFIED                   

 

 

                10/22/2011           EDWARD REYNAGA                   

        304.80      

                          SA POLYSUB DEP                     

 

                10/22/2011           EDWARD REYNAGA                   

        307.47      

                          SI DYSSOMNIA NOS                    

 

                10/22/2011           DARIAN FARRAR PHD                      

     296.30         

                          MO DEPRESSIVE RECURRENT UNSPECIFIED                   

 

 

                10/22/2011           DARIAN FARRAR PHD                      

     304.80         

                          SA POLYSUB DEP                     

 

                10/22/2011           DARIAN FARRAR PHD                      

     307.47         

                          SI DYSSOMNIA NOS                    

 

                10/22/2011           DARIAN FARRAR PHD                      

     296.30         

                          MO DEPRESSIVE RECURRENT UNSPECIFIED                   

 

 

                10/22/2011           DARIAN FARRAR PHD                      

     304.80         

                          SA POLYSUB DEP                     

 

                10/22/2011           DARIAN FARRAR PHD                      

     307.47         

                          SI DYSSOMNIA NOS                    

 

                10/22/2011           DARIAN FARRAR PHD                      

     296.30         

                          MO DEPRESSIVE RECURRENT UNSPECIFIED                   

 

 

                10/22/2011           DARIAN FARRAR PHD                      

     304.80         

                          SA POLYSUB DEP                     

 

                10/22/2011           DARIAN FARRAR PHD                      

     307.47         

                          SI DYSSOMNIA NOS                    

 

                10/22/2011           VENKATA WADDELL EDWARD RIVERAH                   

        296.30      

                          MO DEPRESSIVE RECURRENT UNSPECIFIED                   

 

 

                10/22/2011           EDWARD REYNAGA                   

        304.80      

                          SA POLYSUB DEP                     

 

                10/22/2011           EDWARD REYNAGA                   

        307.47      

                          SI DYSSOMNIA NOS                    

 

                10/22/2011           DARIAN FARRAR PHD                      

     296.30         

                          MO DEPRESSIVE RECURRENT UNSPECIFIED                   

 

 

                10/22/2011           DARIAN FARRAR PHD                      

     304.80         

                          SA POLYSUB DEP                     

 

                10/22/2011           DARIAN FARRAR PHD                      

     307.47         

                          SI DYSSOMNIA NOS                    

 

             10/22/2011           ESSIE MCMANUS DO                        296.30

           MO 

DEPRESSIVE RECURRENT UNSPECIFIED                    

 

             10/22/2011           MCMANUS DOESSIE K                        304.80

           SA 

POLYSUB DEP                                      

 

             10/22/2011           MCMANUS DOESSIE K                        307.47

           SI 

DYSSOMNIA NOS                                    

 

             10/22/2011           VERONICA APRN, JEFFREY                        296

.30           

MO DEPRESSIVE RECURRENT UNSPECIFIED                    

 

             10/22/2011           VERONICA APRN, JEFFREY                        304

.80           

SA POLYSUB DEP                                   

 

             10/22/2011           VERONICA APRN, JEFFREY                        307

.47           

SI DYSSOMNIA NOS                                 

 

                10/22/2011           DARIAN FARRAR PHD                      

     296.30         

                          MO DEPRESSIVE RECURRENT UNSPECIFIED                   

 

 

                10/22/2011           DARIAN FARRAR PHD                      

     304.80         

                          SA POLYSUB DEP                     

 

                10/22/2011           DARIAN FARRAR PHD                      

     307.47         

                          SI DYSSOMNIA NOS                    

 

             10/22/2011           VERONICA APRN, JEFFREY                        296

.30           

MO DEPRESSIVE RECURRENT UNSPECIFIED                    

 

             10/22/2011           VERONICA APRN, JEFFREY                        304

.80           

SA POLYSUB DEP                                   

 

             10/22/2011           VERONICA APRN, JEFFREY                        307

.47           

SI DYSSOMNIA NOS                                 

 

                10/22/2011           DARIAN FARRAR PHD                      

     296.30         

                          MO DEPRESSIVE RECURRENT UNSPECIFIED                   

 

 

                10/22/2011           DARIAN FARRAR PHD                      

     304.80         

                          SA POLYSUB DEP                     

 

                10/22/2011           DARIAN FARRAR PHD                      

     307.47         

                          SI DYSSOMNIA NOS                    

 

                2011           DARIAN FARRAR PHD                      

     296.32         

                          MO DEPRESSIVE RECURRENT MODERATE                    

 

             2011           RASHI DIAMOND DO                        296

.32           

MO DEPRESSIVE RECURRENT MODERATE                    

 

                2011           DARIAN FARRAR PHD                      

     296.32         

                          MO DEPRESSIVE RECURRENT MODERATE                    

 

             2011           ESSIE MCMANUS DO                        296.32

           MO 

DEPRESSIVE RECURRENT MODERATE                    

 

             2011                                     296.32           MO 

DEPRESSIVE 

RECURRENT MODERATE                               

 

             2011                                     296.32           MO 

DEPRESSIVE 

RECURRENT MODERATE                               

 

             2011                                     296.32           MO 

DEPRESSIVE 

RECURRENT MODERATE                               

 

             2011                                     296.32           MO 

DEPRESSIVE 

RECURRENT MODERATE                               

 

             2011                                     296.32           MO 

DEPRESSIVE 

RECURRENT MODERATE                               

 

                2011           KYLAH MELGAR PA-C                      

     296.32         

                          MO DEPRESSIVE RECURRENT MODERATE                    

 

                2011           [facility] DARIAN [facility]                      

     296.32         

                          MO DEPRESSIVE RECURRENT MODERATE                    

 

             2011           ESSIE MCMANUS DO                        296.32

           MO 

DEPRESSIVE RECURRENT MODERATE                    

 

                [facility] , DARIAN JARA                      

     296.32         

                          MO DEPRESSIVE RECURRENT MODERATE                    

 

                2011           [facility]                   

        296.32      

                          MO DEPRESSIVE RECURRENT MODERATE                    

 

                2011           [facility]                      

     296.32         

                          MO DEPRESSIVE RECURRENT MODERATE                    

 

                2011           [facility]                      

     296.32         

                          MO DEPRESSIVE RECURRENT MODERATE                    

 

                2011           [facility]                      

     296.32         

                          MO DEPRESSIVE RECURRENT MODERATE                    

 

                2011           [facility]                   

        296.32      

                          MO DEPRESSIVE RECURRENT MODERATE                    

 

                2011           [facility] , DARIAN A                      

     296.32         

                          MO DEPRESSIVE RECURRENT MODERATE                    

 

             2011           ESSIE MMCANUS DO                        296.32

           MO 

DEPRESSIVE RECURRENT MODERATE                    

 

             2011           VERONICA APRTU, JEFFREY                        296

.32           

MO DEPRESSIVE RECURRENT MODERATE                    

 

                2011           [facility]                      

     296.32         

                          MO DEPRESSIVE RECURRENT MODERATE                    

 

             2011           VERONICA APRN, JEFFREY                        296

.32           

MO DEPRESSIVE RECURRENT MODERATE                    

 

                2011           [facility]                      

     296.32         

                          MO DEPRESSIVE RECURRENT MODERATE                    

 

                2012           DARIAN FARRAR PHD                      

     111.0          

                          P[facility] VERSICOLOR                    

 

                2012           DARIAN FARRAR PHD                      

     272.4          

                          OTHER AND UNSPECIFIED HYPERLIPIDEMIA                  

  

 

             2012           RASHI DIAMOND DO                        111

.0           

[facility] VERSICOLOR                            

 

             2012           RASHI DIAMOND DO                        272

.4           

OTHER AND UNSPECIFIED HYPERLIPIDEMIA                    

 

                2012           DARIAN FARRAR PHD                      

     111.0          

                          [facility] VERSICOLOR                    

 

                2012           DARIAN FARRAR PHD                      

     272.4          

                          OTHER AND UNSPECIFIED HYPERLIPIDEMIA                  

  

 

             2012           ESSIE MCMANUS DO                        111.0 

          

PITYRIASIS VERSICOLOR                            

 

             2012           ESSIE MCMANUS DO                        272.4 

          

OTHER AND UNSPECIFIED HYPERLIPIDEMIA                    

 

             2012                                     111.0           PITY

RIASIS 

VERSICOLOR                                       

 

             2012                                     272.4           OTHE

R AND 

UNSPECIFIED HYPERLIPIDEMIA                       

 

             2012                                     111.0           PITY

RIASIS 

VERSICOLOR                                       

 

             2012                                     272.4           OTHE

R AND 

UNSPECIFIED HYPERLIPIDEMIA                       

 

             2012                                     111.0           PITY

RIASIS 

VERSICOLOR                                       

 

             2012                                     272.4           OTHE

R AND 

UNSPECIFIED HYPERLIPIDEMIA                       

 

             2012                                     111.0           PITY

RIASIS 

VERSICOLOR                                       

 

             2012                                     272.4           OTHE

R AND 

UNSPECIFIED HYPERLIPIDEMIA                       

 

             2012                                     111.0           PITY

RIASIS 

VERSICOLOR                                       

 

             2012                                     272.4           OTHE

R AND 

UNSPECIFIED HYPERLIPIDEMIA                       

 

                2012           KYLAH MELGAR PA-C                      

     111.0          

                          PITYRIASIS VERSICOLOR                    

 

                2012           KYLAH MELGAR PA-C                      

     272.4          

                          OTHER AND UNSPECIFIED HYPERLIPIDEMIA                  

  

 

                2012           DARIAN FARRAR PHD                      

     111.0          

                          PITYRIASIS VERSICOLOR                    

 

                2012           DARIAN FARRAR PHD                      

     272.4          

                          OTHER AND UNSPECIFIED HYPERLIPIDEMIA                  

  

 

             2012           MCMANUS DOVERONIKAA K                        111.0 

          

PITYRIASIS VERSICOLOR                            

 

             2012           MCMANUS DO ESSIE K                        272.4 

          

OTHER AND UNSPECIFIED HYPERLIPIDEMIA                    

 

                2012           DARIAN FARRAR PHD A                      

     111.0          

                          PITYRIASIS VERSICOLOR                    

 

                2012           LENI YAN, DARIAN JARA                      

     272.4          

                          OTHER AND UNSPECIFIED HYPERLIPIDEMIA                  

  

 

                2012           [facility]                   

        111.0       

                          PITYRIASIS VERSICOLOR                    

 

                2012           [facility] REYNAAG                   

        272.4       

                          OTHER AND UNSPECIFIED HYPERLIPIDEMIA                  

  

 

                2012           LENI YAN, DARIAN A                      

     111.0          

                          PITYRIASIS VERSICOLOR                    

 

                2012           LENI YAN, DARIAN A                      

     272.4          

                          OTHER AND UNSPECIFIED HYPERLIPIDEMIA                  

  

 

                2012           LENI YAN, DARIAN A                      

     111.0          

                          PITYRIASIS VERSICOLOR                    

 

                2012           DARIAN FARRAR PHD                      

     272.4          

                          OTHER AND UNSPECIFIED HYPERLIPIDEMIA                  

  

 

                2012           LENI YAN, DARIAN A                      

     111.0          

                          PITYRIASIS VERSICOLOR                    

 

                2012           LENI YAN, DARIAN A                      

     272.4          

                          OTHER AND UNSPECIFIED HYPERLIPIDEMIA                  

  

 

                2012           [facility] REYNAGA                   

        111.0       

                          PITYRIASIS VERSICOLOR                    

 

                2012           HASTINGS APRN, EDWARD RODERICK                   

        272.4       

                          OTHER AND UNSPECIFIED HYPERLIPIDEMIA                  

  

 

                2012           DARIAN FARRAR PHD                      

     111.0          

                          PITYRIASIS VERSICOLOR                    

 

                2012           DARIAN FARRAR PHD                      

     272.4          

                          OTHER AND UNSPECIFIED HYPERLIPIDEMIA                  

  

 

             2012           ESSIE MCMANUS DO K                        111.0 

          

PITYRIASIS VERSICOLOR                            

 

             2012           MCMANUS DOVERONIKAA K                        272.4 

          

OTHER AND UNSPECIFIED HYPERLIPIDEMIA                    

 

             2012           VERONICA APRN, JEFFREY                        111

.0           

PITYRIASIS VERSICOLOR                            

 

             2012           VERONICA APRN, JEFFREY                        272

.4           

OTHER AND UNSPECIFIED HYPERLIPIDEMIA                    

 

                2012           DARIAN FARRAR PHD                      

     111.0          

                          PITYRIASIS VERSICOLOR                    

 

                2012           DARIAN FARRAR PHD                      

     272.4          

                          OTHER AND UNSPECIFIED HYPERLIPIDEMIA                  

  

 

             2012           VERONICA APRN, JEFFREY                        111

.0           

PITYRIASIS VERSICOLOR                            

 

             2012           VERONICA APRN, JEFFREY                        272

.4           

OTHER AND UNSPECIFIED HYPERLIPIDEMIA                    

 

                2012           DARIAN FARRAR PHD                      

     111.0          

                          PITYRIASIS VERSICOLOR                    

 

                2012           DARIAN FARRAR PHD                      

     272.4          

                          OTHER AND UNSPECIFIED HYPERLIPIDEMIA                  

  

 

                2012           DARIAN FARRAR PHD                      

     686.9          

                          UNSPECIFIED LOCAL INFECTION OF SKIN AND SUBCUTANEOUS T

ISSUE                    

 

             2012           RASHI DIAMOND DO                        686

.9           

UNSPECIFIED LOCAL INFECTION OF SKIN AND SUBCUTANEOUS TISSUE                    

 

                2012           DARIAN FARRAR PHD                      

     686.9          

                          UNSPECIFIED LOCAL INFECTION OF SKIN AND SUBCUTANEOUS T

ISSUE                    

 

             2012           ESSIE MCMANUS DO                        686.9 

          

UNSPECIFIED LOCAL INFECTION OF SKIN AND SUBCUTANEOUS TISSUE                    

 

             2012                                     686.9           UNSP

ECIFIED LOCAL 

INFECTION OF SKIN AND SUBCUTANEOUS TISSUE                    

 

             2012                                     686.9           UNSP

ECIFIED LOCAL 

INFECTION OF SKIN AND SUBCUTANEOUS TISSUE                    

 

             2012                                     686.9           UNSP

ECIFIED LOCAL 

INFECTION OF SKIN AND SUBCUTANEOUS TISSUE                    

 

             2012                                     686.9           UNSP

ECIFIED LOCAL 

INFECTION OF SKIN AND SUBCUTANEOUS TISSUE                    

 

             2012                                     686.9           UNSP

ECIFIED LOCAL 

INFECTION OF SKIN AND SUBCUTANEOUS TISSUE                    

 

                2012           KYLAH MELGAR PA-C                      

     686.9          

                          UNSPECIFIED LOCAL INFECTION OF SKIN AND SUBCUTANEOUS T

ISSUE                    

 

                2012           DARIAN FARRAR PHD                      

     686.9          

                          UNSPECIFIED LOCAL INFECTION OF SKIN AND SUBCUTANEOUS T

ISSUE                    

 

             2012           ESSIE MCMANUS DO                        686.9 

          

UNSPECIFIED LOCAL INFECTION OF SKIN AND SUBCUTANEOUS TISSUE                    

 

                2012           DARIAN FARRAR PHD                      

     686.9          

                          UNSPECIFIED LOCAL INFECTION OF SKIN AND SUBCUTANEOUS T

ISSUE                    

 

                2012           [facility]DELL[facility]WARD                   

        686.9       

                          UNSPECIFIED LOCAL INFECTION OF SKIN AND SUBCUTANEOUS T

ISSUE                  

 

 

                2012           BOEDARIAN PEREZ PHD                      

     686.9          

                          UNSPECIFIED LOCAL INFECTION OF SKIN AND SUBCUTANEOUS T

ISSUE                    

 

                2012           BOEDARIAN PEREZ PHD                      

     686.9          

                          UNSPECIFIED LOCAL INFECTION OF SKIN AND SUBCUTANEOUS T

ISSUE                    

 

                2012           DARIAN FARRAR PHD                      

     686.9          

                          UNSPECIFIED LOCAL INFECTION OF SKIN AND SUBCUTANEOUS T

ISSUE                    

 

                2012           [facility]                   

        686.9       

                          UNSPECIFIED LOCAL INFECTION OF SKIN AND SUBCUTANEOUS T

ISSUE                  

 

 

                2012           DARIAN FARRAR PHD                      

     686.9          

                          UNSPECIFIED LOCAL INFECTION OF SKIN AND SUBCUTANEOUS T

ISSUE                    

 

             2012           ESSIE MCMANUS DO                        686.9 

          

UNSPECIFIED LOCAL INFECTION OF SKIN AND SUBCUTANEOUS TISSUE                    

 

             2012           VERONICA APRTU, JEFFREY                        686

.9           

UNSPECIFIED LOCAL INFECTION OF SKIN AND SUBCUTANEOUS TISSUE                    

 

                2012           DARIAN FARRAR PHD                      

     686.9          

                          UNSPECIFIED LOCAL INFECTION OF SKIN AND SUBCUTANEOUS T

ISSUE                    

 

             2012           VERONICA WADDELL, JEFFREY                        686

.9           

UNSPECIFIED LOCAL INFECTION OF SKIN AND SUBCUTANEOUS TISSUE                    

 

                2012           DARIAN FARRAR PHD                      

     686.9          

                          UNSPECIFIED LOCAL INFECTION OF SKIN AND SUBCUTANEOUS T

ISSUE                    

 

                2012           DARIAN FARRAR PHD                      

     782.3          

                          EDEMA                              

 

                2012           LENI YAN, DARIAN JARA                      

     782.7          

                          petichiae                          

 

             2012           WERRASHI ORTEGA DO F                        782

.3           

EDEMA                                            

 

             2012           WERRASHI ORTEGA DO F                        782

.7           

petichiae                                        

 

                2012           DARIAN FARRAR PHD                      

     782.3          

                          EDEMA                              

 

                2012           DARIAN FARRAR PHD                      

     782.7          

                          petichiae                          

 

             2012           MCMANUS ESSIE CABRAL                        782.3 

          

Edema                                            

 

             2012           MCMANUS ESSIE CABRAL                        782.7 

          

Petichiae                                        

 

           2012                                   782.3           Edema   

           

     

 

             2012                                     782.7           Peti

chiae          

                                                 

 

           2012                                   782.3           Edema   

           

     

 

             2012                                     782.7           Peti

chiae          

                                                 

 

           2012                                   782.3           Edema   

           

     

 

             2012                                     782.7           Peti

chiae          

                                                 

 

           2012                                   782.3           Edema   

           

     

 

             2012                                     782.7           Peti

chiae          

                                                 

 

           2012                                   782.3           Edema   

           

     

 

             2012                                     782.7           Peti

chiae          

                                                 

 

                2012           KYLAH MELGAR PA-C                      

     782.3          

                          Edema                              

 

                2012           KYLAH MELGAR PA-C                      

     782.7          

                          Petichiae                          

 

                2012           LENI PHD, DARAIN A                      

     782.3          

                          Edema                              

 

                2012           BOEANA PHD, DARIAN A                      

     782.7          

                          Petichiae                          

 

             2012           MCMANUS DO, ESSIE K                        782.3 

          

Edema                                            

 

             2012           MCMANUS DO, ESSIE K                        782.7 

          

Petichiae                                        

 

                2012           BOEANA PHD, DARIAN A                      

     782.3          

                          Edema                              

 

                2012           BOEANA PHD, DARIAN A                      

     782.7          

                          Petichiae                          

 

                2012           HASTINGS APRTU, EDWARD RIVERAH                   

        782.3       

                          Edema                              

 

                2012           HASTINGS APRTU, EDWARD RIVERAH                   

        782.7       

                          Petichiae                          

 

                2012           BOEANA PHD, DARIAN A                      

     782.3          

                          Edema                              

 

                2012           BOEANA PHD, DARIAN A                      

     782.7          

                          Petichiae                          

 

                2012           BOEANA PHD, DARIAN A                      

     782.3          

                          Edema                              

 

                2012           BOEANA PHD, DARIAN A                      

     782.7          

                          Petichiae                          

 

                2012           BOEANA PHD, DARIAN A                      

     782.3          

                          Edema                              

 

                2012           BOEANA PHD, DARIAN A                      

     782.7          

                          Petichiae                          

 

                2012           HASTINGS APRN, EDWARD RIVERAH                   

        782.3       

                          Edema                              

 

                2012           HASTINGS APRN, EDWARD RIVERAH                   

        782.7       

                          Petichiae                          

 

                2012           BOEANA PHD, DARIAN A                      

     782.3          

                          Edema                              

 

                2012           BOEANA PHD, DARIAN A                      

     782.7          

                          Petichiae                          

 

             2012           MCMANUS DO, ESSIE K                        782.3 

          

Edema                                            

 

             2012           MCMANUS DO, ESSIE K                        782.7 

          

Petichiae                                        

 

             2012           VERONICA APRN, JEFFREY                        782

.3           

Edema                                            

 

             2012           VERONICA APRN, JEFFREY                        782

.7           

Petichiae                                        

 

                2012           DARIAN FARRAR PHD                      

     782.3          

                          Edema                              

 

                2012           DARIAN FARRAR PHD                      

     782.7          

                          Petichiae                          

 

             2012           VERONICA APRN, JEFFREY                        782

.3           

Edema                                            

 

             2012           VERONICA APRN, JEFFREY                        782

.7           

Petichiae                                        

 

                2012           DARIAN FARRAR PHD                      

     782.3          

                          Edema                              

 

                2012           DARIAN FARRAR PHD                      

     782.7          

                          Petichiae                          

 

                2012           DARIAN FARRAR PHD                      

     709.1          

                          VASCULAR DISORDERS OF SKIN                    

 

             2012           RASHI DIAMOND DO                        709

.1           

VASCULAR DISORDERS OF SKIN                       

 

                2012           DARIAN FARRAR PHD                      

     709.1          

                          VASCULAR DISORDERS OF SKIN                    

 

             2012           ESSIE MCMANUS DO                        709.1 

          

Vascular Disorders Of Skin                       

 

             2012                                     709.1           Vasc

ular Disorders 

Of Skin                                          

 

             2012                                     709.1           Vasc

ular Disorders 

Of Skin                                          

 

             2012                                     709.1           Vasc

ular Disorders 

Of Skin                                          

 

             2012                                     709.1           Vasc

ular Disorders 

Of Skin                                          

 

             2012                                     709.1           Vasc

ular Disorders 

Of Skin                                          

 

                2012           TALIA DENNIS, KYLAH ANDRADE                      

     709.1          

                          Vascular Disorders Of Skin                    

 

                2012           DARIAN FARRAR PHD                      

     709.1          

                          Vascular Disorders Of Skin                    

 

             2012           ESSIE MCMANUS DO                        709.1 

          

Vascular Disorders Of Skin                       

 

                2012           DARIAN FARRAR PHD                      

     709.1          

                          Vascular Disorders Of Skin                    

 

                2012           EDWARD REYNAGA                   

        709.1       

                          Vascular Disorders Of Skin                    

 

                2012           DARIAN FARRAR PHD                      

     709.1          

                          Vascular Disorders Of Skin                    

 

                2012           DARIAN FARRAR PHD                      

     709.1          

                          Vascular Disorders Of Skin                    

 

                2012           DARIAN FARRAR PHD                      

     709.1          

                          Vascular Disorders Of Skin                    

 

                2012           EDWARD REYNAGA                   

        709.1       

                          Vascular Disorders Of Skin                    

 

                2012           DARIAN FARRAR PHD                      

     709.1          

                          Vascular Disorders Of Skin                    

 

             2012           ESSIE MCMANUS DO                        709.1 

          

Vascular Disorders Of Skin                       

 

             2012           JEFFREY MORA                        709

.1           

Vascular Disorders Of Skin                       

 

                2012           DARIAN FARRAR PHD                      

     709.1          

                          Vascular Disorders Of Skin                    

 

             2012           VERONICA WADDELL JEFFREY                        709

.1           

Vascular Disorders Of Skin                       

 

                2012           DARIAN FARRAR PHD                      

     709.1          

                          Vascular Disorders Of Skin                    

 

                2012           DARIAN FARRAR PHD                      

     296.31         

                          MO DEPRESSIVE RECURRENT MILD                    

 

             2012           RASHI DIAMOND DO                        296

.31           

MO DEPRESSIVE RECURRENT MILD                     

 

                2012           DARIAN FARRAR PHD                      

     296.31         

                          MO DEPRESSIVE RECURRENT MILD                    

 

             2012           ESSIE MCMANUS DO                        296.31

           MO 

DEPRESSIVE RECURRENT MILD                        

 

             2012                                     296.31           MO 

DEPRESSIVE 

RECURRENT MILD                                   

 

             2012                                     296.31           MO 

DEPRESSIVE 

RECURRENT MILD                                   

 

             2012                                     296.31           MO 

DEPRESSIVE 

RECURRENT MILD                                   

 

             2012                                     296.31           MO 

DEPRESSIVE 

RECURRENT MILD                                   

 

             2012                                     296.31           MO 

DEPRESSIVE 

RECURRENT MILD                                   

 

                2012           KYLAH MELGAR PA-C                      

     296.31         

                          MO DEPRESSIVE RECURRENT MILD                    

 

                2012           DARIAN FARRAR PHD                      

     296.31         

                          MO DEPRESSIVE RECURRENT MILD                    

 

             2012           ESSIE MCMANUS DO                        296.31

           MO 

DEPRESSIVE RECURRENT MILD                        

 

                2012           DARIAN FARRAR PHD                      

     296.31         

                          MO DEPRESSIVE RECURRENT MILD                    

 

                2012           EDWARD REYNAGA                   

        296.31      

                          MO DEPRESSIVE RECURRENT MILD                    

 

                2012           DARIAN FARRAR PHD                      

     296.31         

                          MO DEPRESSIVE RECURRENT MILD                    

 

                2012           DARIAN FARRAR PHD                      

     296.31         

                          MO DEPRESSIVE RECURRENT MILD                    

 

                2012           DARIAN FARRAR PHD                      

     296.31         

                          MO DEPRESSIVE RECURRENT MILD                    

 

                2012           EDWARD REYNAGA                   

        296.31      

                          MO DEPRESSIVE RECURRENT MILD                    

 

                2012           DARIAN FARRAR PHD                      

     296.31         

                          MO DEPRESSIVE RECURRENT MILD                    

 

             2012           ESSIE MCMANUS DO                        296.31

           MO 

DEPRESSIVE RECURRENT MILD                        

 

             2012           JEFFREY MORA                        296

.31           

MO DEPRESSIVE RECURRENT MILD                     

 

                2012           DARIAN FARRAR PHD                      

     296.31         

                          MO DEPRESSIVE RECURRENT MILD                    

 

             2012           JEFFREY MORA                        296

.31           

MO DEPRESSIVE RECURRENT MILD                     

 

                2012           LENI YAN, DARIAN JARA                      

     296.31         

                          MO DEPRESSIVE RECURRENT MILD                    

 

                2013           JENNIFER FARNSWORTH, ENRIQUE POLANCO           Ot        

      276.51       

                          DEHYDRATION                        

 

                2013           ENRIQUE RODRIGUEZ MD           Ot        

      787.03       

                          VOMITING ALONE                     

 

                2013           ENRIQUE RODRIGUEZ MD           Ot        

      789.00       

                          ABDOMINAL PAIN, UNSPECIFIED SITE                    

 

             2013                                     727.03           TRI

GGER FINGER 

(ACQUIRED)                                       

 

             2013                                     727.03           TRI

GGER FINGER 

(ACQUIRED)                                       

 

             2013                                     727.03           TRI

GGER FINGER 

(ACQUIRED)                                       

 

                2013           KYLAH MELGAR PA-C                      

     727.03         

                          TRIGGER FINGER (ACQUIRED)                    

 

                2013           DARIAN FARRAR PHD A                      

     727.03         

                          TRIGGER FINGER (ACQUIRED)                    

 

             2013           ESSIE MCMANUS DO                        727.03

           

TRIGGER FINGER (ACQUIRED)                        

 

                2013           DARIAN FARRAR PHD A                      

     727.03         

                          TRIGGER FINGER (ACQUIRED)                    

 

                2013           EDWARD REYNAGA                   

        727.03      

                          TRIGGER FINGER (ACQUIRED)                    

 

                2013           DARIAN FARRAR PHD A                      

     727.03         

                          TRIGGER FINGER (ACQUIRED)                    

 

                2013           DARIAN FARRAR PHD A                      

     727.03         

                          TRIGGER FINGER (ACQUIRED)                    

 

                2013           DARIAN FARRAR PHD A                      

     727.03         

                          TRIGGER FINGER (ACQUIRED)                    

 

                2013           EDWARD REYNAGA                   

        727.03      

                          TRIGGER FINGER (ACQUIRED)                    

 

                2013           DARIAN FARRAR PHD A                      

     727.03         

                          TRIGGER FINGER (ACQUIRED)                    

 

             2013           ESSIE MCMANUS DO                        727.03

           

TRIGGER FINGER (ACQUIRED)                        

 

             2013           JEFFREY MORA                        727

.03           

TRIGGER FINGER (ACQUIRED)                        

 

                2013           DARIAN FARRAR PHD A                      

     727.03         

                          TRIGGER FINGER (ACQUIRED)                    

 

             2013           JEFFREY MORA                        727

.03           

TRIGGER FINGER (ACQUIRED)                        

 

                2013           [facility] , DARIAN JARA                      

     727.03         

                          TRIGGER FINGER (ACQUIRED)                    

 

             2013                                     790.4           ABNO

RMAL LFT 

(ELEVATED)                                       

 

             2013                                     790.4           ABNO

RMAL LFT 

(ELEVATED)                                       

 

                2013           KYLAH MELGAR PA-C                      

     790.4          

                          ABNORMAL LFT (ELEVATED)                    

 

                2013           [facility] , DARIAN JARA                      

     790.4          

                          ABNORMAL LFT (ELEVATED)                    

 

             2013           ESSIE MCMANUS DO                        790.4 

          

ABNORMAL LFT (ELEVATED)                          

 

                2013           [facility] , DARIAN JARA                      

     790.4          

                          ABNORMAL LFT (ELEVATED)                    

 

                2013           EDWARD REYNAGA                   

        790.4       

                          ABNORMAL LFT (ELEVATED)                    

 

                2013           [facility] , DARIAN JARA                      

     790.4          

                          ABNORMAL LFT (ELEVATED)                    

 

                2013           [facility] , DARIAN JARA                      

     790.4          

                          ABNORMAL LFT (ELEVATED)                    

 

                2013           [facility] , DARIAN JARA                      

     790.4          

                          ABNORMAL LFT (ELEVATED)                    

 

                2013           EDWARD REYNAGA                   

        790.4       

                          ABNORMAL LFT (ELEVATED)                    

 

                2013           [facility] , DARIAN JARA                      

     790.4          

                          ABNORMAL LFT (ELEVATED)                    

 

             2013           ESSIE MCMANUS DO                        790.4 

          

ABNORMAL LFT (ELEVATED)                          

 

             2013           JEFFREY MORA                        790

.4           

ABNORMAL LFT (ELEVATED)                          

 

                2013           [facility] , DARIAN JARA                      

     790.4          

                          ABNORMAL LFT (ELEVATED)                    

 

             2013           JEFFREY MORA                        790

.4           

ABNORMAL LFT (ELEVATED)                          

 

                2013           [facility] , DARIAN JARA                      

     790.4          

                          ABNORMAL LFT (ELEVATED)                    

 

             2013                                     070.70           HEP

ATITIS C, 

UNSPEC                                           

 

                2013           KYLAH MELGAR PA-C                      

     070.70         

                          HEPATITIS C, UNSPEC                    

 

                2013           [facility] , DARIAN JARA                      

     070.70         

                          HEPATITIS C, UNSPEC                    

 

             2013           ESSIE MCMANUS DO                        070.70

           

HEPATITIS C, UNSPEC                              

 

                2013           [facility] DARIAN JARA                      

     070.70         

                          HEPATITIS C, UNSPEC                    

 

                2013           [facility] WADDELL EDWARD RIVERAH                   

        070.70      

                          HEPATITIS C, UNSPEC                    

 

                2013           LENI YAN, DARIAN JARA                      

     070.70         

                          HEPATITIS C, UNSPEC                    

 

                2013           LENI YAN, DARIAN A                      

     070.70         

                          HEPATITIS C, UNSPEC                    

 

                2013           BRENDAOUT , DARIAN A                      

     070.70         

                          HEPATITIS C, UNSPEC                    

 

                2013           [facility] WADDELL EDWARD RODERICK                   

        070.70      

                          HEPATITIS C, UNSPEC                    

 

                2013           BRENDAOUT , DARIAN A                      

     070.70         

                          HEPATITIS C, UNSPEC                    

 

             2013           ESSIE MCMANUS DO                        070.70

           

HEPATITIS C, UNSPEC                              

 

             2013           VERONICA APRTU, JEFFREY                        070

.70           

HEPATITIS C, UNSPEC                              

 

                2013           LENI YAN, DARIAN JARA                      

     070.70         

                          HEPATITIS C, UNSPEC                    

 

             2013           VERONICA APRTU, JEFFREY                        070

.70           

HEPATITIS C, UNSPEC                              

 

                2013           LENI YAN, DARIAN JARA                      

     070.70         

                          HEPATITIS C, UNSPEC                    

 

             10/10/2013           ESSIE MCMANUS DO                        307.42

           

PERSISTENT DISORDER OF INITIATING OR MAINTAINING SLEEP                    

 

                10/10/2013           DARIAN FARRAR PHD                      

     307.42         

                          PERSISTENT DISORDER OF INITIATING OR MAINTAINING SLEEP

                    

 

                10/10/2013           EDWARD REYNAGA                   

        307.42      

                          PERSISTENT DISORDER OF INITIATING OR MAINTAINING SLEEP

                    

 

                10/10/2013           DARIAN FARRAR PHD                      

     307.42         

                          PERSISTENT DISORDER OF INITIATING OR MAINTAINING SLEEP

                    

 

                10/10/2013           DARIAN FARRAR PHD                      

     307.42         

                          PERSISTENT DISORDER OF INITIATING OR MAINTAINING SLEEP

                    

 

                10/10/2013           DARIAN FARRAR PHD                      

     307.42         

                          PERSISTENT DISORDER OF INITIATING OR MAINTAINING SLEEP

                    

 

                10/10/2013           EDWARD REYNAGA                   

        307.42      

                          PERSISTENT DISORDER OF INITIATING OR MAINTAINING SLEEP

                    

 

                10/10/2013           DARIAN FARRAR PHD                      

     307.42         

                          PERSISTENT DISORDER OF INITIATING OR MAINTAINING SLEEP

                    

 

             10/10/2013           ESSIE MCMANUS DO                        307.42

           

PERSISTENT DISORDER OF INITIATING OR MAINTAINING SLEEP                    

 

             10/10/2013           VERONICA APRTU, JEFFREY                        307

.42           

PERSISTENT DISORDER OF INITIATING OR MAINTAINING SLEEP                    

 

                10/10/2013           DARIAN FARRAR PHD                      

     307.42         

                          PERSISTENT DISORDER OF INITIATING OR MAINTAINING SLEEP

                    

 

             10/10/2013           VERONICA APRN, JEFFREY                        307

.42           

PERSISTENT DISORDER OF INITIATING OR MAINTAINING SLEEP                    

 

                10/10/2013           DARIAN FARRAR PHD                      

     307.42         

                          PERSISTENT DISORDER OF INITIATING OR MAINTAINING SLEEP

                    

 

             10/17/2013           ESSIE MCMANUS DO                        454.1 

          

VARICOSE VEINS OF LOWER EXTREMITIES WITH INFLAMMATION                    

 

                10/17/2013           DARIAN FARRAR PHD                      

     454.1          

                          VARICOSE VEINS OF LOWER EXTREMITIES WITH INFLAMMATION 

                   

 

                10/17/2013           EDWARD REYNAGA                   

        454.1       

                          VARICOSE VEINS OF LOWER EXTREMITIES WITH INFLAMMATION 

                   

 

                10/17/2013           DARIAN FARRAR PHD                      

     454.1          

                          VARICOSE VEINS OF LOWER EXTREMITIES WITH INFLAMMATION 

                   

 

                10/17/2013           DARIAN FARRAR PHD                      

     454.1          

                          VARICOSE VEINS OF LOWER EXTREMITIES WITH INFLAMMATION 

                   

 

                10/17/2013           DARIAN FARRAR PHD                      

     454.1          

                          VARICOSE VEINS OF LOWER EXTREMITIES WITH INFLAMMATION 

                   

 

                10/17/2013           EDWARD REYNAGA                   

        454.1       

                          VARICOSE VEINS OF LOWER EXTREMITIES WITH INFLAMMATION 

                   

 

                10/17/2013           DARIAN FARRAR PHD                      

     454.1          

                          VARICOSE VEINS OF LOWER EXTREMITIES WITH INFLAMMATION 

                   

 

             10/17/2013           ESSIE MCMANUS DO                        454.1 

          

VARICOSE VEINS OF LOWER EXTREMITIES WITH INFLAMMATION                    

 

             10/17/2013           JEFFREY MORA                        454

.1           

VARICOSE VEINS OF LOWER EXTREMITIES WITH INFLAMMATION                    

 

                10/17/2013           DARIAN FARRAR PHD                      

     454.1          

                          VARICOSE VEINS OF LOWER EXTREMITIES WITH INFLAMMATION 

                   

 

             10/17/2013           JEFFREY MORA                        454

.1           

VARICOSE VEINS OF LOWER EXTREMITIES WITH INFLAMMATION                    

 

                10/17/2013           DARIAN FARRAR PHD                      

     454.1          

                          VARICOSE VEINS OF LOWER EXTREMITIES WITH INFLAMMATION 

                   

 

                2014           EDWARD REYNAGA                   

        300.00      

                          AN ANXIETY UNSPEC                    

 

                2014           DARIAN FARRAR PHD                      

     300.00         

                          AN ANXIETY UNSPEC                    

 

                2014           DARIAN FARRAR PHD                      

     300.00         

                          AN ANXIETY UNSPEC                    

 

                2014           DARIAN FARRAR PHD                      

     300.00         

                          AN ANXIETY UNSPEC                    

 

                2014           EDWARD REYNAGA                   

        300.00      

                          AN ANXIETY UNSPEC                    

 

                2014           DARIAN FARRAR PHD                      

     300.00         

                          AN ANXIETY UNSPEC                    

 

             2014           ESSIE MCMANUS DO                        300.00

           AN 

ANXIETY UNSPEC                                   

 

             2014           JEFFREY MORA                        300

.00           

AN ANXIETY UNSPEC                                

 

                2014           BOEKHOUT PHD, DARIAN A                      

     300.00         

                          AN ANXIETY UNSPEC                    

 

             2014           VERONICA APRTU, JEFFREY                        300

.00           

AN ANXIETY UNSPEC                                

 

                2014           LENI PHD, DARIAN A                      

     300.00         

                          AN ANXIETY UNSPEC                    

 

                2014           LENI PHD, DARIAN A                      

     V58.69         

                          MEDICATION HIGH RISK                    

 

                2014           VENKATA BAIRONEDWARD                   

        V58.69      

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             

                                                    2014        

 

                                      [id number]                                 PSYT

X PT&/FAMILY 45 MINUTES   

                                                    2014        

 

                                      [id number]                                 ROUT

INE VENIPUNCTURE          

                                                    2014        

 

                                      IRGROUP                                 IR

ON GROUP (Iron,TIBC, 

Ferritin)                                               2014        

 

                                      [id number]                                 GF

R CALC (RESULT ONLY)      

                                                    09/10/2014        

 

                                [id number]                                 CMP       

                    

                                        09/10/2014        

 

                                      [id number]                                 PSYT

X PT&/FAMILY 45 MINUTES   

                                                    2014        

 

                                      [id number]                                 PSYT

X PT&/FAMILY 45 MINUTES   

                                                    2015        



                                                                                
                           



Results

      



                    Test                Result              Range        

 

                                        Complete blood count (CBC) with automate

d white blood cell (WBC) differential - 

18 16:20         

 

                          Blood leukocytes automated count (number/volume)      

     12.7 10*3/uL         

                                        4.3-11.0        

 

                          Blood erythrocytes automated count (number/volume)    

       5.21 10*6/uL       

                                        4.35-5.85        

 

                    Venous blood hemoglobin measurement (mass/volume)           

16.8 g/dL           

13.3-17.7        

 

                    Blood hematocrit (volume fraction)           48 %           

     40-54        

 

                    Automated erythrocyte mean corpuscular volume           92 [

foz_us]           

80-99        

 

                                        Automated erythrocyte mean corpuscular h

emoglobin (mass per erythrocyte)        

                          32 pg                     25-34        

 

                                        Automated erythrocyte mean corpuscular h

emoglobin concentration measurement 

(mass/volume)             35 g/dL                   32-36        

 

                    Automated erythrocyte distribution width ratio           13.

1 %              10.0-

14.5        

 

                    Automated blood platelet count (count/volume)           220 

10*3/uL           

130-400        

 

                          Automated blood platelet mean volume measurement      

     9.4 [foz_us]         

                                        7.4-10.4        

 

                    Automated blood neutrophils/100 leukocytes           83 %   

             42-75       

 

 

                    Automated blood lymphocytes/100 leukocytes           8 %    

             12-44        

 

                    Blood monocytes/100 leukocytes           8 %                

 0-12        

 

                    Automated blood eosinophils/100 leukocytes           1 %    

             0-10        

 

                    Automated blood basophils/100 leukocytes           0 %      

           0-10        

 

                    Blood neutrophils automated count (number/volume)           

10.5 10*3           

1.8-7.8        

 

                    Blood lymphocytes automated count (number/volume)           

1.1 10*3            

1.0-4.0        

 

                    Blood monocytes automated count (number/volume)           1.

0 10*3            

0.0-1.0        

 

                    Automated eosinophil count           0.1 10*3/uL           0

.0-0.3        

 

                    Automated blood basophil count (count/volume)           0.0 

10*3/uL           

0.0-0.1        

 

                                        Comprehensive metabolic panel - 18

 16:20         

 

                          Serum or plasma sodium measurement (moles/volume)     

      138 mmol/L          

                                        135-145        

 

                          Serum or plasma potassium measurement (moles/volume)  

         3.9 mmol/L       

                                        3.6-5.0        

 

                          Serum or plasma chloride measurement (moles/volume)   

        105 mmol/L        

                                                

 

                    Carbon dioxide           22 mmol/L           21-32        

 

                          Serum or plasma anion gap determination (moles/volume)

           11 mmol/L      

                                        5-14        

 

                          Serum or plasma urea nitrogen measurement (mass/volume

)           9 mg/dL       

                                        7-18        

 

                          Serum or plasma creatinine measurement (mass/volume)  

         0.93 mg/dL       

                                        0.60-1.30        

 

                    Serum or plasma urea nitrogen/creatinine mass ratio         

  10                  NRG 

       

 

                                        Serum or plasma creatinine measurement w

ith calculation of estimated glomerular 

filtration rate           >                         NRG        

 

                    Serum or plasma glucose measurement (mass/volume)           

107 mg/dL           

        

 

                    Serum or plasma calcium measurement (mass/volume)           

9.5 mg/dL           

8.5-10.1        

 

                          Serum or plasma total bilirubin measurement (mass/volu

me)           1.3 mg/dL   

                                        0.1-1.0        

 

                                        Serum or plasma alkaline phosphatase joaquín

surement (enzymatic activity/volume)    

                          85 U/L                            

 

                                        Serum or plasma aspartate aminotransfera

se measurement (enzymatic 

activity/volume)           17 U/L                    5-34        

 

                                        Serum or plasma alanine aminotransferase

 measurement (enzymatic activity/volume)

                          24 U/L                    0-55        

 

                    Serum or plasma protein measurement (mass/volume)           

7.8 g/dL            

6.4-8.2        

 

                    Serum or plasma albumin measurement (mass/volume)           

4.5 g/dL            

3.2-4.5        

 

                    CALCIUM CORRECTED           9.1 mg/dL           8.5-10.1    

    

 

                                        Magnesium - 18 16:20         

 

                    Magnesium           2.2 mg/dL           1.8-2.4        

 

                                        Blood lactic acid measurement (moles/vol

ume) - 18 16:55         

 

                    Blood lactic acid measurement (moles/volume)           1.27 

mmol/L           

0.50-2.00        

 

                                        Complete urinalysis with reflex to cultu

re - 18 17:35         

 

                    Urine color determination           YELLOW              NRG 

       

 

                    Urine clarity determination           CLEAR               NR

G        

 

                    Urine pH measurement by test strip           5              

     5-9        

 

                    Specific gravity of urine by test strip           1.005     

          1.016-1.022  

      

 

                    Urine protein assay by test strip, semi-quantitative        

   1+                  

NEGATIVE        

 

                    Urine glucose detection by automated test strip           NE

GATIVE            

NEGATIVE        

 

                          Erythrocytes detection in urine sediment by light micr

oscopy           2+       

                                        NEGATIVE        

 

                    Urine ketones detection by automated test strip           1+

                  NEGATIVE

        

 

                    Urine nitrite detection by test strip           NEGATIVE    

        NEGATIVE    

    

 

                    Urine total bilirubin detection by test strip           NEGA

TIVE            

NEGATIVE        

 

                          Urine urobilinogen measurement by automated test strip

 (mass/volume)           

NORMAL                                  NORMAL        

 

                    Urine leukocyte esterase detection by dipstick           2+ 

                 NEGATIVE 

       

 

                                        Automated urine sediment erythrocyte cou

nt by microscopy (number/high power 

field)                     [HPF]                    NRG        

 

                                        Automated urine sediment leukocyte count

 by microscopy (number/high power field)

                           [HPF]                    NRG        

 

                          Bacteria detection in urine sediment by light microsco

py           FEW          

                                        NRG        

 

                          Crystals detection in urine sediment by light microsco

py           NONE         

                                        NRG        

 

                    Casts detection in urine sediment by light microscopy       

    NONE                

NRG        

 

                          Mucus detection in urine sediment by light microscopy 

          NEGATIVE        

                                        NRG        

 

                    Complete urinalysis with reflex to culture           YES    

             NRG        

 

                                        Bacterial urine culture - 18 17:35

         

 

                    Bacterial urine culture           NG                  NRG   

     

 

                                        Complete blood count (CBC) with automate

d white blood cell (WBC) differential - 

18 05:15         

 

                          Blood leukocytes automated count (number/volume)      

     10.1 10*3/uL         

                                        4.3-11.0        

 

                          Blood erythrocytes automated count (number/volume)    

       4.46 10*6/uL       

                                        4.35-5.85        

 

                    Venous blood hemoglobin measurement (mass/volume)           

14.3 g/dL           

13.3-17.7        

 

                    Blood hematocrit (volume fraction)           42 %           

     40-54        

 

                    Automated erythrocyte mean corpuscular volume           94 [

foz_us]           

80-99        

 

                                        Automated erythrocyte mean corpuscular h

emoglobin (mass per erythrocyte)        

                          32 pg                     25-34        

 

                                        Automated erythrocyte mean corpuscular h

emoglobin concentration measurement 

(mass/volume)             34 g/dL                   32-36        

 

                    Automated erythrocyte distribution width ratio           13.

0 %              10.0-

14.5        

 

                    Automated blood platelet count (count/volume)           175 

10*3/uL           

130-400        

 

                          Automated blood platelet mean volume measurement      

     9.6 [foz_us]         

                                        7.4-10.4        

 

                    Automated blood neutrophils/100 leukocytes           79 %   

             42-75       

 

 

                    Automated blood lymphocytes/100 leukocytes           10 %   

             12-44       

 

 

                    Blood monocytes/100 leukocytes           11 %               

 0-12        

 

                    Automated blood eosinophils/100 leukocytes           0 %    

             0-10        

 

                    Automated blood basophils/100 leukocytes           0 %      

           0-10        

 

                    Blood neutrophils automated count (number/volume)           

8.0 10*3            

1.8-7.8        

 

                    Blood lymphocytes automated count (number/volume)           

1.0 10*3            

1.0-4.0        

 

                    Blood monocytes automated count (number/volume)           1.

1 10*3            

0.0-1.0        

 

                    Automated eosinophil count           0.0 10*3/uL           0

.0-0.3        

 

                    Automated blood basophil count (count/volume)           0.0 

10*3/uL           

0.0-0.1        

 

                                        Comprehensive metabolic panel - 18

 05:15         

 

                          Serum or plasma sodium measurement (moles/volume)     

      136 mmol/L          

                                        135-145        

 

                          Serum or plasma potassium measurement (moles/volume)  

         3.6 mmol/L       

                                        3.6-5.0        

 

                          Serum or plasma chloride measurement (moles/volume)   

        105 mmol/L        

                                                

 

                    Carbon dioxide           18 mmol/L           21-32        

 

                          Serum or plasma anion gap determination (moles/volume)

           13 mmol/L      

                                        5-14        

 

                          Serum or plasma urea nitrogen measurement (mass/volume

)           10 mg/dL      

                                        7-18        

 

                          Serum or plasma creatinine measurement (mass/volume)  

         0.83 mg/dL       

                                        0.60-1.30        

 

                    Serum or plasma urea nitrogen/creatinine mass ratio         

  12                  NRG 

       

 

                                        Serum or plasma creatinine measurement w

ith calculation of estimated glomerular 

filtration rate           >                         NRG        

 

                    Serum or plasma glucose measurement (mass/volume)           

118 mg/dL           

        

 

                    Serum or plasma calcium measurement (mass/volume)           

8.5 mg/dL           

8.5-10.1        

 

                          Serum or plasma total bilirubin measurement (mass/volu

me)           1.4 mg/dL   

                                        0.1-1.0        

 

                                        Serum or plasma alkaline phosphatase joaquín

surement (enzymatic activity/volume)    

                          63 U/L                            

 

                                        Serum or plasma aspartate aminotransfera

se measurement (enzymatic 

activity/volume)           12 U/L                    5-34        

 

                                        Serum or plasma alanine aminotransferase

 measurement (enzymatic activity/volume)

                          17 U/L                    0-55        

 

                    Serum or plasma protein measurement (mass/volume)           

5.8 g/dL            

6.4-8.2        

 

                    Serum or plasma albumin measurement (mass/volume)           

3.5 g/dL            

3.2-4.5        

 

                    CALCIUM CORRECTED           8.9 mg/dL           8.5-10.1    

    

 

                                        Complete blood count (CBC) with automate

d white blood cell (WBC) differential - 

09/15/18 05:27         

 

                          Blood leukocytes automated count (number/volume)      

     6.5 10*3/uL          

                                        4.3-11.0        

 

                          Blood erythrocytes automated count (number/volume)    

       4.28 10*6/uL       

                                        4.35-5.85        

 

                    Venous blood hemoglobin measurement (mass/volume)           

13.9 g/dL           

13.3-17.7        

 

                    Blood hematocrit (volume fraction)           40 %           

     40-54        

 

                    Automated erythrocyte mean corpuscular volume           94 [

foz_us]           

80-99        

 

                                        Automated erythrocyte mean corpuscular h

emoglobin (mass per erythrocyte)        

                          32 pg                     25-34        

 

                                        Automated erythrocyte mean corpuscular h

emoglobin concentration measurement 

(mass/volume)             35 g/dL                   32-36        

 

                    Automated erythrocyte distribution width ratio           12.

8 %              10.0-

14.5        

 

                    Automated blood platelet count (count/volume)           170 

10*3/uL           

130-400        

 

                          Automated blood platelet mean volume measurement      

     9.8 [foz_us]         

                                        7.4-10.4        

 

                    Automated blood neutrophils/100 leukocytes           66 %   

             42-75       

 

 

                    Automated blood lymphocytes/100 leukocytes           21 %   

             12-44       

 

 

                    Blood monocytes/100 leukocytes           11 %               

 0-12        

 

                    Automated blood eosinophils/100 leukocytes           3 %    

             0-10        

 

                    Automated blood basophils/100 leukocytes           0 %      

           0-10        

 

                    Blood neutrophils automated count (number/volume)           

4.2 10*3            

1.8-7.8        

 

                    Blood lymphocytes automated count (number/volume)           

1.4 10*3            

1.0-4.0        

 

                    Blood monocytes automated count (number/volume)           0.

7 10*3            

0.0-1.0        

 

                    Automated eosinophil count           0.2 10*3/uL           0

.0-0.3        

 

                    Automated blood basophil count (count/volume)           0.0 

10*3/uL           

0.0-0.1        

 

                                        Comprehensive metabolic panel - 09/15/18

 05:27         

 

                          Serum or plasma sodium measurement (moles/volume)     

      136 mmol/L          

                                        135-145        

 

                          Serum or plasma potassium measurement (moles/volume)  

         3.8 mmol/L       

                                        3.6-5.0        

 

                          Serum or plasma chloride measurement (moles/volume)   

        104 mmol/L        

                                                

 

                    Carbon dioxide           18 mmol/L           21-32        

 

                          Serum or plasma anion gap determination (moles/volume)

           14 mmol/L      

                                        5-14        

 

                          Serum or plasma urea nitrogen measurement (mass/volume

)           7 mg/dL       

                                        7-18        

 

                          Serum or plasma creatinine measurement (mass/volume)  

         0.80 mg/dL       

                                        0.60-1.30        

 

                    Serum or plasma urea nitrogen/creatinine mass ratio         

  9                   NRG  

      

 

                                        Serum or plasma creatinine measurement w

ith calculation of estimated glomerular 

filtration rate           >                         NRG        

 

                    Serum or plasma glucose measurement (mass/volume)           

91 mg/dL            

        

 

                    Serum or plasma calcium measurement (mass/volume)           

9.0 mg/dL           

8.5-10.1        

 

                          Serum or plasma total bilirubin measurement (mass/volu

me)           0.9 mg/dL   

                                        0.1-1.0        

 

                                        Serum or plasma alkaline phosphatase joaquín

surement (enzymatic activity/volume)    

                          56 U/L                            

 

                                        Serum or plasma aspartate aminotransfera

se measurement (enzymatic 

activity/volume)           12 U/L                    5-34        

 

                                        Serum or plasma alanine aminotransferase

 measurement (enzymatic activity/volume)

                          12 U/L                    0-55        

 

                    Serum or plasma protein measurement (mass/volume)           

6.4 g/dL            

6.4-8.2        

 

                    Serum or plasma albumin measurement (mass/volume)           

3.5 g/dL            

3.2-4.5        

 

                    CALCIUM CORRECTED           9.4 mg/dL           8.5-10.1    

    

 

                                        Automated blood complete blood count (he

mogram) panel - 18 05:39         

 

                          Blood leukocytes automated count (number/volume)      

     4.8 10*3/uL          

                                        4.3-11.0        

 

                          Blood erythrocytes automated count (number/volume)    

       4.14 10*6/uL       

                                        4.35-5.85        

 

                    Venous blood hemoglobin measurement (mass/volume)           

13.1 g/dL           

13.3-17.7        

 

                    Blood hematocrit (volume fraction)           39 %           

     40-54        

 

                    Automated erythrocyte mean corpuscular volume           93 [

foz_us]           

80-99        

 

                                        Automated erythrocyte mean corpuscular h

emoglobin (mass per erythrocyte)        

                          32 pg                     25-34        

 

                                        Automated erythrocyte mean corpuscular h

emoglobin concentration measurement 

(mass/volume)             34 g/dL                   32-36        

 

                    Automated erythrocyte distribution width ratio           12.

5 %              10.0-

14.5        

 

                    Automated blood platelet count (count/volume)           211 

10*3/uL           

130-400        

 

                          Automated blood platelet mean volume measurement      

     9.8 [foz_us]         

                                        7.4-10.4        

 

                                        Whole blood basic metabolic panel -  05:39         

 

                          Serum or plasma sodium measurement (moles/volume)     

      137 mmol/L          

                                        135-145        

 

                          Serum or plasma potassium measurement (moles/volume)  

         3.2 mmol/L       

                                        3.6-5.0        

 

                          Serum or plasma chloride measurement (moles/volume)   

        109 mmol/L        

                                                

 

                    Carbon dioxide           21 mmol/L           21-32        

 

                          Serum or plasma anion gap determination (moles/volume)

           7 mmol/L       

                                        5-14        

 

                          Serum or plasma urea nitrogen measurement (mass/volume

)           5 mg/dL       

                                        7-18        

 

                          Serum or plasma creatinine measurement (mass/volume)  

         0.71 mg/dL       

                                        0.60-1.30        

 

                    Serum or plasma urea nitrogen/creatinine mass ratio         

  7                   NRG  

      

 

                                        Serum or plasma creatinine measurement w

ith calculation of estimated glomerular 

filtration rate           >                         NRG        

 

                    Serum or plasma glucose measurement (mass/volume)           

110 mg/dL           

        

 

                    Serum or plasma calcium measurement (mass/volume)           

8.7 mg/dL           

8.5-10.1        



                                      



Encounters

      



                ACCT No.           Visit Date/Time           Discharge          

 Status         

             Pt. Type           Provider           Facility           Loc./Unit 

          

Complaint        

 

                [id number]           2015 13:26:00           2015 23:59:

59           CLS

                Outpatient           DARIAN FARRAR PHD                      

         

                                                 

 

                [id number]           2014 15:54:00           2014 23:59:

59           CLS

                Outpatient           JEFFREY MORA                        

         

                                                 

 

                [id number]           2014 09:49:00           2014 23:59:

59           CLS

                Outpatient           DARIAN FARRAR PHD                      

         

                                                 

 

                [id number]           10/30/2014 14:04:00           10/30/2014 23:59:

59           CLS

                Outpatient           JEFFREY MORA                        

         

                                                 

 

                [id number]           2014 14:46:00           2014 23:59:

59           CLS

                Outpatient           MARIETTA CABRALESSIE                           

         

                                                 

 

                [id number]           2014 12:54:00           2014 23:59:

59           CLS

                Outpatient           DARIAN FARRAR PHD                      

         

                                                 

 

                [id number]           2014 12:27:00           2014 23:59:

59           CLS

                Outpatient           [facility]                   

         

                                                 

 

                [id number]           2014 14:05:00           2014 23:59:

59           CLS

                Outpatient           DARIAN FARRAR PHD                      

         

                                                 

 

                [id number]           2014 13:52:00           2014 23:59:

59           CLS

                Outpatient           DARIAN FARRAR PHD                      

         

                                                 

 

                [id number]           2014 13:55:00           2014 23:59:

59           CLS

                Outpatient           DARIAN FARRAR PHD                      

         

                                                 

 

                [id number]           2014 11:22:00           2014 23:59:

59           CLS

                Outpatient           EDWARD REYNAGA                   

         

                                                 

 

                [id number]           2013 12:47:00           2013 23:59:

59           CLS

                Outpatient           DARIAN FARRAR PHD                      

         

                                                 

 

                [id number]           10/17/2013 09:42:00           10/17/2013 23:59:

59           CLS

                Outpatient           ESSIE MCMANUS DO                           

         

                                                 

 

                [id number]           10/04/2013 12:42:00           10/04/2013 23:59:

59           CLS

                Outpatient           DARIAN FARRAR PHD                      

         

                                                 

 

                [id number]           2013 14:41:00           2013 23:59:

59           CLS

                Outpatient           KYLAH MELGAR PA-C                      

         

                                                 

 

                [id number]           2013 15:44:00           2013 23:59:

59           CLS

             Outpatient                                                         

  

 

                [id number]           2013 10:41:00           2013 23:59:

59           CLS

                Outpatient           ESSIE MCMANUS DO                           

         

                                                 

 

                [id number]           2013 10:42:00           2013 23:59:

59           CLS

                Outpatient           DARIAN FARRAR PHD                      

         

                                                 

 

                [id number]           2012 09:43:00           2012 23:59:

59           CLS

                Outpatient           BEV DIAMOND DOCADEN VÁSQUEZ                        

         

                                                 

 

                [id number]            2012 17:01:00           2012 23:59:5

9           CLS  

                Outpatient           DARIAN FARRAR PHD                      

           

                                                 

 

             [id number]           2013 11:07:00                               

      Document 

Registration                                                                    

 

             [id number]           2013 08:08:00                               

      Document 

Registration                                                                    

 

             [id number]           2013 16:10:00                               

      Document 

Registration                                                                    

 

             [id number]           04/15/2013 15:07:00                               

      Document 

Registration                                                                    

 

                    [id number]           2020 14:03:00            14:29:00        

                DIS             Emergency           MARGRET PAZ MD          

 [facility]           ER                        SUTURE REMOVAL        

 

                    [id number]           2020 18:58:00            19:46:00        

                DIS             Outpatient           KYLAH ROBLEDO MD   

        [facility] 

[facility]           ER                        [address] [facility]      

  

 

                    [id number]           10/07/2019 07:43:00           10/07/2

019 11:00:00        

                DIS             Outpatient           EL CARLOS DO          

 [facility]           ENDO                      SCREENING        

 

                    [id number]           2019 05:46:00           

019 15:30:00        

                DIS             Outpatient           EL CARLOS DO          

 [facility]           PREOP                     COLONOSCOPY        

 

                    [id number]           10/22/2018 11:45:00           10/22/2

018 23:59:59        

                CLS             Outpatient           EL CARLOS DO          

 [facility]           ENDO                      SCREENING        

 

                    [id number]           10/18/2018 14:33:00           10/18/2

018 14:39:00        

                DIS             Outpatient           EL CARLOS DO          

 [facility]           PREOP                     COLONOSCOPY        

 

                    [id number]           2018 19:15:00           

018 15:55:00        

                DIS             Inpatient           EL CARLOS DO           

[facility]           4TH                       DIVERTICULITIS        

 

                    [id number]           2013 08:49:00           

013 11:15:00        

                DIS             Emergency           JENNIFER FARNSWORTH, ENRIQUE POLANCO      

     [address] 

[facility]           ER                        VOMITING

## 2020-08-05 NOTE — XMS REPORT
[facility]

                             Created on: 2020



Ignacio Valle

External Reference #: [id number]

: 1955

Sex: Male



Demographics





                          Address                   [address] [place]  [address]

 

                          Preferred Language        Unknown

 

                          Marital Status            Unknown

 

                          Gnosticism Affiliation     Unknown

 

                          Race                      Unknown

 

                          Ethnic Group              Unknown





Author





                          Author                    Ignacio Novak

 

                          [facility]

 

                          Address                   [address] [place]  [address]



 

                          Phone                     [phone]







Care Team Providers





                    Care Team Member Name Role                Phone

 

                    Kishore  RASHI   Unavailable         [phone]







PROBLEMS





          Type      Condition ICD9-CM Code YHT42-BJ Code Onset Dates Condition S

tatus SNOMED 

Code

 

                          Problem                   Nonspecific elevation of lev

els of transaminase or lactic acid 

dehydrogenase (LDH) 790.4                                  Active       92130367

2

 

           Problem    Encounter for long-term (current) use of other medications

 V58.69                           

Active                                  [id number]

 

          Problem   Edema     782.3                         Active    929984287

 

          Problem   Spontaneous ecchymoses 782.7                         Active 

   900873304

 

          Problem   Trigger finger (acquired) 727.03                        Acti

ve    7221287

 

          Problem   Varicose veins of lower extremities with inflammation 454.1 

                        Active    

06784150

 

           Problem    Persistent disorder of initiating or maintaining sleep 307

.42                           Active

                                        [id number]

 

          Problem   Pityriasis versicolor 111.0                         Active  

  52764561

 

           Problem    Unspecified local infection of skin and subcutaneous tissu

e 686.9                            

Active                                  209919266

 

          Problem   Unspecified viral hepatitis C without hepatic coma 070.70   

                     Active    

74429384

 

          Problem   Vascular disorder of skin 709.1                         Acti

ve    92602285

 

          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       

                 Active    

72549873

 

          Problem   Anxiety state, unspecified 300.00                        Act

katiha    698534627

 

          Problem   Other and unspecified hyperlipidemia 272.4                  

       Active    35167559

 

          Problem   Major depressive disorder, recurrent episode, mild 296.31   

                     Active    

[id number]







ALLERGIES

No Information



ENCOUNTERS





                Encounter       Location        Date            Diagnosis

 

                          [facility]   [address]

[address] [place] [id number]

                          15 Jesse, 2016              Encounter for other specifie

d administrative purpose Z02.89

 

                          [facility]   [address] [id number]

[address] [place] [id number]

                          12 May, 2016              Dental examination Z01.20 an

d Dental caries K02.9

 

                          [facility]     [address] [id number]

[address] [place] [address]

                          14 Aug, 2015              Edema 782.3 and Family histo

ry of coronary artery disease V17.3

 

                          [facility] [place]     [address] [id number]

[address] [place] [address]

                          07 Aug, 2015              Edema 782.3 and Family histo

ry of coronary artery disease V17.3

 

                          [facility]   [address] [id number]

[address] [place] [id number]

                                        Dental examination V72.2

 

                          [facility] [place]     [address] [id number]

[address] [place] [address]

                                         

 

                          [facility]     [address] [id number]

[address] [place] [address]

                                         

 

                          [facility]     [address] [id number]

[address] [place] [address]

                          20 Mar, 2015               

 

                          [facility] [place]     [address] [id number]

[address] [place] [address]

                          20 Mar, 2015               

 

                          [facility] [place]     [address] [id number]

[address] [place] [address]

                                         

 

                          [facility] [place]     [address] [id number]

[address] [place] [address]

                                         

 

                          [facility]     [address] [id number]

[address] [place] [address]

                                         

 

                          [facility] [place]     [address] [id number]

[address] [place] [address]

                                         

 

                          [facility] [place]     [address] [id number]

[address] [place] [address]

                          11 Dec, 2014               

 

                          [facility] [place]     [address] [id number]

[address] [place] [address]

                          11 Dec, 2014               

 

                          [facility] [place]     [address] [id number]

[address] [place] [address]

                                         

 

                          [facility] [place]     [address] [id number]

[address] [place] [address], [place] [address]

                                         

 

                          [facility]     [address] [id number]

[address] [place] [address], [place] [address]

                          30 Oct, 2014               

 

                          [facility]     [address] [id number]

[address] [place] [address], [place] [address]

                          30 Oct, 2014               

 

                          [facility]     [address] [id number]

[address] [place] [address], [place] [address]

                          10 Sep, 2014               

 

                          [facility]     [address] [id number]

[address] [place] [address], [place] [address]

                          09 Sep, 2014               

 

                          [facility]     [address] [id number]

[address] [place] [address], [place] [address]

                          09 Sep, 2014               

 

                          [facility]     [address] [id number]

[address] [place] [address], [place] [address]

                          22 Aug, 2014               

 

                          [facility]     [address] [id number]

[address] [place] [address], [place] [address]

                          22 Aug, 2014               

 

                          [facility]     [address] [id number]

[address] [place] [address], [place] [address]

                          22 Aug, 2014               

 

                          [facility]     [address] [id number]

[address] [place] [address], [place] [address]

                                         

 

                          [facility]     [address] [id number]

[address] [place] [address], [place] [address]

                                         

 

                          [facility]     [address] [id number]

[address] [place] [address], [place] [address]

                                         

 

                          [facility]     [address] [id number]

[address] [place] [address], [place] [address]

                                         

 

                          [facility]     [address] [id number]

[address] [place] [address], [place] [address]

                                         

 

                          [facility]     [address] [id number]

[address] [place] [address], [place] [address]

                                         

 

                          [facility]     [address] [id number]

[address] [place] [address], [place] [address]

                                         

 

                          [facility]     [address] [id number]

[address] [place] [address], [place] [address]

                                         

 

                          [facility]     [address] [id number]

[address] [place] [address], [place] [address]

                          20 May, 2014               

 

                          [facility]     [address] [id number]

[address] [place] [address], [place] [address]

                          20 May, 2014               

 

                          [facility]     [address] [id number]

[address] [place] [address], [place] [address]

                                         

 

                          [facility]     [address] [id number]

[address] [place] [address], [place] [address]

                                         

 

                          [facility]     [address] [id number]

[address] [place] [address], [place] [address]

                                         

 

                          [facility]     [address] [id number]

[address] [place] [address], [place] [address]

                                         

 

                          [facility]     [address] [id number]

[address] [place] [address], [place] [address]

                                         

 

                          [facility]     [address] [id number]

[address] [place] [address], [place] [address]

                                         

 

                          [facility]     [address] [id number]

[address] [place] [address], [place] [address]

                          13 Dec, 2013               

 

                          [facility]     [address] [id number]

[address] [place] [address], [place] [address]

                          13 Dec, 2013               

 

                          [facility]     [address] [id number]

[address] [place] [address], [place] [address]

                          13 Dec, 2013               

 

                          [facility]     [address] [id number]

[address] [place] [address], [place] [address]

                          13 Dec, 2013               

 

                          [facility]     [address] [id number]

[address] [place] [address], [place] [address]

                          17 Oct, 2013               

 

                          [facility]     [address] [id number]

[address] [place] [address], [place] [address]

                          17 Oct, 2013               

 

                          [facility]     [address] [id number]

[address] [place] [address] [place] [address]

                          10 Oct, 2013               

 

                          [facility]     [address] [id number]

[address] [place] [address], [place] [address]

                          10 Oct, 2013               

 

                          [facility]     [address] [id number]

[address] [place] [address], [place] [address]

                          04 Oct, 2013               

 

                          [facility]     [address] [id number]

[address] [place] [address], [place] [address]

                          24 Sep, 2013               

 

                          [facility]     [address] [id number]

[address] [place] [address], [place] [address]

                          19 Sep, 2013               

 

                          [facility]     [address] [id number]

[address] [place] [address], [place] [address]

                          16 Sep, 2013               

 

                          [facility]     [address] [id number]

[address] [place] [address], [place] [address]

                          21 Aug, 2013               

 

                          [facility]     [address] [id number]

[address] [place] [address], [place] [address]

                          17 Aug, 2013               

 

                          [facility]     [address] [id number]

[address] [place] [address], [place] [address]

                          16 Aug, 2013               

 

                          [facility]     [address] [id number]

[address] [place] [address], [place] [address]

                          15 Aug, 2013               

 

                          [facility]     [address] [id number]

[address] [place] [address], [place] [address]

                          13 Aug, 2013               

 

                          [facility]     [address] [id number]

[address] [place] [address], [place] [address]

                          13 Aug, 2013               

 

                          [facility]     [address] [id number]

[address] [place] [address], [place] [address]

                          12 Aug, 2013               

 

                          [facility]     [address] [id number]

[address] [place] [address], [place] [address]

                                         

 

                          [facility]     [address] [id number]

[address] [place] [address], [place] [address]

                                         

 

                          [facility]     [address] [id number]

[address] [place] [address], [place] [address]

                                         

 

                          [facility]     [address] [id number]

[address] [place] [address], [place] [address]

                          10 2013               

 

                          [facility]     [address] [id number]

[address] [place] [address], [place] [address]

                          17 May, 2013               

 

                          [facility]     [address] [id number]

[address] [place] [address], [place] [address]

                          10 May, 2013               

 

                          [facility]     [address] [id number]

[address] [place] [address], [place] [address]

                          15 Apr, 2013               

 

                          [facility]     [address] [id number]

[address] [place] [address], [place] [address]

                                         

 

                          [facility]     [address] [id number]

[address] [place] [address], [place] [address]

                                         

 

                          [facility]     [address] [id number]

[address] [place] [address], [place] [address]

                                         

 

                          [facility]     [address] [id number]

[address] [place] [address], [place] [address]

                                         

 

                          [facility]     [address] [id number]

[address] [place] [address], [place] [address]

                                         

 

                          [facility]     [address] [id number]

[address] [place] [address], [place] [address]

                                         

 

                          [facility]     [address] [id number]

[address] [place] [address], [place] [address]

                                         

 

                          [facility]     [address] [id number]

[address] [place] [address], [place] [address]

                          21 Dec, 2012               

 

                          [facility]     [address] [id number]

[address] [place] [address], [place] [address]

                          21 Dec, 2012               

 

                          [facility]     [address] [id number]

[address] [place] [address], [place] [address]

                          14 Dec, 2012               

 

                          [facility]     [address] [id number]

[address] [place] [address], [place] [address]

                          14 Dec, 2012               

 

                          [facility]     [address] [id number]

[address] [address]

                          20 2012               

 

                          [facility]     [address] [id number]

[address] [place] [address], [place] [address]

                          20 2012               

 

                          [facility]     [address] [id number]

[address] [place] [address], [place] [address]

                          16 2012               

 

                          [facility]     [address] [id number]

[address] [place] [address], [place] [address]

                          16 2012               

 

                          [facility]     [address] [id number]

[address] [place] [address]

                          13 2012               

 

                          [facility]     [address] [id number]

[address] [place] [address], [place] [address]

                                         

 

                          [facility]     [address] [id number]

[address] [place] [address]

                          13 2012               

 

                          [facility]     [address] [id number]

[address] [place] [address], [place] [address]

                                         

 

                          [facility]     [address] [id number]

[address] [place] [address]

                                         

 

                          [facility]     [address] [id number]

[address] [place] [address]

                          07 2012               

 

                          [facility]     [address] [id number]

[address] [place] [address], [place] [address]

                          22 Oct, 2012               

 

                          [facility]     [address] [id number]

[address] [place] [address]

                          22 Oct, 2012               

 

                          [facility]     [address] [id number]

[address] [place] [address]

                          22 Oct, 2012               

 

                          [facility]     [address] [id number]

[address] [place] [address], [place] [address]

                          22 Oct, 2012               

 

                          [facility]     [address] [id number]

[address] [place] [address]

                          10 Oct, 2012               

 

                          [facility]     [address] [id number]

[address] [place] [address]

                          10 Oct, 2012               

 

                          [facility]     [address] [id number]

[address] [place] [address], [place] [address]

                          05 Oct, 2012               

 

                          [facility]     [address] [id number]

[address] [place] [address], [place] [address]

                          05 Oct, 2012               

 

                          [facility]     [address] [id number]

[address] [place] [address], [place] [address]

                          07 Sep, 2012               

 

                          [facility]     [address] [id number]

[address] [place] [address], [place] [address]

                          22 Aug, 2012               

 

                          [facility]     [address] [id number]

[address] [place] [address], [place] [address]

                          03 Aug, 2012               

 

                          [facility]     [address] [id number]

[address] [place] [address], [place] [address]

                                         

 

                          [facility]     [address] [id number]

[address] [place] [address], [place] [address]

                                         

 

                          [facility]     [address] [id number]

[address] [place] [address], [place] [address]

                                         

 

                          [facility]     [address] [id number]

[address] [place] [address], [place] [address]

                                         

 

                          [facility]     [address] [id number]

[address] [place] [address], [place] [address]

                                         

 

                          [facility]     [address] [id number]

[address] [place] [address], [place] [address]

                                         

 

                          [facility]     [address] [id number]

[address] [place] [address], [place] [address]

                                         

 

                          [facility]     [address] [id number]

[address] [place] [address], [place] [address]

                          30 May, 2012               

 

                          [facility]     [address] [id number]

[address] [place] [address], [place] [address]

                          30 May, 2012               

 

                          [facility]     [address] [id number]

[address] [place] [address], [place] [address]

                          21 May, 2012               

 

                          [facility]     [address] [id number]

[address] [place] [address], [place] [address]

                          14 May, 2012               

 

                          [facility]     [address] [id number]

[address] [place] [address], [place] [address]

                          04 May, 2012               

 

                          [facility]     [address] [id number]

[address] [place] [address], [place] [address]

                          04 May, 2012               

 

                          [facility]     [address] [id number]

[address] [place] [address], [place] [address]

                          04 May, 2012               

 

                          [facility]     [address] [id number]

[address] [place] [address], [place] [address]

                                         

 

                          [facility]     [address] [id number]

[address] [place] [address], [place] [address]

                                         

 

                          [facility]     [address] [id number]

[address] [place] [address], [place] [address]

                          23 Mar, 2012               

 

                          [facility]     [address] [id number]

[address] [place] [address], [place] [address]

                          15 Mar, 2012               

 

                          [facility]     [address] [id number]

[address] [place] [address], [place] [address]

                          13 Mar, 2012               

 

                          [facility]     [address] [id number]

[address] [place] [address], [place] [address]

                          12 Mar, 2012               

 

                          [facility]     [address] [id number]

[address] [place] [address], [place] [address]

                                         

 

                          [facility]     [address] [id number]

[address] [place] [address], [place] [address]

                                         

 

                          [facility]     [address] [id number]

[address] [place] [address], [place] [address]

                                         

 

                          [facility]     [address] [id number]

[address] [place] [address], [place] [address]

                                         

 

                          [facility]     [address] [id number]

[address] [place] [address], [place] [address]

                          29 Dec, 2011               

 

                          [facility]     [address] [id number]

[address] [place] [address], [place] [address]

                          29 Dec, 2011               

 

                          [facility]     [address] [id number]

[address] [place] [address]

                          21 Dec, 2011               

 

                          [facility]     [address] [facility] [id number]

[address] [place] [phone]

                                         

 

                          [facility]     [address] [facility] [id number]

[address] [place] [address]

                          22 Oct, 2011               

 

                          [facility]     [address] [facility] [id number]

[address] [place] [phone]

                          21 Oct, 2011               







IMMUNIZATIONS

No Known Immunizations



SOCIAL HISTORY

Never Assessed



REASON FOR VISIT





PLAN OF CARE





VITAL SIGNS





MEDICATIONS

Unknown Medications



RESULTS

No Results



PROCEDURES

No Known procedures



INSTRUCTIONS





MEDICATIONS ADMINISTERED

No Known Medications



MEDICAL (GENERAL) HISTORY





                    Type                Description         Date

 

                    Medical History     depression           

 

                    Medical History     hyperlipidemia       

 

                    Hospitalization History staph in right leg

## 2020-08-05 NOTE — XMS REPORT
[facility]

                             Created on: 2020



Ignacio Valle

External Reference #: [id number]

: 1955

Sex: Male



Demographics





                          Address                   [address] [place]  [address]

 

                          Preferred Language        Unknown

 

                          Marital Status            Unknown

 

                          Mormonism Affiliation     Unknown

 

                          Race                      Unknown

 

                          Ethnic Group              Unknown





Author





                          Author                    Ignacio FARRAR

 

                          [facility]

 

                          Address                   [address] [place]  [address]



 

                          Phone                     [phone]







Care Team Providers





                    Care Team Member Name Role                Phone

 

                    DARIAN FARRAR    Unavailable         [phone]







PROBLEMS





          Type      Condition ICD9-CM Code EFZ83-FS Code Onset Dates Condition S

tatus SNOMED 

Code

 

                          Problem                   Nonspecific elevation of lev

els of transaminase or lactic acid 

dehydrogenase (LDH) 790.4                                  Active       15558222

2

 

           Problem    Encounter for long-term (current) use of other medications

 V58.69                           

Active                                  [id number]

 

          Problem   Edema     782.3                         Active    109123536

 

          Problem   Spontaneous ecchymoses 782.7                         Active 

   [id number]

 

          Problem   Trigger finger (acquired) 727.03                        Acti

ve    1655144

 

          Problem   Varicose veins of lower extremities with inflammation 454.1 

                        Active    

77097366

 

           Problem    Persistent disorder of initiating or maintaining sleep 307

.42                           Active

                                        [id number]

 

          Problem   Pityriasis versicolor 111.0                         Active  

  02534625

 

           Problem    Unspecified local infection of skin and subcutaneous tissu

e 686.9                            

Active                                  [id number]

 

          Problem   Unspecified viral hepatitis C without hepatic coma 070.70   

                     Active    

85773401

 

          Problem   Vascular disorder of skin 709.1                         Acti

ve    64364770

 

          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       

                 Active    

21570239

 

          Problem   Anxiety state, unspecified 300.00                        Act

kathia    581652685

 

          Problem   Other and unspecified hyperlipidemia 272.4                  

       Active    01393235

 

          Problem   Major depressive disorder, recurrent episode, mild 296.31   

                     Active    

16989827







ALLERGIES

No Information



ENCOUNTERS





                Encounter       Location        Date            Diagnosis

 

                          [facility]   [id number]

[address] [place] [id number]

                          15 Jesse, 2016              Encounter for other specifie

d administrative purpose Z02.89

 

                          [facility]   [address] [facility] [id number]

[address] [place] [id number]

                          12 May, 2016              Dental examination Z01.20 an

d Dental caries K02.9

 

                          [facility]     [address] [id number]

[address] [place] [address]

                          14 Aug, 2015              Edema 782.3 and Family histo

ry of coronary artery disease V17.3

 

                          [facility]     [address] [id number]

[address] [place] [address]

                          07 Aug, 2015              Edema 782.3 and Family histo

ry of coronary artery disease V17.3

 

                          [facility]   [address] [id number]

[address] [place] [id number]

                                        Dental examination V72.2

 

                          [facility]     [address] [id number]

[address] [place] [address]

                                         

 

                          [facility]     [address] [id number]

[address] [place] [address]

                                         

 

                          [facility]     [address] [id number]

[address] [place] [address]

                          20 Mar, 2015               

 

                          [facility]     [address] [id number]

[address] [place] [address]

                          20 Mar, 2015               

 

                          [facility]     [address] [id number]

[address] [place] [address]

                                         

 

                          [facility]     [address] [id number]

[address] [place] [address]

                                         

 

                          [facility]     [address] [id number]

[address] [place] [address]

                                         

 

                          [facility]     [address] [id number]

[address] [place] [address]

                                         

 

                          [facility]     [address] [id number]

[address] [place] [address]

                          11 Dec, 2014               

 

                          [facility]     [address] [id number]

[address] [place] [address]

                          11 Dec, 2014               

 

                          [facility]     [address] [id number]

[address] [place] [address]

                                         

 

                          [facility]     [address] [id number]

[address] [place] [address] [place] [address]

                                         

 

                          [facility]     [address] [id number]

[address] [place] [address], [place] [address]

                          30 Oct, 2014               

 

                          [facility]     [address] [id number]

[address] [place] [address], [place] [address]

                          30 Oct, 2014               

 

                          [facility]     [address] [id number]

[address] [place] [address], [place] [address]

                          10 Sep, 2014               

 

                          [facility]     [address] [id number]

[address] [place] [address], [place] [address]

                          09 Sep, 2014               

 

                          [facility]     [address] [id number]

[address] [place] [address], [place] [address]

                          09 Sep, 2014               

 

                          [facility]     [address] [id number]

[address] [place] [address], [place] [address]

                          22 Aug, 2014               

 

                          [facility]     [address] [id number]

[address] [place] [address], [place] [address]

                          22 Aug, 2014               

 

                          [facility]     [address] [id number]

[address] [place] [address], [place] [address]

                          22 Aug, 2014               

 

                          [facility]     [address] [id number]

[address] [place] [address], [place] [address]

                                         

 

                          [facility]     [address] [id number]

[address] [place] [address], [place] [address]

                                         

 

                          [facility]     [address] [id number]

[address] [place] [address], [place] [address]

                                         

 

                          [facility]     [address] [id number]

[address] [place] [address], [place] [address]

                                         

 

                          [facility]     [address] [id number]

[address] [place] [address], [place] [address]

                                         

 

                          [facility]     [address] [id number]

[address] [place] [address], [place] [address]

                                         

 

                          [facility]     [address] [id number]

[address] [place] [address], [place] [address]

                                         

 

                          [facility]     [address] [id number]

[address] [place] [address], [place] [address]

                                         

 

                          [facility]     [address] [id number]

[address] [place] [address], [place] [address]

                          20 May, 2014               

 

                          [facility]     [address] [id number]

[address] [place] [address], [place] [address]

                          20 May, 2014               

 

                          [facility]     [address] [id number]

[address] [place] [address], [place] [address]

                                         

 

                          [facility]     [address] [id number]

[address] [place] [address], [place] [address]

                                         

 

                          [facility]     [address] [id number]

[address] [place] [address], [place] [address]

                                         

 

                          [facility]     [address] [id number]

[address] [place] [address], [place] [address]

                                         

 

                          [facility]     [address] [id number]

[address] [place] [address], [place] [address]

                                         

 

                          [facility]     [address] [id number]

[address] [place] [address], [place] [address]

                                         

 

                          [facility]     [address] [id number]

[address] [place] [address], [place] [address]

                          13 Dec, 2013               

 

                          [facility]     [address] [id number]

[address] [place] [address], [place] [address]

                          13 Dec, 2013               

 

                          [facility]     [address] [id number]

[address] [place] [address], [place] [address]

                          13 Dec, 2013               

 

                          [facility]     [address] [id number]

[address] [place] [address], [place] [address]

                          13 Dec, 2013               

 

                          [facility]     [address] [id number]

[address] [place] [address], [place] [address]

                          17 Oct, 2013               

 

                          [facility]     [address] [id number]

[address] [place] [address], [place] [address]

                          17 Oct, 2013               

 

                          [facility]     [address] [id number]

[address] [place] [address], [place] [address]

                          10 Oct, 2013               

 

                          [facility]     [address] [id number]

[address] [place] [address], [place] [address]

                          10 Oct, 2013               

 

                          [facility]     [address] [id number]

[address] [place] [address], [place] [address]

                          04 Oct, 2013               

 

                          [facility]     [address] [id number]

[address] [place] [address], [place] [address]

                          24 Sep, 2013               

 

                          [facility]     [address] [id number]

[address] [place] [address], [place] [address]

                          19 Sep, 2013               

 

                          [facility]     [address] [id number]

[address] [place] [address], [place] [address]

                          16 Sep, 2013               

 

                          [facility]     [address] [id number]

[address] [place] [address], [place] [address]

                          21 Aug, 2013               

 

                          [facility]     [address] [id number]

[address] [place] [address], [place] [address]

                          17 Aug, 2013               

 

                          [facility]     [address] [id number]

[address] [place] [address], [place] [address]

                          16 Aug, 2013               

 

                          [facility]     [address] [id number]

[address] [place] [address], [place] [address]

                          15 Aug, 2013               

 

                          [facility]     [address] [id number]

[address] [place] [address], [place] [address]

                          13 Aug, 2013               

 

                          [facility]     [address] [id number]

[address] [place] [address], [place] [address]

                          13 Aug, 2013               

 

                          [facility]     [address] [id number]

[address] [place] [address], [place] [address]

                          12 Aug, 2013               

 

                          [facility]     [address] [id number]

[address] [place] [address], [place] [address]

                                         

 

                          [facility]     [address] [id number]

[address] [place] [address], [place] [address]

                                         

 

                          [facility]     [address] [id number]

[address] [place] [address], [place] [address]

                                         

 

                          [facility]     [address] [id number]

[address] [place] [address], [place] [address]

                          10 2013               

 

                          [facility]     [address] [id number]

[address] [place] [address], [place] [address]

                          17 May, 2013               

 

                          [facility]     [address] [id number]

[address] [place] [address], [place] [address]

                          10 May, 2013               

 

                          [facility]     [address] [id number]

[address] [place] [address], [place] [address]

                          15 Apr, 2013               

 

                          [facility]     [address] [id number]

[address] [place] [address], [place] [address]

                                         

 

                          [facility]     [address] [id number]

[address] [place] [address], [place] [address]

                                         

 

                          [facility]     [address] [id number]

[address] [place] [address], [place] [address]

                                         

 

                          [facility]     [address] [id number]

[address] [place] [address], [place] [address]

                                         

 

                          [facility]     [address] [id number]

[address] [place] [address], [place] [address]

                                         

 

                          [facility]     [address] [id number]

[address] [place] [address], [place] [address]

                                         

 

                          [facility]     [address] [id number]

[address] [place] [address], [place] [address]

                                         

 

                          [facility]     [address] [id number]

[address] [place] [address], [place] [address]

                          21 Dec, 2012               

 

                          [facility]     [address] [id number]

[address] [place] [address], [place] [address]

                          21 Dec, 2012               

 

                          [facility]     [address] [id number]

[address] [place] [address], [place] [address]

                          14 Dec, 2012               

 

                          [facility]     [address] [id number]

[address] [place] [address], [place] [address]

                          14 Dec, 2012               

 

                          [facility]     [address] [id number]

[address] [place] [address], [place] [address]

                          20 2012               

 

                          [facility]     [address] [id number]

[address] [place] [address], [place] [address]

                          20 2012               

 

                          [facility]     [address] [id number]

[address] [place] [address], [place] [address]

                          16 2012               

 

                          [facility]     [address] [id number]

[address] [place] [address], [place] [address]

                          16 2012               

 

                          [facility]     [address] [id number]

[address] [place] [address], [place] [address]

                          13 2012               

 

                          [facility]     [address] [id number]

[address] [place] [address], [place] [address]

                          13 2012               

 

                          [facility]     [address] [id number]

[address] [place] [address], [place] [address]

                          13 2012               

 

                          [facility]     [address] [id number]

[address] [place] [address], [place] [address]

                                         

 

                          [facility]     [address] [id number]

[address] [place] [address], [place] [address]

                                         

 

                          [facility]     [address] [id number]

[address] [place] [address], [place] [address]

                                         

 

                          [facility]     [address] [id number]

[address] [place] [address], [place] [address]

                          22 Oct, 2012               

 

                          [facility]     [address] [id number]

[address] [place] [address], [place] [address]

                          22 Oct, 2012               

 

                          [facility]     [address] [id number]

[address] [place] [address]

                          22 Oct, 2012               

 

                          [facility]     [address] [id number]

[address] [place] [address], [place] [address]

                          22 Oct, 2012               

 

                          [facility]     [address] [id number]

[address] [place] [address], [place] [address]

                          10 Oct, 2012               

 

                          [facility]     [address] [id number]

[address] [place] [address], [place] [address]

                          10 Oct, 2012               

 

                          [facility]     [address] [id number]

[address] [place] [address], [place] [address]

                          05 Oct, 2012               

 

                          [facility]     [address] [id number]

[address] [place] [address], [place] [address]

                          05 Oct, 2012               

 

                          [facility]     [address] [id number]

[address] [place] [address], [place] [address]

                          07 Sep, 2012               

 

                          [facility]     [address] [id number]

[address] [place] [address], [place] [address]

                          22 Aug, 2012               

 

                          [facility]     [address] [id number]

[address] [place] [address], [place] [address]

                          03 Aug, 2012               

 

                          [facility]     [address] [id number]

[address] [place] [address], [place] [address]

                                         

 

                          [facility]     [address] [id number]

[address] [place] [address], [place] [address]

                                         

 

                          [facility]     [address] [id number]

[address] [place] [address], [place] [address]

                                         

 

                          [facility]     [address] [id number]

[address] [place] [address], [place] [address]

                                         

 

                          [facility]     [address] [id number]

[address] [place] [address], [place] [address]

                                         

 

                          [facility]     [address] [id number]

[address] [place] [address], [place] [address]

                                         

 

                          [facility]     [address] [id number]

[address] [place] [address], [place] [address]

                                         

 

                          [facility]     [address] [id number]

[address] [place] [address], [place] [address]

                          30 May, 2012               

 

                          [facility]     [address] [id number]

[address] [place] [place] [address], [place] [address]

                          30 May, 2012               

 

                          [facility]     [address] [id number]

[address] [place] [address], [place] [address]

                          21 May, 2012               

 

                          [facility]     [address] [id number]

[address] [place] [address], [place] [address]

                          14 May, 2012               

 

                          [facility]     [address] [id number]

[address] [place] [address], [place] [address]

                          04 May, 2012               

 

                          [facility]     [address] [id number]

[address] [place] [address], [place] [address]

                          04 May, 2012               

 

                          [facility]     [address] [id number]

[address] [place] [address], [place] [address]

                          04 May, 2012               

 

                          [facility]     [address] [id number]

[address] [place] [address], [place] [address]

                                         

 

                          [facility]     [address] [id number]

[address] [place] [address], [place] [address]

                                         

 

                          [facility]     [address] [id number]

[address] [place] [address], [place] [address]

                          23 Mar, 2012               

 

                          [facility]     [address] [id number]

[address] [place] [address], [place] [address]

                          15 Mar, 2012               

 

                          [facility]     [address] [id number]

[address] [place] [address], [place] [address]

                          13 Mar, 2012               

 

                          [facility]     [address] [id number]

[address] [place] [address], [place] [address]

                          12 Mar, 2012               

 

                          [facility]     [address] [id number]

[address] [place] [address], [place] [address]

                                         

 

                          [facility]     [address] [id number]

[address] [place] [address], [place] [address]

                                         

 

                          [facility]     [address] [id number]

[address] [place] [address], [place] [address]

                                         

 

                          [facility]     [address] [id number]

[address] [place] [address], [place] [address]

                                         

 

                          [facility]     [address] [id number]

[address] [place] [address], [place] [address]

                          29 Dec, 2011               

 

                          [facility]     [address] [id number]

[address] [place] [address], [place] [address]

                          29 Dec, 2011               

 

                          [facility]     [address] [id number]

[address] [place] [address]

                          21 Dec, 2011               

 

                          [facility]     [address] [facility] [id number]

[address] [place] [address]

                                         

 

                          [facility]     [address] [facility] [id number]

[address] [place] [address]

                          22 Oct, 2011               

 

                          [facility]     [address] [facility] [id number]

[address] [place] [address]

                          21 Oct, 2011               







IMMUNIZATIONS

No Known Immunizations



SOCIAL HISTORY

Never Assessed



REASON FOR VISIT





PLAN OF CARE





VITAL SIGNS





MEDICATIONS

Unknown Medications



RESULTS

No Results



PROCEDURES

No Known procedures



INSTRUCTIONS





MEDICATIONS ADMINISTERED

No Known Medications



MEDICAL (GENERAL) HISTORY





                    Type                Description         Date

 

                    Medical History     depression           

 

                    Medical History     hyperlipidemia       

 

                    Hospitalization History staph in right leg

## 2020-08-05 NOTE — XMS REPORT
[facility]

                             Created on: 2020



Ignacio Valle

External Reference #: [id number]

: 1955

Sex: Male



Demographics





                          Address                   [address] [place]  [address]

 

                          Preferred Language        Unknown

 

                          Marital Status            Unknown

 

                          Temple Affiliation     Unknown

 

                          Race                      Unknown

 

                          Ethnic Group              Unknown





Author





                          Author                    Ignacio Novak

 

                          [facility]

 

                          Address                   [address] [place]  [address]



 

                          Phone                     [phone]







Care Team Providers





                    Care Team Member Name Role                Phone

 

                    Kishore  RASHI   Unavailable         [phone]







PROBLEMS





          Type      Condition ICD9-CM Code TDE78-RY Code Onset Dates Condition S

tatus SNOMED 

Code

 

                          Problem                   Nonspecific elevation of lev

els of transaminase or lactic acid 

dehydrogenase (LDH) 790.4                                  Active       [id number]

2

 

           Problem    Encounter for long-term (current) use of other medications

 V58.69                           

Active                                  [id number]

 

          Problem   Edema     782.3                         Active    160499152

 

          Problem   Spontaneous ecchymoses 782.7                         Active 

   [id number]

 

          Problem   Trigger finger (acquired) 727.03                        Acti

ve    6840790

 

          Problem   Varicose veins of lower extremities with inflammation 454.1 

                        Active    

48291310

 

           Problem    Persistent disorder of initiating or maintaining sleep 307

.42                           Active

                                        [id number]

 

          Problem   Pityriasis versicolor 111.0                         Active  

  51265223

 

           Problem    Unspecified local infection of skin and subcutaneous tissu

e 686.9                            

Active                                  [id number]

 

          Problem   Unspecified viral hepatitis C without hepatic coma 070.70   

                     Active    

75426579

 

          Problem   Vascular disorder of skin 709.1                         Acti

ve    58046258

 

          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       

                 Active    

05186149

 

          Problem   Anxiety state, unspecified 300.00                        Act

kathia    569537038

 

          Problem   Other and unspecified hyperlipidemia 272.4                  

       Active    65101788

 

          Problem   Major depressive disorder, recurrent episode, mild 296.31   

                     Active    

32991067







ALLERGIES

No Information



ENCOUNTERS





                Encounter       Location        Date            Diagnosis

 

                          [facility]   [id number]

[address] [place] [id number]

                          15 Jesse, 2016              Encounter for other specifie

d administrative purpose Z02.89

 

                          [facility]   [address] [id number]

[address] [place] [id number]

                          12 May, 2016              Dental examination Z01.20 an

d Dental caries K02.9

 

                          [facility]     [address] [id number]

[address] [place] [address]

                          14 Aug, 2015              Edema 782.3 and Family histo

ry of coronary artery disease V17.3

 

                          [facility]     [address] [id number]

[address] [place] [address]

                          07 Aug, 2015              Edema 782.3 and Family histo

ry of coronary artery disease V17.3

 

                          [facility]   [address] [id number]

[address] [place] [id number]

                                        Dental examination V72.2

 

                          [facility]     [address] [id number]

[address] [place] [address]

                                         

 

                          [facility]     [address] [id number]

[address] [place] [address]

                                         

 

                          [facility]     [address] [id number]

[address] [place] [address]

                          20 Mar, 2015               

 

                          [facility]     [address] [id number]

[address] [place] [address]

                          20 Mar, 2015               

 

                          [facility]     [address] [id number]

[address] [place] [address]

                                         

 

                          [facility]     [address] [id number]

[address] [place] [address]

                                         

 

                          [facility]     [address] [id number]

[address] [place] [address]

                                         

 

                          [facility]     [address] [id number]

[address] [place] [address]

                                         

 

                          [facility]     [address] [id number]

[address] [place] [address]

                          11 Dec, 2014               

 

                          [facility]     [address] [id number]

[address] [place] [address]

                          11 Dec, 2014               

 

                          [facility]     [address] [id number]

[address] [place] [address]

                                         

 

                          [facility]     [address] [id number]

[address] [place] [address], [place] [address]

                                         

 

                          [facility]     [address] [id number]

[address] [place] [address], [place] [address]

                          30 Oct, 2014               

 

                          [facility]     [address] [id number]

[address] [place] [address], [place] [address]

                          30 Oct, 2014               

 

                          [facility]     [address] [id number]

[address] [place] [address], [place] [address]

                          10 Sep, 2014               

 

                          [facility]     [address] [id number]

[address] [place] [address], [place] [address]

                          09 Sep, 2014               

 

                          [facility]     [address] [id number]

[address] [place] [address], [place] [address]

                          09 Sep, 2014               

 

                          [facility]     [address] [id number]

[address] [place] [address], [place] [address]

                          22 Aug, 2014               

 

                          [facility]     [address] [id number]

[address] [place] [address], [place] [address]

                          22 Aug, 2014               

 

                          [facility]     [address] [id number]

[address] [place] [address], [place] [address]

                          22 Aug, 2014               

 

                          [facility]     [address] [id number]

[address] [place] [address], [place] [address]

                                         

 

                          [facility]     [address] [id number]

[address] [place] [address], [place] [address]

                                         

 

                          [facility]     [address] [id number]

[address] [place] [address], [place] [address]

                                         

 

                          [facility]     [address] [id number]

[address] [place] [address], [place] [address]

                                         

 

                          [facility]     [address] [id number]

[address] [place] [address], [place] [address]

                                         

 

                          [facility]     [address] [id number]

[address] [place] [address], [place] [address]

                                         

 

                          [facility]     [address] [id number]

[address] [place] [address], [place] [address]

                                         

 

                          [facility]     [address] [id number]

[address] [place] [address], [place] [address]

                                         

 

                          [facility]     [address] [id number]

[address] [place] [address], [place] [address]

                          20 May, 2014               

 

                          [facility]     [address] [id number]

[address] [place] [address], [place] [address]

                          20 May, 2014               

 

                          [facility]     [address] [id number]

[address] [place] [address], [place] [address]

                                         

 

                          [facility]     [address] [id number]

[address] [place] [address], [place] [address]

                                         

 

                          [facility]     [address] [id number]

[address] [place] [address], [place] [address]

                                         

 

                          [facility]     [address] [id number]

[address] [place] [address], [place] [address]

                                         

 

                          [facility]     [address] [id number]

[address] [place] [address], [place] [address]

                                         

 

                          [facility]     [address] [id number]

[address] [place] [address], [place] [address]

                                         

 

                          [facility]     [address] [id number]

[address] [place] [address], [place] [address]

                          13 Dec, 2013               

 

                          [facility]     [address] [id number]

[address] [place] [address], [place] [address]

                          13 Dec, 2013               

 

                          [facility]     [address] [id number]

[address] [place] [address], [place] [address]

                          13 Dec, 2013               

 

                          [facility]     [address] [id number]

[address] [place] [address], [place] [address]

                          13 Dec, 2013               

 

                          [facility]     [address] [id number]

[address] [place] [address], [place] [address]

                          17 Oct, 2013               

 

                          [facility]     [address] [id number]

[address] [place] [address], [place] [address]

                          17 Oct, 2013               

 

                          [facility]     [address] [id number]

[address] [place] [address] [place] [address]

                          10 Oct, 2013               

 

                          [facility]     [address] [id number]

[address] [place] [address], [place] [address]

                          10 Oct, 2013               

 

                          [facility]     [address] [id number]

[address] [place] [address], [place] [address]

                          04 Oct, 2013               

 

                          [facility]     [address] [id number]

[address] [place] [address], [place] [address]

                          24 Sep, 2013               

 

                          [facility]     [address] [id number]

[address] [place] [address], [place] [address]

                          19 Sep, 2013               

 

                          [facility]     [address] [id number]

[address] [place] [address], [place] [address]

                          16 Sep, 2013               

 

                          [facility]     [address] [id number]

[address] [place] [address], [place] [address]

                          21 Aug, 2013               

 

                          [facility]     [address] [id number]

[address] [place] [address], [place] [address]

                          17 Aug, 2013               

 

                          [facility]     [address] [id number]

[address] [place] [address], [place] [address]

                          16 Aug, 2013               

 

                          [facility]     [address] [id number]

[address] [place] [address], [place] [address]

                          15 Aug, 2013               

 

                          [facility]     [address] [id number]

[address] [place] [address], [place] [address]

                          13 Aug, 2013               

 

                          [facility]     [address] [id number]

[address] [place] [address], [place] [address]

                          13 Aug, 2013               

 

                          [facility]     [address] [id number]

[address] [place] [address], [place] [address]

                          12 Aug, 2013               

 

                          [facility]     [address] [id number]

[address] [place] [address], [place] [address]

                                         

 

                          [facility]     [address] [id number]

[address] [place] [address], [place] [address]

                                         

 

                          [facility]     [address] [id number]

[address] [place] [address], [place] [address]

                                         

 

                          [facility]     [address] [id number]

[address] [place] [address], [place] [address]

                          10 2013               

 

                          [facility]     [address] [id number]

[address] [place] [address], [place] [address]

                          17 May, 2013               

 

                          [facility]     [address] [id number]

[address] [place] [address], [place] [address]

                          10 May, 2013               

 

                          [facility]     [address] [id number]

[address] [place] [address], [place] [address]

                          15 Apr, 2013               

 

                          [facility]     [address] [id number]

[address] [place] [address], [place] [address]

                                         

 

                          [facility]     [address] [id number]

[address] [place] [address], [place] [address]

                                         

 

                          [facility]     [address] [id number]

[address] [place] [address], [place] [address]

                                         

 

                          [facility]     [address] [id number]

[address] [place] [address], [place] [address]

                                         

 

                          [facility]     [address] [id number]

[address] [place] [address], [place] [address]

                                         

 

                          [facility]     [address] [id number]

[address] [place] [address], [place] [address]

                                         

 

                          [facility]     [address] [id number]

[address] [place] [address], [place] [address]

                                         

 

                          [facility]     [address] [id number]

[address] [place] [address], [place] [address]

                          21 Dec, 2012               

 

                          [facility]     [address] [id number]

[address] [place] [address], [place] [address]

                          21 Dec, 2012               

 

                          [facility]     [address] [id number]

[address] [place] [address], [place] [address]

                          14 Dec, 2012               

 

                          [facility]     [address] [id number]

[address] [place] [address], [place] [address]

                          14 Dec, 2012               

 

                          [facility]     [address] [id number]

[address] [address]

                          20 2012               

 

                          [facility]     [address] [id number]

[address] [place] [address], [place] [address]

                          20 2012               

 

                          [facility]     [address] [id number]

[address] [place] [address], [place] [address]

                          16 2012               

 

                          [facility]     [address] [id number]

[address] [place] [address], [place] [address]

                          16 2012               

 

                          [facility]     [address] [id number]

[address] [place] [address]

                          13 2012               

 

                          [facility]     [address] [id number]

[address] [place] [address], [place] [address]

                                         

 

                          [facility]     [address] [id number]

[address] [place] [address]

                          13 2012               

 

                          [facility]     [address] [id number]

[address] [place] [address], [place] [address]

                                         

 

                          [facility]     [address] [id number]

[address] [place] [address]

                                         

 

                          [facility]     [address] [id number]

[address] [place] [address]

                          07 2012               

 

                          [facility]     [address] [id number]

[address] [place] [address], [place] [address]

                          22 Oct, 2012               

 

                          [facility]     [address] [id number]

[address] [place] [address]

                          22 Oct, 2012               

 

                          [facility]     [address] [id number]

[address] [place] [address]

                          22 Oct, 2012               

 

                          [facility]     [address] [id number]

[address] [place] [address], [place] [address]

                          22 Oct, 2012               

 

                          [facility]     [address] [id number]

[address] [place] [address]

                          10 Oct, 2012               

 

                          [facility]     [address] [id number]

[address] [place] [address]

                          10 Oct, 2012               

 

                          [facility]     [address] [id number]

[address] [place] [address], [place] [address]

                          05 Oct, 2012               

 

                          [facility]     [address] [id number]

[address] [place] [address], [place] [address]

                          05 Oct, 2012               

 

                          [facility]     [address] [id number]

[address] [place] [address], [place] [address]

                          07 Sep, 2012               

 

                          [facility]     [address] [id number]

[address] [place] [address], [place] [address]

                          22 Aug, 2012               

 

                          [facility]     [address] [id number]

[address] [place] [address], [place] [address]

                          03 Aug, 2012               

 

                          [facility]     [address] [id number]

[address] [place] [address], [place] [address]

                                         

 

                          [facility]     [address] [id number]

[address] [place] [address], [place] [address]

                                         

 

                          [facility]     [address] [id number]

[address] [place] [address], [place] [address]

                                         

 

                          [facility]     [address] [id number]

[address] [place] [address], [place] [address]

                                         

 

                          [facility]     [address] [id number]

[address] [place] [address], [place] [address]

                                         

 

                          [facility] [facility]     [address] [id number]

[address] [place] [address], [place] [address]

                                         

 

                          [facility]     [address] [id number]

[address] [place] [address], [place] [address]

                                         

 

                          [facility]     [address] [id number]

[address] [place] [address], [place] [address]

                          30 May, 2012               

 

                          [facility]     [address] [id number]

[address] [place] [address], [place] [address]

                          30 May, 2012               

 

                          [facility]     [address] [id number]

[address] [place] [address], [place] [address]

                          21 May, 2012               

 

                          [facility]     [address] [id number]

[address] [place] [address], [place] [address]

                          14 May, 2012               

 

                          [facility]     [address] [id number]

[address] [place] [address], [place] [address]

                          04 May, 2012               

 

                          [facility]     [address] [id number]

[address] [place] [address], [place] [address]

                          04 May, 2012               

 

                          [facility]     [address] [id number]

[address] [place] [address], [place] [address]

                          04 May, 2012               

 

                          [facility]     [address] [id number]

[address] [place] [address], [place] [address]

                                         

 

                          [facility]     [address] [id number]

[address] [place] [address], [place] [address]

                                         

 

                          [facility]     [address] [id number]

[address] [place] [address], [place] [address]

                          23 Mar, 2012               

 

                          [facility]     [address] [id number]

[address] [place] [address], [place] [address]

                          15 Mar, 2012               

 

                          [facility]     [address] [id number]

[address] [place] [address], [place] [address]

                          13 Mar, 2012               

 

                          [facility]     [address] [id number]

[address] [place] [address], [place] [address]

                          12 Mar, 2012               

 

                          [facility]     [address] [id number]

[address] [place] [address], [place] [address]

                                         

 

                          [facility]     [address] [id number]

[address] [place] [address], [place] [address]

                                         

 

                          [facility]     [address] [id number]

[address] [place] [address], [place] [address]

                                         

 

                          [facility]     [address] [id number]

[address] [place] [address], [place] [address]

                                         

 

                          [facility]     [address] [id number]

[address] [place] [address], [place] [address]

                          29 Dec, 2011               

 

                          [facility]     [address] [id number]

[address] [place] [address], [place] [address]

                          29 Dec, 2011               

 

                          [facility]     [address] [id number]

[address] [place] [address]

                          21 Dec, 2011               

 

                          [facility]     [address] [facility] [id number]

[address] [place] [address]

                                         

 

                          [facility]     [address] [facility] [place] [id number]

[address] [place] [address]

                          22 Oct, 2011               

 

                          [facility]     [address] [facility] [place] [id number]

[address] [place] [address]

                          21 Oct, 2011               







IMMUNIZATIONS

No Known Immunizations



SOCIAL HISTORY

Never Assessed



REASON FOR VISIT





PLAN OF CARE





VITAL SIGNS





MEDICATIONS

Unknown Medications



RESULTS

No Results



PROCEDURES

No Known procedures



INSTRUCTIONS





MEDICATIONS ADMINISTERED

No Known Medications



MEDICAL (GENERAL) HISTORY





                    Type                Description         Date

 

                    Medical History     depression           

 

                    Medical History     hyperlipidemia       

 

                    Hospitalization History staph in right leg

## 2023-07-27 ENCOUNTER — HOSPITAL ENCOUNTER (OUTPATIENT)
Dept: HOSPITAL 75 - RAD | Age: 68
End: 2023-07-27
Attending: HOSPITALIST
Payer: OTHER GOVERNMENT

## 2023-07-27 DIAGNOSIS — M77.8: ICD-10-CM

## 2023-07-27 DIAGNOSIS — M19.011: ICD-10-CM
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
## 2023-07-27 DIAGNOSIS — S46.911A: Primary | ICD-10-CM

## 2023-07-27 PROCEDURE — 73221 MRI JOINT UPR EXTREM W/O DYE: CPT

## 2023-07-27 NOTE — DIAGNOSTIC IMAGING REPORT
PROCEDURE: MRI right joint upper extremity without contrast.



TECHNIQUE: Multiplanar, multisequence non contrast-enhanced MRI

of the right upper extremity was accomplished.



INDICATION:  Right shoulder pain, fall in May 2023. Surgery 20

years ago.



COMPARISON: None



FINDINGS:



No acute fracture seen in the right shoulder. Alignment is

normal. There is no joint effusion.



The supraspinatus tendon appears intact. There is a small

low-grade partial-thickness tear at the insertion of the

infraspinatus tendon. The teres minor tendon is intact.

Subscapularis tendon is intact. There is no muscular atrophy.



The long head of the biceps tendon is normal in course and

signal.



The glenoid labrum is suboptimally evaluated in the absence of

intra-articular contrast. There is suspected tearing at the

anterior inferior labrum with no para labral cysts seen. This is

likely degenerative.



The acromion has a curved undersurface with a small subacromial

spur. There are severe degenerative changes in the

acromioclavicular joint. The coracoclavicular and coracoacromial

ligaments appear intact. Soft tissues about the right shoulder

demonstrate no acute abnormality.



IMPRESSION:

1. Small low-grade partial-thickness tear in the infraspinatus

tendon with no high-grade partial-thickness or full-thickness

rotator cuff tear seen.

2. Marked degenerative changes in the acromioclavicular joint

with mild subacromial spurring.



Dictated by: 



  Dictated on workstation # [id number]

## 2023-08-01 ENCOUNTER — HOSPITAL ENCOUNTER (OUTPATIENT)
Dept: HOSPITAL 75 - PREOP | Age: 68
Discharge: HOME | End: 2023-08-01
Attending: SPECIALIST
Payer: OTHER GOVERNMENT

## 2023-08-01 VITALS — WEIGHT: 173.72 LBS | BODY MASS INDEX: 25.73 KG/M2 | HEIGHT: 69.02 IN

## 2023-08-01 DIAGNOSIS — Z01.818: Primary | ICD-10-CM

## 2023-08-04 ENCOUNTER — HOSPITAL ENCOUNTER (OUTPATIENT)
Dept: HOSPITAL 75 - SDC | Age: 68
Discharge: HOME | End: 2023-08-04
Attending: SPECIALIST
Payer: MEDICARE

## 2023-08-04 VITALS — WEIGHT: 173.72 LBS | BODY MASS INDEX: 25.73 KG/M2 | HEIGHT: 69.02 IN

## 2023-08-04 VITALS — SYSTOLIC BLOOD PRESSURE: 139 MMHG | DIASTOLIC BLOOD PRESSURE: 83 MMHG

## 2023-08-04 VITALS — SYSTOLIC BLOOD PRESSURE: 135 MMHG | DIASTOLIC BLOOD PRESSURE: 88 MMHG

## 2023-08-04 DIAGNOSIS — H25.9: Primary | ICD-10-CM

## 2023-08-04 RX ADMIN — PHENYLEPHRINE HYDROCHLORIDE SCH ML: 100 SOLUTION/ DROPS OPHTHALMIC at 10:43

## 2023-08-04 RX ADMIN — TROPICAMIDE SCH ML: 10 SOLUTION/ DROPS OPHTHALMIC at 10:43

## 2023-08-04 RX ADMIN — TETRACAINE HYDROCHLORIDE PRN ML: 5 SOLUTION OPHTHALMIC at 10:36

## 2023-08-04 RX ADMIN — TETRACAINE HYDROCHLORIDE PRN ML: 5 SOLUTION OPHTHALMIC at 10:50

## 2023-08-04 RX ADMIN — PHENYLEPHRINE HYDROCHLORIDE SCH ML: 100 SOLUTION/ DROPS OPHTHALMIC at 10:55

## 2023-08-04 RX ADMIN — TROPICAMIDE SCH ML: 10 SOLUTION/ DROPS OPHTHALMIC at 10:55

## 2023-08-04 RX ADMIN — TETRACAINE HYDROCHLORIDE PRN ML: 5 SOLUTION OPHTHALMIC at 10:55

## 2023-08-04 RX ADMIN — TETRACAINE HYDROCHLORIDE PRN ML: 5 SOLUTION OPHTHALMIC at 10:43

## 2023-08-04 RX ADMIN — PHENYLEPHRINE HYDROCHLORIDE SCH ML: 100 SOLUTION/ DROPS OPHTHALMIC at 10:50

## 2023-08-04 RX ADMIN — TROPICAMIDE SCH ML: 10 SOLUTION/ DROPS OPHTHALMIC at 10:50

## 2023-08-04 NOTE — OPHTHALMOLOGY OPERATIVE REPORT
Cataract removal/placement IOL


PREOPERATIVE DIAGNOSIS: Cataract Right Eye


POSTOPERATIVE DIAGNOSIS: Cataract Right Eye





PROCEDURE: Cataract removal and placement of posterior chamber implant, right 

eye





SURGEON: Dc Martin 





ANESTHESIA: Topical with sedation





COMPLICATIONS: None





ESTIMATED BLOOD LOSS: Minimal 





DESCRIPTION OF PROCEDURE:


After proper informed consent was obtained, the patient, a 67 male, was taken to

the Operating Room and the right eye was anesthetized with tetracaine.  The 

right eye was then prepped and draped in the usual manner.  A wire lid speculum 

was placed. A paracentesis was made at the left hand position. Preservative free

lidocaine was injected into the anterior chamber followed by viscoelastic.  A 

clear corneal incision was made in the temporal position. A capsulorrhexis was 

preformed and the central nuclear and cortical material were removed.  The 

posterior capsule was polished and Antonio 19.5 [id number]  IOL was placed into the 

capsular bag. The residual viscoelastic was aspirated and balanced saline 

solution was injected into the anterior chamber. Moxifloxacin  was injected into

the anterior chamber.





The wound was checked and found to be water tight.





The patient tolerated the procedure well without complications.











DC MARTIN MD              Aug 4, 2023 11:48

## 2023-08-04 NOTE — OPHTHALMOLOGIST PRE-OP NOTE
Pre-Operative Progress Note


H&P Reviewed


The H&P was reviewed, patient examined and no changes noted.


Date H&P Reviewed:  Aug 4, 2023


Time H&P Reviewed:  11:26


Pre-Op Dx


Cataract, Right Eye











MOSHE MARTIN MD              Aug 4, 2023 11:26

## 2023-08-04 NOTE — ANESTHESIA-GENERAL POST-OP
MAC


Patient Condition


Mental Status/LOC:  Same as Preop


Cardiovascular:  Satisfactory


Nausea/Vomiting:  Absent


Respiratory:  Satisfactory


Pain:  Controlled


Complications:  Absent





Post Op Complications


Complications


None





Follow Up Care/Instructions


Patient Instructions


None needed.





Anesthesiology Discharge Order


Discharge Order


Patient is doing well, no complaints, stable vital signs, no apparent adverse 

anesthesia problems.   


No complications reported per nursing.











BRIAN JACQUES CRNA             Aug 4, 2023 13:59

## 2023-09-27 ENCOUNTER — HOSPITAL ENCOUNTER (OUTPATIENT)
Dept: HOSPITAL 75 - REHAB | Age: 68
LOS: 3 days | Discharge: HOME | End: 2023-09-30
Attending: HOSPITALIST
Payer: COMMERCIAL

## 2023-09-27 DIAGNOSIS — M75.111: Primary | ICD-10-CM

## 2023-10-30 ENCOUNTER — HOSPITAL ENCOUNTER (OUTPATIENT)
Dept: HOSPITAL 75 - REHAB | Age: 68
LOS: 1 days | Discharge: HOME | End: 2023-10-31
Attending: HOSPITALIST
Payer: COMMERCIAL
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
## 2023-10-30 DIAGNOSIS — S49.91XD: Primary | ICD-10-CM
